# Patient Record
Sex: FEMALE | Race: WHITE | NOT HISPANIC OR LATINO | Employment: FULL TIME | URBAN - METROPOLITAN AREA
[De-identification: names, ages, dates, MRNs, and addresses within clinical notes are randomized per-mention and may not be internally consistent; named-entity substitution may affect disease eponyms.]

---

## 2017-01-11 ENCOUNTER — PATIENT MESSAGE (OUTPATIENT)
Dept: FAMILY MEDICINE | Facility: CLINIC | Age: 57
End: 2017-01-11

## 2017-01-12 RX ORDER — CLINDAMYCIN AND BENZOYL PEROXIDE 10; 50 MG/G; MG/G
GEL TOPICAL 2 TIMES DAILY
Qty: 50 G | Refills: 5 | Status: SHIPPED | OUTPATIENT
Start: 2017-01-12 | End: 2017-03-27 | Stop reason: ALTCHOICE

## 2017-01-18 ENCOUNTER — TELEPHONE (OUTPATIENT)
Dept: ORTHOPEDICS | Facility: CLINIC | Age: 57
End: 2017-01-18

## 2017-01-18 NOTE — TELEPHONE ENCOUNTER
Called patient regarding appointment needed with Dr. Beltran.  Appointment made for 2-27-17 per patient's request and slip mailed.

## 2017-02-03 DIAGNOSIS — Z12.31 OTHER SCREENING MAMMOGRAM: ICD-10-CM

## 2017-02-27 ENCOUNTER — OFFICE VISIT (OUTPATIENT)
Dept: ORTHOPEDICS | Facility: CLINIC | Age: 57
End: 2017-02-27
Payer: COMMERCIAL

## 2017-02-27 ENCOUNTER — HOSPITAL ENCOUNTER (OUTPATIENT)
Dept: RADIOLOGY | Facility: HOSPITAL | Age: 57
Discharge: HOME | End: 2017-02-27
Attending: ORTHOPAEDIC SURGERY
Payer: COMMERCIAL

## 2017-02-27 VITALS
WEIGHT: 136.25 LBS | DIASTOLIC BLOOD PRESSURE: 80 MMHG | BODY MASS INDEX: 25.72 KG/M2 | HEIGHT: 61 IN | SYSTOLIC BLOOD PRESSURE: 118 MMHG | HEART RATE: 103 BPM

## 2017-02-27 DIAGNOSIS — R52 PAIN: ICD-10-CM

## 2017-02-27 DIAGNOSIS — R52 PAIN: Primary | ICD-10-CM

## 2017-02-27 DIAGNOSIS — M20.42 HAMMER TOE OF LEFT FOOT: Primary | ICD-10-CM

## 2017-02-27 PROCEDURE — 73630 X-RAY EXAM OF FOOT: CPT | Mod: 26,LT,, | Performed by: RADIOLOGY

## 2017-02-27 PROCEDURE — 99999 PR PBB SHADOW E&M-EST. PATIENT-LVL III: CPT | Mod: PBBFAC,,, | Performed by: ORTHOPAEDIC SURGERY

## 2017-02-27 PROCEDURE — 99203 OFFICE O/P NEW LOW 30 MIN: CPT | Mod: S$GLB,,, | Performed by: ORTHOPAEDIC SURGERY

## 2017-02-27 PROCEDURE — 73630 X-RAY EXAM OF FOOT: CPT | Mod: TC,LT

## 2017-02-27 PROCEDURE — 1160F RVW MEDS BY RX/DR IN RCRD: CPT | Mod: S$GLB,,, | Performed by: ORTHOPAEDIC SURGERY

## 2017-02-27 RX ORDER — HYDROCODONE BITARTRATE AND HOMATROPINE METHYLBROMIDE ORAL SOLUTION 5; 1.5 MG/5ML; MG/5ML
LIQUID ORAL
COMMUNITY
Start: 2017-01-26 | End: 2017-03-24

## 2017-02-27 RX ORDER — AMOXICILLIN 875 MG/1
TABLET, FILM COATED ORAL
COMMUNITY
Start: 2017-01-26 | End: 2017-03-24 | Stop reason: ALTCHOICE

## 2017-02-27 NOTE — PROGRESS NOTES
DATE: 2017  PATIENT: Terese Barney    CHIEF COMPLAINT: left 2nd toe pain    HISTORY:  Terese Barney is a 56 y.o. female here for initial evaluation of her left second toe pain.  She has had it for several years and recently saw Dr Goff for this issue and was diagnosed with hammer toe and operative repair was offered.  Her pain initially began with shoe wear but has progressed now to the point that she has pain even without shoes.  She has also developed callous formation on dorsal PIP of left 2nd toe.      She has h/o BL plantar fasciitis and is s/p release in left foot.      PAST MEDICAL/SURGICAL HISTORY:  History reviewed. No pertinent past medical history.  Past Surgical History:   Procedure Laterality Date     SECTION      two    foot surgery      left plantar fascial release    laparascopy      ovarian cysts    left knee surgery      TONSILLECTOMY      TOTAL ABDOMINAL HYSTERECTOMY W/ BILATERAL SALPINGOOPHORECTOMY         Current Medications:   Current Outpatient Prescriptions:     buPROPion (WELLBUTRIN XL) 150 MG TB24 tablet, Take 1 tablet (150 mg total) by mouth once daily., Disp: 30 tablet, Rfl: 11    cevimeline (EVOXAC) 30 mg capsule, 2 (two) times daily. , Disp: , Rfl:     duloxetine (CYMBALTA) 60 MG capsule, Take 1 capsule (60 mg total) by mouth once daily., Disp: 90 capsule, Rfl: 3    ergocalciferol (ERGOCALCIFEROL) 50,000 unit Cap, Take 1 capsule (50,000 Units total) by mouth every 7 days., Disp: 15 capsule, Rfl: 3    fluticasone (FLONASE) 50 mcg/actuation nasal spray, , Disp: , Rfl:     lorazepam (ATIVAN) 1 MG tablet, 1/2-1 tablet daily as needed for severe anxiety, Disp: 30 tablet, Rfl: 5    meloxicam (MOBIC) 15 MG tablet, Take 1 tablet (15 mg total) by mouth once daily., Disp: 30 tablet, Rfl: 3    zolpidem (AMBIEN) 10 mg Tab, Take 1 tablet (10 mg total) by mouth every evening., Disp: 30 tablet, Rfl: 5    amoxicillin (AMOXIL) 875 MG tablet, , Disp: , Rfl:      "clindamycin-benzoyl peroxide (BENZACLIN) gel, Apply topically 2 (two) times daily., Disp: 50 g, Rfl: 5    hydrocodone-homatropine 5-1.5 mg/5 ml (HYCODAN) 5-1.5 mg/5 mL Syrp, , Disp: , Rfl:     Social History:   Social History     Social History    Marital status:      Spouse name: N/A    Number of children: N/A    Years of education: N/A     Occupational History          middle school     Social History Main Topics    Smoking status: Never Smoker    Smokeless tobacco: Not on file    Alcohol use Yes      Comment: rare    Drug use: No    Sexual activity: Not on file     Other Topics Concern    Not on file     Social History Narrative       REVIEW OF SYSTEMS:  Constitution: Negative. Negative for chills, fever and night sweats.   Cardiovascular: Negative for chest pain and syncope.   Respiratory: Negative for cough and shortness of breath.   Gastrointestinal: See HPI. Negative for nausea/vomiting. Negative for abdominal pain.  Genitourinary: See HPI. Negative for discoloration or dysuria.  Skin: Negative for dry skin, itching and rash.   Hematologic/Lymphatic: Negative for bleeding problem. Does not bruise/bleed easily.   Musculoskeletal: Negative for falls and muscle weakness.   Neurological: See HPI. No seizures.   Endocrine: Negative for polydipsia, polyphagia and polyuria.   Allergic/Immunologic: Negative for hives and persistent infections.    PHYSICAL EXAMINATION:    /80 (BP Location: Right arm, Patient Position: Sitting, BP Method: Automatic)  Pulse 103  Ht 5' 1" (1.549 m)  Wt 61.8 kg (136 lb 3.9 oz)  BMI 25.74 kg/m2    General: The patient is a 56 y.o. female in no apparent distress, the patient is orientatied to person, place and time.   Psych: Normal mood and affect  HEENT:  NCAT, sclera nonicteric  Lungs:  Respirations are equal and unlabored.  CV:  2+ bilateral upper and lower extremity pulses.  Skin:  Intact throughout.  Musculoskeletal: No pain with the range of " motion of the bilateral hips. No trochanteric tenderness to palpation. No pain with range of motion about the bilateral knees.    Left Foot:  - 2nd hammer toe: flexible with callus on dorsal PIP joint  - ttp to PIP 2nd toe  - full ROM without pain  - NVI        IMAGING:     Radiographs of the left foot were ordered and personally reviewed with the patient today.   - 2nd hammer toe    ASSESSMENT/PLAN:    Terese was seen today for foot pain.    Diagnoses and all orders for this visit:    Hammer toe of left foot, 2nd      No Follow-up on file.    Discussed operative intervention vs conservative management.  Given that she has had symptoms for an extended period of time and she cannot wear shoes without pain, she would like to proceed with surgery.  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      I have personally taken the history and examined this patient and agree with the residents note as stated above.

## 2017-03-01 ENCOUNTER — DOCUMENTATION ONLY (OUTPATIENT)
Dept: ORTHOPEDICS | Facility: CLINIC | Age: 57
End: 2017-03-01

## 2017-03-01 NOTE — PROGRESS NOTES
Left message for patient to return call.  Regarding change provider 03/24/17 @ 1415 & 04/10/17 @ 1000/Mailed, (RT).

## 2017-03-09 DIAGNOSIS — M20.42 HAMMER TOE OF LEFT FOOT: Primary | ICD-10-CM

## 2017-03-21 ENCOUNTER — PATIENT MESSAGE (OUTPATIENT)
Dept: ORTHOPEDICS | Facility: CLINIC | Age: 57
End: 2017-03-21

## 2017-03-24 ENCOUNTER — OFFICE VISIT (OUTPATIENT)
Dept: ORTHOPEDICS | Facility: CLINIC | Age: 57
End: 2017-03-24
Payer: COMMERCIAL

## 2017-03-24 VITALS
HEIGHT: 61 IN | DIASTOLIC BLOOD PRESSURE: 79 MMHG | SYSTOLIC BLOOD PRESSURE: 119 MMHG | WEIGHT: 137 LBS | RESPIRATION RATE: 18 BRPM | BODY MASS INDEX: 25.86 KG/M2 | HEART RATE: 98 BPM

## 2017-03-24 DIAGNOSIS — M20.42 HAMMER TOE OF LEFT FOOT: Primary | ICD-10-CM

## 2017-03-24 PROCEDURE — 99999 PR PBB SHADOW E&M-EST. PATIENT-LVL III: CPT | Mod: PBBFAC,,, | Performed by: PHYSICIAN ASSISTANT

## 2017-03-24 PROCEDURE — 99499 UNLISTED E&M SERVICE: CPT | Mod: S$GLB,,, | Performed by: PHYSICIAN ASSISTANT

## 2017-03-24 RX ORDER — HYDROCODONE BITARTRATE AND ACETAMINOPHEN 5; 325 MG/1; MG/1
1 TABLET ORAL
Qty: 60 TABLET | Refills: 0 | Status: SHIPPED | OUTPATIENT
Start: 2017-03-24 | End: 2017-03-24

## 2017-03-24 RX ORDER — PROMETHAZINE HYDROCHLORIDE 12.5 MG/1
12.5 TABLET ORAL EVERY 6 HOURS PRN
Qty: 20 TABLET | Refills: 0 | Status: SHIPPED | OUTPATIENT
Start: 2017-03-24 | End: 2018-06-29

## 2017-03-24 NOTE — H&P
Subjective:      Patient ID: Terese Barney is a 56 y.o. female.    Chief Complaint: No chief complaint on file.    Terese is a 56 y.o. female here today for a pre-operative visit in preparation for a   REPAIR-HAMMER TOE 2nd toe Left     to be performed by   on 2017.  she was last seen and treated in the clinic on 2017.  she has since seen their primary care for optimization of the chronic health concerns.  There has been no significant change in their past medical or orthopedic status since the previous visit.  No fever, chills, malaise or unexplained weight change.     No past medical history on file.  Past Surgical History:   Procedure Laterality Date     SECTION      two    foot surgery      left plantar fascial release    laparascopy      ovarian cysts    left knee surgery      TONSILLECTOMY      TOTAL ABDOMINAL HYSTERECTOMY W/ BILATERAL SALPINGOOPHORECTOMY       Social History     Occupational History          middle school     Social History Main Topics    Smoking status: Never Smoker    Smokeless tobacco: Not on file    Alcohol use Yes      Comment: rare    Drug use: No    Sexual activity: Not on file      ROS  Current Outpatient Prescriptions on File Prior to Visit   Medication Sig Dispense Refill    amoxicillin (AMOXIL) 875 MG tablet       buPROPion (WELLBUTRIN XL) 150 MG TB24 tablet Take 1 tablet (150 mg total) by mouth once daily. 30 tablet 11    cevimeline (EVOXAC) 30 mg capsule 2 (two) times daily.       clindamycin-benzoyl peroxide (BENZACLIN) gel Apply topically 2 (two) times daily. 50 g 5    duloxetine (CYMBALTA) 60 MG capsule Take 1 capsule (60 mg total) by mouth once daily. 90 capsule 3    ergocalciferol (ERGOCALCIFEROL) 50,000 unit Cap Take 1 capsule (50,000 Units total) by mouth every 7 days. 15 capsule 3    fluticasone (FLONASE) 50 mcg/actuation nasal spray       hydrocodone-homatropine 5-1.5 mg/5 ml (HYCODAN) 5-1.5 mg/5 mL  Syrp       lorazepam (ATIVAN) 1 MG tablet 1/2-1 tablet daily as needed for severe anxiety 30 tablet 5    meloxicam (MOBIC) 15 MG tablet Take 1 tablet (15 mg total) by mouth once daily. 30 tablet 3    zolpidem (AMBIEN) 10 mg Tab Take 1 tablet (10 mg total) by mouth every evening. 30 tablet 5     No current facility-administered medications on file prior to visit.      Review of patient's allergies indicates:  No Known Allergies      Objective:      There were no vitals filed for this visit.  Ortho Exam     Gen: WD, WN in NAD   HEENT: NC/AT   Heart: RR   Resp: unlabored breathing   Skin: intact, no lesions pertinent to the surgery site    Assessment:       1. Hammer toe of left foot, 2nd          Plan:       Surgical intervention per .

## 2017-03-24 NOTE — PROGRESS NOTES
Terese is a 56 y.o. female here today for a pre-operative visit in preparation for a   REPAIR-HAMMER TOE 2nd toe Left     to be performed by   on 03/28/2017.  she was last seen and treated in the clinic on 02/27/2017.  she has since seen their primary care for optimization of the chronic health concerns.  There has been no significant change in their past medical or orthopedic status since the previous visit.  No fever, chills, malaise or unexplained weight change.     Allergies, medications, past medical history and past surgical history were reviewed with the patient.     Physical examination was performed.     Consents were signed.     Pre, cesar, and post operative procedure and expectations were discussed.  Questions were answered. The patient has been educated and is ready to proceed with surgery.  Approximately 30 minutes was spent discussing surgical outcomes, plans, procedures, pre, cesar, and post operative expectations and care.  The patient will contact us if the have any questions, concerns, and changes in their medical condition prior to surgery.

## 2017-03-27 ENCOUNTER — TELEPHONE (OUTPATIENT)
Dept: ORTHOPEDICS | Facility: CLINIC | Age: 57
End: 2017-03-27

## 2017-03-27 ENCOUNTER — PATIENT MESSAGE (OUTPATIENT)
Dept: FAMILY MEDICINE | Facility: CLINIC | Age: 57
End: 2017-03-27

## 2017-03-27 RX ORDER — CLINDAMYCIN PHOSPHATE AND BENZOYL PEROXIDE 10; 50 MG/G; MG/G
GEL TOPICAL NIGHTLY
Qty: 45 G | Refills: 11 | Status: SHIPPED | OUTPATIENT
Start: 2017-03-27 | End: 2017-08-25 | Stop reason: SDUPTHER

## 2017-03-27 NOTE — PRE-PROCEDURE INSTRUCTIONS
Spoke with Patient.  NPO, medication, and pre-op instructions reviewed.  Has PONV with every Anesthesia.  Has motion sickness.  Verbalized understanding of instructions.

## 2017-03-27 NOTE — TELEPHONE ENCOUNTER
----- Message from Narinder Stanley sent at 3/27/2017  4:26 PM CDT -----  Contact: self/home  Pt would like to reschedule her post op appt.

## 2017-03-28 ENCOUNTER — HOSPITAL ENCOUNTER (OUTPATIENT)
Facility: HOSPITAL | Age: 57
Discharge: HOME | End: 2017-03-28
Attending: ORTHOPAEDIC SURGERY | Admitting: ORTHOPAEDIC SURGERY
Payer: COMMERCIAL

## 2017-03-28 ENCOUNTER — SURGERY (OUTPATIENT)
Age: 57
End: 2017-03-28

## 2017-03-28 ENCOUNTER — ANESTHESIA (OUTPATIENT)
Dept: SURGERY | Facility: HOSPITAL | Age: 57
End: 2017-03-28
Payer: COMMERCIAL

## 2017-03-28 ENCOUNTER — ANESTHESIA EVENT (OUTPATIENT)
Dept: SURGERY | Facility: HOSPITAL | Age: 57
End: 2017-03-28
Payer: COMMERCIAL

## 2017-03-28 DIAGNOSIS — M20.42 HAMMER TOE OF LEFT FOOT: ICD-10-CM

## 2017-03-28 PROCEDURE — 25000003 PHARM REV CODE 250: Performed by: ORTHOPAEDIC SURGERY

## 2017-03-28 PROCEDURE — D9220A PRA ANESTHESIA: Mod: ANES,,, | Performed by: ANESTHESIOLOGY

## 2017-03-28 PROCEDURE — 63600175 PHARM REV CODE 636 W HCPCS: Performed by: NURSE ANESTHETIST, CERTIFIED REGISTERED

## 2017-03-28 PROCEDURE — 37000009 HC ANESTHESIA EA ADD 15 MINS: Performed by: ORTHOPAEDIC SURGERY

## 2017-03-28 PROCEDURE — D9220A PRA ANESTHESIA: Mod: CRNA,,, | Performed by: NURSE ANESTHETIST, CERTIFIED REGISTERED

## 2017-03-28 PROCEDURE — 27201423 OPTIME MED/SURG SUP & DEVICES STERILE SUPPLY: Performed by: ORTHOPAEDIC SURGERY

## 2017-03-28 PROCEDURE — 28285 REPAIR OF HAMMERTOE: CPT | Mod: T1,,, | Performed by: ORTHOPAEDIC SURGERY

## 2017-03-28 PROCEDURE — 71000033 HC RECOVERY, INTIAL HOUR: Performed by: ORTHOPAEDIC SURGERY

## 2017-03-28 PROCEDURE — 36000707: Performed by: ORTHOPAEDIC SURGERY

## 2017-03-28 PROCEDURE — 71000015 HC POSTOP RECOV 1ST HR: Performed by: ORTHOPAEDIC SURGERY

## 2017-03-28 PROCEDURE — 25000003 PHARM REV CODE 250: Performed by: NURSE ANESTHETIST, CERTIFIED REGISTERED

## 2017-03-28 PROCEDURE — 36000706: Performed by: ORTHOPAEDIC SURGERY

## 2017-03-28 PROCEDURE — 71000039 HC RECOVERY, EACH ADD'L HOUR: Performed by: ORTHOPAEDIC SURGERY

## 2017-03-28 PROCEDURE — 27200671 HC STIMUCATH NEEDLE/ CATHETER: Performed by: ANESTHESIOLOGY

## 2017-03-28 PROCEDURE — 37000008 HC ANESTHESIA 1ST 15 MINUTES: Performed by: ORTHOPAEDIC SURGERY

## 2017-03-28 RX ORDER — ONDANSETRON 2 MG/ML
INJECTION INTRAMUSCULAR; INTRAVENOUS
Status: DISCONTINUED | OUTPATIENT
Start: 2017-03-28 | End: 2017-03-28

## 2017-03-28 RX ORDER — SODIUM CHLORIDE 9 MG/ML
INJECTION, SOLUTION INTRAVENOUS CONTINUOUS
Status: DISCONTINUED | OUTPATIENT
Start: 2017-03-28 | End: 2017-03-30 | Stop reason: HOSPADM

## 2017-03-28 RX ORDER — OXYCODONE AND ACETAMINOPHEN 10; 325 MG/1; MG/1
1 TABLET ORAL EVERY 4 HOURS PRN
Qty: 30 TABLET | Refills: 0 | Status: SHIPPED | OUTPATIENT
Start: 2017-03-28 | End: 2017-03-29

## 2017-03-28 RX ORDER — FENTANYL CITRATE 50 UG/ML
INJECTION, SOLUTION INTRAMUSCULAR; INTRAVENOUS
Status: DISCONTINUED | OUTPATIENT
Start: 2017-03-28 | End: 2017-03-28

## 2017-03-28 RX ORDER — ONDANSETRON 8 MG/1
8 TABLET, ORALLY DISINTEGRATING ORAL EVERY 6 HOURS PRN
Qty: 30 TABLET | Refills: 0 | Status: SHIPPED | OUTPATIENT
Start: 2017-03-28 | End: 2018-06-29

## 2017-03-28 RX ORDER — DEXAMETHASONE SODIUM PHOSPHATE 4 MG/ML
INJECTION, SOLUTION INTRA-ARTICULAR; INTRALESIONAL; INTRAMUSCULAR; INTRAVENOUS; SOFT TISSUE
Status: DISCONTINUED | OUTPATIENT
Start: 2017-03-28 | End: 2017-03-28

## 2017-03-28 RX ORDER — PROPOFOL 10 MG/ML
VIAL (ML) INTRAVENOUS CONTINUOUS PRN
Status: DISCONTINUED | OUTPATIENT
Start: 2017-03-28 | End: 2017-03-28

## 2017-03-28 RX ORDER — PROPOFOL 10 MG/ML
VIAL (ML) INTRAVENOUS
Status: DISCONTINUED | OUTPATIENT
Start: 2017-03-28 | End: 2017-03-28

## 2017-03-28 RX ORDER — ONDANSETRON 8 MG/1
8 TABLET, ORALLY DISINTEGRATING ORAL EVERY 8 HOURS PRN
Status: DISCONTINUED | OUTPATIENT
Start: 2017-03-28 | End: 2017-03-30 | Stop reason: HOSPADM

## 2017-03-28 RX ORDER — OXYCODONE HYDROCHLORIDE 5 MG/1
10 TABLET ORAL EVERY 4 HOURS PRN
Status: DISCONTINUED | OUTPATIENT
Start: 2017-03-28 | End: 2017-03-30 | Stop reason: HOSPADM

## 2017-03-28 RX ORDER — LIDOCAINE HCL/PF 100 MG/5ML
SYRINGE (ML) INTRAVENOUS
Status: DISCONTINUED | OUTPATIENT
Start: 2017-03-28 | End: 2017-03-28

## 2017-03-28 RX ORDER — MIDAZOLAM HYDROCHLORIDE 1 MG/ML
INJECTION, SOLUTION INTRAMUSCULAR; INTRAVENOUS
Status: DISCONTINUED | OUTPATIENT
Start: 2017-03-28 | End: 2017-03-28

## 2017-03-28 RX ORDER — OXYCODONE HYDROCHLORIDE 5 MG/1
5 TABLET ORAL EVERY 4 HOURS PRN
Status: DISCONTINUED | OUTPATIENT
Start: 2017-03-28 | End: 2017-03-30 | Stop reason: HOSPADM

## 2017-03-28 RX ORDER — LIDOCAINE HYDROCHLORIDE 10 MG/ML
1 INJECTION INFILTRATION; PERINEURAL ONCE
Status: COMPLETED | OUTPATIENT
Start: 2017-03-28 | End: 2017-03-28

## 2017-03-28 RX ADMIN — Medication 1 G: at 12:03

## 2017-03-28 RX ADMIN — LIDOCAINE HYDROCHLORIDE 1 ML: 10 INJECTION, SOLUTION EPIDURAL; INFILTRATION; INTRACAUDAL; PERINEURAL at 10:03

## 2017-03-28 RX ADMIN — PROPOFOL 150 MCG/KG/MIN: 10 INJECTION, EMULSION INTRAVENOUS at 12:03

## 2017-03-28 RX ADMIN — PROPOFOL 40 MG: 10 INJECTION, EMULSION INTRAVENOUS at 12:03

## 2017-03-28 RX ADMIN — SODIUM CHLORIDE: 0.9 INJECTION, SOLUTION INTRAVENOUS at 10:03

## 2017-03-28 RX ADMIN — PROPOFOL 50 MG: 10 INJECTION, EMULSION INTRAVENOUS at 12:03

## 2017-03-28 RX ADMIN — FENTANYL CITRATE 25 MCG: 50 INJECTION, SOLUTION INTRAMUSCULAR; INTRAVENOUS at 12:03

## 2017-03-28 RX ADMIN — LIDOCAINE HYDROCHLORIDE 50 MG: 20 INJECTION, SOLUTION INTRAVENOUS at 12:03

## 2017-03-28 RX ADMIN — MIDAZOLAM HYDROCHLORIDE 2 MG: 1 INJECTION, SOLUTION INTRAMUSCULAR; INTRAVENOUS at 12:03

## 2017-03-28 RX ADMIN — DEXAMETHASONE SODIUM PHOSPHATE 8 MG: 4 INJECTION, SOLUTION INTRAMUSCULAR; INTRAVENOUS at 12:03

## 2017-03-28 RX ADMIN — ONDANSETRON 4 MG: 2 INJECTION INTRAMUSCULAR; INTRAVENOUS at 12:03

## 2017-03-28 NOTE — ANESTHESIA PREPROCEDURE EVALUATION
03/28/2017  Terese Barney is a 56 y.o., female.    [facility] Anesthesia Evaluation    I have reviewed the Patient Summary Reports.    I have reviewed the Nursing Notes.   I have reviewed the Medications.     Review of Systems  Anesthesia Hx:  Hx of Anesthetic complications PONV History of prior surgery of interest to airway management or planning:  Denies Personal Hx of Anesthesia complications.   Social:  Non-Smoker, Alcohol Use    Hematology/Oncology:     Oncology Normal     EENT/Dental:EENT/Dental Normal   Cardiovascular:    Denies Angina.    Pulmonary:   Denies Shortness of breath.    Renal/:  Renal/ Normal     Hepatic/GI:  Hepatic/GI Normal    Psych:   Psychiatric History anxiety depression          Physical Exam  General:  Well nourished    Airway/Jaw/Neck:  Airway Findings: Mouth Opening: Normal Tongue: Normal  Mallampati: II  TM Distance: Normal, at least 6 cm  Jaw/Neck Findings:  Neck ROM: Normal ROM      Dental:  Dental Findings: In tact   Chest/Lungs:  Chest/Lungs Findings: Normal Respiratory Rate     Heart/Vascular:  Heart Findings: Rate: Normal        Mental Status:  Mental Status Findings:  Cooperative, Alert and Oriented         Anesthesia Plan  Type of Anesthesia, risks & benefits discussed:  Anesthesia Type:  regional, MAC  Patient's Preference:   Intra-op Monitoring Plan: standard ASA monitors  Intra-op Monitoring Plan Comments:   Post Op Pain Control Plan:   Post Op Pain Control Plan Comments:   Induction:   IV  Beta Blocker:  Patient is not currently on a Beta-Blocker (No further documentation required).       Informed Consent: Patient understands risks and agrees with Anesthesia plan.  Questions answered. Anesthesia consent signed with patient.  ASA Score: 1     Day of Surgery Review of History & Physical:    H&P update referred to the surgeon.         Ready For Surgery From Anesthesia  Perspective.

## 2017-03-28 NOTE — ANESTHESIA POSTPROCEDURE EVALUATION
"Anesthesia Post Evaluation    Patient: Terese Barney    Procedure(s) Performed: Procedure(s) (LRB):  REPAIR-HAMMER TOE 2nd toe (Left)    Final Anesthesia Type: regional  Patient location during evaluation: PACU  Patient participation: Yes- Able to Participate  Level of consciousness: awake and alert and oriented  Post-procedure vital signs: reviewed and stable  Pain management: adequate  Airway patency: patent  PONV status at discharge: No PONV  Anesthetic complications: no      Cardiovascular status: blood pressure returned to baseline and hemodynamically stable  Respiratory status: unassisted, spontaneous ventilation and room air  Hydration status: euvolemic  Follow-up not needed.        Visit Vitals    /70    Pulse 64    Temp 36.7 °C (98.1 °F) (Temporal)    Resp 16    Ht 5' 1" (1.549 m)    Wt 62.1 kg (137 lb)    SpO2 100%    Breastfeeding No    BMI 25.89 kg/m2       Pain/Veronique Score: Pain Assessment Performed: Yes (3/28/2017  2:00 PM)  Presence of Pain: denies (3/28/2017  2:30 PM)  Veronique Score: 10 (3/28/2017  2:30 PM)      "

## 2017-03-28 NOTE — TRANSFER OF CARE
"Anesthesia Transfer of Care Note    Patient: Terese Barney    Procedure(s) Performed: Procedure(s) (LRB):  REPAIR-HAMMER TOE 2nd toe (Left)    Patient location: PACU    Anesthesia Type: regional    Transport from OR: Transported from OR on 6-10 L/min O2 by face mask with adequate spontaneous ventilation    Post pain: adequate analgesia    Post assessment: no apparent anesthetic complications and tolerated procedure well    Post vital signs: stable    Level of consciousness: sedated    Nausea/Vomiting: no nausea/vomiting    Complications: none          Last vitals:   Visit Vitals    BP 98/60 (BP Location: Left arm, Patient Position: Lying, BP Method: Automatic)    Pulse 77    Temp 36.5 °C (97.7 °F) (Tympanic)    Resp 18    Ht 5' 1" (1.549 m)    Wt 62.1 kg (137 lb)    SpO2 99%    Breastfeeding No    BMI 25.89 kg/m2     "

## 2017-03-28 NOTE — BRIEF OP NOTE
[facility]  Brief Operative Note     SUMMARY     Surgery Date: 3/28/2017     Surgeon(s) and Role:     * Oscar Beltran MD - Primary     * Michael Joseph Casale, MD - Resident - Assisting        Pre-op Diagnosis:  Hammer toe of left foot [M20.42]    Post-op Diagnosis:  Post-Op Diagnosis Codes:     * Hammer toe of left foot [M20.42]    Procedure(s) (LRB):  REPAIR-HAMMER TOE 2nd toe (Left)    Anesthesia: Choice    Description of the findings of the procedure: See Op Note    Estimated Blood Loss: Minimal         Specimens:   Specimen     None          Discharge Note    SUMMARY     Admit Date: 3/28/2017    Discharge Date and Time:  03/28/2017 1:02 PM    Hospital Course (synopsis of major diagnoses, care, treatment, and services provided during the course of the hospital stay): The patient was admitted for an outpatient procedure and tolerated the procedure well with no complications.     Final Diagnosis: Post-Op Diagnosis Codes:     * Hammer toe of left foot [M20.42]    Disposition: Home or Self Care    Follow Up/Patient Instructions:     Medications:  Reconciled Home Medications:   Current Discharge Medication List      START taking these medications    Details   ondansetron (ZOFRAN-ODT) 8 MG TbDL Take 1 tablet (8 mg total) by mouth every 6 (six) hours as needed.  Qty: 30 tablet, Refills: 0      oxycodone-acetaminophen (PERCOCET)  mg per tablet Take 1 tablet by mouth every 4 (four) hours as needed for Pain.  Qty: 30 tablet, Refills: 0         CONTINUE these medications which have NOT CHANGED    Details   buPROPion (WELLBUTRIN XL) 150 MG TB24 tablet Take 1 tablet (150 mg total) by mouth once daily.  Qty: 30 tablet, Refills: 11    Associated Diagnoses: Anxiety and depression      cevimeline (EVOXAC) 30 mg capsule Take 30 mg by mouth 2 (two) times daily.       clindamycin-benzoyl peroxide gel Apply topically every evening.  Qty: 45 g, Refills: 11      duloxetine (CYMBALTA) 60 MG capsule Take  1 capsule (60 mg total) by mouth once daily.  Qty: 90 capsule, Refills: 3    Associated Diagnoses: Anxiety and depression      lorazepam (ATIVAN) 1 MG tablet 1/2-1 tablet daily as needed for severe anxiety  Qty: 30 tablet, Refills: 5    Associated Diagnoses: Anxiety and depression      meloxicam (MOBIC) 15 MG tablet Take 1 tablet (15 mg total) by mouth once daily.  Qty: 30 tablet, Refills: 3    Associated Diagnoses: Chronic back pain, unspecified back location, unspecified back pain laterality      zolpidem (AMBIEN) 10 mg Tab Take 1 tablet (10 mg total) by mouth every evening.  Qty: 30 tablet, Refills: 5    Associated Diagnoses: Insomnia, unspecified type      ergocalciferol (ERGOCALCIFEROL) 50,000 unit Cap Take 1 capsule (50,000 Units total) by mouth every 7 days.  Qty: 15 capsule, Refills: 3    Associated Diagnoses: Vitamin D deficiency      fluticasone (FLONASE) 50 mcg/actuation nasal spray 1 spray by Nasal route daily as needed.       promethazine (PHENERGAN) 12.5 MG Tab Take 1 tablet (12.5 mg total) by mouth every 6 (six) hours as needed.  Qty: 20 tablet, Refills: 0             Discharge Procedure Orders  Diet general     Keep surgical extremity elevated     No driving, operating heavy equipment or signing legal documents while taking pain medication     Call MD for:  temperature >100.4     Call MD for:  persistent nausea and vomiting     Call MD for:  severe uncontrolled pain     Call MD for:  difficulty breathing, headache or visual disturbances     Call MD for:  redness, tenderness, or signs of infection (pain, swelling, redness, odor or green/yellow discharge around incision site)     Call MD for:  hives     Call MD for:  persistent dizziness or light-headedness     Call MD for:  extreme fatigue     Weight bearing as tolerated     Change dressing (specify)   Order Comments: Keep dressing clean, dry, and intact for 3-5 days. Then, may remove and re-dress with gauze and an ACE wrap.       Follow-up  Information     Follow up with Tanisha Beasley PA-C. Go on 4/10/2017.    Specialty:  Orthopedic Surgery    Why:  For wound re-check    Contact information:    [address] QU[address] PRIYA  [facility] [id number] [phone]

## 2017-03-28 NOTE — IP AVS SNAPSHOT
[facility]  [address]  [facility] [address]  Phone: [phone]           Patient Discharge Instructions     Our goal is to set you up for success. This packet includes information on your condition, medications, and your home care. It will help you to care for yourself so you don't get sicker and need to go back to the hospital.     Please ask your nurse if you have any questions.        There are many details to remember when preparing to leave the hospital. Here is what you will need to do:    1. Take your medicine. If you are prescribed medications, review your Medication List in the following pages. You may have new medications to  at the pharmacy and others that you'll need to stop taking. Review the instructions for how and when to take your medications. Talk with your doctor or nurses if you are unsure of what to do.     2. Go to your follow-up appointments. Specific follow-up information is listed in the following pages. Your may be contacted by a transition nurse or clinical provider about future appointments. Be sure we have all of the phone numbers to reach you, if needed. Please contact your provider's office if you are unable to make an appointment.     3. Watch for warning signs. Your doctor or nurse will give you detailed warning signs to watch for and when to call for assistance. These instructions may also include educational information about your condition. If you experience any of warning signs to your health, call your doctor.               [facility]  Unless otherwise directed by your provider, please contact [facility], our nurse care line that is available for 24/7 assistance.     [phone] (toll-free)    Registered nurses in the [facility] provide clinical advisement, health education, appointment booking, and other advisory services.                    ** Verify the list of medication(s) below is accurate and up  to date. Carry this with you in case of emergency. If your medications have changed, please notify your healthcare provider.             Medication List      START taking these medications        Additional Info                      ondansetron 8 MG Tbdl   Commonly known as:  ZOFRAN-ODT   Quantity:  30 tablet   Refills:  0   Dose:  8 mg    Instructions:  Take 1 tablet (8 mg total) by mouth every 6 (six) hours as needed.     Begin Date    AM    Noon    PM    Bedtime       oxycodone-acetaminophen  mg per tablet   Commonly known as:  PERCOCET   Quantity:  30 tablet   Refills:  0   Dose:  1 tablet    Instructions:  Take 1 tablet by mouth every 4 (four) hours as needed for Pain.     Begin Date    AM    Noon    PM    Bedtime         CHANGE how you take these medications        Additional Info                      buPROPion 150 MG TB24 tablet   Commonly known as:  WELLBUTRIN XL   Quantity:  30 tablet   Refills:  11   Dose:  150 mg   What changed:  when to take this    Instructions:  Take 1 tablet (150 mg total) by mouth once daily.     Begin Date    AM    Noon    PM    Bedtime       duloxetine 60 MG capsule   Commonly known as:  CYMBALTA   Quantity:  90 capsule   Refills:  3   Dose:  60 mg   What changed:  when to take this    Instructions:  Take 1 capsule (60 mg total) by mouth once daily.     Begin Date    AM    Noon    PM    Bedtime       ergocalciferol 50,000 unit Cap   Commonly known as:  ERGOCALCIFEROL   Quantity:  15 capsule   Refills:  3   Dose:  29932 Units   What changed:  additional instructions    Instructions:  Take 1 capsule (50,000 Units total) by mouth every 7 days.     Begin Date    AM    Noon    PM    Bedtime       meloxicam 15 MG tablet   Commonly known as:  MOBIC   Quantity:  30 tablet   Refills:  3   Dose:  15 mg   What changed:    - when to take this  - reasons to take this    Instructions:  Take 1 tablet (15 mg total) by mouth once daily.     Begin Date    AM    Noon    PM    Bedtime        zolpidem 10 mg Tab   Commonly known as:  AMBIEN   Quantity:  30 tablet   Refills:  5   Dose:  10 mg   What changed:  when to take this    Instructions:  Take 1 tablet (10 mg total) by mouth every evening.     Begin Date    AM    Noon    PM    Bedtime         CONTINUE taking these medications        Additional Info                      cevimeline 30 mg capsule   Commonly known as:  EVOXAC   Refills:  0   Dose:  30 mg    Instructions:  Take 30 mg by mouth 2 (two) times daily.     Begin Date    AM    Noon    PM    Bedtime       clindamycin-benzoyl peroxide gel   Quantity:  45 g   Refills:  11    Instructions:  Apply topically every evening.     Begin Date    AM    Noon    PM    Bedtime       fluticasone 50 mcg/actuation nasal spray   Commonly known as:  FLONASE   Refills:  0   Dose:  1 spray    Instructions:  1 spray by Nasal route daily as needed.     Begin Date    AM    Noon    PM    Bedtime       lorazepam 1 MG tablet   Commonly known as:  ATIVAN   Quantity:  30 tablet   Refills:  5    Instructions:  1/2-1 tablet daily as needed for severe anxiety     Begin Date    AM    Noon    PM    Bedtime       promethazine 12.5 MG Tab   Commonly known as:  PHENERGAN   Quantity:  20 tablet   Refills:  0   Dose:  12.5 mg    Instructions:  Take 1 tablet (12.5 mg total) by mouth every 6 (six) hours as needed.     Begin Date    AM    Noon    PM    Bedtime            Where to Get Your Medications      You can get these medications from any pharmacy     Bring a paper prescription for each of these medications     ondansetron 8 MG Tbdl    oxycodone-acetaminophen  mg per tablet                  Please bring to all follow up appointments:    1. A copy of your discharge instructions.  2. All medicines you are currently taking in their original bottles.  3. Identification and insurance card.    Please arrive 15 minutes ahead of scheduled appointment time.    Please call 24 hours in advance if you must reschedule your appointment  and/or time.        Your Scheduled Appointments     Apr 10, 2017 10:00 AM CDT   Post OP with Oscar Beltran MD   [facility]    [address]  [facility] [id number] [phone]              Follow-up Information     Follow up with Tanisha Beasley PA-C. Go on 4/10/2017.    Specialty:  Orthopedic Surgery    Why:  For wound re-check    Contact information:    [address]  [facility] [id number] [phone]          Discharge Instructions     Future Orders    Call MD for:  difficulty breathing, headache or visual disturbances     Call MD for:  extreme fatigue     Call MD for:  hives     Call MD for:  persistent dizziness or light-headedness     Call MD for:  persistent nausea and vomiting     Call MD for:  redness, tenderness, or signs of infection (pain, swelling, redness, odor or green/yellow discharge around incision site)     Call MD for:  severe uncontrolled pain     Call MD for:  temperature >100.4     Change dressing (specify)     Comments:    Keep dressing clean, dry, and intact for 3-5 days. Then, may remove and re-dress with gauze and an ACE wrap.    Diet general     Questions:    Total calories:      Fat restriction, if any:      Protein restriction, if any:      Na restriction, if any:      Fluid restriction:      Additional restrictions:      Keep surgical extremity elevated     No driving, operating heavy equipment or signing legal documents while taking pain medication     Weight bearing as tolerated         Primary Diagnosis     Your primary diagnosis was:  Hammer Toe Of Left Foot      Admission Information     Date & Time Provider Department [facility]    3/28/2017  9:10 AM Oscar Beltran MD [facility] [id number]       Admisson Diagnosis: Hammer toe of left foot, Hammer toe of left foot      Care Providers     Provider Role Specialty Primary office phone    Oscar Beltran MD Attending Provider Orthopedic Surgery [phone]     "Oscar Beltran MD Surgeon  Orthopedic Surgery [phone]      Your Vitals Were     BP Pulse Temp Resp Height Weight    98/60 (BP Location: Left arm, Patient Position: Lying, BP Method: Automatic) 77 97.7 °F (36.5 °C) (Tympanic) 18 5' 1" (1.549 m) 62.1 kg (137 lb)    SpO2 BMI             99% 25.89 kg/m2         Recent Lab Values        11/8/2016                           8:50 AM           A1C 5.5           Comment for A1C at  8:50 AM on 11/8/2016:  According to ADA guidelines, hemoglobin A1C <7.0% represents  optimal control in non-pregnant diabetic patients.  Different  metrics may apply to specific populations.   Standards of Medical Care in Diabetes - 2016.  For the purpose of screening for the presence of diabetes:  <5.7%     Consistent with the absence of diabetes  5.7-6.4%  Consistent with increasing risk for diabetes   (prediabetes)  >or=6.5%  Consistent with diabetes  Currently no consensus exists for use of hemoglobin A1C  for diagnosis of diabetes for children.        Allergies as of 3/28/2017        Reactions    Codeine Nausea And Vomiting      Advance Directives     An advance directive is a document which, in the event you are no longer able to make decisions for yourself, tells your healthcare team what kind of treatment you do or do not want to receive, or who you would like to make those decisions for you.  If you do not currently have an advance directive, [facility] encourages you to create one.  For more information call:  [phone]), [phone] ([phone]), or log on to [email or web address].        Translation Services Information     ATTENTION: Language assistance services are available, free of charge. Please call [phone].    ATENCIÓN: Si habla español, tiene a gilbert disposición servicios gratuitos de asistencia lingüística. Llame al [phone].     CHÚ Ý: N?u b?n nói Ti?ng Vi?t, có các d?ch v? h? tr? ngôn ng? mi?n phí dành cho b?n. G?i s? [phone].   "       [facility] complies with applicable Federal civil rights laws and does not discriminate on the basis of race, color, national origin, age, disability, or sex.

## 2017-03-28 NOTE — DISCHARGE INSTRUCTIONS
Foot Surgery: Curled Fifth Toe  Hammertoes can make walking painful. With hammertoes, one or more toes curl or bend abnormally. This can be caused by an inherited muscle problem, an abnormal bone length, or poor foot mechanics. The affected joints can rub inside shoes, causing corns (buildups of dead skin).    Curled fifth toe  A curled fifth toe is most often inherited. When the fifth toe curls inward, it moves under the next toe. Then the nail of the curled toe starts to face outward. As a result, you may bear weight on the side of your toe instead of the bottom. This can cause corns and painful nails.  There are many nonsurgical treatments available. But if these are not effective, surgery is a choice.    Derotation arthroplasty  A wedge of skin and a section of bone are removed to help straighten (derotate) the toe. You can bear weight on your foot right after surgery. In some cases, you may need to wear a bandage, splint, and surgical shoe for a few weeks. When healed, the bones become connected with scar tissue.  Date Last Reviewed: 10/15/2015  © 7631-2595 [employer]. [address] [place] [address], [place] [address]. All rights reserved. This information is not intended as a substitute for professional medical care. Always follow your healthcare professional's instructions.

## 2017-03-28 NOTE — PLAN OF CARE
Pt to pod _21__ asleep and easily aroused by voiced. Pt oriented to immediate surroundings and situation and verbalized understanding, acceptance and satisfaction with clinical encounter.Patient arrived to unit via stretcher from___or____. Denies pain. Denies shortness of breath. Monitoring equipment placed on pt with noted VSS HRR, tele strip obtained.IVF patent per gravity and without any s/s ofnfiltration noted. Oxygen in use @ 3L VM___ Bed in lowest position. Brakes locked Call light within reach. Side rails up x2. xr @ bs with xr of left lower ext. Dressing cdi. Stabilization boot in use with noted pin protruding from 2nd toe

## 2017-03-28 NOTE — ANESTHESIA PROCEDURE NOTES
Left ankle block    Patient location during procedure: OR    Reason for block: primary anesthetic   Diagnosis: left toe pain   Start time: 3/28/2017 12:11 PM  Timeout: 3/28/2017 12:08 PM   End time: 3/28/2017 12:16 PM  Staffing  Anesthesiologist: CLAY BAUMAN  Resident/CRNA: MARIAN MACLOLM  Performed by: resident/CRNA   Peripheral Block  Patient position: supine  Prep: ChloraPrep  Patient monitoring: heart rate, cardiac monitor, continuous pulse ox, continuous capnometry and frequent blood pressure checks  Block type: ankle - saphenous, ankle - superficial peroneal and ankle - tibial  Laterality: left  Injection technique: single shot  Needle  Needle gauge: 22 G  Needle length: 1.5 in  Needle localization: anatomical landmarks and ultrasound guidance (U/S for PT block)     Assessment  Injection assessment: negative aspiration and negative parasthesia  Heart rate change: no  Slow fractionated injection: yes  Medications:  Bolus administered: 30 mL of 0.75/1.5 mepivacaine/bupivacaine

## 2017-03-28 NOTE — INTERVAL H&P NOTE
The patient has been examined and the H&P has been reviewed:    I concur with the findings and no changes have occurred since H&P was written.    Anesthesia/Surgery risks, benefits and alternative options discussed and understood by patient/family.          Active Hospital Problems    Diagnosis  POA    Hammer toe of left foot [M20.42]  Yes      Resolved Hospital Problems    Diagnosis Date Resolved POA   No resolved problems to display.

## 2017-03-29 VITALS
DIASTOLIC BLOOD PRESSURE: 70 MMHG | HEIGHT: 61 IN | HEART RATE: 64 BPM | OXYGEN SATURATION: 100 % | TEMPERATURE: 98 F | SYSTOLIC BLOOD PRESSURE: 124 MMHG | RESPIRATION RATE: 16 BRPM | WEIGHT: 137 LBS | BODY MASS INDEX: 25.86 KG/M2

## 2017-03-29 DIAGNOSIS — M20.42 HAMMER TOE OF LEFT FOOT: Primary | ICD-10-CM

## 2017-03-29 RX ORDER — TRAMADOL HYDROCHLORIDE 50 MG/1
50 TABLET ORAL
Qty: 60 TABLET | Refills: 0 | Status: SHIPPED | OUTPATIENT
Start: 2017-03-29 | End: 2017-04-08

## 2017-03-29 NOTE — OP NOTE
DATE OF PROCEDURE:  03/28/2017    PREOPERATIVE DIAGNOSIS:  Left second hammertoe.    POSTOPERATIVE DIAGNOSIS:  Left second hammertoe.    PROCEDURES PERFORMED:  Repair of left second hammertoe, PIP resection   arthroplasty.    ANESTHESIA:  Ankle block.    SURGEON:  Oscar Beltran M.D.    ASSISTANT:  Dr. Casale.    COMPLICATIONS:  There were no operative or anesthetic complications.    PROCEDURE IN DETAIL:  After ankle block was administered, the patient's left leg   was prepped and draped in sterile fashion.  The left foot was exsanguinated   with an Esmarch bandage and this was left around the ankle as a tourniquet.  An   elliptical incision was made around the dorsal callous formation over the PIP   joint of the left second toe.  Full thickness wedge of skin was excised.  The   extensor tendon was then split longitudinally and a T-shaped capsulotomy was   performed, exposing the distal portion of the proximal phalanx.  The distal   portion of proximal phalanx was removed with a microsagittal saw, allowing for   correction of flexion contracture.  A 0.045 K-wire was used to fix the PIP   resection arthroplasty.  Once this was completed, the wounds were well   irrigated.  The Esmarch was taken off of the ankle.  The toe sat in good   position with the ankle in a neutral dorsiflexion.  The skin incision was closed   with 3-0 nylon.  A sterile compressive dressing was placed.  The patient   returned to the Recovery Room in stable condition.      LUISITO/YOLETTE  dd: 03/28/2017 13:03:07 (CDT)  td: 03/28/2017 20:49:50 (ROBIN)  Doc ID   #[id number]  Job ID #[id number]    CC:

## 2017-03-29 NOTE — OP NOTE
DATE OF PROCEDURE:  03/28/2017    PREOPERATIVE DIAGNOSIS:  Left second hammertoe.    POSTOPERATIVE DIAGNOSIS:  Left second hammertoe.    PROCEDURES PERFORMED:  Repair of left second hammertoe, PIP resection   arthroplasty.    ANESTHESIA:  Ankle block.    SURGEON:  Oscar Beltran M.D.    ASSISTANT:  Dr. Casale.    COMPLICATIONS:  There were no operative or anesthetic complications.    PROCEDURE IN DETAIL:  After anesthesia was administered, the patient's left leg   was prepped and draped in a sterile fashion.  The left foot was exsanguinated   with an Esmarch bandage and this was left around the ankle as a tourniquet.  An   elliptical skin incision was made around the dorsal callous formation over the   PIP joint of the second toe.  A full thickness wedge of skin was excised.  The   extensor tendon was split longitudinally and a T-shaped capsulotomy was   performed, exposing the distal portion of the proximal phalanx.  The distal   portion of proximal phalanx was removed with a microsagittal saw allowing for   correction of the PIP flexion contracture.  The resection arthroplasty site was   fixed with a 0.045 K-wire.  The Esmarch was removed from the ankle and the toe   sat in a good position in a fully dorsiflexed position.      LUISITO/YOLETTE  dd: 03/28/2017 13:01:01 (ROBIN)  td: 03/28/2017 20:36:15 (ROBIN)  Doc ID   #[id number]  Job ID #[id number]    CC:

## 2017-03-30 ENCOUNTER — TELEPHONE (OUTPATIENT)
Dept: ORTHOPEDICS | Facility: CLINIC | Age: 57
End: 2017-03-30

## 2017-04-10 ENCOUNTER — OFFICE VISIT (OUTPATIENT)
Dept: ORTHOPEDICS | Facility: CLINIC | Age: 57
End: 2017-04-10
Payer: COMMERCIAL

## 2017-04-10 VITALS
TEMPERATURE: 98 F | HEIGHT: 61 IN | BODY MASS INDEX: 26.06 KG/M2 | WEIGHT: 138 LBS | SYSTOLIC BLOOD PRESSURE: 131 MMHG | DIASTOLIC BLOOD PRESSURE: 80 MMHG

## 2017-04-10 DIAGNOSIS — M20.42 HAMMER TOE OF LEFT FOOT: ICD-10-CM

## 2017-04-10 DIAGNOSIS — Z09 FOLLOW-UP EXAMINATION AFTER ORTHOPEDIC SURGERY: Primary | ICD-10-CM

## 2017-04-10 PROCEDURE — 99024 POSTOP FOLLOW-UP VISIT: CPT | Mod: S$GLB,,, | Performed by: ORTHOPAEDIC SURGERY

## 2017-04-10 PROCEDURE — 99999 PR PBB SHADOW E&M-EST. PATIENT-LVL III: CPT | Mod: PBBFAC,,, | Performed by: ORTHOPAEDIC SURGERY

## 2017-04-10 RX ORDER — HYDROCODONE BITARTRATE AND ACETAMINOPHEN 5; 325 MG/1; MG/1
TABLET ORAL
COMMUNITY
Start: 2017-03-27 | End: 2017-05-08

## 2017-04-11 NOTE — PROGRESS NOTES
Ms. Barney returns today.  It has been about two weeks since her left second   hammertoe repair.  Her wounds are well healed.  I removed her sutures from the   toe today.  I am going to leave the pin in for one more week.  She will return   for pin removal next week.      LUISITO/YOLETTE  dd: 04/10/2017 10:28:58 (CDT)  td: 04/11/2017 07:38:16 (CDT)  Doc ID   #[id number]  Job ID #[id number]    CC:

## 2017-04-12 ENCOUNTER — NURSE TRIAGE (OUTPATIENT)
Dept: ADMINISTRATIVE | Facility: CLINIC | Age: 57
End: 2017-04-12

## 2017-04-12 ENCOUNTER — TELEPHONE (OUTPATIENT)
Dept: ORTHOPEDICS | Facility: CLINIC | Age: 57
End: 2017-04-12

## 2017-04-12 NOTE — TELEPHONE ENCOUNTER
----- Message from Ming Todd sent at 4/12/2017  2:02 PM CDT -----  Contact: self  Patient states have tripped over something in pain,pin sticking out,need to be seen today.

## 2017-04-12 NOTE — TELEPHONE ENCOUNTER
"  Reason for Disposition   [1] SEVERE post-op pain (e.g., excruciating, pain scale 8-10) AND [2] not controlled with pain medications    Answer Assessment - Initial Assessment Questions  1. SYMPTOM: "What's the main symptom you're concerned about?" (e.g., pain, fever, vomiting)      Pain and pin is sticking out further, patient hit foot that was operated on  2. ONSET: "When did ________  start?"      today  3. SURGERY: "What surgery was performed?"      Hammer toe repair  4. DATE of SURGERY: "When was surgery performed?"       3/28/17  5. ANESTHESIA: " What type of anesthesia did you have?" (e.g., general, spinal, epidural, local)      -  6. PAIN: "Is there any pain?" If so, ask: "How bad is it?"  (Scale 1-10; or mild, moderate, severe)      severe  7. FEVER: "Do you have a fever?" If so, ask: "What is your temperature, how was it measured, and when did it start?"      -  8. VOMITING: "Is there any vomiting?" If yes, ask: "How many times?"      -  9. BLEEDING: "Is there any bleeding?" If so, ask: "How much?" and "Where?"      -  10. OTHER SYMPTOMS: "Do you have any other symptoms?" (e.g., drainage from wound, painful urination, constipation)        -    Protocols used: ST POST-OP SYMPTOMS AND [id number]  Patient is requesting to be seen today. She states she is in severe pain. Ms. Barney transferred to [facility] with Dr. Beltran's office.  "

## 2017-04-13 ENCOUNTER — PATIENT MESSAGE (OUTPATIENT)
Dept: FAMILY MEDICINE | Facility: CLINIC | Age: 57
End: 2017-04-13

## 2017-04-13 ENCOUNTER — OFFICE VISIT (OUTPATIENT)
Dept: ORTHOPEDICS | Facility: CLINIC | Age: 57
End: 2017-04-13
Payer: COMMERCIAL

## 2017-04-13 DIAGNOSIS — F32.A ANXIETY AND DEPRESSION: ICD-10-CM

## 2017-04-13 DIAGNOSIS — Z09 S/P ORTHOPEDIC SURGERY, FOLLOW-UP EXAM: ICD-10-CM

## 2017-04-13 DIAGNOSIS — F41.9 ANXIETY AND DEPRESSION: ICD-10-CM

## 2017-04-13 DIAGNOSIS — G47.00 INSOMNIA, UNSPECIFIED TYPE: ICD-10-CM

## 2017-04-13 DIAGNOSIS — M20.42 HAMMER TOE OF LEFT FOOT: Primary | ICD-10-CM

## 2017-04-13 DIAGNOSIS — E55.9 VITAMIN D DEFICIENCY: ICD-10-CM

## 2017-04-13 PROCEDURE — 99999 PR PBB SHADOW E&M-EST. PATIENT-LVL II: CPT | Mod: PBBFAC,,, | Performed by: PHYSICIAN ASSISTANT

## 2017-04-13 PROCEDURE — 99024 POSTOP FOLLOW-UP VISIT: CPT | Mod: S$GLB,,, | Performed by: PHYSICIAN ASSISTANT

## 2017-04-13 RX ORDER — BUPROPION HYDROCHLORIDE 150 MG/1
150 TABLET ORAL EVERY MORNING
Qty: 30 TABLET | Refills: 11 | Status: SHIPPED | OUTPATIENT
Start: 2017-04-13 | End: 2017-04-23 | Stop reason: SDUPTHER

## 2017-04-13 RX ORDER — LORAZEPAM 1 MG/1
TABLET ORAL
Qty: 30 TABLET | Refills: 0 | Status: SHIPPED | OUTPATIENT
Start: 2017-04-13 | End: 2017-04-23 | Stop reason: SDUPTHER

## 2017-04-13 RX ORDER — CEVIMELINE HYDROCHLORIDE 30 MG/1
30 CAPSULE ORAL 2 TIMES DAILY
Qty: 90 CAPSULE | Refills: 0 | Status: SHIPPED | OUTPATIENT
Start: 2017-04-13 | End: 2017-04-23 | Stop reason: SDUPTHER

## 2017-04-13 RX ORDER — ERGOCALCIFEROL 1.25 MG/1
50000 CAPSULE ORAL
Qty: 12 CAPSULE | Refills: 0 | Status: SHIPPED | OUTPATIENT
Start: 2017-04-13 | End: 2017-04-23 | Stop reason: SDUPTHER

## 2017-04-13 RX ORDER — ZOLPIDEM TARTRATE 10 MG/1
10 TABLET ORAL NIGHTLY
Qty: 30 TABLET | Refills: 0 | Status: SHIPPED | OUTPATIENT
Start: 2017-04-13 | End: 2017-04-23 | Stop reason: SDUPTHER

## 2017-04-13 NOTE — PROGRESS NOTES
Ms. Barney is here today for increased pain in her toe as well as pin pulled out. Patient stated that yesterday she tripped while at work and the pin in her toe pulled out some.  Patient is s/p  Repair of left second hammertoe, PIP resection arthroplasty by  on 03/28/2107.    Physical exam:  Dressing taken down Incision is clean, dry and intact. Pin sticking out of toe 1/2 inch mild swelling no signs of infection.      Assessment:  Post-op visit     Plan:  - Pin removed in clinic by .   - wound dressed and taped down.   - weight bearing as tolerated   - return to clinic in 2 weeks with .

## 2017-04-17 ENCOUNTER — HOSPITAL ENCOUNTER (OUTPATIENT)
Dept: RADIOLOGY | Facility: HOSPITAL | Age: 57
Discharge: HOME | End: 2017-04-17
Attending: FAMILY MEDICINE
Payer: COMMERCIAL

## 2017-04-17 DIAGNOSIS — Z12.31 OTHER SCREENING MAMMOGRAM: ICD-10-CM

## 2017-04-17 PROCEDURE — 77067 SCR MAMMO BI INCL CAD: CPT | Mod: TC

## 2017-04-17 PROCEDURE — 77067 SCR MAMMO BI INCL CAD: CPT | Mod: 26,,, | Performed by: RADIOLOGY

## 2017-04-17 PROCEDURE — 77063 BREAST TOMOSYNTHESIS BI: CPT | Mod: 26,,, | Performed by: RADIOLOGY

## 2017-04-18 ENCOUNTER — TELEPHONE (OUTPATIENT)
Dept: ORTHOPEDICS | Facility: CLINIC | Age: 57
End: 2017-04-18

## 2017-04-18 NOTE — TELEPHONE ENCOUNTER
----- Message from Ray Buenrostro sent at 4/18/2017  3:06 PM CDT -----  Contact: self  Pt request a call to reschedule her PO.

## 2017-04-19 ENCOUNTER — PATIENT MESSAGE (OUTPATIENT)
Dept: ORTHOPEDICS | Facility: CLINIC | Age: 57
End: 2017-04-19

## 2017-04-20 ENCOUNTER — TELEPHONE (OUTPATIENT)
Dept: ORTHOPEDICS | Facility: CLINIC | Age: 57
End: 2017-04-20

## 2017-04-20 ENCOUNTER — PATIENT MESSAGE (OUTPATIENT)
Dept: ORTHOPEDICS | Facility: CLINIC | Age: 57
End: 2017-04-20

## 2017-04-20 ENCOUNTER — OFFICE VISIT (OUTPATIENT)
Dept: ORTHOPEDICS | Facility: CLINIC | Age: 57
End: 2017-04-20
Payer: COMMERCIAL

## 2017-04-20 VITALS
BODY MASS INDEX: 25.84 KG/M2 | DIASTOLIC BLOOD PRESSURE: 86 MMHG | SYSTOLIC BLOOD PRESSURE: 125 MMHG | HEIGHT: 61 IN | WEIGHT: 136.88 LBS | HEART RATE: 108 BPM

## 2017-04-20 DIAGNOSIS — Z09 FOLLOW-UP EXAMINATION AFTER ORTHOPEDIC SURGERY: Primary | ICD-10-CM

## 2017-04-20 DIAGNOSIS — M20.42 HAMMER TOE OF LEFT FOOT: ICD-10-CM

## 2017-04-20 PROCEDURE — 99024 POSTOP FOLLOW-UP VISIT: CPT | Mod: S$GLB,,, | Performed by: ORTHOPAEDIC SURGERY

## 2017-04-20 PROCEDURE — 99999 PR PBB SHADOW E&M-EST. PATIENT-LVL III: CPT | Mod: PBBFAC,,, | Performed by: ORTHOPAEDIC SURGERY

## 2017-04-20 NOTE — TELEPHONE ENCOUNTER
Returned call. Scheduled an appointment for today. We will talk about the note for work when she comes in.

## 2017-04-20 NOTE — TELEPHONE ENCOUNTER
----- Message from Alicia Sher sent at 4/19/2017  5:07 PM CDT -----  Contact: self  Terese  Is scheduled for 04/27/17 to come in and see Dr. Beltran. Pt was scheduled on the 04/24/17, but canceled appt.  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 Beltran has released pt.  Pt needs a new release date to give for her job.  Pt is also stating that her left foot, second toe after big toe is swollen and in pain.  Pt's pain level is a 6 and toe is also red and look like it may possibly have puss in toe    Please contact Terese as soon as possible at [phone]    Thank you

## 2017-04-21 NOTE — PROGRESS NOTES
Ms. Barney returns today.  She was not scheduled for followup and was just seen   this one week ago.  She had had an injury to her foot while working, and when   she came back last week a pin was coming out, so in addition to removing the   sutures, I had to remove her pin as well.  We had hoped to leave the pin in for   at least three weeks.  She called in today because she was concerned that she   might have an infection.  So, I had her come in.  On exam, her wounds are well   healed.  She has a normal amount of swelling with some mild erythema, but it   does not appear to be infected.  The toe is maintaining a decent correction   despite early pin removal.  So at this point, I am going to have her continue   out of work as this is where her injury occurred.  I am going to have her return   to see me in about three weeks for followup.  If she is doing well, we will let   her return to work at that point.      LUISITO/YOLETTE  dd: 04/20/2017 10:31:54 (ROBIN)  td: 04/21/2017 00:31:25 (ROBIN)  Doc ID   #[id number]  Job ID #[id number]    CC:

## 2017-04-22 ENCOUNTER — PATIENT MESSAGE (OUTPATIENT)
Dept: FAMILY MEDICINE | Facility: CLINIC | Age: 57
End: 2017-04-22

## 2017-04-22 DIAGNOSIS — F32.A ANXIETY AND DEPRESSION: ICD-10-CM

## 2017-04-22 DIAGNOSIS — F41.9 ANXIETY AND DEPRESSION: ICD-10-CM

## 2017-04-22 DIAGNOSIS — E55.9 VITAMIN D DEFICIENCY: ICD-10-CM

## 2017-04-22 DIAGNOSIS — G47.00 INSOMNIA, UNSPECIFIED TYPE: ICD-10-CM

## 2017-04-24 RX ORDER — ZOLPIDEM TARTRATE 10 MG/1
10 TABLET ORAL NIGHTLY
Qty: 90 TABLET | Refills: 5 | Status: SHIPPED | OUTPATIENT
Start: 2017-04-24 | End: 2017-06-30 | Stop reason: SDUPTHER

## 2017-04-24 RX ORDER — DULOXETIN HYDROCHLORIDE 60 MG/1
60 CAPSULE, DELAYED RELEASE ORAL EVERY MORNING
Qty: 90 CAPSULE | Refills: 4 | Status: SHIPPED | OUTPATIENT
Start: 2017-04-24 | End: 2017-05-02 | Stop reason: SDUPTHER

## 2017-04-24 RX ORDER — BUPROPION HYDROCHLORIDE 150 MG/1
150 TABLET ORAL EVERY MORNING
Qty: 90 TABLET | Refills: 4 | Status: SHIPPED | OUTPATIENT
Start: 2017-04-24 | End: 2017-08-25 | Stop reason: SDUPTHER

## 2017-04-24 RX ORDER — LORAZEPAM 1 MG/1
TABLET ORAL
Qty: 45 TABLET | Refills: 5 | Status: SHIPPED | OUTPATIENT
Start: 2017-04-24 | End: 2017-06-29 | Stop reason: SDUPTHER

## 2017-04-24 RX ORDER — ERGOCALCIFEROL 1.25 MG/1
50000 CAPSULE ORAL
Qty: 15 CAPSULE | Refills: 4 | Status: SHIPPED | OUTPATIENT
Start: 2017-04-24 | End: 2017-11-17 | Stop reason: ALTCHOICE

## 2017-04-24 RX ORDER — CEVIMELINE HYDROCHLORIDE 30 MG/1
30 CAPSULE ORAL 2 TIMES DAILY
Qty: 180 CAPSULE | Refills: 4 | Status: SHIPPED | OUTPATIENT
Start: 2017-04-24 | End: 2018-07-30 | Stop reason: SDUPTHER

## 2017-04-28 ENCOUNTER — PATIENT MESSAGE (OUTPATIENT)
Dept: ORTHOPEDICS | Facility: CLINIC | Age: 57
End: 2017-04-28

## 2017-05-01 ENCOUNTER — HOSPITAL ENCOUNTER (OUTPATIENT)
Dept: RADIOLOGY | Facility: HOSPITAL | Age: 57
Discharge: HOME | End: 2017-05-01
Attending: ORTHOPAEDIC SURGERY
Payer: COMMERCIAL

## 2017-05-01 ENCOUNTER — OFFICE VISIT (OUTPATIENT)
Dept: ORTHOPEDICS | Facility: CLINIC | Age: 57
End: 2017-05-01
Payer: COMMERCIAL

## 2017-05-01 VITALS — HEIGHT: 61 IN | BODY MASS INDEX: 25.68 KG/M2 | WEIGHT: 136 LBS

## 2017-05-01 DIAGNOSIS — Z09 FOLLOW-UP EXAMINATION AFTER ORTHOPEDIC SURGERY: ICD-10-CM

## 2017-05-01 DIAGNOSIS — M20.42 HAMMER TOE OF LEFT FOOT: Primary | ICD-10-CM

## 2017-05-01 DIAGNOSIS — M20.42 HAMMER TOE OF LEFT FOOT: ICD-10-CM

## 2017-05-01 PROCEDURE — 73630 X-RAY EXAM OF FOOT: CPT | Mod: TC,LT

## 2017-05-01 PROCEDURE — 73630 X-RAY EXAM OF FOOT: CPT | Mod: 26,LT,, | Performed by: RADIOLOGY

## 2017-05-01 PROCEDURE — 99999 PR PBB SHADOW E&M-EST. PATIENT-LVL II: CPT | Mod: PBBFAC,,, | Performed by: ORTHOPAEDIC SURGERY

## 2017-05-01 PROCEDURE — 99024 POSTOP FOLLOW-UP VISIT: CPT | Mod: S$GLB,,, | Performed by: ORTHOPAEDIC SURGERY

## 2017-05-02 ENCOUNTER — PATIENT MESSAGE (OUTPATIENT)
Dept: ORTHOPEDICS | Facility: CLINIC | Age: 57
End: 2017-05-02

## 2017-05-02 ENCOUNTER — OFFICE VISIT (OUTPATIENT)
Dept: PSYCHIATRY | Facility: CLINIC | Age: 57
End: 2017-05-02
Payer: COMMERCIAL

## 2017-05-02 VITALS
SYSTOLIC BLOOD PRESSURE: 133 MMHG | BODY MASS INDEX: 25.94 KG/M2 | HEIGHT: 61 IN | DIASTOLIC BLOOD PRESSURE: 79 MMHG | HEART RATE: 102 BPM | WEIGHT: 137.38 LBS

## 2017-05-02 DIAGNOSIS — F32.A ANXIETY AND DEPRESSION: ICD-10-CM

## 2017-05-02 DIAGNOSIS — F33.1 MDD (MAJOR DEPRESSIVE DISORDER), RECURRENT EPISODE, MODERATE: Primary | ICD-10-CM

## 2017-05-02 DIAGNOSIS — F41.9 ANXIETY AND DEPRESSION: ICD-10-CM

## 2017-05-02 DIAGNOSIS — F41.1 ANXIETY STATE, UNSPECIFIED: ICD-10-CM

## 2017-05-02 DIAGNOSIS — G89.4 CHRONIC PAIN SYNDROME: ICD-10-CM

## 2017-05-02 DIAGNOSIS — G47.00 INSOMNIA, UNSPECIFIED TYPE: ICD-10-CM

## 2017-05-02 PROCEDURE — 1160F RVW MEDS BY RX/DR IN RCRD: CPT | Mod: S$GLB,,, | Performed by: PSYCHIATRY & NEUROLOGY

## 2017-05-02 PROCEDURE — 99204 OFFICE O/P NEW MOD 45 MIN: CPT | Mod: S$GLB,,, | Performed by: PSYCHIATRY & NEUROLOGY

## 2017-05-02 PROCEDURE — 99999 PR PBB SHADOW E&M-EST. PATIENT-LVL III: CPT | Mod: PBBFAC,,, | Performed by: PSYCHIATRY & NEUROLOGY

## 2017-05-02 RX ORDER — DULOXETIN HYDROCHLORIDE 60 MG/1
60 CAPSULE, DELAYED RELEASE ORAL 2 TIMES DAILY
Qty: 180 CAPSULE | Refills: 1 | Status: SHIPPED | OUTPATIENT
Start: 2017-05-02 | End: 2018-07-30 | Stop reason: SDUPTHER
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
## 2017-05-02 NOTE — PATIENT INSTRUCTIONS
Increase Cymbalta to 120 mg daily.    Continue all other medications with refills as noted.    Return to clinic in  for follow up.

## 2017-05-02 NOTE — MR AVS SNAPSHOT
[facility]  [address]  [facility] [address]  Phone: [phone]  Fax: [phone]                  Terese Barney   2017 1:00 PM   Office Visit    Description:  Female : 1960   Provider:  Anant Pulido MD   Department:  [facility]           Diagnoses this Visit        Comments    Anxiety and depression         Anxiety and depression     trial of Cymbalta 30 mg daily and Wellbutrin 150 mg XL           To Do List           Future Appointments        Provider Department Dept Phone    2017 9:00 AM MD [facility] [phone]      Goals (5 Years of Data)     None       These Medications        Disp Refills Start End    duloxetine (CYMBALTA) 60 MG capsule 180 capsule 1 2017     Take 1 capsule (60 mg total) by mouth 2 (two) times daily. - Oral    Pharmacy: [facility] - [address] #: [phone]       Notes to Pharmacy: Please note: increase in dosage      [facility]     [facility] On Call Nurse Care Line -  Assistance  Unless otherwise directed by your provider, please contact [facility] On-Call, our nurse care line that is available for  assistance.     Registered nurses in the [facility] On Call Center provide: appointment scheduling, clinical advisement, health education, and other advisory services.  Call: [phone] (toll free)               Medications           Message regarding Medications     Verify the changes and/or additions to your medication regime listed below are the same as discussed with your clinician today.  If any of these changes or additions are incorrect, please notify your healthcare provider.        CHANGE how you are taking these medications     Start Taking Instead of    duloxetine (CYMBALTA) 60 MG capsule duloxetine (CYMBALTA) 60 MG capsule    Dosage:  Take 1 capsule (60 mg total) by mouth 2 (two) times daily. Dosage:  Take 1 capsule  "(60 mg total) by mouth every morning.    Reason for Change:  Reorder            Verify that the below list of medications is an accurate representation of the medications you are currently taking.  If none reported, the list may be blank. If incorrect, please contact your healthcare provider. Carry this list with you in case of emergency.           Current Medications     buPROPion (WELLBUTRIN XL) 150 MG TB24 tablet Take 1 tablet (150 mg total) by mouth every morning.    cevimeline (EVOXAC) 30 mg capsule Take 1 capsule (30 mg total) by mouth 2 (two) times daily.    clindamycin-benzoyl peroxide gel Apply topically every evening.    duloxetine (CYMBALTA) 60 MG capsule Take 1 capsule (60 mg total) by mouth 2 (two) times daily.    ergocalciferol (ERGOCALCIFEROL) 50,000 unit Cap Take 1 capsule (50,000 Units total) by mouth every 7 days. Takes on Sundays    fluticasone (FLONASE) 50 mcg/actuation nasal spray 1 spray by Nasal route daily as needed.     hydrocodone-acetaminophen 5-325mg (NORCO) 5-325 mg per tablet     lorazepam (ATIVAN) 1 MG tablet 1/2-1 tablet daily as needed for severe anxiety    meloxicam (MOBIC) 15 MG tablet Take 1 tablet (15 mg total) by mouth once daily.    ondansetron (ZOFRAN-ODT) 8 MG TbDL Take 1 tablet (8 mg total) by mouth every 6 (six) hours as needed.    promethazine (PHENERGAN) 12.5 MG Tab Take 1 tablet (12.5 mg total) by mouth every 6 (six) hours as needed.    zolpidem (AMBIEN) 10 mg Tab Take 1 tablet (10 mg total) by mouth nightly.           Clinical Reference Information           Your Vitals Were     BP Pulse Height Weight BMI    133/79 102 5' 1" (1.549 m) 62.3 kg (137 lb 6.4 oz) 25.96 kg/m2      Blood Pressure          Most Recent Value    BP  133/79      Allergies as of 5/2/2017     Codeine      Immunizations Administered on Date of Encounter - 5/2/2017     None      Instructions    Increase Cymbalta to 120 mg daily.    Continue all other medications with refills as noted.    Return to " clinic in  for follow up.         Language Assistance Services     ATTENTION: Language assistance services are available, free of charge. Please call [phone].      ATENCIÓN: Si habla cynthia, tiene a gilbert disposición servicios gratuitos de asistencia lingüística. Llame al [phone].     CHÚ Ý: N?u b?n nói Ti?ng Vi?t, có các d?ch v? h? tr? ngôn ng? mi?n phí dành cho b?n. G?i s? [phone].         [facility] complies with applicable Federal civil rights laws and does not discriminate on the basis of race, color, national origin, age, disability, or sex.

## 2017-05-02 NOTE — PROGRESS NOTES
Ms. Barney returns today for followup.  She came in because of concern that   there may be some metal still left in her toe.  She states she felt some sharp   kind of pain at the tip of the toe and thought she saw something where the pin   had been previously.  On exam, she has a little bit of hyperkeratotic area where   the pin had been.  I used a #15 blade to shave the area down and there was   noted to be no loose bodies.  I ordered an x-ray as well, which did not show any   metal in her toe.  After she had the callus down, she stated that it felt   better.  I am going to have her return to see me as previously scheduled.      LUISITO/YOLETTE  dd: 05/01/2017 10:26:41 (CDT)  td: 05/02/2017 01:45:11 (CDT)  Doc ID   #[id number]  Job ID #[id number]    CC:

## 2017-05-03 ENCOUNTER — PATIENT MESSAGE (OUTPATIENT)
Dept: ORTHOPEDICS | Facility: CLINIC | Age: 57
End: 2017-05-03

## 2017-05-05 PROBLEM — F41.1 ANXIETY STATE, UNSPECIFIED: Status: ACTIVE | Noted: 2017-05-05

## 2017-05-05 PROBLEM — G89.4 CHRONIC PAIN SYNDROME: Status: ACTIVE | Noted: 2017-05-05

## 2017-05-06 ENCOUNTER — PATIENT MESSAGE (OUTPATIENT)
Dept: ORTHOPEDICS | Facility: CLINIC | Age: 57
End: 2017-05-06

## 2017-05-08 ENCOUNTER — OFFICE VISIT (OUTPATIENT)
Dept: ORTHOPEDICS | Facility: CLINIC | Age: 57
End: 2017-05-08
Payer: COMMERCIAL

## 2017-05-08 VITALS — WEIGHT: 137.38 LBS | BODY MASS INDEX: 25.94 KG/M2 | HEIGHT: 61 IN

## 2017-05-08 DIAGNOSIS — M20.42 HAMMER TOE OF LEFT FOOT: ICD-10-CM

## 2017-05-08 DIAGNOSIS — Z09 FOLLOW-UP EXAMINATION AFTER ORTHOPEDIC SURGERY: Primary | ICD-10-CM

## 2017-05-08 PROCEDURE — 99024 POSTOP FOLLOW-UP VISIT: CPT | Mod: S$GLB,,, | Performed by: ORTHOPAEDIC SURGERY

## 2017-05-08 PROCEDURE — 99999 PR PBB SHADOW E&M-EST. PATIENT-LVL II: CPT | Mod: PBBFAC,,, | Performed by: ORTHOPAEDIC SURGERY

## 2017-05-09 NOTE — PROGRESS NOTES
Ms. Barney returns today.  It has been six weeks since her left second   hammertoe repair.  She had an incident when she returned to work after surgery   where her pin got dislodged and we had removed the pin early.  She returns today   for followup.  She has continued swelling and redness, which would be expected   at this point.  She has mild discomfort.  She has not really tried to get an   enclosed shoe on.  She would like to return to work tomorrow, so I would agree   to this as long as she can limit her standing and walking as needed.  She may   also need to wear open toed shoe or a spacious athletic shoe.  I am going to   have her return to see me in six weeks if necessary.      LUISITO/YOLETTE  dd: 05/08/2017 09:27:04 (CDT)  td: 05/09/2017 04:06:45 (ROBIN)  Doc ID   #[id number]  Job ID #[id number]    CC:

## 2017-05-10 ENCOUNTER — PATIENT MESSAGE (OUTPATIENT)
Dept: ORTHOPEDICS | Facility: CLINIC | Age: 57
End: 2017-05-10

## 2017-05-15 ENCOUNTER — TELEPHONE (OUTPATIENT)
Dept: ORTHOPEDICS | Facility: CLINIC | Age: 57
End: 2017-05-15

## 2017-05-15 NOTE — TELEPHONE ENCOUNTER
Spoke with pt.   Advised that letter has been written for return to work with restrictions.   Emailed letter as requested

## 2017-05-15 NOTE — TELEPHONE ENCOUNTER
----- Message from Ming Todd sent at 5/15/2017  9:47 AM CDT -----  Contact: self   Patient states missed a call from Kristan and ask for a call back

## 2017-05-24 ENCOUNTER — OFFICE VISIT (OUTPATIENT)
Dept: PSYCHIATRY | Facility: CLINIC | Age: 57
End: 2017-05-24
Payer: COMMERCIAL

## 2017-05-24 VITALS
HEIGHT: 62 IN | HEART RATE: 99 BPM | WEIGHT: 138.38 LBS | BODY MASS INDEX: 25.47 KG/M2 | DIASTOLIC BLOOD PRESSURE: 67 MMHG | SYSTOLIC BLOOD PRESSURE: 146 MMHG

## 2017-05-24 DIAGNOSIS — G89.4 CHRONIC PAIN SYNDROME: ICD-10-CM

## 2017-05-24 DIAGNOSIS — F41.1 ANXIETY STATE, UNSPECIFIED: ICD-10-CM

## 2017-05-24 DIAGNOSIS — G47.00 INSOMNIA, UNSPECIFIED TYPE: ICD-10-CM

## 2017-05-24 DIAGNOSIS — F33.1 MDD (MAJOR DEPRESSIVE DISORDER), RECURRENT EPISODE, MODERATE: Primary | ICD-10-CM

## 2017-05-24 PROCEDURE — 99999 PR PBB SHADOW E&M-EST. PATIENT-LVL II: CPT | Mod: PBBFAC,,, | Performed by: PSYCHIATRY & NEUROLOGY

## 2017-05-24 PROCEDURE — 90836 PSYTX W PT W E/M 45 MIN: CPT | Mod: S$GLB,,, | Performed by: PSYCHIATRY & NEUROLOGY

## 2017-05-24 PROCEDURE — 99213 OFFICE O/P EST LOW 20 MIN: CPT | Mod: S$GLB,,, | Performed by: PSYCHIATRY & NEUROLOGY

## 2017-05-24 NOTE — PROGRESS NOTES
"Outpatient Psychiatry Follow-Up Visit (MD/NP)    5/24/2017    Clinical Status of Patient:  Outpatient (Ambulatory)    Session Length:  60 minutes (E&M plus psychotherapy)     Chief Complaint:  Terese Barney is a 56 y.o. female who presents today for follow-up of anxiety, depression, relationship stress and bereavement.  Met with patient.      Interval History and Content of Current Session:  Interim Events/Subjective Report/Content of Current Session: First appt since initial evaluation on 5/2/2017 (see my note in computer medical record).    Med plan at last appt:  "Increase Cymbalta to 120 mg daily.  Risks/benefits of SNRI's noted, including potential for nausea, constipation, diarrhea, headache, sexual adverse effects, hypertension and, rarely, hyponatremia.  I also noted SNRI's are NOT habit forming and can benefit uncontrolled anxiety and depression.    Continue all other medications with refills as noted."      She has tried to increase the Cymbalta to 120 mg daily; however, it has caused worsening of her chronic dry mouth.  Therefore, she has not been taking 2 of the Cymbalta 60 mg capsules per day, but has been taking basically 2 caps every other day with 1 cap on alternating days.   However, she has had a lot of problems with dry mouth even before the increase in Cymbalta.  She has a long Hx of xerostomia -- she has been using Evoxac twice daily that was Rx by another provider (not sure if it helps).      She is still reeling from the loss of the relationship (see my initial eval note) -- self disclosure noted.  Pt describes him as very narcissistic, very selfish; "he used me".  She notes he was similar compared to her first  who was also very selfish and used her (though circumstances were different).       She takes 1/2 of a 10 mg Ambien at bedtime.  She has some restless nights at times.    She hates taking ANY medication in general.      She denies any current physical complaints (besides dry " "mouth).      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  She is considering other options, such as coding school,  school, etc -- self disclosure noted.      Current SIGECAPS:    Sleep -- somewhat better with Ambien  Interests -- decreased   Guilt -- denies excessive   Energy -- decreased   Concentration -- decreased  Appetite -- fluctuates   Psychomotor -- decreased  Suicidal ideation -- + passive at times; denies active     From my initial eval on 5/2/2017:  Since her move back to the [facility], she has been living with her parents in [place].  Her dad is 82 yo and her mom is 79 yo.  She must stay with them because she cannot afford to live on her own and try to pay off the debt that she has accrued.   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  She calls this "the job from Hell".  She states there is essentially nothing that is good about it -- the kids are highly disrespectful, obviously don't want to learn, have stolen things from teachers and classrooms, resources are woefully lacking; she also has problems with parents and administration, as well as all of the bureaucracy and paperwork.  She is currently on medical leave s/p surgery to her L foot to correct hammer toe with subsequent accidental injury to the toe while at work.  She states she will not be returning to this job next fall and is already trying to find other work, but NOT as a teacher.    She states she did recently get an opportunity to train as a .  However, she is not sure about this because she would like to be close to her parents as they get older.        PSYCHOTHERAPY ADD-ON +[id number]   45 (38 - 52*) minutes  · Target symptoms: depression, anxiety , adjustment, grief  · Why chosen therapy is appropriate versus another modality: " relevant to diagnosis, patient responds to this modality  · Outcome monitoring methods: self-report, lab data, observation  · Therapeutic intervention type: insight oriented psychotherapy, supportive psychotherapy  · Topics discussed/themes: relationships difficulties, difficulty managing affect in interpersonal relationships   · The patient's response to the intervention is motivated.   · The patient's progress toward treatment goals is limited.   · Duration of intervention: 45 minutes.    Review of Systems   GENERAL:  No recent weight gain or loss  SKIN:  No rashes or lacerations  HEAD:  No headaches  EYES:  No exophthalmos, jaundice or blindness  EARS:  No dizziness, tinnitus or hearing loss  NOSE:  No changes in smell  MOUTH & THROAT:  No dyskinetic movements or obvious goiter  CHEST:  No shortness of breath, hyperventilation or cough  CARDIOVASCULAR:  No tachycardia or chest pain  ABDOMEN:  No nausea, vomiting, pain, constipation or diarrhea  URINARY:  No frequency, dysuria or sexual dysfunction  ENDOCRINE:  No polydipsia, polyuria  MUSCULOSKELETAL:  Chronic lower back pain  NEUROLOGIC:  No weakness, sensory changes, seizures, confusion, memory loss, tremor or other abnormal movements     Past Psychiatric History:  She had some counseling in the past; did not find this helpful.    Prior to seeing me in the initial evaluation, pt was taking Wellbutrin XL, Cymbalta daily, as well as PRN lorazepam and Ambien as noted (Rx most recently by PCP).  Cymbalta is for BOTH depression and chronic pain.      Past Medical, Family and Social History: The patient's past medical, family and social history have been reviewed and updated as appropriate within the electronic medical record -- see encounter notes, including my initial eval on 5/2/2017.     Current Medications:    Current Outpatient Prescriptions on File Prior to Visit   Medication Sig Dispense Refill    buPROPion (WELLBUTRIN XL) 150 MG TB24 tablet Take 1 tablet  "(150 mg total) by mouth every morning. 90 tablet 4    cevimeline (EVOXAC) 30 mg capsule Take 1 capsule (30 mg total) by mouth 2 (two) times daily. 180 capsule 4    clindamycin-benzoyl peroxide gel Apply topically every evening. 45 g 11    duloxetine (CYMBALTA) 60 MG capsule Take 1 capsule (60 mg total) by mouth 2 (two) times daily. 180 capsule 1    ergocalciferol (ERGOCALCIFEROL) 50,000 unit Cap Take 1 capsule (50,000 Units total) by mouth every 7 days. Takes on Sundays 15 capsule 4    fluticasone (FLONASE) 50 mcg/actuation nasal spray 1 spray by Nasal route daily as needed.       lorazepam (ATIVAN) 1 MG tablet 1/2-1 tablet daily as needed for severe anxiety 45 tablet 5    meloxicam (MOBIC) 15 MG tablet Take 1 tablet (15 mg total) by mouth once daily. (Patient taking differently: Take 15 mg by mouth daily as needed. ) 30 tablet 3    ondansetron (ZOFRAN-ODT) 8 MG TbDL Take 1 tablet (8 mg total) by mouth every 6 (six) hours as needed. 30 tablet 0    promethazine (PHENERGAN) 12.5 MG Tab Take 1 tablet (12.5 mg total) by mouth every 6 (six) hours as needed. 20 tablet 0    zolpidem (AMBIEN) 10 mg Tab Take 1 tablet (10 mg total) by mouth nightly. 90 tablet 5     No current facility-administered medications on file prior to visit.        Compliance: see above     Side effects: xerostomia    Risk Parameters:  Patient reports no suicidal ideation  Patient reports no homicidal ideation  Patient reports no self-injurious behavior  Patient reports no violent behavior    Exam (detailed: at least 9 elements; comprehensive: all 15 elements)   Constitutional  Vitals:  Most recent vital signs, dated less than 90 days prior to this appointment, were reviewed.   Vitals:    05/24/17 1541   BP: (!) 146/67   Pulse: 99   Weight: 62.8 kg (138 lb 6.4 oz)   Height: 5' 2" (1.575 m)     Vitals - 1 value per visit 5/1/2017 5/2/2017 5/8/2017   SYSTOLIC  133    DIASTOLIC  79    PULSE  102    TEMPERATURE      RESPIRATIONS      SPO2    " "  Weight (lb) 136 137.4 137.35   Weight (kg) 61.689 62.324 62.3   HEIGHT 5' 1" 5' 1" 5' 1"   BODY MASS INDEX 25.7 25.96 25.95   VISIT REPORT      Pain Score  4  3          General:  unremarkable, age appropriate, well nourished, casually dressed, neatly groomed, cooperative, good eye contact, engages well with interviewer     Musculoskeletal  Muscle Strength/Tone:  not examined   Gait & Station:  non-ataxic     Psychiatric  Speech:  spontaneous, mildly pressured, but interruptible; good articulation   Mood & Affect:  anxious, depressed  congruent and appropriate   Thought Process:  logical, circumstantial   Associations:  intact, circumstantial   Thought Content:  normal, no suicidality, no homicidality, delusions, or paranoia   Insight:  intact, has awareness of illness   Judgement: behavior is adequate to circumstances   Orientation:  grossly intact   Memory: intact for content of interview   Language: grossly intact   Attention Span & Concentration:  able to focus   Fund of Knowledge:  intact and appropriate to age and level of education       Assessment and Diagnosis   Status/Progress: Based on the examination today, the patient's problem(s) is/are inadequately controlled.  New problems have been presented today.   Co-morbidities are complicating management of the primary condition.  There are no active rule-out diagnoses for this patient at this time.     General Impression:    Major Depressive Disorder, Recurrent, Moderate   Anxiety Disorder, Unspecified   Chronic Insomnia  Chronic Pain Syndrome (lower back)     Intervention/Counseling/Treatment Plan   Medication Management: Continue all current medications as noted above; pt does NOT want to make any medication changes at this time.  I have encouraged her to try to take Cymbalta 120 mg daily (can take 60 mg BID instead of entire daily dose at one time).      Return to Clinic: 2 months; pt to call sooner prn.      "

## 2017-05-30 DIAGNOSIS — G47.00 INSOMNIA, UNSPECIFIED TYPE: ICD-10-CM

## 2017-05-30 DIAGNOSIS — F32.A ANXIETY AND DEPRESSION: ICD-10-CM

## 2017-05-30 DIAGNOSIS — F41.9 ANXIETY AND DEPRESSION: ICD-10-CM

## 2017-05-30 RX ORDER — LORAZEPAM 1 MG/1
TABLET ORAL
Qty: 45 TABLET | Refills: 5 | OUTPATIENT
Start: 2017-05-30

## 2017-05-30 RX ORDER — ONDANSETRON 4 MG/1
TABLET, ORALLY DISINTEGRATING ORAL
COMMUNITY
Start: 2017-05-20 | End: 2017-08-28 | Stop reason: SDUPTHER

## 2017-05-30 RX ORDER — ZOLPIDEM TARTRATE 10 MG/1
10 TABLET ORAL NIGHTLY
Qty: 90 TABLET | Refills: 5 | OUTPATIENT
Start: 2017-05-30

## 2017-05-30 NOTE — TELEPHONE ENCOUNTER
Covering for Dr. Potts: She was provided lorazepam 45 tablets with 5 additional refills just last month.  She was also provided with multiple refills of Ambien last month as well

## 2017-06-19 ENCOUNTER — HOSPITAL ENCOUNTER (OUTPATIENT)
Dept: RADIOLOGY | Facility: HOSPITAL | Age: 57
Discharge: HOME | End: 2017-06-19
Attending: ORTHOPAEDIC SURGERY
Payer: COMMERCIAL

## 2017-06-19 ENCOUNTER — OFFICE VISIT (OUTPATIENT)
Dept: ORTHOPEDICS | Facility: CLINIC | Age: 57
End: 2017-06-19

## 2017-06-19 VITALS — WEIGHT: 138 LBS | BODY MASS INDEX: 25.4 KG/M2 | HEIGHT: 62 IN

## 2017-06-19 DIAGNOSIS — M20.42 HAMMER TOE OF LEFT FOOT: ICD-10-CM

## 2017-06-19 DIAGNOSIS — Z09 FOLLOW-UP EXAMINATION AFTER ORTHOPEDIC SURGERY: ICD-10-CM

## 2017-06-19 DIAGNOSIS — Z09 FOLLOW-UP EXAMINATION AFTER ORTHOPEDIC SURGERY: Primary | ICD-10-CM

## 2017-06-19 PROCEDURE — 99024 POSTOP FOLLOW-UP VISIT: CPT | Mod: S$GLB,,, | Performed by: ORTHOPAEDIC SURGERY

## 2017-06-19 PROCEDURE — 73630 X-RAY EXAM OF FOOT: CPT | Mod: 26,LT,, | Performed by: RADIOLOGY

## 2017-06-19 PROCEDURE — 99999 PR PBB SHADOW E&M-EST. PATIENT-LVL II: CPT | Mod: PBBFAC,,, | Performed by: ORTHOPAEDIC SURGERY

## 2017-06-19 PROCEDURE — 73630 X-RAY EXAM OF FOOT: CPT | Mod: TC,LT

## 2017-06-20 NOTE — PROGRESS NOTES
Ms. Barney returns today.  She is now three months' postop from her left second   hammertoe repair.  She seems to be a bit displeased with her outcome to this   point.  She states she is still having similar pains as to what she was having   before surgery.  Examination today reveals continued swelling, but overall good   alignment.  There is still a bit of a curl at the end of the toe.  Her pin did   come out a bit early from an incident postoperatively.  I ordered an x-ray today   and the overall alignment is improved from preop.  There is still significant   soft tissue swelling.  I reassured her there is still continued room for   improvement.  The swelling will continue to go down, but I told her it can take   up to a year.  I did not recommend any further intervention at this time.  I am   going to have her make a return appointment in three months for followup.      LUISITO/YOLETTE  dd: 06/19/2017 11:15:21 (CDT)  td: 06/20/2017 05:59:03 (ROBIN)  Doc ID   #[id number]  Job ID #[id number]    CC:

## 2017-06-29 DIAGNOSIS — F41.9 ANXIETY AND DEPRESSION: ICD-10-CM

## 2017-06-29 DIAGNOSIS — F32.A ANXIETY AND DEPRESSION: ICD-10-CM

## 2017-06-30 ENCOUNTER — PATIENT MESSAGE (OUTPATIENT)
Dept: FAMILY MEDICINE | Facility: CLINIC | Age: 57
End: 2017-06-30

## 2017-06-30 DIAGNOSIS — E55.9 VITAMIN D DEFICIENCY: ICD-10-CM

## 2017-06-30 DIAGNOSIS — G89.29 CHRONIC BACK PAIN, UNSPECIFIED BACK LOCATION, UNSPECIFIED BACK PAIN LATERALITY: ICD-10-CM

## 2017-06-30 DIAGNOSIS — M54.9 CHRONIC BACK PAIN, UNSPECIFIED BACK LOCATION, UNSPECIFIED BACK PAIN LATERALITY: ICD-10-CM

## 2017-06-30 DIAGNOSIS — F41.9 ANXIETY AND DEPRESSION: ICD-10-CM

## 2017-06-30 DIAGNOSIS — F32.A ANXIETY AND DEPRESSION: ICD-10-CM

## 2017-06-30 DIAGNOSIS — G47.00 INSOMNIA, UNSPECIFIED TYPE: ICD-10-CM

## 2017-06-30 RX ORDER — ERGOCALCIFEROL 1.25 MG/1
CAPSULE ORAL
Qty: 15 CAPSULE | Refills: 3 | Status: SHIPPED | OUTPATIENT
Start: 2017-06-30 | End: 2019-01-14 | Stop reason: SDUPTHER

## 2017-06-30 RX ORDER — MELOXICAM 15 MG/1
15 TABLET ORAL DAILY PRN
Qty: 90 TABLET | Refills: 3 | Status: SHIPPED | OUTPATIENT
Start: 2017-06-30 | End: 2018-10-04 | Stop reason: SDUPTHER

## 2017-06-30 RX ORDER — LORAZEPAM 1 MG/1
TABLET ORAL
Qty: 30 TABLET | Refills: 5 | Status: SHIPPED | OUTPATIENT
Start: 2017-06-30 | End: 2017-06-30 | Stop reason: SDUPTHER

## 2017-06-30 RX ORDER — LORAZEPAM 1 MG/1
TABLET ORAL
Qty: 30 TABLET | Refills: 5 | Status: SHIPPED | OUTPATIENT
Start: 2017-06-30 | End: 2018-01-15 | Stop reason: SDUPTHER

## 2017-06-30 RX ORDER — ZOLPIDEM TARTRATE 10 MG/1
10 TABLET ORAL NIGHTLY
Qty: 90 TABLET | Refills: 5 | Status: SHIPPED | OUTPATIENT
Start: 2017-06-30 | End: 2018-01-24 | Stop reason: SDUPTHER

## 2017-06-30 NOTE — TELEPHONE ENCOUNTER
Could you please prescribe to [facility] a 90 day refill for the following medications:   Zolpidem - 10mg   Meloxicam 15 mg     Also, can you please send in the following prescription to my [facility] pharmacy as I am completely out.   Lorazepam - 1 mg     Could you please let me know if these have been prescribed, mainly for the prescription at [facility].

## 2017-08-22 DIAGNOSIS — Z12.11 COLON CANCER SCREENING: ICD-10-CM

## 2017-08-25 ENCOUNTER — TELEPHONE (OUTPATIENT)
Dept: FAMILY MEDICINE | Facility: CLINIC | Age: 57
End: 2017-08-25

## 2017-08-25 DIAGNOSIS — F32.A ANXIETY AND DEPRESSION: ICD-10-CM

## 2017-08-25 DIAGNOSIS — F41.9 ANXIETY AND DEPRESSION: ICD-10-CM

## 2017-08-25 RX ORDER — BUPROPION HYDROCHLORIDE 150 MG/1
150 TABLET ORAL EVERY MORNING
Qty: 90 TABLET | Refills: 4 | Status: SHIPPED | OUTPATIENT
Start: 2017-08-25 | End: 2018-05-08 | Stop reason: SDUPTHER

## 2017-08-25 RX ORDER — CLINDAMYCIN PHOSPHATE AND BENZOYL PEROXIDE 10; 50 MG/G; MG/G
GEL TOPICAL NIGHTLY
Qty: 45 G | Refills: 11 | Status: SHIPPED | OUTPATIENT
Start: 2017-08-25 | End: 2018-06-29 | Stop reason: SDUPTHER

## 2017-08-25 NOTE — TELEPHONE ENCOUNTER
----- Message from Senia Reid sent at 8/25/2017  4:10 PM CDT -----  Contact: self/[phone]  Patient would like to be seen for a sooner appointment.  Please advise

## 2017-08-25 NOTE — TELEPHONE ENCOUNTER
----- Message from Senia Reid sent at 8/25/2017  4:10 PM CDT -----  Contact: self/[phone]  Patient would like to be seen for a sooner appointment.  Please advise

## 2017-08-25 NOTE — TELEPHONE ENCOUNTER
Terese Barney would like a refill of the following medications:   clindamycin-benzoyl peroxide gel [Shanique Potts MD]   buPROPion (WELLBUTRIN XL) 150 MG TB24 tablet [Shanique Potts MD]     Preferred pharmacy: [facility] - [address]     Comment:   Dr. Potts:     Can you please send both prescriptions to [facility].     If you can, please send quantity of 90 for the Bupropion HclXl Tabs rather than 24 and the generic (Duac) for the Clindamycin-Benzoyl perixide gel.       Thank you -   Terese Barney

## 2017-08-25 NOTE — TELEPHONE ENCOUNTER
----- Message from Senia Reid sent at 8/25/2017  4:21 PM CDT -----  Contact: self/[phone]  Patient is returning your call.  Please advise

## 2017-08-28 ENCOUNTER — TELEPHONE (OUTPATIENT)
Dept: FAMILY MEDICINE | Facility: CLINIC | Age: 57
End: 2017-08-28

## 2017-08-28 ENCOUNTER — OFFICE VISIT (OUTPATIENT)
Dept: FAMILY MEDICINE | Facility: CLINIC | Age: 57
End: 2017-08-28
Payer: COMMERCIAL

## 2017-08-28 VITALS
WEIGHT: 140 LBS | BODY MASS INDEX: 25.76 KG/M2 | HEIGHT: 62 IN | DIASTOLIC BLOOD PRESSURE: 72 MMHG | SYSTOLIC BLOOD PRESSURE: 123 MMHG | OXYGEN SATURATION: 95 % | HEART RATE: 90 BPM

## 2017-08-28 DIAGNOSIS — K21.9 GASTROESOPHAGEAL REFLUX DISEASE, ESOPHAGITIS PRESENCE NOT SPECIFIED: ICD-10-CM

## 2017-08-28 DIAGNOSIS — M54.9 CHRONIC BACK PAIN, UNSPECIFIED BACK LOCATION, UNSPECIFIED BACK PAIN LATERALITY: Primary | ICD-10-CM

## 2017-08-28 DIAGNOSIS — G47.00 INSOMNIA, UNSPECIFIED TYPE: ICD-10-CM

## 2017-08-28 DIAGNOSIS — G89.29 CHRONIC BACK PAIN, UNSPECIFIED BACK LOCATION, UNSPECIFIED BACK PAIN LATERALITY: Primary | ICD-10-CM

## 2017-08-28 DIAGNOSIS — E55.9 VITAMIN D DEFICIENCY: ICD-10-CM

## 2017-08-28 DIAGNOSIS — F41.9 ANXIETY AND DEPRESSION: ICD-10-CM

## 2017-08-28 DIAGNOSIS — F32.A ANXIETY AND DEPRESSION: ICD-10-CM

## 2017-08-28 DIAGNOSIS — R07.89 COSTOCHONDRAL CHEST PAIN: ICD-10-CM

## 2017-08-28 DIAGNOSIS — E66.3 OVERWEIGHT (BMI 25.0-29.9): ICD-10-CM

## 2017-08-28 PROCEDURE — 99214 OFFICE O/P EST MOD 30 MIN: CPT | Mod: S$GLB,,, | Performed by: FAMILY MEDICINE

## 2017-08-28 PROCEDURE — 99999 PR PBB SHADOW E&M-EST. PATIENT-LVL III: CPT | Mod: PBBFAC,,, | Performed by: FAMILY MEDICINE

## 2017-08-28 PROCEDURE — 3008F BODY MASS INDEX DOCD: CPT | Mod: S$GLB,,, | Performed by: FAMILY MEDICINE

## 2017-08-28 RX ORDER — TIZANIDINE HYDROCHLORIDE 2 MG/1
1 CAPSULE, GELATIN COATED ORAL DAILY PRN
Qty: 30 CAPSULE | Refills: 2 | Status: SHIPPED | OUTPATIENT
Start: 2017-08-28 | End: 2017-09-07

## 2017-08-28 RX ORDER — OMEPRAZOLE 40 MG/1
40 CAPSULE, DELAYED RELEASE ORAL EVERY MORNING
Qty: 90 CAPSULE | Refills: 3 | Status: SHIPPED | OUTPATIENT
Start: 2017-08-28 | End: 2017-12-18

## 2017-08-28 NOTE — PROGRESS NOTES
Office Visit    Patient Name: Terese Barney    : 1960  MRN: [id number]    Subjective:  Terese is a 56 y.o. female who presents today for:    Stress    56 yr old patient of mine with Major Depressive Disorder, Recurrent, Moderate Anxiety Disorder, Unspecified Chronic Insomnia, Chronic Pain Syndrome (lower back) who also sees Psychiatry Dr Pulido (last seen 2017) here today secondary to flare of anxiety and depression secondary to stress over where her life is right now-- trying to find a job and living with her parents after breaking up with her ex-fiance.  Fighting with unemployment issues. Now seeing a grief counselor- Alexis Belcher-- and this is helping.     She is tearful and cries often over her situation and now dealing with chronic chest pain possibly related to build up of stress. Feels right sided chest pain and now left sided near her breast bone, with some associated reflux.  Can exercise regularly at gym with jonathan or spin class without increased chest pain.  Starting with a  and exercise tolerance is excellent.  The pain is described as an intermittent squeezing pain next to her breast bone, and she definitely notices an association with stress.  He also notices some point tenderness in that certain movements tend to exacerbate her pain.  This morning she had some neck stiffness and took some Aleve, and the Aleve definitely helped her chest pain.             Past Medical History  Past Medical History:   Diagnosis Date    Anxiety and depression 2016    Chronic back pain     Hammer toe of left foot     Insomnia     Motion sickness     Overweight (BMI 25.0-29.9) 2016    PONV (postoperative nausea and vomiting)     Vitamin D deficiency 2016       Past Surgical History  Past Surgical History:   Procedure Laterality Date     SECTION      two    foot surgery      left plantar fascial release    FOOT SURGERY Left 2017    2nd TOE, Hammer toe repair     "laparascopy      ovarian cysts    left knee surgery      TONSILLECTOMY      TOTAL ABDOMINAL HYSTERECTOMY W/ BILATERAL SALPINGOOPHORECTOMY  2004       Family History  Family History   Problem Relation Age of Onset    Hypertension Mother     Breast cancer Mother     Macular degeneration Father     Diabetes type II Father     Coronary artery disease Father     Hypertension Sister     Diabetes type II Sister     Diabetes type II Brother     Hypertension Brother        Social History  Social History     Social History    Marital status:      Spouse name: N/A    Number of children: N/A    Years of education: N/A     Occupational History          middle school     Social History Main Topics    Smoking status: Never Smoker    Smokeless tobacco: Never Used    Alcohol use Yes      Comment: rare    Drug use: No    Sexual activity: Not on file     Other Topics Concern    Not on file     Social History Narrative    No narrative on file       Current Medications  Medications reviewed and updated.     Allergies   Review of patient's allergies indicates:   Allergen Reactions    Codeine Nausea And Vomiting       Review of Systems (Pertinent positives)  Review of Systems   Respiratory: Negative for shortness of breath.    Cardiovascular: Negative for palpitations. Chest pain: muscular.   Gastrointestinal:        Occasional reflux     Neurological: Negative for dizziness and numbness.   Psychiatric/Behavioral: Positive for dysphoric mood. The patient is nervous/anxious.        /72   Pulse 90   Ht 5' 2" (1.575 m)   Wt 63.5 kg (139 lb 15.9 oz)   SpO2 95%   BMI 25.60 kg/m²     Physical Exam   Constitutional: She is oriented to person, place, and time. She appears well-developed and well-nourished. No distress.   HENT:   Head: Normocephalic.   Eyes: Conjunctivae are normal.   Cardiovascular: Normal rate, regular rhythm and normal heart sounds.    Pulmonary/Chest: Effort normal and " breath sounds normal.       Abdominal: Soft.   Musculoskeletal: She exhibits no edema.   Neurological: She is alert and oriented to person, place, and time.   Skin: Skin is warm and dry.   Psychiatric: She has a normal mood and affect.   Vitals reviewed.        Assessment/Plan:  Terese Barney is a 56 y.o. female who presents today for :    Terese was seen today for stress.    Diagnoses and all orders for this visit:    Chronic back pain, unspecified back location, unspecified back pain laterality  -     tizanidine 2 mg Cap; Take 1 capsule (2 mg total) by mouth daily as needed (muscle spasm).    Costochondral chest pain  Comments:  trial of Aleve or other NSAID as needed, watch exercise movements, muscle relaxer or lorazepam as needed for stress  Orders:  -     tizanidine 2 mg Cap; Take 1 capsule (2 mg total) by mouth daily as needed (muscle spasm).    Anxiety and depression    Vitamin D deficiency  Comments:  Resume weekly vitamin D and we'll recheck level with annual physical in November 2017    Insomnia, unspecified type    Overweight (BMI 25.0-29.9)    Gastroesophageal reflux disease, esophagitis presence not specified    Other orders  -     omeprazole (PRILOSEC) 40 MG capsule; Take 1 capsule (40 mg total) by mouth every morning.            ICD-10-CM ICD-9-CM    1. Chronic back pain, unspecified back location, unspecified back pain laterality M54.9 724.5 tizanidine 2 mg Cap    G89.29 338.29    2. Costochondral chest pain R07.1 786.52 tizanidine 2 mg Cap    trial of Aleve or other NSAID as needed, watch exercise movements, muscle relaxer or lorazepam as needed for stress   3. Anxiety and depression F41.9 300.00     F32.9 311    4. Vitamin D deficiency E55.9 268.9     Resume weekly vitamin D and we'll recheck level with annual physical in November 2017   5. Insomnia, unspecified type G47.00 780.52    6. Overweight (BMI 25.0-29.9) E66.3 278.02    7. Gastroesophageal reflux disease, esophagitis presence not  specified K21.9 530.81        Return in about 3 months (around 11/28/2017) for annual physical November 2017, sooner if concerns.

## 2017-08-28 NOTE — PATIENT INSTRUCTIONS
Chest pain is most consistent with costochondritis-- inflammation of the junction of the ribs to breast bone. Try NSAIDs- aleve,advil, motriin, etc as needed.  Stress reduction with ativan and muscle relaxer as needed.  Watch movement that stress this area (keep elbow close to body).  Follow up if worsening.

## 2017-08-28 NOTE — TELEPHONE ENCOUNTER
----- Message from Senia Reid sent at 8/25/2017  4:21 PM CDT -----  Contact: self/[phone]  Patient is returning your call.  Please advise

## 2017-11-14 ENCOUNTER — PATIENT MESSAGE (OUTPATIENT)
Dept: FAMILY MEDICINE | Facility: CLINIC | Age: 57
End: 2017-11-14

## 2017-11-14 DIAGNOSIS — Z00.00 LABORATORY EXAMINATION ORDERED AS PART OF A ROUTINE GENERAL MEDICAL EXAMINATION: ICD-10-CM

## 2017-11-14 DIAGNOSIS — E55.9 VITAMIN D DEFICIENCY: Primary | ICD-10-CM

## 2017-11-16 ENCOUNTER — LAB VISIT (OUTPATIENT)
Dept: LAB | Facility: HOSPITAL | Age: 57
End: 2017-11-16
Attending: FAMILY MEDICINE
Payer: COMMERCIAL

## 2017-11-16 DIAGNOSIS — E55.9 VITAMIN D DEFICIENCY: ICD-10-CM

## 2017-11-16 DIAGNOSIS — Z00.00 LABORATORY EXAMINATION ORDERED AS PART OF A ROUTINE GENERAL MEDICAL EXAMINATION: ICD-10-CM

## 2017-11-16 LAB
25(OH)D3+25(OH)D2 SERPL-MCNC: 38 NG/ML
ALBUMIN SERPL BCP-MCNC: 4.2 G/DL
ALP SERPL-CCNC: 60 U/L
ALT SERPL W/O P-5'-P-CCNC: 19 U/L
ANION GAP SERPL CALC-SCNC: 10 MMOL/L
AST SERPL-CCNC: 34 U/L
BASOPHILS # BLD AUTO: 0.03 K/UL
BASOPHILS NFR BLD: 0.7 %
BILIRUB SERPL-MCNC: 0.8 MG/DL
BUN SERPL-MCNC: 18 MG/DL
CALCIUM SERPL-MCNC: 9.4 MG/DL
CHLORIDE SERPL-SCNC: 104 MMOL/L
CHOLEST SERPL-MCNC: 219 MG/DL
CHOLEST/HDLC SERPL: 3.4 {RATIO}
CO2 SERPL-SCNC: 29 MMOL/L
CREAT SERPL-MCNC: 0.85 MG/DL
DIFFERENTIAL METHOD: ABNORMAL
EOSINOPHIL # BLD AUTO: 0.2 K/UL
EOSINOPHIL NFR BLD: 3.9 %
ERYTHROCYTE [DISTWIDTH] IN BLOOD BY AUTOMATED COUNT: 12.6 %
EST. GFR  (AFRICAN AMERICAN): >60 ML/MIN/1.73 M^2
EST. GFR  (NON AFRICAN AMERICAN): >60 ML/MIN/1.73 M^2
ESTIMATED AVG GLUCOSE: 105 MG/DL
GLUCOSE SERPL-MCNC: 83 MG/DL
HBA1C MFR BLD HPLC: 5.3 %
HCT VFR BLD AUTO: 39.2 %
HDLC SERPL-MCNC: 65 MG/DL
HDLC SERPL: 29.7 %
HGB BLD-MCNC: 12.5 G/DL
LDLC SERPL CALC-MCNC: 131.6 MG/DL
LYMPHOCYTES # BLD AUTO: 1.1 K/UL
LYMPHOCYTES NFR BLD: 27.9 %
MCH RBC QN AUTO: 31.4 PG
MCHC RBC AUTO-ENTMCNC: 31.9 G/DL
MCV RBC AUTO: 99 FL
MONOCYTES # BLD AUTO: 0.5 K/UL
MONOCYTES NFR BLD: 11.5 %
NEUTROPHILS # BLD AUTO: 2.3 K/UL
NEUTROPHILS NFR BLD: 55.5 %
NONHDLC SERPL-MCNC: 154 MG/DL
PLATELET # BLD AUTO: 305 K/UL
PMV BLD AUTO: 10.2 FL
POTASSIUM SERPL-SCNC: 4.8 MMOL/L
PROT SERPL-MCNC: 7.3 G/DL
RBC # BLD AUTO: 3.98 M/UL
SODIUM SERPL-SCNC: 143 MMOL/L
TRIGL SERPL-MCNC: 112 MG/DL
TSH SERPL DL<=0.005 MIU/L-ACNC: 1.67 UIU/ML
WBC # BLD AUTO: 4.09 K/UL

## 2017-11-16 PROCEDURE — 80061 LIPID PANEL: CPT

## 2017-11-16 PROCEDURE — 84443 ASSAY THYROID STIM HORMONE: CPT | Mod: PO

## 2017-11-16 PROCEDURE — 85025 COMPLETE CBC W/AUTO DIFF WBC: CPT | Mod: PO

## 2017-11-16 PROCEDURE — 80053 COMPREHEN METABOLIC PANEL: CPT | Mod: PO

## 2017-11-16 PROCEDURE — 83036 HEMOGLOBIN GLYCOSYLATED A1C: CPT | Mod: PO

## 2017-11-16 PROCEDURE — 82306 VITAMIN D 25 HYDROXY: CPT | Mod: PO

## 2017-11-16 PROCEDURE — 36415 COLL VENOUS BLD VENIPUNCTURE: CPT | Mod: PO

## 2017-11-17 ENCOUNTER — OFFICE VISIT (OUTPATIENT)
Dept: FAMILY MEDICINE | Facility: CLINIC | Age: 57
End: 2017-11-17
Payer: COMMERCIAL

## 2017-11-17 VITALS
WEIGHT: 144.19 LBS | BODY MASS INDEX: 26.53 KG/M2 | SYSTOLIC BLOOD PRESSURE: 112 MMHG | DIASTOLIC BLOOD PRESSURE: 74 MMHG | HEIGHT: 62 IN | HEART RATE: 88 BPM

## 2017-11-17 DIAGNOSIS — M54.9 CHRONIC BACK PAIN, UNSPECIFIED BACK LOCATION, UNSPECIFIED BACK PAIN LATERALITY: ICD-10-CM

## 2017-11-17 DIAGNOSIS — H52.10 MYOPIA, UNSPECIFIED LATERALITY: ICD-10-CM

## 2017-11-17 DIAGNOSIS — E55.9 VITAMIN D DEFICIENCY: ICD-10-CM

## 2017-11-17 DIAGNOSIS — R13.12 OROPHARYNGEAL DYSPHAGIA: ICD-10-CM

## 2017-11-17 DIAGNOSIS — E66.3 OVERWEIGHT (BMI 25.0-29.9): ICD-10-CM

## 2017-11-17 DIAGNOSIS — Z78.0 POSTMENOPAUSAL: ICD-10-CM

## 2017-11-17 DIAGNOSIS — Z79.899 MEDICATION MANAGEMENT: ICD-10-CM

## 2017-11-17 DIAGNOSIS — Z23 NEEDS FLU SHOT: ICD-10-CM

## 2017-11-17 DIAGNOSIS — K21.9 GASTROESOPHAGEAL REFLUX DISEASE, ESOPHAGITIS PRESENCE NOT SPECIFIED: ICD-10-CM

## 2017-11-17 DIAGNOSIS — F32.A ANXIETY AND DEPRESSION: ICD-10-CM

## 2017-11-17 DIAGNOSIS — Z12.31 ENCOUNTER FOR SCREENING MAMMOGRAM FOR BREAST CANCER: ICD-10-CM

## 2017-11-17 DIAGNOSIS — G47.00 INSOMNIA, UNSPECIFIED TYPE: ICD-10-CM

## 2017-11-17 DIAGNOSIS — F41.9 ANXIETY AND DEPRESSION: ICD-10-CM

## 2017-11-17 DIAGNOSIS — G89.29 CHRONIC BACK PAIN, UNSPECIFIED BACK LOCATION, UNSPECIFIED BACK PAIN LATERALITY: ICD-10-CM

## 2017-11-17 DIAGNOSIS — Z00.00 ROUTINE GENERAL MEDICAL EXAMINATION AT A HEALTH CARE FACILITY: Primary | ICD-10-CM

## 2017-11-17 PROCEDURE — 99999 PR PBB SHADOW E&M-EST. PATIENT-LVL IV: CPT | Mod: PBBFAC,,, | Performed by: FAMILY MEDICINE

## 2017-11-17 PROCEDURE — 99396 PREV VISIT EST AGE 40-64: CPT | Mod: 25,S$GLB,, | Performed by: FAMILY MEDICINE

## 2017-11-17 PROCEDURE — 90686 IIV4 VACC NO PRSV 0.5 ML IM: CPT | Mod: S$GLB,,, | Performed by: FAMILY MEDICINE

## 2017-11-17 PROCEDURE — 90471 IMMUNIZATION ADMIN: CPT | Mod: S$GLB,,, | Performed by: FAMILY MEDICINE

## 2017-11-17 RX ORDER — SODIUM FLUORIDE 6.1 MG/ML
GEL, DENTIFRICE DENTAL
COMMUNITY
Start: 2017-10-06 | End: 2022-07-27

## 2017-11-17 NOTE — PROGRESS NOTES
Office Visit    Patient Name: Terese Barney    : 1960  MRN: [id number]    Subjective:  Terese is a 57 y.o. female who presents today for:    Annual Exam (flu shot)    Terese Barney presents today for annual wellness exam and answering of chronic conditions: Anxiety and depression, vitamin D deficiency, insomnia, overweight, back pain.  She is postmenopausal since BRUNO/BSO in  and no longer needs paps. Previously was on HRT but has not been on HRT in several years. Had previous side effects from HRT.      They have been feeling physically overall well/ stable- has some chronic musculoskeletal issues including costochondritis that are overall managed.  She is also doing much better from an anxiety and depression standpoint-feels stable on her current medications and does see Dr. Pulido, psychiatry regularly.  New complaint today includes dysphasia.  She does have long-standing reflux and recently has started to feel like certain foods that have a dry or texture will get stop in the back of her throat and upper neck.  She has never had an EGD to evaluate this.  She is currently on omeprazole.     General lifestyle habits are as follows:  Diet is described as healthy-- home cooked meals and now avoiding fast food, exercise is described as good-- now working out with a  and doing well, sleep is described as poor-- depends on ambien and does well with it.  Weight is up 5 lbs in the last year.       Immunizations: TDaP 2015, influenza today 2017     Screening Tests: colonoscopy --> normal and repeat 10 years, mammogram--> 19 repeat 1 year, DEXA with next mammogram due to early menopause, no longer needs paps     Eye/Dental:  Eye exam up to date-- needs new eye doctor, dental up to date          Past Medical History  Past Medical History:   Diagnosis Date    Anxiety and depression 2016    Chronic back pain     Hammer toe of left foot     Insomnia     Motion sickness      Overweight (BMI 25.0-29.9) 2016    PONV (postoperative nausea and vomiting)     Vitamin D deficiency 2016       Past Surgical History  Past Surgical History:   Procedure Laterality Date     SECTION      two    foot surgery      left plantar fascial release    FOOT SURGERY Left 2017    2nd TOE, Hammer toe repair    laparascopy      ovarian cysts    left knee surgery      TONSILLECTOMY      TOTAL ABDOMINAL HYSTERECTOMY W/ BILATERAL SALPINGOOPHORECTOMY  2004       Family History  Family History   Problem Relation Age of Onset    Hypertension Mother     Breast cancer Mother     Macular degeneration Father     Diabetes type II Father     Coronary artery disease Father     Hypertension Sister     Diabetes type II Sister     Diabetes type II Brother     Hypertension Brother        Social History  Social History     Social History    Marital status:      Spouse name: N/A    Number of children: N/A    Years of education: N/A     Occupational History          middle school     Social History Main Topics    Smoking status: Never Smoker    Smokeless tobacco: Never Used    Alcohol use Yes      Comment: rare    Drug use: No    Sexual activity: Not on file     Other Topics Concern    Not on file     Social History Narrative    No narrative on file       Current Medications  Medications reviewed and updated.     Allergies   Review of patient's allergies indicates:   Allergen Reactions    Codeine Nausea And Vomiting       Review of Systems (Pertinent positives)  Review of Systems   Constitutional: Positive for activity change. Negative for unexpected weight change.   HENT: Positive for hearing loss and trouble swallowing. Negative for rhinorrhea.    Eyes: Negative for discharge and visual disturbance.   Respiratory: Negative for chest tightness and wheezing.    Cardiovascular: Positive for chest pain (STABLE COSTOCHONDRITIS). Negative for palpitations.  "  Gastrointestinal: Positive for constipation. Negative for blood in stool, diarrhea and vomiting.   Endocrine: Positive for polyuria. Negative for polydipsia.   Genitourinary: Negative for difficulty urinating, dysuria, hematuria and menstrual problem.   Musculoskeletal: Positive for arthralgias and back pain. Negative for joint swelling and neck pain.   Neurological: Negative for weakness and headaches.   Psychiatric/Behavioral: Positive for dysphoric mood (STABLE/ IMPROVED). Negative for confusion.       /74   Pulse 88   Ht 5' 2" (1.575 m)   Wt 65.4 kg (144 lb 2.9 oz)   BMI 26.37 kg/m²     Physical Exam   Constitutional: She is oriented to person, place, and time. She appears well-developed and well-nourished. No distress.   HENT:   Head: Normocephalic and atraumatic.   Right Ear: Ear canal normal. Tympanic membrane is not erythematous and not bulging.   Left Ear: Ear canal normal. Tympanic membrane is not erythematous and not bulging.   Mouth/Throat: No oropharyngeal exudate.   Eyes: Conjunctivae are normal.   Neck: Carotid bruit is not present. No thyroid mass and no thyromegaly present.   Cardiovascular: Normal rate, regular rhythm and normal heart sounds.    No murmur heard.  Pulses:       Dorsalis pedis pulses are 2+ on the right side, and 2+ on the left side.   Pulmonary/Chest: Effort normal and breath sounds normal. No respiratory distress.   Abdominal: Soft. Bowel sounds are normal. She exhibits no distension and no mass. There is no hepatosplenomegaly. There is no tenderness.   Musculoskeletal: Normal range of motion.   Lymphadenopathy:     She has no cervical adenopathy.   Neurological: She is alert and oriented to person, place, and time.   Skin: Skin is warm and dry. No rash noted.   Psychiatric: She has a normal mood and affect.   Vitals reviewed.        Assessment/Plan:  Terese Barney is a 57 y.o. female who presents today for :    Terese was seen today for annual exam.    Diagnoses and " all orders for this visit:    Routine general medical examination at a health care facility    Overweight (BMI 25.0-29.9)    Anxiety and depression    Chronic back pain, unspecified back location, unspecified back pain laterality    Gastroesophageal reflux disease, esophagitis presence not specified  -     Ambulatory referral to Gastroenterology    Insomnia, unspecified type    Vitamin D deficiency    Medication management    Encounter for screening mammogram for breast cancer  -     Mammo Digital Screening Bilat with CAD; Future    Postmenopausal  -     DXA Bone Density Spine And Hip; Future    Needs flu shot  -     Influenza - Quadrivalent (3 years & older) (PF)    Myopia, unspecified laterality  -     Ambulatory referral to Optometry    Oropharyngeal dysphagia  -     Ambulatory referral to Gastroenterology            ICD-10-CM ICD-9-CM    1. Routine general medical examination at a health care facility Z00.00 V70.0    2. Overweight (BMI 25.0-29.9) E66.3 278.02    3. Anxiety and depression F41.8 300.00      311    4. Chronic back pain, unspecified back location, unspecified back pain laterality M54.9 724.5     G89.29 338.29    5. Gastroesophageal reflux disease, esophagitis presence not specified K21.9 530.81 Ambulatory referral to Gastroenterology   6. Insomnia, unspecified type G47.00 780.52    7. Vitamin D deficiency E55.9 268.9    8. Medication management Z79.899 V58.69    9. Encounter for screening mammogram for breast cancer Z12.31 V76.12 Mammo Digital Screening Bilat with CAD   10. Postmenopausal Z78.0 V49.81 DXA Bone Density Spine And Hip   11. Needs flu shot Z23 V04.81 Influenza - Quadrivalent (3 years & older) (PF)   12. Myopia, unspecified laterality H52.10 367.1 Ambulatory referral to Optometry   13. Oropharyngeal dysphagia R13.12 787.22 Ambulatory referral to Gastroenterology       Patient Instructions   TRIAL OF PERICOLACE 2-3 PILLS NIGHTLY TO MAINTAIN NORMAL BOWEL REGIMEN.  CONSIDER METAMUCIL MIXED  WITH WATER EVERY MORNING.      CHANGE TO OVER THE COUNTER VITAMIN D 5,000 IU DAILY.         Return in about 1 year (around 11/17/2018) for return as needed for new concerns.

## 2017-11-17 NOTE — PATIENT INSTRUCTIONS
TRIAL OF PERICOLACE 2-3 PILLS NIGHTLY TO MAINTAIN NORMAL BOWEL REGIMEN.  CONSIDER METAMUCIL MIXED WITH WATER EVERY MORNING.      CHANGE TO OVER THE COUNTER VITAMIN D 5,000 IU DAILY.

## 2017-11-27 ENCOUNTER — PATIENT MESSAGE (OUTPATIENT)
Dept: FAMILY MEDICINE | Facility: CLINIC | Age: 57
End: 2017-11-27

## 2017-11-27 DIAGNOSIS — K59.00 CONSTIPATION, UNSPECIFIED CONSTIPATION TYPE: Primary | ICD-10-CM

## 2017-11-27 RX ORDER — LACTULOSE 10 G/15ML
20 SOLUTION ORAL 2 TIMES DAILY PRN
Qty: 900 ML | Refills: 2 | Status: SHIPPED | OUTPATIENT
Start: 2017-11-27 | End: 2017-12-07

## 2017-12-13 ENCOUNTER — OFFICE VISIT (OUTPATIENT)
Dept: OPTOMETRY | Facility: CLINIC | Age: 57
End: 2017-12-13
Payer: COMMERCIAL

## 2017-12-13 DIAGNOSIS — H52.4 MYOPIA OF BOTH EYES WITH ASTIGMATISM AND PRESBYOPIA: ICD-10-CM

## 2017-12-13 DIAGNOSIS — H04.123 BILATERAL DRY EYES: Primary | ICD-10-CM

## 2017-12-13 DIAGNOSIS — Z46.0 FITTING AND ADJUSTMENT OF SPECTACLES AND CONTACT LENSES: Primary | ICD-10-CM

## 2017-12-13 DIAGNOSIS — H52.203 MYOPIA OF BOTH EYES WITH ASTIGMATISM AND PRESBYOPIA: ICD-10-CM

## 2017-12-13 DIAGNOSIS — H52.13 MYOPIA OF BOTH EYES WITH ASTIGMATISM AND PRESBYOPIA: ICD-10-CM

## 2017-12-13 PROCEDURE — 99999 PR PBB SHADOW E&M-EST. PATIENT-LVL II: CPT | Mod: PBBFAC,,, | Performed by: OPTOMETRIST

## 2017-12-13 PROCEDURE — 92015 DETERMINE REFRACTIVE STATE: CPT | Mod: S$GLB,,, | Performed by: OPTOMETRIST

## 2017-12-13 PROCEDURE — 92310 CONTACT LENS FITTING OU: CPT | Mod: ,,, | Performed by: OPTOMETRIST

## 2017-12-13 PROCEDURE — 92004 COMPRE OPH EXAM NEW PT 1/>: CPT | Mod: S$GLB,,, | Performed by: OPTOMETRIST

## 2017-12-13 RX ORDER — CYCLOSPORINE 0.5 MG/ML
1 EMULSION OPHTHALMIC 2 TIMES DAILY
Qty: 60 VIAL | Refills: 12 | Status: SHIPPED | OUTPATIENT
Start: 2017-12-13 | End: 2021-04-26 | Stop reason: ALTCHOICE

## 2017-12-13 NOTE — PROGRESS NOTES
HPI     Watery eyes  Prev cauterized tear ducts in [facility]  Uses saline solution  Has restasis, does not use regularly  Wears dist only specs, takes off to read  Wears one daily contact, doesn't sleep in them, not sure which eye for   distance    Last edited by Wesley Bennett, OD on 12/13/2017 10:40 AM. (History)              Assessment /Plan     For exam results, see Encounter Report.    Bilateral dry eyes  -     cycloSPORINE (RESTASIS) 0.05 % ophthalmic emulsion; Place 0.4 mLs (1 drop total) into both eyes 2 (two) times daily.  Dispense: 60 vial; Refill: 12    Myopia of both eyes with astigmatism and presbyopia      1. Continue restasis , stressed use of PF art tears with and without contact lenses. Prev punctal cautery.   2. Spec Rx given and Contact lens Rx released to pt. Daily wear only advised, with education to risks of extended wear.  Discussed lens care, compliance and solutions. RTC yearly contact lens follow up.   . Different lens options discussed with patient. RTC 1 year full exam.

## 2017-12-13 NOTE — LETTER
December 13, 2017      Shanique Potts MD  [address]  [address]  [place] [address]           [place] [facility]  [address]  [place] [address]  Phone: [phone]  Fax: [phone]          Patient: Terese Barney   MR Number: [id number]   YOB: 1960   Date of Visit: 12/13/2017       Dear Dr. Shanique Potts:    Thank you for referring Terese Barney to me for evaluation. Attached you will find relevant portions of my assessment and plan of care.    If you have questions, please do not hesitate to call me. I look forward to following Terese Barney along with you.    Sincerely,    Wesley Bennett, OD    Enclosure  CC:  No Recipients    If you would like to receive this communication electronically, please contact [email or web address] or [phone] to request more information on [employer] Link access.    For providers and/or their staff who would like to refer a patient to [facility], please contact us through our one-stop-shop provider referral line, [facility] [place], at [phone].    If you feel you have received this communication in error or would no longer like to receive these types of communications, please e-mail [email or web address]

## 2017-12-18 ENCOUNTER — INITIAL CONSULT (OUTPATIENT)
Dept: GASTROENTEROLOGY | Facility: CLINIC | Age: 57
End: 2017-12-18
Payer: COMMERCIAL

## 2017-12-18 VITALS
WEIGHT: 144.19 LBS | DIASTOLIC BLOOD PRESSURE: 59 MMHG | SYSTOLIC BLOOD PRESSURE: 117 MMHG | BODY MASS INDEX: 26.37 KG/M2 | HEART RATE: 93 BPM

## 2017-12-18 DIAGNOSIS — K21.9 GASTROESOPHAGEAL REFLUX DISEASE, ESOPHAGITIS PRESENCE NOT SPECIFIED: Primary | ICD-10-CM

## 2017-12-18 DIAGNOSIS — R13.10 DYSPHAGIA, UNSPECIFIED TYPE: ICD-10-CM

## 2017-12-18 PROCEDURE — 99203 OFFICE O/P NEW LOW 30 MIN: CPT | Mod: S$GLB,,, | Performed by: INTERNAL MEDICINE

## 2017-12-18 PROCEDURE — 99999 PR PBB SHADOW E&M-EST. PATIENT-LVL II: CPT | Mod: PBBFAC,,, | Performed by: INTERNAL MEDICINE

## 2017-12-18 RX ORDER — LACTULOSE 10 G/15ML
SOLUTION ORAL
COMMUNITY
Start: 2017-11-29 | End: 2018-06-29

## 2017-12-18 NOTE — PROGRESS NOTES
Subjective:       Patient ID: Terese Barney is a 57 y.o. female.    Chief Complaint: Gastroesophageal Reflux    This is a 56yo female with a hx of anxiety, depression here for evaluation of reflux and dysphagia.  She's noted these symptoms of reflux for many years but the dysphagia is more recent.  It occurs primarily to meet noting the sensation of it sticking in the suprasternal region.  It is improved then by drinking water.  She notes significant dry mouth which is relatively constant.  No other exacerbating or relieving factors.  It occurs episodically lasting minutes at a time.  No other exacerbating or relieving factors or other associated symptoms. She has been prescribed prilosec 40mg daily and mobic as needed.     The following portions of the patient's history were reviewed and updated as appropriate: allergies, current medications, past family history, past medical history, past social history, past surgical history and problem list.    (Portions of this note were dictated using voice recognition software and may contain dictation related errors in spelling/grammar/syntax not found on text review)    HPI  Review of Systems   Constitutional: Negative for appetite change, chills and fever.   HENT: Positive for trouble swallowing. Negative for postnasal drip.    Eyes: Negative for pain and redness.   Respiratory: Negative for cough, choking, chest tightness and shortness of breath.    Cardiovascular: Negative for chest pain and leg swelling.   Gastrointestinal: Negative for abdominal distention, abdominal pain, anal bleeding, blood in stool, constipation, diarrhea, nausea, rectal pain and vomiting.   Endocrine: Negative for cold intolerance and heat intolerance.   Genitourinary: Negative for difficulty urinating and hematuria.   Musculoskeletal: Negative for arthralgias and back pain.   Skin: Negative for color change and pallor.   Allergic/Immunologic: Negative for environmental allergies and food  allergies.   Neurological: Negative for dizziness and light-headedness.   Hematological: Negative for adenopathy. Does not bruise/bleed easily.   Psychiatric/Behavioral: Negative for agitation and behavioral problems.       Objective:      Physical Exam   Constitutional: She is oriented to person, place, and time. She appears well-developed and well-nourished. No distress.   HENT:   Head: Normocephalic and atraumatic.   Eyes: Conjunctivae are normal. No scleral icterus.   Neck: Normal range of motion. Neck supple. No tracheal deviation present. No thyromegaly present.   Cardiovascular: Normal rate and regular rhythm.  Exam reveals no gallop and no friction rub.    No murmur heard.  Pulmonary/Chest: Effort normal and breath sounds normal. No respiratory distress. She has no wheezes.   Abdominal: Soft. Bowel sounds are normal. She exhibits no distension. There is no tenderness.   Musculoskeletal:        Right wrist: She exhibits normal range of motion and no tenderness.        Left wrist: She exhibits normal range of motion and no tenderness.   Lymphadenopathy:        Head (right side): No submental and no submandibular adenopathy present.        Head (left side): No submental and no submandibular adenopathy present.   Neurological: She is alert and oriented to person, place, and time.   Skin: Skin is warm and dry. No rash noted. She is not diaphoretic. No erythema.   Psychiatric: She has a normal mood and affect. Her behavior is normal.   Nursing note and vitals reviewed.      Labs reviewed  Assessment:       1. Gastroesophageal reflux disease, esophagitis presence not specified    2. Dysphagia, unspecified type        Plan:   1. Agree with trial of PPI  2. RTC in 4-5 weeks

## 2017-12-18 NOTE — LETTER
December 18, 2017      Shanique Potts MD  [address]  [address]  [place] [address]           [facility]  [address]  [place] [address]  Phone: [phone]          Patient: Terese Barney   MR Number: [id number]   YOB: 1960   Date of Visit: 12/18/2017       Dear Dr. Shanique Potts:    Thank you for referring Terese Barney to me for evaluation. Attached you will find relevant portions of my assessment and plan of care.    If you have questions, please do not hesitate to call me. I look forward to following Terese Barney along with you.    Sincerely,    Cristhian Mancilla MD    Enclosure  CC:  No Recipients    If you would like to receive this communication electronically, please contact [email or web address] or [phone] to request more information on [employer] Link access.    For providers and/or their staff who would like to refer a patient to [facility], please contact us through our one-stop-shop provider referral line, [facility], at [phone].    If you feel you have received this communication in error or would no longer like to receive these types of communications, please e-mail [email or web address]

## 2018-01-15 DIAGNOSIS — F32.A ANXIETY AND DEPRESSION: ICD-10-CM

## 2018-01-15 DIAGNOSIS — F41.9 ANXIETY AND DEPRESSION: ICD-10-CM

## 2018-01-15 RX ORDER — LORAZEPAM 1 MG/1
TABLET ORAL
Qty: 30 TABLET | Refills: 5 | Status: SHIPPED | OUTPATIENT
Start: 2018-01-15 | End: 2018-03-06 | Stop reason: SDUPTHER

## 2018-01-15 NOTE — TELEPHONE ENCOUNTER
Terese Barney would like a refill of the following medications:         lorazepam (ATIVAN) 1 MG tablet [Shanique Potts MD]     Preferred pharmacy: [facility] - [address]

## 2018-01-24 DIAGNOSIS — G47.00 INSOMNIA, UNSPECIFIED TYPE: ICD-10-CM

## 2018-01-24 RX ORDER — ZOLPIDEM TARTRATE 10 MG/1
10 TABLET ORAL NIGHTLY
Qty: 90 TABLET | Refills: 5 | Status: SHIPPED | OUTPATIENT
Start: 2018-01-24 | End: 2018-07-30

## 2018-01-24 NOTE — TELEPHONE ENCOUNTER
Terese Barney would like a refill of the following medications:         zolpidem (AMBIEN) 10 mg Tab [Shanique Potts MD]     Preferred pharmacy: [facility] - [address]

## 2018-03-05 ENCOUNTER — PATIENT MESSAGE (OUTPATIENT)
Dept: FAMILY MEDICINE | Facility: CLINIC | Age: 58
End: 2018-03-05

## 2018-03-05 DIAGNOSIS — F41.9 ANXIETY AND DEPRESSION: ICD-10-CM

## 2018-03-05 DIAGNOSIS — F41.9 ANXIETY AND DEPRESSION: Primary | ICD-10-CM

## 2018-03-05 DIAGNOSIS — F32.A ANXIETY AND DEPRESSION: ICD-10-CM

## 2018-03-05 DIAGNOSIS — F32.A ANXIETY AND DEPRESSION: Primary | ICD-10-CM

## 2018-03-06 ENCOUNTER — PATIENT MESSAGE (OUTPATIENT)
Dept: FAMILY MEDICINE | Facility: CLINIC | Age: 58
End: 2018-03-06

## 2018-03-06 RX ORDER — LORAZEPAM 1 MG/1
TABLET ORAL
Qty: 30 TABLET | Refills: 5 | Status: SHIPPED | OUTPATIENT
Start: 2018-03-06 | End: 2018-09-25 | Stop reason: SDUPTHER

## 2018-03-26 ENCOUNTER — PATIENT MESSAGE (OUTPATIENT)
Dept: ADMINISTRATIVE | Facility: OTHER | Age: 58
End: 2018-03-26

## 2018-04-27 ENCOUNTER — HOSPITAL ENCOUNTER (OUTPATIENT)
Dept: RADIOLOGY | Facility: HOSPITAL | Age: 58
Discharge: HOME | End: 2018-04-27
Attending: FAMILY MEDICINE
Payer: COMMERCIAL

## 2018-04-27 DIAGNOSIS — Z12.31 ENCOUNTER FOR SCREENING MAMMOGRAM FOR BREAST CANCER: ICD-10-CM

## 2018-04-27 DIAGNOSIS — Z78.0 POSTMENOPAUSAL: ICD-10-CM

## 2018-04-27 PROCEDURE — 77063 BREAST TOMOSYNTHESIS BI: CPT | Mod: 26,,, | Performed by: RADIOLOGY

## 2018-04-27 PROCEDURE — 77080 DXA BONE DENSITY AXIAL: CPT | Mod: 26,,, | Performed by: RADIOLOGY

## 2018-04-27 PROCEDURE — 77080 DXA BONE DENSITY AXIAL: CPT | Mod: TC

## 2018-04-27 PROCEDURE — 77067 SCR MAMMO BI INCL CAD: CPT | Mod: TC

## 2018-04-27 PROCEDURE — 77067 SCR MAMMO BI INCL CAD: CPT | Mod: 26,,, | Performed by: RADIOLOGY

## 2018-04-28 PROBLEM — M85.80 OSTEOPENIA: Status: ACTIVE | Noted: 2018-04-28

## 2018-04-30 ENCOUNTER — PATIENT MESSAGE (OUTPATIENT)
Dept: FAMILY MEDICINE | Facility: CLINIC | Age: 58
End: 2018-04-30

## 2018-05-08 ENCOUNTER — PATIENT MESSAGE (OUTPATIENT)
Dept: FAMILY MEDICINE | Facility: CLINIC | Age: 58
End: 2018-05-08

## 2018-05-08 DIAGNOSIS — F41.9 ANXIETY AND DEPRESSION: ICD-10-CM

## 2018-05-08 DIAGNOSIS — F32.A ANXIETY AND DEPRESSION: ICD-10-CM

## 2018-05-08 RX ORDER — BUPROPION HYDROCHLORIDE 150 MG/1
150 TABLET ORAL EVERY MORNING
Qty: 90 TABLET | Refills: 4 | Status: SHIPPED | OUTPATIENT
Start: 2018-05-08 | End: 2018-06-29 | Stop reason: ALTCHOICE

## 2018-05-30 ENCOUNTER — PATIENT MESSAGE (OUTPATIENT)
Dept: FAMILY MEDICINE | Facility: CLINIC | Age: 58
End: 2018-05-30

## 2018-06-06 NOTE — TELEPHONE ENCOUNTER
Can you please schedule patient for a visit to discuss adipex? I need to do some more documentation prior to starting it.  Also, she has bring in her insurance for at that time for us to fill out and order repeat biometric labs if needed.  It looks like there are several options on June 18th. Thanks

## 2018-06-29 ENCOUNTER — OFFICE VISIT (OUTPATIENT)
Dept: FAMILY MEDICINE | Facility: CLINIC | Age: 58
End: 2018-06-29
Payer: COMMERCIAL

## 2018-06-29 VITALS
SYSTOLIC BLOOD PRESSURE: 116 MMHG | WEIGHT: 143.31 LBS | DIASTOLIC BLOOD PRESSURE: 79 MMHG | HEIGHT: 62 IN | TEMPERATURE: 99 F | BODY MASS INDEX: 26.37 KG/M2 | HEART RATE: 83 BPM

## 2018-06-29 DIAGNOSIS — E66.3 OVERWEIGHT (BMI 25.0-29.9): ICD-10-CM

## 2018-06-29 DIAGNOSIS — E78.00 PURE HYPERCHOLESTEROLEMIA: Primary | ICD-10-CM

## 2018-06-29 DIAGNOSIS — F41.9 ANXIETY AND DEPRESSION: ICD-10-CM

## 2018-06-29 DIAGNOSIS — M54.9 CHRONIC BACK PAIN, UNSPECIFIED BACK LOCATION, UNSPECIFIED BACK PAIN LATERALITY: ICD-10-CM

## 2018-06-29 DIAGNOSIS — K21.9 GASTROESOPHAGEAL REFLUX DISEASE, ESOPHAGITIS PRESENCE NOT SPECIFIED: ICD-10-CM

## 2018-06-29 DIAGNOSIS — F32.A ANXIETY AND DEPRESSION: ICD-10-CM

## 2018-06-29 DIAGNOSIS — Z79.899 MEDICATION MANAGEMENT: ICD-10-CM

## 2018-06-29 DIAGNOSIS — G89.29 CHRONIC BACK PAIN, UNSPECIFIED BACK LOCATION, UNSPECIFIED BACK PAIN LATERALITY: ICD-10-CM

## 2018-06-29 DIAGNOSIS — G47.00 INSOMNIA, UNSPECIFIED TYPE: ICD-10-CM

## 2018-06-29 PROCEDURE — 3008F BODY MASS INDEX DOCD: CPT | Mod: CPTII,S$GLB,, | Performed by: FAMILY MEDICINE

## 2018-06-29 PROCEDURE — 99214 OFFICE O/P EST MOD 30 MIN: CPT | Mod: S$GLB,,, | Performed by: FAMILY MEDICINE

## 2018-06-29 PROCEDURE — 99999 PR PBB SHADOW E&M-EST. PATIENT-LVL III: CPT | Mod: PBBFAC,,, | Performed by: FAMILY MEDICINE

## 2018-06-29 RX ORDER — CLINDAMYCIN PHOSPHATE AND BENZOYL PEROXIDE 10; 50 MG/G; MG/G
GEL TOPICAL NIGHTLY
Qty: 45 G | Refills: 11 | Status: SHIPPED | OUTPATIENT
Start: 2018-06-29 | End: 2019-08-02 | Stop reason: SDUPTHER

## 2018-06-29 RX ORDER — PHENTERMINE HYDROCHLORIDE 37.5 MG/1
37.5 TABLET ORAL
Qty: 90 TABLET | Refills: 0 | Status: SHIPPED | OUTPATIENT
Start: 2018-06-29 | End: 2018-07-30

## 2018-06-29 NOTE — PROGRESS NOTES
Office Visit    Patient Name: Terese Barney    : 1960  MRN: [id number]    Subjective:  Terese is a 57 y.o. female who presents today for:    Follow-up    58 yo female with overweight BMI, GERD, osteopenia w/ vit d deficiency, anxiety/depression, chronic back pain here for biometric screening for insurance and to discuss recent weight gain/ possibly starting adipex.    Her weight is up about 8 lbs in the last year and this has put her into the overweight category for BMI. She started working for [employer] 6 months ago and her schedule has been crazy since then with very odd work shifts-- ie 630 PM to 230 AM.     She is eating on a fairly regular schedule and trying to make healthier choices with avoiding sugars.  Drinking plenty of water.   She is currently walking and is trying to add in some strength training.     Anxiety and depression are stable on cymbalta and lorazepam as needed-- will use lorazepam as needed for sleep INSTEAD of ambien. Has been able to off and on stop Wellbutrin without any negative side effects.       Last CME 2017    Past Medical History  Past Medical History:   Diagnosis Date    Anxiety and depression 2016    Chronic back pain     Hammer toe of left foot     Insomnia     Motion sickness     Overweight (BMI 25.0-29.9) 2016    PONV (postoperative nausea and vomiting)     Vitamin D deficiency 2016       Past Surgical History  Past Surgical History:   Procedure Laterality Date    BREAST SURGERY Bilateral      SECTION      two    foot surgery      left plantar fascial release    FOOT SURGERY Left 2017    2nd TOE, Hammer toe repair    HYSTERECTOMY      laparascopy      ovarian cysts    left knee surgery      TONSILLECTOMY      TOTAL ABDOMINAL HYSTERECTOMY W/ BILATERAL SALPINGOOPHORECTOMY  2004       Family History  Family History   Problem Relation Age of Onset    Hypertension Mother     Breast cancer Mother     Macular degeneration  "Father     Diabetes type II Father     Coronary artery disease Father     Hypertension Sister     Diabetes type II Sister     Diabetes type II Brother     Hypertension Brother     Glaucoma Paternal Grandmother        Social History  Social History     Social History    Marital status:      Spouse name: N/A    Number of children: N/A    Years of education: N/A     Occupational History          middle school     Social History Main Topics    Smoking status: Never Smoker    Smokeless tobacco: Never Used    Alcohol use Yes      Comment: rare    Drug use: No    Sexual activity: Not on file     Other Topics Concern    Not on file     Social History Narrative    No narrative on file       Current Medications  Medications reviewed and updated.     Allergies   Review of patient's allergies indicates:   Allergen Reactions    Codeine Nausea And Vomiting       Review of Systems (Pertinent positives)  Review of Systems   Constitutional: Positive for unexpected weight change (weight gain).   Respiratory: Negative for shortness of breath.    Cardiovascular: Negative for chest pain, palpitations and leg swelling.   Musculoskeletal: Positive for back pain.   Neurological: Negative for dizziness and light-headedness.   Psychiatric/Behavioral: Negative for dysphoric mood. The patient is not nervous/anxious (controlled).        /79   Pulse 83   Temp 98.6 °F (37 °C)   Ht 5' 2" (1.575 m)   Wt 65 kg (143 lb 4.8 oz)   LMP  (LMP Unknown)   BMI 26.21 kg/m²     Physical Exam   Constitutional: She is oriented to person, place, and time. She appears well-developed and well-nourished. No distress.   HENT:   Head: Normocephalic and atraumatic.   Eyes: Conjunctivae are normal.   Cardiovascular: Normal rate and regular rhythm.    Pulmonary/Chest: Effort normal and breath sounds normal.   Musculoskeletal: She exhibits no edema.   Neurological: She is alert and oriented to person, place, and time. "   Skin: Skin is warm and dry.   Psychiatric: She has a normal mood and affect.   Vitals reviewed.        Assessment/Plan:  Terese Barney is a 57 y.o. female who presents today for :    Terese was seen today for follow-up.    Diagnoses and all orders for this visit:    Pure hypercholesterolemia  Comments:  Will recheck CMP and lipid panel for updated biometric screening for insurance purposes  Orders:  -     Lipid panel; Future  -     Comprehensive metabolic panel; Future    Overweight (BMI 25.0-29.9)  Comments:  After counseling, have decided on 90 days Adipex trial. Counseled on risks/benefits along with need to stick with a regular diet and exercise routine. F/U 2 wks  Orders:  -     phentermine (ADIPEX-P) 37.5 mg tablet; Take 1 tablet (37.5 mg total) by mouth before breakfast.    Gastroesophageal reflux disease, esophagitis presence not specified    Anxiety and depression  Comments:  Has been doing well on Cymbalta 60 mg twice daily.  Advised to stop Wellbutrin.  Continue lorazepam as needed.    Chronic back pain, unspecified back location, unspecified back pain laterality    Insomnia, unspecified type  Comments:  Continue attention to sleep hygiene, but in light of crazy work schedule, okay to take occasional lorazepam to help with getting to sleep instead of the Ambien.    Medication management    Other orders  -     clindamycin-benzoyl peroxide gel; Apply topically every evening.            ICD-10-CM ICD-9-CM    1. Pure hypercholesterolemia E78.00 272.0 Lipid panel      Comprehensive metabolic panel    Will recheck CMP and lipid panel for updated biometric screening for insurance purposes   2. Overweight (BMI 25.0-29.9) E66.3 278.02 phentermine (ADIPEX-P) 37.5 mg tablet    After counseling, have decided on 90 days Adipex trial. Counseled on risks/benefits along with need to stick with a regular diet and exercise routine. F/U 2 wks   3. Gastroesophageal reflux disease, esophagitis presence not specified  K21.9 530.81    4. Anxiety and depression F41.9 300.00     F32.9 311     Has been doing well on Cymbalta 60 mg twice daily.  Advised to stop Wellbutrin.  Continue lorazepam as needed.   5. Chronic back pain, unspecified back location, unspecified back pain laterality M54.9 724.5     G89.29 338.29    6. Insomnia, unspecified type G47.00 780.52     Continue attention to sleep hygiene, but in light of crazy work schedule, okay to take occasional lorazepam to help with getting to sleep instead of the Ambien.   7. Medication management Z79.899 V58.69        Greater than 50% of 25 min office visit spent counseling.     There are no Patient Instructions on file for this visit.      Follow-up for nurse visit in 2 weeks for adipex follow up and then annual exam in 11/2018.

## 2018-07-02 ENCOUNTER — PATIENT MESSAGE (OUTPATIENT)
Dept: FAMILY MEDICINE | Facility: CLINIC | Age: 58
End: 2018-07-02

## 2018-07-06 DIAGNOSIS — Z11.59 NEED FOR HEPATITIS C SCREENING TEST: ICD-10-CM

## 2018-07-18 ENCOUNTER — CLINICAL SUPPORT (OUTPATIENT)
Dept: FAMILY MEDICINE | Facility: CLINIC | Age: 58
End: 2018-07-18
Payer: COMMERCIAL

## 2018-07-18 VITALS
SYSTOLIC BLOOD PRESSURE: 116 MMHG | TEMPERATURE: 99 F | OXYGEN SATURATION: 98 % | HEIGHT: 62 IN | BODY MASS INDEX: 25.48 KG/M2 | HEART RATE: 84 BPM | DIASTOLIC BLOOD PRESSURE: 72 MMHG | WEIGHT: 138.44 LBS

## 2018-07-18 PROCEDURE — 99999 PR PBB SHADOW E&M-EST. PATIENT-LVL II: CPT | Mod: PBBFAC,,,

## 2018-07-18 NOTE — PROGRESS NOTES
Pt came in today for weight and vital check.  All vitals WNL and weight as of today is 62.8kg.  Advised pt to keep us updated.

## 2018-07-30 ENCOUNTER — OFFICE VISIT (OUTPATIENT)
Dept: FAMILY MEDICINE | Facility: CLINIC | Age: 58
End: 2018-07-30
Payer: COMMERCIAL

## 2018-07-30 VITALS
HEART RATE: 105 BPM | HEIGHT: 62 IN | OXYGEN SATURATION: 98 % | BODY MASS INDEX: 24.95 KG/M2 | DIASTOLIC BLOOD PRESSURE: 81 MMHG | WEIGHT: 135.56 LBS | TEMPERATURE: 99 F | SYSTOLIC BLOOD PRESSURE: 117 MMHG

## 2018-07-30 DIAGNOSIS — E55.9 VITAMIN D DEFICIENCY: ICD-10-CM

## 2018-07-30 DIAGNOSIS — Z11.59 NEED FOR HEPATITIS C SCREENING TEST: ICD-10-CM

## 2018-07-30 DIAGNOSIS — G89.29 CHRONIC BACK PAIN, UNSPECIFIED BACK LOCATION, UNSPECIFIED BACK PAIN LATERALITY: ICD-10-CM

## 2018-07-30 DIAGNOSIS — K21.9 GASTROESOPHAGEAL REFLUX DISEASE, ESOPHAGITIS PRESENCE NOT SPECIFIED: Primary | ICD-10-CM

## 2018-07-30 DIAGNOSIS — F41.9 ANXIETY AND DEPRESSION: ICD-10-CM

## 2018-07-30 DIAGNOSIS — E78.1 HYPERTRIGLYCERIDEMIA: ICD-10-CM

## 2018-07-30 DIAGNOSIS — M54.9 CHRONIC BACK PAIN, UNSPECIFIED BACK LOCATION, UNSPECIFIED BACK PAIN LATERALITY: ICD-10-CM

## 2018-07-30 DIAGNOSIS — E66.3 OVERWEIGHT (BMI 25.0-29.9): ICD-10-CM

## 2018-07-30 DIAGNOSIS — R47.02 DYSPHASIA: ICD-10-CM

## 2018-07-30 DIAGNOSIS — Z79.899 MEDICATION MANAGEMENT: ICD-10-CM

## 2018-07-30 DIAGNOSIS — H90.5 SENSORINEURAL HEARING LOSS (SNHL) OF RIGHT EAR, UNSPECIFIED HEARING STATUS ON CONTRALATERAL SIDE: ICD-10-CM

## 2018-07-30 DIAGNOSIS — F32.A ANXIETY AND DEPRESSION: ICD-10-CM

## 2018-07-30 PROCEDURE — 99999 PR PBB SHADOW E&M-EST. PATIENT-LVL III: CPT | Mod: PBBFAC,,, | Performed by: FAMILY MEDICINE

## 2018-07-30 PROCEDURE — 99214 OFFICE O/P EST MOD 30 MIN: CPT | Mod: S$GLB,,, | Performed by: FAMILY MEDICINE

## 2018-07-30 PROCEDURE — 3008F BODY MASS INDEX DOCD: CPT | Mod: CPTII,S$GLB,, | Performed by: FAMILY MEDICINE

## 2018-07-30 RX ORDER — DULOXETIN HYDROCHLORIDE 60 MG/1
60 CAPSULE, DELAYED RELEASE ORAL 2 TIMES DAILY
Qty: 180 CAPSULE | Refills: 4 | Status: SHIPPED | OUTPATIENT
Start: 2018-07-30 | End: 2020-02-20

## 2018-07-30 RX ORDER — CEVIMELINE HYDROCHLORIDE 30 MG/1
30 CAPSULE ORAL 2 TIMES DAILY
Qty: 180 CAPSULE | Refills: 4 | Status: SHIPPED | OUTPATIENT
Start: 2018-07-30 | End: 2019-08-11 | Stop reason: SDUPTHER

## 2018-07-30 NOTE — PROGRESS NOTES
" Office Visit    Patient Name: Terese Barney    : 1960  MRN: [id number]    Subjective:  Terese is a 57 y.o. female who presents today for:    Follow-up (refill cymbalta, )    56 yo female last seen 2018 here for follow up on Adipex trial for help with weight loss.    From previous visit: "She is eating on a fairly regular schedule and trying to make healthier choices with avoiding sugars.  Drinking plenty of water.   She is currently walking and is trying to add in some strength training."    She is on a very irregular work schedule so not going to the gym as often as she would like.     CMP and LIPID 2018 unremarkable except for mildly elevated triglycerides     Reports feeling well on the ADIPEX with NO SIDE EFFECTS. WEIGHT: down 8 lbs in the last month.  She has stopped wellbutrin and is continuing Cymbalta only for treatment of anxiety/depression-- this is working well.       Past Medical History  Past Medical History:   Diagnosis Date    Anxiety and depression 2016    Chronic back pain     Hammer toe of left foot     Insomnia     Motion sickness     Overweight (BMI 25.0-29.9) 2016    PONV (postoperative nausea and vomiting)     Vitamin D deficiency 2016       Past Surgical History  Past Surgical History:   Procedure Laterality Date    BREAST SURGERY Bilateral      SECTION      two    foot surgery      left plantar fascial release    FOOT SURGERY Left 2017    2nd TOE, Hammer toe repair    HYSTERECTOMY      laparascopy      ovarian cysts    left knee surgery      TONSILLECTOMY      TOTAL ABDOMINAL HYSTERECTOMY W/ BILATERAL SALPINGOOPHORECTOMY         Family History  Family History   Problem Relation Age of Onset    Hypertension Mother     Breast cancer Mother     Macular degeneration Father     Diabetes type II Father     Coronary artery disease Father     Hypertension Sister     Diabetes type II Sister     Diabetes type II Brother     " "Hypertension Brother     Glaucoma Paternal Grandmother        Social History  Social History     Social History    Marital status:      Spouse name: N/A    Number of children: N/A    Years of education: N/A     Occupational History          middle school     Social History Main Topics    Smoking status: Never Smoker    Smokeless tobacco: Never Used    Alcohol use Yes      Comment: rare    Drug use: No    Sexual activity: Not on file     Other Topics Concern    Not on file     Social History Narrative    No narrative on file       Current Medications  Medications reviewed and updated.     Allergies   Review of patient's allergies indicates:   Allergen Reactions    Codeine Nausea And Vomiting       Review of Systems (Pertinent positives)  Review of Systems   Constitutional: Negative for activity change and unexpected weight change.   HENT: Positive for hearing loss and trouble swallowing. Negative for rhinorrhea.    Eyes: Negative for discharge and visual disturbance.   Respiratory: Negative for chest tightness and wheezing.    Cardiovascular: Negative for chest pain and palpitations.   Gastrointestinal: Negative for blood in stool, constipation, diarrhea and vomiting.   Endocrine: Negative for polydipsia and polyuria.   Genitourinary: Negative for difficulty urinating, dysuria, hematuria and menstrual problem.   Musculoskeletal: Positive for arthralgias. Negative for joint swelling and neck pain.   Neurological: Negative for weakness and headaches.   Psychiatric/Behavioral: Negative for confusion and dysphoric mood.       /81   Pulse 105   Temp 98.8 °F (37.1 °C)   Ht 5' 2" (1.575 m)   Wt 61.5 kg (135 lb 9.3 oz)   LMP  (LMP Unknown)   SpO2 98%   BMI 24.80 kg/m²     Physical Exam   Constitutional: She is oriented to person, place, and time. She appears well-developed and well-nourished. No distress.   HENT:   Head: Normocephalic and atraumatic.   Eyes: Conjunctivae are " normal.   Cardiovascular: Normal rate and regular rhythm.    Pulmonary/Chest: Effort normal and breath sounds normal.   Musculoskeletal: She exhibits no edema.   Neurological: She is alert and oriented to person, place, and time.   Skin: Skin is warm and dry.   Psychiatric: She has a normal mood and affect.   Vitals reviewed.        Assessment/Plan:  Terese Barney is a 57 y.o. female who presents today for :    Terese was seen today for follow-up.    Diagnoses and all orders for this visit:    Gastroesophageal reflux disease, esophagitis presence not specified  Comments:  resume omeprazole and referral to discuss EGD  Orders:  -     Ambulatory referral to Gastroenterology    Dysphasia  -     Ambulatory referral to Gastroenterology    Overweight (BMI 25.0-29.9)  -     Hemoglobin A1c; Future  -     Comprehensive metabolic panel; Future  -     Lipid panel; Future  -     TSH; Future    Anxiety and depression  -     TSH; Future  -     DULoxetine (CYMBALTA) 60 MG capsule; Take 1 capsule (60 mg total) by mouth 2 (two) times daily.    Medication management  -     Hemoglobin A1c; Future  -     Comprehensive metabolic panel; Future  -     Lipid panel; Future  -     CBC auto differential; Future  -     TSH; Future  -     Vitamin D; Future  -     Hepatitis C antibody; Future    Hypertriglyceridemia  -     Hemoglobin A1c; Future  -     Comprehensive metabolic panel; Future  -     Lipid panel; Future  -     CBC auto differential; Future  -     TSH; Future    Vitamin D deficiency  -     Vitamin D; Future    Need for hepatitis C screening test  -     Hepatitis C antibody; Future    Sensorineural hearing loss (SNHL) of right ear, unspecified hearing status on contralateral side  Comments:  seing outside ENT    Chronic back pain, unspecified back location, unspecified back pain laterality    Other orders  -     cevimeline (EVOXAC) 30 mg capsule; Take 1 capsule (30 mg total) by mouth 2 (two) times daily.            ICD-10-CM  ICD-9-CM    1. Gastroesophageal reflux disease, esophagitis presence not specified K21.9 530.81 Ambulatory referral to Gastroenterology    resume omeprazole and referral to discuss EGD   2. Dysphasia R47.02 784.59 Ambulatory referral to Gastroenterology   3. Overweight (BMI 25.0-29.9) E66.3 278.02 Hemoglobin A1c      Comprehensive metabolic panel      Lipid panel      TSH   4. Anxiety and depression F41.9 300.00 TSH    F32.9 311 DULoxetine (CYMBALTA) 60 MG capsule   5. Medication management Z79.899 V58.69 Hemoglobin A1c      Comprehensive metabolic panel      Lipid panel      CBC auto differential      TSH      Vitamin D      Hepatitis C antibody   6. Hypertriglyceridemia E78.1 272.1 Hemoglobin A1c      Comprehensive metabolic panel      Lipid panel      CBC auto differential      TSH   7. Vitamin D deficiency E55.9 268.9 Vitamin D   8. Need for hepatitis C screening test Z11.59 V73.89 Hepatitis C antibody   9. Sensorineural hearing loss (SNHL) of right ear, unspecified hearing status on contralateral side H90.5 389.15     seing outside ENT   10. Chronic back pain, unspecified back location, unspecified back pain laterality M54.9 724.5     G89.29 338.29        There are no Patient Instructions on file for this visit.      Follow-up for follow up for wellness visit in January 2019 with labs prior, sooner if concerns .

## 2018-09-25 DIAGNOSIS — F32.A ANXIETY AND DEPRESSION: ICD-10-CM

## 2018-09-25 DIAGNOSIS — F41.9 ANXIETY AND DEPRESSION: ICD-10-CM

## 2018-09-25 RX ORDER — LORAZEPAM 1 MG/1
TABLET ORAL
Qty: 30 TABLET | Refills: 5 | Status: SHIPPED | OUTPATIENT
Start: 2018-09-25 | End: 2018-10-04 | Stop reason: SDUPTHER

## 2018-10-04 ENCOUNTER — PATIENT MESSAGE (OUTPATIENT)
Dept: FAMILY MEDICINE | Facility: CLINIC | Age: 58
End: 2018-10-04

## 2018-10-04 DIAGNOSIS — G89.29 CHRONIC BACK PAIN, UNSPECIFIED BACK LOCATION, UNSPECIFIED BACK PAIN LATERALITY: ICD-10-CM

## 2018-10-04 DIAGNOSIS — F32.A ANXIETY AND DEPRESSION: ICD-10-CM

## 2018-10-04 DIAGNOSIS — F41.9 ANXIETY AND DEPRESSION: ICD-10-CM

## 2018-10-04 DIAGNOSIS — M54.9 CHRONIC BACK PAIN, UNSPECIFIED BACK LOCATION, UNSPECIFIED BACK PAIN LATERALITY: ICD-10-CM

## 2018-10-04 RX ORDER — MELOXICAM 15 MG/1
15 TABLET ORAL DAILY PRN
Qty: 90 TABLET | Refills: 3 | Status: CANCELLED | OUTPATIENT
Start: 2018-10-04

## 2018-10-04 RX ORDER — MELOXICAM 15 MG/1
15 TABLET ORAL DAILY PRN
Qty: 90 TABLET | Refills: 3 | Status: ON HOLD | OUTPATIENT
Start: 2018-10-04 | End: 2020-01-31 | Stop reason: HOSPADM

## 2018-10-04 RX ORDER — LORAZEPAM 1 MG/1
TABLET ORAL
Qty: 30 TABLET | Refills: 5 | Status: SHIPPED | OUTPATIENT
Start: 2018-10-04 | End: 2018-10-05 | Stop reason: SDUPTHER

## 2018-10-05 ENCOUNTER — PATIENT MESSAGE (OUTPATIENT)
Dept: FAMILY MEDICINE | Facility: CLINIC | Age: 58
End: 2018-10-05

## 2018-10-05 RX ORDER — LORAZEPAM 1 MG/1
TABLET ORAL
Qty: 30 TABLET | Refills: 5 | Status: SHIPPED | OUTPATIENT
Start: 2018-10-05 | End: 2019-04-09 | Stop reason: SDUPTHER

## 2018-12-28 ENCOUNTER — PATIENT MESSAGE (OUTPATIENT)
Dept: FAMILY MEDICINE | Facility: CLINIC | Age: 58
End: 2018-12-28

## 2018-12-28 DIAGNOSIS — M25.569 ACUTE KNEE PAIN, UNSPECIFIED LATERALITY: Primary | ICD-10-CM

## 2018-12-28 NOTE — TELEPHONE ENCOUNTER
It sounds like patient is seeing an outside orthopedist but continuing to have very severe knee problems.  She is having trouble walking due to knee pain and swelling, can you please help schedule her with Dr. John [facility] ASAP for another opinion?  Thank you

## 2018-12-28 NOTE — TELEPHONE ENCOUNTER
Patient scheduled her appointment on January 8, 2019 with Dr. John [facility] through patient portal.

## 2018-12-31 ENCOUNTER — TELEPHONE (OUTPATIENT)
Dept: ORTHOPEDICS | Facility: CLINIC | Age: 58
End: 2018-12-31

## 2018-12-31 NOTE — TELEPHONE ENCOUNTER
----- Message from Miguel Johnson sent at 12/31/2018  8:26 AM CST -----  Contact: Self/ [phone]  Same Day Appointment Request    Reason for FST appt.: Patient can not walk on her leg and would like to come in today.   Communication Preference: Cell phone [phone].         We spoke  Told Dr [facility] is in surgery  She said she had an appt for 1/8 ,  I offered to get her someone else   I   asked if workers comp. =no  Asked if litigation =she said a grocery store may have to pay   I said we don't take litigation either   She said she would pay

## 2019-01-07 ENCOUNTER — LAB VISIT (OUTPATIENT)
Dept: LAB | Facility: HOSPITAL | Age: 59
End: 2019-01-07
Attending: FAMILY MEDICINE
Payer: COMMERCIAL

## 2019-01-07 DIAGNOSIS — F32.A ANXIETY AND DEPRESSION: ICD-10-CM

## 2019-01-07 DIAGNOSIS — Z79.899 MEDICATION MANAGEMENT: ICD-10-CM

## 2019-01-07 DIAGNOSIS — E66.3 OVERWEIGHT (BMI 25.0-29.9): ICD-10-CM

## 2019-01-07 DIAGNOSIS — E78.1 HYPERTRIGLYCERIDEMIA: ICD-10-CM

## 2019-01-07 DIAGNOSIS — Z11.59 NEED FOR HEPATITIS C SCREENING TEST: ICD-10-CM

## 2019-01-07 DIAGNOSIS — F41.9 ANXIETY AND DEPRESSION: ICD-10-CM

## 2019-01-07 DIAGNOSIS — E55.9 VITAMIN D DEFICIENCY: ICD-10-CM

## 2019-01-07 LAB
25(OH)D3+25(OH)D2 SERPL-MCNC: 17 NG/ML
ALBUMIN SERPL BCP-MCNC: 3.4 G/DL
ALP SERPL-CCNC: 65 U/L
ALT SERPL W/O P-5'-P-CCNC: 14 U/L
ANION GAP SERPL CALC-SCNC: 7 MMOL/L
AST SERPL-CCNC: 20 U/L
BASOPHILS # BLD AUTO: 0.02 K/UL
BASOPHILS NFR BLD: 0.4 %
BILIRUB SERPL-MCNC: 0.9 MG/DL
BUN SERPL-MCNC: 17 MG/DL
CALCIUM SERPL-MCNC: 9.4 MG/DL
CHLORIDE SERPL-SCNC: 106 MMOL/L
CHOLEST SERPL-MCNC: 189 MG/DL
CHOLEST/HDLC SERPL: 3.4 {RATIO}
CO2 SERPL-SCNC: 28 MMOL/L
CREAT SERPL-MCNC: 0.8 MG/DL
DIFFERENTIAL METHOD: ABNORMAL
EOSINOPHIL # BLD AUTO: 0.2 K/UL
EOSINOPHIL NFR BLD: 4.3 %
ERYTHROCYTE [DISTWIDTH] IN BLOOD BY AUTOMATED COUNT: 12.6 %
EST. GFR  (AFRICAN AMERICAN): >60 ML/MIN/1.73 M^2
EST. GFR  (NON AFRICAN AMERICAN): >60 ML/MIN/1.73 M^2
ESTIMATED AVG GLUCOSE: 114 MG/DL
GLUCOSE SERPL-MCNC: 95 MG/DL
HBA1C MFR BLD HPLC: 5.6 %
HCT VFR BLD AUTO: 39.4 %
HDLC SERPL-MCNC: 55 MG/DL
HDLC SERPL: 29.1 %
HGB BLD-MCNC: 12.5 G/DL
LDLC SERPL CALC-MCNC: 103.4 MG/DL
LYMPHOCYTES # BLD AUTO: 1.8 K/UL
LYMPHOCYTES NFR BLD: 37.9 %
MCH RBC QN AUTO: 30.9 PG
MCHC RBC AUTO-ENTMCNC: 31.7 G/DL
MCV RBC AUTO: 97 FL
MONOCYTES # BLD AUTO: 0.4 K/UL
MONOCYTES NFR BLD: 9.3 %
NEUTROPHILS # BLD AUTO: 2.2 K/UL
NEUTROPHILS NFR BLD: 48.1 %
NONHDLC SERPL-MCNC: 134 MG/DL
PLATELET # BLD AUTO: 320 K/UL
PMV BLD AUTO: 9.3 FL
POTASSIUM SERPL-SCNC: 3.8 MMOL/L
PROT SERPL-MCNC: 6.7 G/DL
RBC # BLD AUTO: 4.05 M/UL
SODIUM SERPL-SCNC: 141 MMOL/L
TRIGL SERPL-MCNC: 153 MG/DL
TSH SERPL DL<=0.005 MIU/L-ACNC: 2.62 UIU/ML
WBC # BLD AUTO: 4.62 K/UL

## 2019-01-07 PROCEDURE — 36415 COLL VENOUS BLD VENIPUNCTURE: CPT

## 2019-01-07 PROCEDURE — 84443 ASSAY THYROID STIM HORMONE: CPT

## 2019-01-07 PROCEDURE — 85025 COMPLETE CBC W/AUTO DIFF WBC: CPT

## 2019-01-07 PROCEDURE — 80061 LIPID PANEL: CPT

## 2019-01-07 PROCEDURE — 83036 HEMOGLOBIN GLYCOSYLATED A1C: CPT

## 2019-01-07 PROCEDURE — 80053 COMPREHEN METABOLIC PANEL: CPT

## 2019-01-07 PROCEDURE — 86803 HEPATITIS C AB TEST: CPT

## 2019-01-07 PROCEDURE — 82306 VITAMIN D 25 HYDROXY: CPT

## 2019-01-08 LAB — HCV AB SERPL QL IA: NEGATIVE

## 2019-01-14 ENCOUNTER — OFFICE VISIT (OUTPATIENT)
Dept: FAMILY MEDICINE | Facility: CLINIC | Age: 59
End: 2019-01-14
Payer: COMMERCIAL

## 2019-01-14 VITALS
DIASTOLIC BLOOD PRESSURE: 84 MMHG | OXYGEN SATURATION: 98 % | SYSTOLIC BLOOD PRESSURE: 128 MMHG | WEIGHT: 147.69 LBS | BODY MASS INDEX: 27.18 KG/M2 | HEIGHT: 62 IN | TEMPERATURE: 98 F | HEART RATE: 97 BPM

## 2019-01-14 DIAGNOSIS — E66.3 OVERWEIGHT (BMI 25.0-29.9): ICD-10-CM

## 2019-01-14 DIAGNOSIS — Z00.00 ROUTINE GENERAL MEDICAL EXAMINATION AT A HEALTH CARE FACILITY: Primary | ICD-10-CM

## 2019-01-14 DIAGNOSIS — F41.9 ANXIETY AND DEPRESSION: ICD-10-CM

## 2019-01-14 DIAGNOSIS — M25.569 PAIN AND SWELLING OF KNEE, UNSPECIFIED LATERALITY: ICD-10-CM

## 2019-01-14 DIAGNOSIS — Z12.31 ENCOUNTER FOR SCREENING MAMMOGRAM FOR BREAST CANCER: ICD-10-CM

## 2019-01-14 DIAGNOSIS — F32.A ANXIETY AND DEPRESSION: ICD-10-CM

## 2019-01-14 DIAGNOSIS — Z79.899 ENCOUNTER FOR MONITORING LONG-TERM PROTON PUMP INHIBITOR THERAPY: ICD-10-CM

## 2019-01-14 DIAGNOSIS — E55.9 VITAMIN D DEFICIENCY: ICD-10-CM

## 2019-01-14 DIAGNOSIS — Z23 NEEDS FLU SHOT: ICD-10-CM

## 2019-01-14 DIAGNOSIS — Z51.81 ENCOUNTER FOR MONITORING LONG-TERM PROTON PUMP INHIBITOR THERAPY: ICD-10-CM

## 2019-01-14 DIAGNOSIS — G89.29 CHRONIC BACK PAIN, UNSPECIFIED BACK LOCATION, UNSPECIFIED BACK PAIN LATERALITY: ICD-10-CM

## 2019-01-14 DIAGNOSIS — M54.9 CHRONIC BACK PAIN, UNSPECIFIED BACK LOCATION, UNSPECIFIED BACK PAIN LATERALITY: ICD-10-CM

## 2019-01-14 DIAGNOSIS — K21.9 GASTROESOPHAGEAL REFLUX DISEASE, ESOPHAGITIS PRESENCE NOT SPECIFIED: ICD-10-CM

## 2019-01-14 DIAGNOSIS — S83.242D ACUTE MEDIAL MENISCUS TEAR OF LEFT KNEE, SUBSEQUENT ENCOUNTER: ICD-10-CM

## 2019-01-14 DIAGNOSIS — M25.469 PAIN AND SWELLING OF KNEE, UNSPECIFIED LATERALITY: ICD-10-CM

## 2019-01-14 DIAGNOSIS — M85.80 OSTEOPENIA, UNSPECIFIED LOCATION: ICD-10-CM

## 2019-01-14 DIAGNOSIS — G47.00 INSOMNIA, UNSPECIFIED TYPE: ICD-10-CM

## 2019-01-14 PROBLEM — S83.242A ACUTE MEDIAL MENISCUS TEAR OF LEFT KNEE: Status: ACTIVE | Noted: 2019-01-14

## 2019-01-14 PROCEDURE — 90471 IMMUNIZATION ADMIN: CPT | Mod: S$GLB,,, | Performed by: FAMILY MEDICINE

## 2019-01-14 PROCEDURE — 99999 PR PBB SHADOW E&M-EST. PATIENT-LVL IV: ICD-10-PCS | Mod: PBBFAC,,, | Performed by: FAMILY MEDICINE

## 2019-01-14 PROCEDURE — 90686 IIV4 VACC NO PRSV 0.5 ML IM: CPT | Mod: S$GLB,,, | Performed by: FAMILY MEDICINE

## 2019-01-14 PROCEDURE — 99999 PR PBB SHADOW E&M-EST. PATIENT-LVL IV: CPT | Mod: PBBFAC,,, | Performed by: FAMILY MEDICINE

## 2019-01-14 PROCEDURE — 90471 FLU VACCINE (QUAD) GREATER THAN OR EQUAL TO 3YO PRESERVATIVE FREE IM: ICD-10-PCS | Mod: S$GLB,,, | Performed by: FAMILY MEDICINE

## 2019-01-14 PROCEDURE — 99396 PR PREVENTIVE VISIT,EST,40-64: ICD-10-PCS | Mod: 25,S$GLB,, | Performed by: FAMILY MEDICINE

## 2019-01-14 PROCEDURE — 90686 FLU VACCINE (QUAD) GREATER THAN OR EQUAL TO 3YO PRESERVATIVE FREE IM: ICD-10-PCS | Mod: S$GLB,,, | Performed by: FAMILY MEDICINE

## 2019-01-14 PROCEDURE — 99396 PREV VISIT EST AGE 40-64: CPT | Mod: 25,S$GLB,, | Performed by: FAMILY MEDICINE

## 2019-01-14 RX ORDER — OMEPRAZOLE 40 MG/1
40 CAPSULE, DELAYED RELEASE ORAL DAILY
Qty: 30 CAPSULE | Refills: 11 | Status: ON HOLD | OUTPATIENT
Start: 2019-01-14 | End: 2020-01-31 | Stop reason: HOSPADM

## 2019-01-14 RX ORDER — ERGOCALCIFEROL 1.25 MG/1
CAPSULE ORAL
Qty: 15 CAPSULE | Refills: 3 | Status: SHIPPED | OUTPATIENT
Start: 2019-01-14 | End: 2020-02-20

## 2019-01-14 RX ORDER — PHENTERMINE HYDROCHLORIDE 37.5 MG/1
37.5 TABLET ORAL
Qty: 30 TABLET | Refills: 0 | Status: SHIPPED | OUTPATIENT
Start: 2019-01-14 | End: 2019-02-13

## 2019-01-14 NOTE — PROGRESS NOTES
Office Visit    Patient Name: Terese Barney    : 1960  MRN: [id number]    Subjective:  Terese is a 58 y.o. female who presents today for:    Annual Exam (tdap, flu shot, colonoscopy)    Terese Barney presents today for annual wellness exam and monitoring of chronic conditions: Anxiety and depression, vitamin D deficiency, insomnia, overweight, back pain, GERD (resumed omeprazole off and on). Recently she has been struggling with ongoing knee pain since slipping and falling at a grocery store a few months ago. She has been following with orthopedics-- Dr Barriga- has a left knee meniscus tear that she needs surgery which is scheduled for later this month.   Labs 2019 unremarkable except for low Vit D of 17- has been off her weekly supplement but is resuming it.     She is postmenopausal since BRUNO/BSO in  and no longer needs paps. Previously was on HRT but has not been on HRT in several years. Had previous side effects from HRT.      They have been feeling physically overall well other than her recent knee issues.  She is also doing much better from an anxiety and depression standpoint-  Stable on cymbalta 60 mg daily with lorazepam nightly for sleep.     General lifestyle habits are as follows:  Diet is described as healthy-- home cooked meals and avoiding fast food but still struggling with irregular eating schedule [place], exercise is described as recently poor- she was previously doing very well working out with a , etc, but now cannot do anything due to her knee pain and swelling, sleep is described as good -sleeps well nightly with use of Ativan.  This is working better than Ambien.   Weight is back up to her previous baseline-- had lost 10 lbs but then with recent knee injury has been unable to exercise an put her weight back on.  She is s/p 90 day of ADIPEX about 60 months ago, which did help at that time.      Immunizations: TDaP 2015, yearly influenza today   19     Screening Tests: colonoscopy --> normal and repeat 10 years, mammogram--> 20 repeat 1 year, DEXA with next mammogram due to early menopause, no longer needs paps, hep C (-) 2019     Eye/Dental:  Eye exam up to date-[facility] exam, dental up to date       Past Medical History  Past Medical History:   Diagnosis Date    Anxiety and depression 2016    Chronic back pain     Hammer toe of left foot     Insomnia     Motion sickness     Overweight (BMI 25.0-29.9) 2016    PONV (postoperative nausea and vomiting)     Vitamin D deficiency 2016       Past Surgical History  Past Surgical History:   Procedure Laterality Date    BREAST SURGERY Bilateral      SECTION      two    foot surgery      left plantar fascial release    FOOT SURGERY Left 2017    2nd TOE, Hammer toe repair    HYSTERECTOMY      laparascopy      ovarian cysts    left knee surgery      REPAIR-HAMMER TOE 2nd toe Left 3/28/2017    Performed by [facility]an MD at [facility] OR [address] [place] [address]    TONSILLECTOMY      TOTAL ABDOMINAL HYSTERECTOMY W/ BILATERAL SALPINGOOPHORECTOMY  2004       Family History  Family History   Problem Relation Age of Onset    Hypertension Mother     Breast cancer Mother     Macular degeneration Father     Diabetes type II Father     Coronary artery disease Father     Hypertension Sister     Diabetes type II Sister     Diabetes type II Brother     Hypertension Brother     Glaucoma Paternal Grandmother        Social History  Social History     Socioeconomic History    Marital status:      Spouse name: Not on file    Number of children: Not on file    Years of education: Not on file    Highest education level: Not on file   Social Needs    Financial resource strain: Not on file    Food insecurity - worry: Not on file    Food insecurity - inability: Not on file    Transportation needs - medical: Not on file    Transportation needs - non-medical:  "Not on file   Occupational History    Occupation:      Comment: middle school   Tobacco Use    Smoking status: Never Smoker    Smokeless tobacco: Never Used   Substance and Sexual Activity    Alcohol use: Yes     Comment: rare    Drug use: No    Sexual activity: Not on file   Other Topics Concern    Not on file   Social History Narrative    Not on file       Current Medications  Medications reviewed and updated.     Allergies   Review of patient's allergies indicates:   Allergen Reactions    Codeine Nausea And Vomiting       Review of Systems (Pertinent positives)  Review of Systems   Constitutional: Negative for chills, fever and unexpected weight change.   HENT: Positive for postnasal drip. Negative for dental problem.    Eyes: Negative for visual disturbance.   Respiratory: Negative for shortness of breath.    Cardiovascular: Negative for chest pain.   Gastrointestinal: Negative for blood in stool, constipation and diarrhea.        Reflux   Genitourinary: Negative for dysuria and hematuria.   Musculoskeletal: Positive for arthralgias (knees shoulder).   Allergic/Immunologic: Positive for environmental allergies.   Neurological: Negative for dizziness and light-headedness.   Psychiatric/Behavioral: Negative for dysphoric mood and sleep disturbance (stable on lorazepam ). The patient is not nervous/anxious (managed on cymbalta ).        /84   Pulse 97   Temp 98.1 °F (36.7 °C)   Ht 5' 2" (1.575 m)   Wt 67 kg (147 lb 11.3 oz)   LMP  (LMP Unknown)   SpO2 98%   BMI 27.02 kg/m²     Physical Exam   Constitutional: She is oriented to person, place, and time. She appears well-developed and well-nourished. No distress.   HENT:   Head: Normocephalic and atraumatic.   Right Ear: Ear canal normal. Tympanic membrane is not erythematous and not bulging.   Left Ear: Ear canal normal. Tympanic membrane is not erythematous and not bulging.   Mouth/Throat: No oropharyngeal exudate.   Eyes: " Conjunctivae are normal.   Neck: Carotid bruit is not present. No thyroid mass and no thyromegaly present.   Cardiovascular: Normal rate, regular rhythm and normal heart sounds.   No murmur heard.  Pulses:       Dorsalis pedis pulses are 2+ on the right side, and 2+ on the left side.   Pulmonary/Chest: Effort normal and breath sounds normal. No respiratory distress.   Abdominal: Soft. Bowel sounds are normal. She exhibits no distension and no mass. There is no hepatosplenomegaly. There is no tenderness.   Musculoskeletal: Normal range of motion.        Left knee: Tenderness found. Medial joint line tenderness noted.   Lymphadenopathy:     She has no cervical adenopathy.   Neurological: She is alert and oriented to person, place, and time.   Skin: Skin is warm and dry. No rash noted.   Psychiatric: She has a normal mood and affect.   Vitals reviewed.        Assessment/Plan:  Terese Barney is a 58 y.o. female who presents today for :    Terese was seen today for annual exam.    Diagnoses and all orders for this visit:    Routine general medical examination at a health care facility  Comments:  health maintenance reviewed: flu shot today & otw UTD, advised on diet/exercise, eye/dental    Overweight (BMI 25.0-29.9)  -     phentermine (ADIPEX-P) 37.5 mg tablet; Take 1 tablet (37.5 mg total) by mouth before breakfast.    Gastroesophageal reflux disease, esophagitis presence not specified  Comments:  resuming daily omeprazole and will see GI if this is not helping reflux symptoms  Orders:  -     omeprazole (PRILOSEC) 40 MG capsule; Take 1 capsule (40 mg total) by mouth once daily.    Encounter for monitoring long-term proton pump inhibitor therapy    Vitamin D deficiency  -     ergocalciferol (VITAMIN D2) 50,000 unit Cap; TAKE 1 CAPSULE EVERY 7 DAYS ON SUNDAYS    Anxiety and depression  Comments:  stable on cymbalta 60 mg daily    Insomnia, unspecified type  Comments:  stable with lorazepam nightly, not needing any  lorazepam during the day    Chronic back pain, unspecified back location, unspecified back pain laterality    Osteopenia, unspecified location    Pain and swelling of knee, unspecified laterality    Acute medial meniscus tear of left knee, subsequent encounter  Comments:  has arthroscopic repair planned with Dr Linus poole January 24, 2019.     Needs flu shot  -     Influenza - Quadrivalent (3 years & older) (PF)    Encounter for screening mammogram for breast cancer  -     Mammo Digital Screening Bilat; Future            ICD-10-CM ICD-9-CM    1. Routine general medical examination at a health care facility Z00.00 V70.0     health maintenance reviewed: flu shot today & otw UTD, advised on diet/exercise, eye/dental   2. Overweight (BMI 25.0-29.9) E66.3 278.02 phentermine (ADIPEX-P) 37.5 mg tablet   3. Gastroesophageal reflux disease, esophagitis presence not specified K21.9 530.81 omeprazole (PRILOSEC) 40 MG capsule    resuming daily omeprazole and will see GI if this is not helping reflux symptoms   4. Encounter for monitoring long-term proton pump inhibitor therapy Z51.81 V58.83     Z79.899 V58.69    5. Vitamin D deficiency E55.9 268.9 ergocalciferol (VITAMIN D2) 50,000 unit Cap   6. Anxiety and depression F41.9 300.00     F32.9 311     stable on cymbalta 60 mg daily   7. Insomnia, unspecified type G47.00 780.52     stable with lorazepam nightly, not needing any lorazepam during the day   8. Chronic back pain, unspecified back location, unspecified back pain laterality M54.9 724.5     G89.29 338.29    9. Osteopenia, unspecified location M85.80 733.90    10. Pain and swelling of knee, unspecified laterality M25.569 719.46     M25.469 719.06    11. Acute medial meniscus tear of left knee, subsequent encounter S83.242D V58.89      836.0     has arthroscopic repair planned with Dr Linus poole January 24, 2019.    12. Needs flu shot Z23 V04.81 Influenza - Quadrivalent (3 years & older) (PF)   13. Encounter for  screening mammogram for breast cancer Z12.31 V76.12 Mammo Digital Screening Bilat       There are no Patient Instructions on file for this visit.      Follow-up in about 1 year (around 1/14/2020) for return as needed for new concerns.

## 2019-04-09 DIAGNOSIS — F41.9 ANXIETY AND DEPRESSION: ICD-10-CM

## 2019-04-09 DIAGNOSIS — F32.A ANXIETY AND DEPRESSION: ICD-10-CM

## 2019-04-09 RX ORDER — LORAZEPAM 1 MG/1
TABLET ORAL
Qty: 30 TABLET | Refills: 5 | Status: SHIPPED | OUTPATIENT
Start: 2019-04-09 | End: 2019-10-16 | Stop reason: SDUPTHER

## 2019-04-29 ENCOUNTER — HOSPITAL ENCOUNTER (OUTPATIENT)
Dept: RADIOLOGY | Facility: HOSPITAL | Age: 59
Discharge: HOME | End: 2019-04-29
Attending: FAMILY MEDICINE
Payer: COMMERCIAL

## 2019-04-29 DIAGNOSIS — Z12.31 ENCOUNTER FOR SCREENING MAMMOGRAM FOR BREAST CANCER: ICD-10-CM

## 2019-04-29 PROCEDURE — 77067 SCR MAMMO BI INCL CAD: CPT | Mod: 26,,, | Performed by: RADIOLOGY

## 2019-04-29 PROCEDURE — 77063 BREAST TOMOSYNTHESIS BI: CPT | Mod: 26,,, | Performed by: RADIOLOGY

## 2019-04-29 PROCEDURE — 77067 MAMMO DIGITAL SCREENING BILAT WITH TOMOSYNTHESIS_CAD: ICD-10-PCS | Mod: 26,,, | Performed by: RADIOLOGY

## 2019-04-29 PROCEDURE — 77063 MAMMO DIGITAL SCREENING BILAT WITH TOMOSYNTHESIS_CAD: ICD-10-PCS | Mod: 26,,, | Performed by: RADIOLOGY

## 2019-04-29 PROCEDURE — 77067 SCR MAMMO BI INCL CAD: CPT | Mod: TC

## 2019-08-02 RX ORDER — CLINDAMYCIN PHOSPHATE AND BENZOYL PEROXIDE 10; 50 MG/G; MG/G
GEL TOPICAL NIGHTLY
Qty: 45 G | Refills: 11 | Status: SHIPPED | OUTPATIENT
Start: 2019-08-02 | End: 2021-10-26 | Stop reason: SDUPTHER

## 2019-08-05 ENCOUNTER — PATIENT MESSAGE (OUTPATIENT)
Dept: FAMILY MEDICINE | Facility: CLINIC | Age: 59
End: 2019-08-05

## 2019-08-08 NOTE — TELEPHONE ENCOUNTER
Jahaira, can you please schedule Terese in the 1120 spot this coming Tuesday? Thanks    PS you can hold it with her MRN and then put her in that morning if need be, thanks

## 2019-08-11 RX ORDER — CEVIMELINE HYDROCHLORIDE 30 MG/1
CAPSULE ORAL
Qty: 180 CAPSULE | Refills: 4 | Status: SHIPPED | OUTPATIENT
Start: 2019-08-11 | End: 2020-09-17

## 2019-08-12 ENCOUNTER — HOSPITAL ENCOUNTER (OUTPATIENT)
Dept: RADIOLOGY | Facility: HOSPITAL | Age: 59
Discharge: HOME | End: 2019-08-12
Attending: FAMILY MEDICINE
Payer: COMMERCIAL

## 2019-08-12 ENCOUNTER — OFFICE VISIT (OUTPATIENT)
Dept: FAMILY MEDICINE | Facility: CLINIC | Age: 59
End: 2019-08-12
Payer: COMMERCIAL

## 2019-08-12 VITALS
BODY MASS INDEX: 28.39 KG/M2 | HEIGHT: 62 IN | HEART RATE: 99 BPM | SYSTOLIC BLOOD PRESSURE: 120 MMHG | WEIGHT: 154.31 LBS | DIASTOLIC BLOOD PRESSURE: 82 MMHG | OXYGEN SATURATION: 99 %

## 2019-08-12 DIAGNOSIS — M25.562 CHRONIC PAIN OF LEFT KNEE: ICD-10-CM

## 2019-08-12 DIAGNOSIS — G89.29 CHRONIC PAIN OF LEFT KNEE: Primary | ICD-10-CM

## 2019-08-12 DIAGNOSIS — G89.29 CHRONIC PAIN OF LEFT KNEE: ICD-10-CM

## 2019-08-12 DIAGNOSIS — M25.462 SWELLING OF LEFT KNEE JOINT: ICD-10-CM

## 2019-08-12 DIAGNOSIS — M25.562 CHRONIC PAIN OF LEFT KNEE: Primary | ICD-10-CM

## 2019-08-12 PROCEDURE — 99214 OFFICE O/P EST MOD 30 MIN: CPT | Mod: S$GLB,,, | Performed by: FAMILY MEDICINE

## 2019-08-12 PROCEDURE — 3008F BODY MASS INDEX DOCD: CPT | Mod: CPTII,S$GLB,, | Performed by: FAMILY MEDICINE

## 2019-08-12 PROCEDURE — 73564 XR KNEE COMP 4 OR MORE VIEWS LEFT: ICD-10-PCS | Mod: 26,LT,, | Performed by: RADIOLOGY

## 2019-08-12 PROCEDURE — 99999 PR PBB SHADOW E&M-EST. PATIENT-LVL IV: ICD-10-PCS | Mod: PBBFAC,,, | Performed by: FAMILY MEDICINE

## 2019-08-12 PROCEDURE — 73564 X-RAY EXAM KNEE 4 OR MORE: CPT | Mod: 26,LT,, | Performed by: RADIOLOGY

## 2019-08-12 PROCEDURE — 73565 X-RAY EXAM OF KNEES: CPT | Mod: TC,FY

## 2019-08-12 PROCEDURE — 99999 PR PBB SHADOW E&M-EST. PATIENT-LVL IV: CPT | Mod: PBBFAC,,, | Performed by: FAMILY MEDICINE

## 2019-08-12 PROCEDURE — 3008F PR BODY MASS INDEX (BMI) DOCUMENTED: ICD-10-PCS | Mod: CPTII,S$GLB,, | Performed by: FAMILY MEDICINE

## 2019-08-12 PROCEDURE — 99214 PR OFFICE/OUTPT VISIT, EST, LEVL IV, 30-39 MIN: ICD-10-PCS | Mod: S$GLB,,, | Performed by: FAMILY MEDICINE

## 2019-08-12 PROCEDURE — 73564 X-RAY EXAM KNEE 4 OR MORE: CPT | Mod: TC,FY,LT

## 2019-08-12 RX ORDER — CEFUROXIME AXETIL 250 MG/1
250 TABLET ORAL 2 TIMES DAILY
Refills: 1 | COMMUNITY
Start: 2019-05-21 | End: 2019-08-12

## 2019-08-12 RX ORDER — GABAPENTIN 300 MG/1
300 CAPSULE ORAL 3 TIMES DAILY PRN
Qty: 90 CAPSULE | Refills: 11 | Status: SHIPPED | OUTPATIENT
Start: 2019-08-12 | End: 2020-02-07

## 2019-08-12 NOTE — PROGRESS NOTES
" Office Visit    Patient Name: Terese Barney    : 1960  MRN: [id number]    Subjective:  Terese is a 58 y.o. female who presents today for:    joint pains    57 yo patient of mine with PMH of anxiety and depression, vitamin D deficiency, insomnia, overweight BMI, back pain, GERD (resumed omeprazole off and on) here today to discuss  ongoing knee pain since slipping and falling at a grocery store 2018. As part of that fall she sprained her R knee, broke her R second toe and started to experience Left knee pain with looking/giving out. At a certain point her knee stared having such severe pain and severe "giving out."    She has been following with outside ortho- Dr Barriga- she received cortisone injections and then MRI that showed meniscus tear. She had an arthroscopic knee surgery for repair of the meniscus 2019. She did post operative PT after the surgery for about 20 sessions and continued home exercise program. She was not really improving much so he then advised Synvisc injections about 2 months ago with zero relief.     Current pain is severe especially after being on her feet all day and progressive swelling. Still with episodes of locking/catching/giving out. Prior to her fall she was working out but now has difficulty going from sitting to standing and just with basic walking. Takes OTC analgesics and RX NSAID  meloxicam with only mild relief. A knee brace is also only minimally helpful.     She was previously pursuing legal action against the grocery store(and obtaining medical care via her insurance claim)  as there appears to have been a substance on the floor where she fell causing it to be slippery, however, she is fine now going through me and paying through her normal insurance pathway as she was not making good progress using the avenues advised through her legal action.    Past Medical History  Past Medical History:   Diagnosis Date    Anxiety and depression " 2016    Chronic back pain     Hammer toe of left foot     Insomnia     Motion sickness     Overweight (BMI 25.0-29.9) 2016    PONV (postoperative nausea and vomiting)     Vitamin D deficiency 2016       Past Surgical History  Past Surgical History:   Procedure Laterality Date    AUGMENTATION OF BREAST Bilateral     BREAST SURGERY Bilateral      SECTION      two    foot surgery      left plantar fascial release    FOOT SURGERY Left 2017    2nd TOE, Hammer toe repair    HYSTERECTOMY      laparascopy      ovarian cysts    left knee surgery      REPAIR-HAMMER TOE 2nd toe Left 3/28/2017    Performed by [facility] at [facility] OR [address] [place] [address]    TONSILLECTOMY      TOTAL ABDOMINAL HYSTERECTOMY W/ BILATERAL SALPINGOOPHORECTOMY  2004       Family History  Family History   Problem Relation Age of Onset    Hypertension Mother     Breast cancer Mother     Macular degeneration Father     Diabetes type II Father     Coronary artery disease Father     Hypertension Sister     Diabetes type II Sister     Diabetes type II Brother     Hypertension Brother     Glaucoma Paternal Grandmother        Social History  Social History     Socioeconomic History    Marital status:      Spouse name: Not on file    Number of children: Not on file    Years of education: Not on file    Highest education level: Not on file   Occupational History    Occupation:      Comment: middle school   Social Needs    Financial resource strain: Not on file    Food insecurity:     Worry: Not on file     Inability: Not on file    Transportation needs:     Medical: Not on file     Non-medical: Not on file   Tobacco Use    Smoking status: Never Smoker    Smokeless tobacco: Never Used   Substance and Sexual Activity    Alcohol use: Yes     Comment: rare    Drug use: No    Sexual activity: Not on file   Lifestyle    Physical activity:     Days per week: Not on file      "Minutes per session: Not on file    Stress: Not on file   Relationships    Social connections:     Talks on phone: Not on file     Gets together: Not on file     Attends Worship service: Not on file     Active member of club or organization: Not on file     Attends meetings of clubs or organizations: Not on file     Relationship status: Not on file   Other Topics Concern    Not on file   Social History Narrative    Not on file       Current Medications  Medications reviewed and updated.     Allergies   Review of patient's allergies indicates:   Allergen Reactions    Codeine Nausea And Vomiting       Review of Systems (Pertinent positives)  Review of Systems   Constitutional: Positive for unexpected weight change (weight gain-- can't exercise due to knee issues).   Musculoskeletal: Positive for arthralgias and back pain.   Skin: Negative for color change, rash and wound.       /82   Pulse 99   Ht 5' 2" (1.575 m)   Wt 70 kg (154 lb 5.2 oz)   LMP  (LMP Unknown)   SpO2 99%   BMI 28.23 kg/m²     Physical Exam   Constitutional: She is oriented to person, place, and time. She appears well-developed and well-nourished. No distress.   HENT:   Head: Normocephalic and atraumatic.   Eyes: Conjunctivae are normal.   Pulmonary/Chest: Effort normal.   Musculoskeletal: She exhibits no edema.        Left knee: She exhibits decreased range of motion and swelling. She exhibits no effusion and no erythema. Abnormal patellar mobility: pain with patellar grind. Tenderness found. Medial joint line tenderness noted. No patellar tendon tenderness noted.        Legs:  Neurological: She is alert and oriented to person, place, and time.   Skin: Skin is warm and dry.   Psychiatric: She has a normal mood and affect.   Vitals reviewed.        Assessment/Plan:  Terese Barney is a 58 y.o. female who presents today for :    Terese was seen today for joint pains.    Diagnoses and all orders for this visit:    Chronic pain of " left knee  Comments:  Due to ongoing pain/swelling & instability symptoms following her meniscus repair, will obtain updated MRI as it has been now about 8 months since prior surgery  Orders:  -     X-Ray Knee AP Standing Bilateral; Future  -     X-Ray Knee Complete 4 or More Views Left; Future  -     MRI Knee Without Contrast Left; Future  -     gabapentin (NEURONTIN) 300 MG capsule; Take 1 capsule (300 mg total) by mouth 3 (three) times daily as needed.    Swelling of left knee joint  -     X-Ray Knee AP Standing Bilateral; Future  -     X-Ray Knee Complete 4 or More Views Left; Future  -     MRI Knee Without Contrast Left; Future            ICD-10-CM ICD-9-CM    1. Chronic pain of left knee M25.562 719.46 X-Ray Knee AP Standing Bilateral    G89.29 338.29 X-Ray Knee Complete 4 or More Views Left      MRI Knee Without Contrast Left      gabapentin (NEURONTIN) 300 MG capsule    Due to ongoing pain/swelling & instability symptoms following her meniscus repair, will obtain updated MRI as it has been now about 8 months since prior surgery   2. Swelling of left knee joint M25.462 719.06 X-Ray Knee AP Standing Bilateral      X-Ray Knee Complete 4 or More Views Left      MRI Knee Without Contrast Left       There are no Patient Instructions on file for this visit.    Based on x-ray and MRI results, will advised on next step IE repeat trial of physical therapy/injections either steroid or viscosupplementation through ortho and/ or referral to Orthopedics for 2nd opinion.    Trial of adding gabapentin to her meloxicam anti-inflammatory as some of her knee pain is described as burning/pins and needles/numbness.    Follow up in about 3 months (around 11/12/2019) for to review results of diagnostic procedure.

## 2019-08-13 PROBLEM — M17.12 OSTEOARTHRITIS OF LEFT KNEE: Status: ACTIVE | Noted: 2019-08-13

## 2019-08-19 ENCOUNTER — HOSPITAL ENCOUNTER (OUTPATIENT)
Dept: RADIOLOGY | Facility: HOSPITAL | Age: 59
Discharge: HOME | End: 2019-08-19
Attending: FAMILY MEDICINE
Payer: COMMERCIAL

## 2019-08-19 DIAGNOSIS — M25.562 CHRONIC PAIN OF LEFT KNEE: ICD-10-CM

## 2019-08-19 DIAGNOSIS — M25.462 SWELLING OF LEFT KNEE JOINT: ICD-10-CM

## 2019-08-19 DIAGNOSIS — G89.29 CHRONIC PAIN OF LEFT KNEE: ICD-10-CM

## 2019-08-19 PROCEDURE — 73721 MRI JNT OF LWR EXTRE W/O DYE: CPT | Mod: TC,LT

## 2019-08-19 PROCEDURE — 73721 MRI JNT OF LWR EXTRE W/O DYE: CPT | Mod: 26,LT,, | Performed by: RADIOLOGY

## 2019-08-19 PROCEDURE — 73721 MRI KNEE WITHOUT CONTRAST LEFT: ICD-10-PCS | Mod: 26,LT,, | Performed by: RADIOLOGY

## 2019-08-25 ENCOUNTER — TELEPHONE (OUTPATIENT)
Dept: FAMILY MEDICINE | Facility: CLINIC | Age: 59
End: 2019-08-25

## 2019-08-25 DIAGNOSIS — M17.12 OSTEOARTHRITIS OF LEFT KNEE, UNSPECIFIED OSTEOARTHRITIS TYPE: Primary | ICD-10-CM

## 2019-08-25 DIAGNOSIS — M25.562 LEFT KNEE PAIN, UNSPECIFIED CHRONICITY: ICD-10-CM

## 2019-08-25 DIAGNOSIS — S83.242D ACUTE MEDIAL MENISCUS TEAR OF LEFT KNEE, SUBSEQUENT ENCOUNTER: ICD-10-CM

## 2019-09-06 ENCOUNTER — TELEPHONE (OUTPATIENT)
Dept: SPORTS MEDICINE | Facility: CLINIC | Age: 59
End: 2019-09-06

## 2019-09-06 ENCOUNTER — PATIENT MESSAGE (OUTPATIENT)
Dept: SPORTS MEDICINE | Facility: CLINIC | Age: 59
End: 2019-09-06

## 2019-09-09 ENCOUNTER — HOSPITAL ENCOUNTER (EMERGENCY)
Facility: HOSPITAL | Age: 59
Discharge: HOME | End: 2019-09-09
Attending: FAMILY MEDICINE
Payer: COMMERCIAL

## 2019-09-09 VITALS
OXYGEN SATURATION: 100 % | WEIGHT: 156.88 LBS | DIASTOLIC BLOOD PRESSURE: 73 MMHG | SYSTOLIC BLOOD PRESSURE: 130 MMHG | RESPIRATION RATE: 22 BRPM | HEART RATE: 75 BPM | TEMPERATURE: 98 F | BODY MASS INDEX: 28.87 KG/M2 | HEIGHT: 62 IN

## 2019-09-09 DIAGNOSIS — S62.639B OPEN FRACTURE OF TUFT OF DISTAL PHALANX OF FINGER: Primary | ICD-10-CM

## 2019-09-09 DIAGNOSIS — S60.132A CONTUSION OF LEFT MIDDLE FINGER WITH DAMAGE TO NAIL, INITIAL ENCOUNTER: ICD-10-CM

## 2019-09-09 PROCEDURE — 90715 TDAP VACCINE 7 YRS/> IM: CPT | Mod: ER | Performed by: FAMILY MEDICINE

## 2019-09-09 PROCEDURE — 63600175 PHARM REV CODE 636 W HCPCS: Mod: ER | Performed by: FAMILY MEDICINE

## 2019-09-09 PROCEDURE — 96365 THER/PROPH/DIAG IV INF INIT: CPT | Mod: ER

## 2019-09-09 PROCEDURE — 90471 IMMUNIZATION ADMIN: CPT | Mod: ER | Performed by: FAMILY MEDICINE

## 2019-09-09 PROCEDURE — 99284 EMERGENCY DEPT VISIT MOD MDM: CPT | Mod: 25,ER

## 2019-09-09 PROCEDURE — S0020 INJECTION, BUPIVICAINE HYDRO: HCPCS | Mod: ER | Performed by: FAMILY MEDICINE

## 2019-09-09 PROCEDURE — 12001 RPR S/N/AX/GEN/TRNK 2.5CM/<: CPT | Mod: ER

## 2019-09-09 PROCEDURE — 25000003 PHARM REV CODE 250: Mod: ER | Performed by: FAMILY MEDICINE

## 2019-09-09 PROCEDURE — 96375 TX/PRO/DX INJ NEW DRUG ADDON: CPT | Mod: ER

## 2019-09-09 PROCEDURE — 29130 APPL FINGER SPLINT STATIC: CPT | Mod: F3,ER

## 2019-09-09 RX ORDER — CEFAZOLIN SODIUM 1 G/50ML
1 SOLUTION INTRAVENOUS
Status: COMPLETED | OUTPATIENT
Start: 2019-09-09 | End: 2019-09-09

## 2019-09-09 RX ORDER — ONDANSETRON 2 MG/ML
4 INJECTION INTRAMUSCULAR; INTRAVENOUS
Status: COMPLETED | OUTPATIENT
Start: 2019-09-09 | End: 2019-09-09

## 2019-09-09 RX ORDER — CEPHALEXIN 500 MG/1
500 CAPSULE ORAL EVERY 12 HOURS
Qty: 14 CAPSULE | Refills: 0 | Status: SHIPPED | OUTPATIENT
Start: 2019-09-09 | End: 2019-09-16

## 2019-09-09 RX ORDER — MORPHINE SULFATE 4 MG/ML
4 INJECTION, SOLUTION INTRAMUSCULAR; INTRAVENOUS
Status: COMPLETED | OUTPATIENT
Start: 2019-09-09 | End: 2019-09-09

## 2019-09-09 RX ORDER — KETOROLAC TROMETHAMINE 30 MG/ML
30 INJECTION, SOLUTION INTRAMUSCULAR; INTRAVENOUS
Status: COMPLETED | OUTPATIENT
Start: 2019-09-09 | End: 2019-09-09

## 2019-09-09 RX ORDER — HYDROCODONE BITARTRATE AND ACETAMINOPHEN 5; 325 MG/1; MG/1
1 TABLET ORAL EVERY 4 HOURS PRN
Qty: 18 TABLET | Refills: 0 | Status: SHIPPED | OUTPATIENT
Start: 2019-09-09 | End: 2020-02-07

## 2019-09-09 RX ORDER — BUPIVACAINE HYDROCHLORIDE 5 MG/ML
10 INJECTION, SOLUTION EPIDURAL; INTRACAUDAL
Status: COMPLETED | OUTPATIENT
Start: 2019-09-09 | End: 2019-09-09

## 2019-09-09 RX ADMIN — MORPHINE SULFATE 4 MG: 4 INJECTION INTRAVENOUS at 07:09

## 2019-09-09 RX ADMIN — BACITRACIN ZINC, NEOMYCIN SULFATE, POLYMYXIN B SULFATE 1 EACH: 3.5; 5000; 4 OINTMENT TOPICAL at 09:09

## 2019-09-09 RX ADMIN — CEFAZOLIN SODIUM 1 G: 1 SOLUTION INTRAVENOUS at 09:09

## 2019-09-09 RX ADMIN — KETOROLAC TROMETHAMINE 30 MG: 30 INJECTION, SOLUTION INTRAMUSCULAR; INTRAVENOUS at 07:09

## 2019-09-09 RX ADMIN — BUPIVACAINE HYDROCHLORIDE 150 MG: 5 INJECTION, SOLUTION EPIDURAL; INTRACAUDAL; PERINEURAL at 07:09

## 2019-09-09 RX ADMIN — CLOSTRIDIUM TETANI TOXOID ANTIGEN (FORMALDEHYDE INACTIVATED), CORYNEBACTERIUM DIPHTHERIAE TOXOID ANTIGEN (FORMALDEHYDE INACTIVATED), BORDETELLA PERTUSSIS TOXOID ANTIGEN (GLUTARALDEHYDE INACTIVATED), BORDETELLA PERTUSSIS FILAMENTOUS HEMAGGLUTININ ANTIGEN (FORMALDEHYDE INACTIVATED), BORDETELLA PERTUSSIS PERTACTIN ANTIGEN, AND BORDETELLA PERTUSSIS FIMBRIAE 2/3 ANTIGEN 0.5 ML: 5; 2; 2.5; 5; 3; 5 INJECTION, SUSPENSION INTRAMUSCULAR at 07:09

## 2019-09-09 RX ADMIN — ONDANSETRON 4 MG: 2 INJECTION INTRAMUSCULAR; INTRAVENOUS at 07:09

## 2019-09-10 NOTE — ED NOTES
Cleaned 4th finger again.   Applied neosporin to suture site.   Simple dressing applied.  Applied finger splint to digit.  Pt tolerated procedure well.  Pt educated on RICE and wound care. Pt instructed to follow up with orthopedic this week.   Pt verbalized understanding.

## 2019-09-10 NOTE — ED NOTES
Family member returned with acetone product.   Poured some in container. Placed middle finger into acetone to allow to soak off artifical nail covering.  Instruct to call with any problems, needs, concerns.  Will continue to monitor.

## 2019-09-10 NOTE — ED NOTES
Dr Fields went speak with pt.  Alternative options provided to relieve pressure behind nail bed.   Family will go to purchase some acetone to remove the artifical nail.

## 2019-09-10 NOTE — ED NOTES
Dr Fields at the bedside to reassess before I wrap dressing.   He wants to apply stitch to wound prior to closing dressing.  Set up for suture as instructed.

## 2019-09-10 NOTE — ED NOTES
Attempted to remove nail polish and artificial overlay from nail on middle finger of left hand.   Nail polish not working.  Notified MD.

## 2019-09-10 NOTE — ED PROVIDER NOTES
Encounter Date: 2019       History     Chief Complaint   Patient presents with    Hand Pain     60 y/o f to ER after having fingers caught under a lawnmower about 20 mins PTA. Bleeding controlled. Pain and discoloration to 3rd and 4th digit. Pt reports pain going up arm     59-year-old female presents with chief complaint of left hand pain.  Patient reports was cutting grass a which time some grass got stuck in the exit of the more while the more still running stuck her hand in it to clear the grass out.  Patient reports the blade hit her finger injuring her left hand.  Patient reports is right-hand dominant.  Currently complaining in severe pain.        Review of patient's allergies indicates:   Allergen Reactions    Codeine Nausea And Vomiting     Past Medical History:   Diagnosis Date    Anxiety and depression 2016    Chronic back pain     Hammer toe of left foot     Insomnia     Motion sickness     Overweight (BMI 25.0-29.9) 2016    PONV (postoperative nausea and vomiting)     Vitamin D deficiency 2016     Past Surgical History:   Procedure Laterality Date    AUGMENTATION OF BREAST Bilateral     BREAST SURGERY Bilateral      SECTION      two    foot surgery      left plantar fascial release    FOOT SURGERY Left 2017    2nd TOE, Hammer toe repair    HYSTERECTOMY      laparascopy      ovarian cysts    left knee surgery      REPAIR-HAMMER TOE 2nd toe Left 3/28/2017    Performed by Oscar [facility]an MD at [facility] OR [address] [place] [address]    TONSILLECTOMY      TOTAL ABDOMINAL HYSTERECTOMY W/ BILATERAL SALPINGOOPHORECTOMY       Family History   Problem Relation Age of Onset    Hypertension Mother     Breast cancer Mother     Macular degeneration Father     Diabetes type II Father     Coronary artery disease Father     Hypertension Sister     Diabetes type II Sister     Diabetes type II Brother     Hypertension Brother     Glaucoma Paternal Grandmother      Social  History     Tobacco Use    Smoking status: Never Smoker    Smokeless tobacco: Never Used   Substance Use Topics    Alcohol use: Yes     Comment: rare    Drug use: No     Review of Systems   Constitutional: Negative for chills and fever.   Skin: Positive for wound.   All other systems reviewed and are negative.      Physical Exam     Initial Vitals [09/09/19 1913]   BP Pulse Resp Temp SpO2   (!) 145/84 109 (!) 22 98.2 °F (36.8 °C) 97 %      MAP       --         Physical Exam    Nursing note and vitals reviewed.  Constitutional: She appears well-developed and well-nourished.   HENT:   Head: Normocephalic and atraumatic.   Eyes: EOM are normal. Pupils are equal, round, and reactive to light.   Neck: Normal range of motion. Neck supple.   Cardiovascular: Normal rate, regular rhythm and normal heart sounds.   Pulmonary/Chest: Breath sounds normal.   Abdominal: Soft.   Musculoskeletal: Normal range of motion.   Neurological: She is alert and oriented to person, place, and time. She has normal strength.   Skin: Skin is warm. Capillary refill takes less than 2 seconds. Laceration noted.        Distal laceration of left distal tip.  Patient has gel nail Polish required acetone soak to removed the nail Polish from the 3rd fingernail to assess fully.   Psychiatric: Her behavior is normal. Her mood appears anxious.         ED Course   Orthopedic Injury  Date/Time: 9/9/2019 9:22 PM  Performed by: South Fields MD  Authorized by: South Fields MD     Pre-operative diagnosis:  Distal tuft fracture 4th digit  Post-operative diagnosis:  Distal tuft fracture 4th digit  Consent Done?:  Not Needed  Injury:     Injury location:  Finger    Location details:  Left ring finger    Injury type:  Fracture    Fracture type: distal phalanx      MCP joint involved?: No      Any IP joint involved?: No        Pre-procedure assessment:     Neurovascular status: Neurovascularly intact      Distal perfusion: normal      Neurological  function: normal      Range of motion: reduced      Local anesthesia used?: Yes      Anesthesia:  Digital block    Local anesthetic:  Bupivacaine 0.5% without epinephrine    Anesthetic total (ml):  10    Patient sedated?: No        Selections made in this section will also lock the Injury type section above.:     Manipulation performed?: No      Immobilization:  Splint    Splint type:  Static finger    Supplies used:  Aluminum splint    Complications: No      Specimens: No      Implants: No    Post-procedure assessment:     Distal perfusion: normal      Neurological function: normal      Range of motion: splinted      Patient tolerance:  Patient tolerated the procedure well with no immediate complications    Lac Repair  Date/Time: 9/9/2019 9:35 PM  Performed by: South Fields MD  Authorized by: South Fields MD   Consent Done: Not Needed  Body area: upper extremity  Location details: left index finger  Laceration length: 1.5 cm  Foreign bodies: no foreign bodies  Tendon involvement: none  Nerve involvement: none  Vascular damage: no  Anesthesia: digital block    Anesthesia:  Local Anesthetic: bupivacaine 0.5% without epinephrine  Anesthetic total: 10 mL  Patient sedated: no  Preparation: Patient was prepped and draped in the usual sterile fashion.  Debridement: none  Degree of undermining: none  Skin closure: 4-0 nylon  Number of sutures: 1  Technique: simple  Approximation: loose  Approximation difficulty: simple  Dressing: antibiotic ointment  Patient tolerance: Patient tolerated the procedure well with no immediate complications        Labs Reviewed - No data to display       Imaging Results          X-Ray Hand 3 view Left (Final result)  Result time 09/09/19 19:32:02    Final result by Oscar Watkins MD (09/09/19 19:32:02)                 Impression:        Comminuted nondisplaced fracture of the shaft and tuft of the distal phalanx left ring finger.      Electronically signed by: Oscar  Chadchelsey  Date:    09/09/2019  Time:    19:32             Narrative:    EXAMINATION:  XR HAND COMPLETE 3 VIEW LEFT    CLINICAL HISTORY:  Hand pain;. Left hand pain.    COMPARISON:  None    FINDINGS:  A comminuted nondisplaced fracture of the shaft and tuft of the distal phalanx left ring finger.  No other fractures are identified.  Joint spaces are well preserved.  Soft tissues are our normal.                                                      Clinical Impression:       ICD-10-CM ICD-9-CM   1. Open fracture of tuft of distal phalanx of finger S62.639B 816.12   2. Contusion of left middle finger with damage to nail, initial encounter S60.132A 923.3                                South Fields MD  09/09/19 2148

## 2019-09-10 NOTE — ED NOTES
Updated the family. Pt's niece went obtain the acetone.   Instructed pt and family to inform when she returns.  Verbalized understanding.  Will continue to monitor.

## 2019-09-13 ENCOUNTER — PATIENT MESSAGE (OUTPATIENT)
Dept: SPORTS MEDICINE | Facility: CLINIC | Age: 59
End: 2019-09-13

## 2019-09-13 ENCOUNTER — TELEPHONE (OUTPATIENT)
Dept: SPORTS MEDICINE | Facility: CLINIC | Age: 59
End: 2019-09-13

## 2019-10-03 ENCOUNTER — OFFICE VISIT (OUTPATIENT)
Dept: SPORTS MEDICINE | Facility: CLINIC | Age: 59
End: 2019-10-03
Payer: COMMERCIAL

## 2019-10-03 VITALS
HEIGHT: 62 IN | DIASTOLIC BLOOD PRESSURE: 81 MMHG | WEIGHT: 150 LBS | SYSTOLIC BLOOD PRESSURE: 136 MMHG | HEART RATE: 106 BPM | BODY MASS INDEX: 27.6 KG/M2

## 2019-10-03 DIAGNOSIS — G89.29 CHRONIC PAIN OF LEFT KNEE: ICD-10-CM

## 2019-10-03 DIAGNOSIS — M25.562 CHRONIC PAIN OF LEFT KNEE: ICD-10-CM

## 2019-10-03 DIAGNOSIS — M17.12 ARTHRITIS OF KNEE, LEFT: Primary | ICD-10-CM

## 2019-10-03 PROCEDURE — 20610 LARGE JOINT ASPIRATION/INJECTION: L KNEE: ICD-10-PCS | Mod: LT,S$GLB,, | Performed by: ORTHOPAEDIC SURGERY

## 2019-10-03 PROCEDURE — 99999 PR PBB SHADOW E&M-EST. PATIENT-LVL III: CPT | Mod: PBBFAC,,, | Performed by: ORTHOPAEDIC SURGERY

## 2019-10-03 PROCEDURE — 99204 OFFICE O/P NEW MOD 45 MIN: CPT | Mod: 25,S$GLB,, | Performed by: ORTHOPAEDIC SURGERY

## 2019-10-03 PROCEDURE — 3008F BODY MASS INDEX DOCD: CPT | Mod: CPTII,S$GLB,, | Performed by: ORTHOPAEDIC SURGERY

## 2019-10-03 PROCEDURE — 3008F PR BODY MASS INDEX (BMI) DOCUMENTED: ICD-10-PCS | Mod: CPTII,S$GLB,, | Performed by: ORTHOPAEDIC SURGERY

## 2019-10-03 PROCEDURE — 99204 PR OFFICE/OUTPT VISIT, NEW, LEVL IV, 45-59 MIN: ICD-10-PCS | Mod: 25,S$GLB,, | Performed by: ORTHOPAEDIC SURGERY

## 2019-10-03 PROCEDURE — 99999 PR PBB SHADOW E&M-EST. PATIENT-LVL III: ICD-10-PCS | Mod: PBBFAC,,, | Performed by: ORTHOPAEDIC SURGERY

## 2019-10-03 PROCEDURE — 20610 DRAIN/INJ JOINT/BURSA W/O US: CPT | Mod: LT,S$GLB,, | Performed by: ORTHOPAEDIC SURGERY

## 2019-10-03 RX ADMIN — TRIAMCINOLONE ACETONIDE 40 MG: 40 INJECTION, SUSPENSION INTRA-ARTICULAR; INTRAMUSCULAR at 11:10

## 2019-10-03 NOTE — PROGRESS NOTES
CC: LEFT knee pain    59 y.o. Female who presents as a new patient to me. She works as a  for [employer]. Complaint is left knee pain of about a year in duration. Patient slipped while in the store and fell on knee and had immediated pain. She continued to have pain and was seen by Dr. Barriga who performed a partial meniscectomy. She subsequently had 3 gel injections. However her pain never got better and saw her pcp who obtained an MRI and referred her for a 2nd opinion. Her pain is 2/10 localizes anterior knee, with occasional swelling, and stiffness. No prominent mechanical symptoms. Denies instability.  Worse with rising from a seater position, prolonged sitting, prolonged standing, as well as going up/down stairs. Better with rest, and sometimes aleve.Treatment thus far has included rest, activity modifications, oral medications.  Here today to discuss diagnosis and treatment options.      PMHx notable for as listed below.   Negative for tobacco.   Negative for diabetes.     Pain Score:   2    REVIEW OF SYSTEMS:   Constitution: Negative. Negative for chills, fever and night sweats.    Hematologic/Lymphatic: Negative for bleeding problem. Does not bruise/bleed easily.   Skin: Negative for dry skin, itching and rash.   Musculoskeletal: Negative for falls. Positive for left knee pain and  muscle weakness.     All other review of symptoms were reviewed and found to be noncontributory.     PAST MEDICAL HISTORY:   Past Medical History:   Diagnosis Date    Anxiety and depression 2016    Chronic back pain     Hammer toe of left foot     Insomnia     Motion sickness     Overweight (BMI 25.0-29.9) 2016    PONV (postoperative nausea and vomiting)     Vitamin D deficiency 2016       PAST SURGICAL HISTORY:   Past Surgical History:   Procedure Laterality Date    AUGMENTATION OF BREAST Bilateral     BREAST SURGERY Bilateral      SECTION      two     foot surgery      left plantar fascial release    FOOT SURGERY Left 03/28/2017    2nd TOE, Hammer toe repair    HYSTERECTOMY      laparascopy      ovarian cysts    left knee surgery      TONSILLECTOMY      TOTAL ABDOMINAL HYSTERECTOMY W/ BILATERAL SALPINGOOPHORECTOMY  2004       FAMILY HISTORY:   Family History   Problem Relation Age of Onset    Hypertension Mother     Breast cancer Mother     Macular degeneration Father     Diabetes type II Father     Coronary artery disease Father     Hypertension Sister     Diabetes type II Sister     Diabetes type II Brother     Hypertension Brother     Glaucoma Paternal Grandmother        SOCIAL HISTORY:   Social History     Socioeconomic History    Marital status:      Spouse name: Not on file    Number of children: Not on file    Years of education: Not on file    Highest education level: Not on file   Occupational History    Occupation:      Comment: middle school   Social Needs    Financial resource strain: Not on file    Food insecurity:     Worry: Not on file     Inability: Not on file    Transportation needs:     Medical: Not on file     Non-medical: Not on file   Tobacco Use    Smoking status: Never Smoker    Smokeless tobacco: Never Used   Substance and Sexual Activity    Alcohol use: Yes     Comment: rare    Drug use: No    Sexual activity: Not on file   Lifestyle    Physical activity:     Days per week: Not on file     Minutes per session: Not on file    Stress: Not on file   Relationships    Social connections:     Talks on phone: Not on file     Gets together: Not on file     Attends Anabaptist service: Not on file     Active member of club or organization: Not on file     Attends meetings of clubs or organizations: Not on file     Relationship status: Not on file   Other Topics Concern    Not on file   Social History Narrative    Not on file       MEDICATIONS:     Current Outpatient Medications:      "cevimeline (EVOXAC) 30 mg capsule, TAKE 1 CAPSULE BY MOUTH TWICE DAILY, Disp: 180 capsule, Rfl: 4    clindamycin-benzoyl peroxide gel, APPLY TOPICALLY EVERY EVENING, Disp: 45 g, Rfl: 11    DULoxetine (CYMBALTA) 60 MG capsule, Take 1 capsule (60 mg total) by mouth 2 (two) times daily., Disp: 180 capsule, Rfl: 4    ergocalciferol (VITAMIN D2) 50,000 unit Cap, TAKE 1 CAPSULE EVERY 7 DAYS ON SUNDAYS, Disp: 15 capsule, Rfl: 3    gabapentin (NEURONTIN) 300 MG capsule, Take 1 capsule (300 mg total) by mouth 3 (three) times daily as needed., Disp: 90 capsule, Rfl: 11    HYDROcodone-acetaminophen (NORCO) 5-325 mg per tablet, Take 1 tablet by mouth every 4 (four) hours as needed for Pain., Disp: 18 tablet, Rfl: 0    LORazepam (ATIVAN) 1 MG tablet, TAKE 1/2 TO 1 (ONE-HALF TO ONE) TABLET BY MOUTH ONCE DAILY AS NEEDED FOR SEVERE ANXIETY, Disp: 30 tablet, Rfl: 5    meloxicam (MOBIC) 15 MG tablet, Take 1 tablet (15 mg total) by mouth daily as needed., Disp: 90 tablet, Rfl: 3    omeprazole (PRILOSEC) 40 MG capsule, Take 1 capsule (40 mg total) by mouth once daily., Disp: 30 capsule, Rfl: 11    PREVIDENT 5000 DRY MOUTH 1.1 % Gel, , Disp: , Rfl:     cycloSPORINE (RESTASIS) 0.05 % ophthalmic emulsion, Place 0.4 mLs (1 drop total) into both eyes 2 (two) times daily., Disp: 60 vial, Rfl: 12    ALLERGIES:   Review of patient's allergies indicates:   Allergen Reactions    Codeine Nausea And Vomiting        PHYSICAL EXAMINATION:  /81   Pulse 106   Ht 5' 2" (1.575 m)   Wt 68 kg (150 lb)   LMP  (LMP Unknown)   BMI 27.44 kg/m²   General: Well-developed well-nourished 59 y.o. femalein no acute distress   Cardiovascular: Regular rhythm by palpation of distal pulse, normal color and temperature, no concerning varicosities on symptomatic side   Lungs: No labored breathing or wheezing appreciated   Neuro: Alert and oriented ×3   Psychiatric: well oriented to person, place and time, demonstrates normal mood and affect   Skin: " No rashes, lesions or ulcers, normal temperature, turgor, and texture on involved extremity    Ortho/SPM Exam  Examination of the left knee demonstrates no effusion or swelling. Negative patellar grind test.  Negative patellar apprehension. Positive medial joint line tenderness. Positive Mayra's with pain over medial compartment.  Range of motion is 0-140 with no pain at terminal flexion or extension. Mild patellofemoral joint crepitus with range of motion. Negative Lachman's.  Negative posterior drawer.  Knee stable with varus and valgus stress test. Patient is neurovascularly intact.    IMAGING:  X-rays including standing, weight bearing AP and flexion bilateral knees, LEFT knee lateral and sunrise views ordered and images reviewed by me show:    There is medial greater than lateral femorotibial joint space narrowing.  Mild tricompartmental degenerative osteophytes.  No acute fracture or dislocation.  Possible small suprapatellar effusion.    MRI reviewed by me and discussed with patient. Study shows:   1. Tricompartmental osteoarthritis most pronounced within the patellofemoral and medial tibiofemoral compartments.  2. Degenerative changes of the medial meniscus with horizontal and radial tears as above.  3. Degenerative fraying lateral meniscus without definite focal tears.  4. Small joint effusion.    ASSESSMENT:      ICD-10-CM ICD-9-CM   1. Arthritis of knee, left M17.12 716.96   2. Chronic pain of left knee M25.562 719.46    G89.29 338.29     PLAN:     Findings were discussed with the patient. She has osteoarthritis.  Conservative care was recommended at this time. She elected for intra-articular steroid injection.  Also discussed weight loss, physical therapy, activity modifications, and oral medication as needed. She may need a knee replacement at some point in the future if she fails these conservative treatment measures.  All questions answered.    Large Joint Aspiration/Injection: L knee  Date/Time:  10/3/2019 11:00 AM  Performed by: NANDO Coffey MD  Authorized by: NANDO Coffey MD     Consent Done?:  Yes (Verbal)  Indications:  Pain  Procedure site marked: Yes    Timeout: Prior to procedure the correct patient, procedure, and site was verified    Anesthesia  Local anesthesia used  Anesthetic: co-phenylcaine spray (0.2% Naropin)  Anesthetic total: 4mL    Location:  Knee  Site:  L knee  Prep: Patient was prepped and draped in usual sterile fashion    Needle size:  22 G  Ultrasonic Guidance for needle placement: No  Approach:  Lateral  Medications:  40 mg triamcinolone acetonide 40 mg/mL  Patient tolerance:  Patient tolerated the procedure well with no immediate complications

## 2019-10-14 RX ORDER — TRIAMCINOLONE ACETONIDE 40 MG/ML
40 INJECTION, SUSPENSION INTRA-ARTICULAR; INTRAMUSCULAR
Status: DISCONTINUED | OUTPATIENT
Start: 2019-10-03 | End: 2019-10-14 | Stop reason: HOSPADM

## 2019-10-16 DIAGNOSIS — F41.9 ANXIETY AND DEPRESSION: ICD-10-CM

## 2019-10-16 DIAGNOSIS — F32.A ANXIETY AND DEPRESSION: ICD-10-CM

## 2019-10-16 RX ORDER — LORAZEPAM 1 MG/1
TABLET ORAL
Qty: 30 TABLET | Refills: 5 | Status: SHIPPED | OUTPATIENT
Start: 2019-10-16 | End: 2020-03-17 | Stop reason: SDUPTHER

## 2020-01-19 ENCOUNTER — OFFICE VISIT (OUTPATIENT)
Dept: URGENT CARE | Facility: CLINIC | Age: 60
End: 2020-01-19
Payer: COMMERCIAL

## 2020-01-19 VITALS
SYSTOLIC BLOOD PRESSURE: 134 MMHG | OXYGEN SATURATION: 100 % | DIASTOLIC BLOOD PRESSURE: 67 MMHG | HEIGHT: 62 IN | WEIGHT: 150 LBS | TEMPERATURE: 99 F | BODY MASS INDEX: 27.6 KG/M2 | HEART RATE: 102 BPM | RESPIRATION RATE: 19 BRPM

## 2020-01-19 DIAGNOSIS — J40 BRONCHITIS: Primary | ICD-10-CM

## 2020-01-19 DIAGNOSIS — J06.9 URI, ACUTE: ICD-10-CM

## 2020-01-19 PROCEDURE — 99214 OFFICE O/P EST MOD 30 MIN: CPT | Mod: S$GLB,,, | Performed by: FAMILY MEDICINE

## 2020-01-19 PROCEDURE — 99214 PR OFFICE/OUTPT VISIT, EST, LEVL IV, 30-39 MIN: ICD-10-PCS | Mod: S$GLB,,, | Performed by: FAMILY MEDICINE

## 2020-01-19 PROCEDURE — 71046 XR CHEST PA AND LATERAL: ICD-10-PCS | Mod: FY,S$GLB,, | Performed by: RADIOLOGY

## 2020-01-19 PROCEDURE — 71046 X-RAY EXAM CHEST 2 VIEWS: CPT | Mod: FY,S$GLB,, | Performed by: RADIOLOGY

## 2020-01-19 RX ORDER — PREDNISONE 10 MG/1
TABLET ORAL
Qty: 30 TABLET | Refills: 0 | Status: SHIPPED | OUTPATIENT
Start: 2020-01-19 | End: 2021-04-26 | Stop reason: ALTCHOICE

## 2020-01-19 RX ORDER — ALBUTEROL SULFATE 90 UG/1
2 AEROSOL, METERED RESPIRATORY (INHALATION) EVERY 6 HOURS PRN
Qty: 1 INHALER | Refills: 0 | Status: SHIPPED | OUTPATIENT
Start: 2020-01-19

## 2020-01-19 RX ORDER — ALBUTEROL SULFATE 0.83 MG/ML
2.5 SOLUTION RESPIRATORY (INHALATION) EVERY 6 HOURS PRN
Qty: 2 BOX | Refills: 3 | Status: SHIPPED | OUTPATIENT
Start: 2020-01-19 | End: 2022-01-20 | Stop reason: SDUPTHER

## 2020-01-19 RX ORDER — PROMETHAZINE HYDROCHLORIDE AND DEXTROMETHORPHAN HYDROBROMIDE 6.25; 15 MG/5ML; MG/5ML
SYRUP ORAL
Qty: 180 ML | Refills: 0 | Status: SHIPPED | OUTPATIENT
Start: 2020-01-19 | End: 2020-02-23

## 2020-01-19 RX ORDER — PANTOPRAZOLE SODIUM 40 MG/1
40 TABLET, DELAYED RELEASE ORAL DAILY
Qty: 30 TABLET | Refills: 1 | Status: ON HOLD | OUTPATIENT
Start: 2020-01-19 | End: 2020-01-31 | Stop reason: SDUPTHER

## 2020-01-19 NOTE — PROGRESS NOTES
"Subjective:       Patient ID: Terese Barney is a 59 y.o. female.    Vitals:  height is 5' 2" (1.575 m) and weight is 68 kg (150 lb). Her oral temperature is 98.8 °F (37.1 °C). Her blood pressure is 134/67 and her pulse is 102. Her respiration is 19 and oxygen saturation is 100%.     Chief Complaint: URI    60 y/o female with c/o persistent cough and chest congestion, x 3 weeks, no fever but cough is persistent, denies fever or chills  Has hx of asthma,     URI    This is a new problem. The current episode started 1 to 4 weeks ago (3 weeks). The problem has been unchanged. There has been no fever. Associated symptoms include congestion, coughing and headaches. Pertinent negatives include no abdominal pain, ear pain, nausea, rash, sinus pain, sore throat, vomiting or wheezing. She has tried decongestant for the symptoms. The treatment provided no relief.       Constitution: Negative for chills, sweating, fatigue and fever.   HENT: Positive for congestion. Negative for ear pain, sinus pain, sinus pressure, sore throat and voice change.    Neck: Negative for painful lymph nodes.   Eyes: Negative for eye redness.   Respiratory: Positive for cough. Negative for chest tightness, sputum production, bloody sputum, COPD, shortness of breath, stridor, wheezing and asthma.    Gastrointestinal: Negative for abdominal pain, nausea and vomiting.   Musculoskeletal: Negative for muscle ache.   Skin: Negative for rash.   Allergic/Immunologic: Negative for seasonal allergies and asthma.   Neurological: Positive for headaches.   Hematologic/Lymphatic: Negative for swollen lymph nodes.       Objective:      Physical Exam   Constitutional: She is oriented to person, place, and time. She appears well-developed and well-nourished. She is cooperative.  Non-toxic appearance. She does not appear ill. No distress.   HENT:   Head: Normocephalic and atraumatic.   Right Ear: Hearing, tympanic membrane, external ear and ear canal normal.   Left " Ear: Hearing, tympanic membrane, external ear and ear canal normal.   Nose: Nose normal. No mucosal edema, rhinorrhea or nasal deformity. No epistaxis. Right sinus exhibits no maxillary sinus tenderness and no frontal sinus tenderness. Left sinus exhibits no maxillary sinus tenderness and no frontal sinus tenderness.   Mouth/Throat: Uvula is midline, oropharynx is clear and moist and mucous membranes are normal. No trismus in the jaw. Normal dentition. No uvula swelling. No posterior oropharyngeal erythema.   Throat clear, tms normal     Eyes: Conjunctivae and lids are normal. Right eye exhibits no discharge. Left eye exhibits no discharge. No scleral icterus.   Neck: Trachea normal, normal range of motion, full passive range of motion without pain and phonation normal. Neck supple.   Cardiovascular: Normal rate, regular rhythm, normal heart sounds, intact distal pulses and normal pulses.   Pulmonary/Chest: Effort normal. No respiratory distress. She has wheezes. She has no rales. She exhibits no tenderness.   Abdominal: Soft. Normal appearance and bowel sounds are normal. She exhibits no distension, no pulsatile midline mass and no mass. There is no tenderness.   Musculoskeletal: Normal range of motion. She exhibits no edema or deformity.   Neurological: She is alert and oriented to person, place, and time. She exhibits normal muscle tone. Coordination normal.   Skin: Skin is warm, dry, intact, not diaphoretic and not pale.   Psychiatric: She has a normal mood and affect. Her speech is normal and behavior is normal. Judgment and thought content normal. Cognition and memory are normal.   Nursing note and vitals reviewed.        Assessment:       1. Bronchitis    2. URI, acute        Plan:         Bronchitis  -     X-Ray Chest PA And Lateral; Future; Expected date: 01/19/2020    URI, acute  -     X-Ray Chest PA And Lateral; Future; Expected date: 01/19/2020    Other orders  -     albuterol (PROVENTIL/VENTOLIN HFA) 90  mcg/actuation inhaler; Inhale 2 puffs into the lungs every 6 (six) hours as needed for Wheezing or Shortness of Breath. Rescue  Dispense: 1 Inhaler; Refill: 0  -     promethazine-dextromethorphan (PROMETHAZINE-DM) 6.25-15 mg/5 mL Syrp; Take one tsp po q 6 hrs prn cough  Dispense: 180 mL; Refill: 0  -     predniSONE (DELTASONE) 10 MG tablet; Take 2 po bid x 3 days, one bid x 3 days, then one daily till finish  Dispense: 30 tablet; Refill: 0  -     albuterol (PROVENTIL) 2.5 mg /3 mL (0.083 %) nebulizer solution; Take 3 mLs (2.5 mg total) by nebulization every 6 (six) hours as needed for Wheezing. Rescue  Dispense: 2 Box; Refill: 3

## 2020-01-19 NOTE — PATIENT INSTRUCTIONS
What Is Acute Bronchitis?  Acute bronchitis is when the airways in your lungs (bronchial tubes) become red and swollen (inflamed). It is usually caused by a viral infection. But it can also occur because of a bacteria or allergen. Symptoms include a cough that produces yellow or greenish mucus and can last for days or sometimes weeks.  Inside healthy lungs    Air travels in and out of the lungs through the airways. The linings of these airways produce sticky mucus. This mucus traps particles that enter the lungs. Tiny structures called cilia then sweep the particles out of the airways.     Healthy airway: Airways are normally open. Air moves in and out easily.      Healthy cilia: Tiny, hairlike cilia sweep mucus and particles up and out of the airways.   Lungs with bronchitis  Bronchitis often occurs with a cold or the flu virus. The airways become inflamed (red and swollen). There is a deep hacking cough from the extra mucus. Other symptoms may include:  · Wheezing  · Chest discomfort  · Shortness of breath  · Mild fever  A second infection, this time due to bacteria, may then occur. And airways irritated by allergens or smoke are more likely to get infected.        Inflamed airway: Inflammation and extra mucus narrow the airway, causing shortness of breath.      Impaired cilia: Extra mucus impairs cilia, causing congestion and wheezing. Smoking makes the problem worse.   Making a diagnosis  A physical exam, health history, and certain tests help your healthcare provider make the diagnosis.  Health history  Your healthcare provider will ask you about your symptoms.  The exam  Your provider listens to your chest for signs of congestion. He or she may also check your ears, nose, and throat.  Possible tests  · A sputum test for bacteria. This requires a sample of mucus from your lungs.  · A nasal or throat swab. This tests to see if you have a bacterial infection.  · A chest X-ray. This is done if your healthcare  provider thinks you have pneumonia.  · Tests to check for an underlying condition. Other tests may be done to check for things such as allergies, asthma, or COPD (chronic obstructive pulmonary disease). You may need to see a specialist for more lung function testing.  Treating a cough  The main treatment for bronchitis is easing symptoms. Avoiding smoke, allergens, and other things that trigger coughing can often help. If the infection is bacterial, you may be given antibiotics. During the illness, it's important to get plenty of sleep. To ease symptoms:  · Dont smoke. Also avoid secondhand smoke.  · Use a humidifier. Or try breathing in steam from a hot shower. This may help loosen mucus.  · Drink a lot of water and juice. They can soothe the throat and may help thin mucus.  · Sit up or use extra pillows when in bed. This helps to lessen coughing and congestion.  · Ask your provider about using medicine. Ask about using cough medicine, pain and fever medicine, or a decongestant.  Antibiotics  Most cases of bronchitis are caused by cold or flu viruses. They dont need antibiotics to treat them, even if your mucus is thick and green or yellow. Antibiotics dont treat viral illness and antibiotics have not been shown to have any benefit in cases of acute bronchitis. Taking antibiotics when they are not needed increases your risk of getting an infection later that is antibiotic-resistant. Antibiotics can also cause severe cases of diarrhea that require other antibiotics to treat.  It is important that you accept your healthcare provider's opinion to not use antibiotics. Your provider will prescribe antibiotics if the infection is caused by bacteria. If they are prescribed:  · Take all of the medicine. Take the medicine until it is used up, even if symptoms have improved. If you dont, the bronchitis may come back.  · Take the medicines as directed. For instance, some medicines should be taken with food.  · Ask about  side effects. Ask your provider or pharmacist what side effects are common, and what to do about them.  Follow-up care  You should see your provider again in 2 to 3 weeks. By this time, symptoms should have improved. An infection that lasts longer may mean you have a more serious problem.  Prevention  · Avoid tobacco smoke. If you smoke, quit. Stay away from smoky places. Ask friends and family not to smoke around you, or in your home or car.  · Get checked for allergies.  · Ask your provider about getting a yearly flu shot. Also ask about pneumococcal or pneumonia shots.  · Wash your hands often. This helps reduce the chance of picking up viruses that cause colds and flu.  Call your healthcare provider if:  · Symptoms worsen, or you have new symptoms  · Breathing problems worsen or  become severe  · Symptoms dont get better within a week, or within 3 days of taking antibiotics   Date Last Reviewed: 2/1/2017  © 8376-1995 [employer]. [address] [place] [address], [place] [address]. All rights reserved. This information is not intended as a substitute for professional medical care. Always follow your healthcare professional's instructions.

## 2020-01-28 ENCOUNTER — PATIENT OUTREACH (OUTPATIENT)
Dept: ADMINISTRATIVE | Facility: OTHER | Age: 60
End: 2020-01-28

## 2020-01-29 ENCOUNTER — TELEPHONE (OUTPATIENT)
Dept: FAMILY MEDICINE | Facility: CLINIC | Age: 60
End: 2020-01-29

## 2020-01-29 NOTE — TELEPHONE ENCOUNTER
----- Message from Ruth Sher sent at 1/29/2020 10:15 AM CST -----  Contact: Pt   Pt is requesting to be seen today because she has been feeling weak and tired for 2 weeks - 3 weeks , has been to urgent care twice.     Please contact patient at [phone]    Thanks

## 2020-01-30 ENCOUNTER — TELEPHONE (OUTPATIENT)
Dept: ALLERGY | Facility: CLINIC | Age: 60
End: 2020-01-30

## 2020-01-30 ENCOUNTER — HOSPITAL ENCOUNTER (OUTPATIENT)
Facility: HOSPITAL | Age: 60
Discharge: HOME | End: 2020-01-31
Attending: EMERGENCY MEDICINE | Admitting: FAMILY MEDICINE
Payer: COMMERCIAL

## 2020-01-30 ENCOUNTER — LAB VISIT (OUTPATIENT)
Dept: LAB | Facility: HOSPITAL | Age: 60
End: 2020-01-30
Attending: ALLERGY & IMMUNOLOGY
Payer: COMMERCIAL

## 2020-01-30 ENCOUNTER — TELEPHONE (OUTPATIENT)
Dept: FAMILY MEDICINE | Facility: CLINIC | Age: 60
End: 2020-01-30

## 2020-01-30 ENCOUNTER — OFFICE VISIT (OUTPATIENT)
Dept: ALLERGY | Facility: CLINIC | Age: 60
End: 2020-01-30
Payer: COMMERCIAL

## 2020-01-30 VITALS — WEIGHT: 156.31 LBS | HEART RATE: 112 BPM | OXYGEN SATURATION: 98 % | BODY MASS INDEX: 28.76 KG/M2 | HEIGHT: 62 IN

## 2020-01-30 DIAGNOSIS — J32.1 CHRONIC FRONTAL SINUSITIS: ICD-10-CM

## 2020-01-30 DIAGNOSIS — R05.3 CHRONIC COUGH: ICD-10-CM

## 2020-01-30 DIAGNOSIS — K92.2 GASTROINTESTINAL HEMORRHAGE, UNSPECIFIED GASTROINTESTINAL HEMORRHAGE TYPE: ICD-10-CM

## 2020-01-30 DIAGNOSIS — D64.9 SEVERE ANEMIA: Primary | ICD-10-CM

## 2020-01-30 DIAGNOSIS — D50.0 IRON DEFICIENCY ANEMIA DUE TO CHRONIC BLOOD LOSS: ICD-10-CM

## 2020-01-30 DIAGNOSIS — R00.2 PALPITATIONS: ICD-10-CM

## 2020-01-30 DIAGNOSIS — J31.0 CHRONIC RHINITIS: ICD-10-CM

## 2020-01-30 DIAGNOSIS — D64.9 SYMPTOMATIC ANEMIA: Primary | ICD-10-CM

## 2020-01-30 DIAGNOSIS — J32.1 CHRONIC FRONTAL SINUSITIS: Primary | ICD-10-CM

## 2020-01-30 DIAGNOSIS — R07.9 CHEST PAIN: ICD-10-CM

## 2020-01-30 LAB
ABO + RH BLD: NORMAL
ALBUMIN SERPL BCP-MCNC: 3.4 G/DL (ref 3.5–5.2)
ALP SERPL-CCNC: 62 U/L (ref 55–135)
ALT SERPL W/O P-5'-P-CCNC: 9 U/L (ref 10–44)
ANION GAP SERPL CALC-SCNC: 5 MMOL/L (ref 8–16)
ANISOCYTOSIS BLD QL SMEAR: SLIGHT
AST SERPL-CCNC: 14 U/L (ref 10–40)
BASOPHILS # BLD AUTO: 0.01 K/UL (ref 0–0.2)
BASOPHILS # BLD AUTO: 0.01 K/UL (ref 0–0.2)
BASOPHILS NFR BLD: 0.1 % (ref 0–1.9)
BASOPHILS NFR BLD: 0.1 % (ref 0–1.9)
BILIRUB SERPL-MCNC: 0.2 MG/DL (ref 0.1–1)
BLD GP AB SCN CELLS X3 SERPL QL: NORMAL
BLD PROD TYP BPU: NORMAL
BLD PROD TYP BPU: NORMAL
BLOOD UNIT EXPIRATION DATE: NORMAL
BLOOD UNIT EXPIRATION DATE: NORMAL
BLOOD UNIT TYPE CODE: 6200
BLOOD UNIT TYPE CODE: 6200
BLOOD UNIT TYPE: NORMAL
BLOOD UNIT TYPE: NORMAL
BUN SERPL-MCNC: 19 MG/DL (ref 6–20)
CALCIUM SERPL-MCNC: 8.2 MG/DL (ref 8.7–10.5)
CHLORIDE SERPL-SCNC: 105 MMOL/L (ref 95–110)
CO2 SERPL-SCNC: 29 MMOL/L (ref 23–29)
CODING SYSTEM: NORMAL
CODING SYSTEM: NORMAL
CREAT SERPL-MCNC: 1 MG/DL (ref 0.5–1.4)
DACRYOCYTES BLD QL SMEAR: ABNORMAL
DIFFERENTIAL METHOD: ABNORMAL
DIFFERENTIAL METHOD: ABNORMAL
DISPENSE STATUS: NORMAL
DISPENSE STATUS: NORMAL
EOSINOPHIL # BLD AUTO: 0.1 K/UL (ref 0–0.5)
EOSINOPHIL # BLD AUTO: 0.1 K/UL (ref 0–0.5)
EOSINOPHIL NFR BLD: 0.8 % (ref 0–8)
EOSINOPHIL NFR BLD: 0.9 % (ref 0–8)
ERYTHROCYTE [DISTWIDTH] IN BLOOD BY AUTOMATED COUNT: 19.8 % (ref 11.5–14.5)
ERYTHROCYTE [DISTWIDTH] IN BLOOD BY AUTOMATED COUNT: 19.9 % (ref 11.5–14.5)
EST. GFR  (AFRICAN AMERICAN): >60 ML/MIN/1.73 M^2
EST. GFR  (NON AFRICAN AMERICAN): >60 ML/MIN/1.73 M^2
GLUCOSE SERPL-MCNC: 103 MG/DL (ref 70–110)
HCT VFR BLD AUTO: 18.9 % (ref 37–48.5)
HCT VFR BLD AUTO: 22.4 % (ref 37–48.5)
HGB BLD-MCNC: 5.4 G/DL (ref 12–16)
HGB BLD-MCNC: 6 G/DL (ref 12–16)
HYPOCHROMIA BLD QL SMEAR: ABNORMAL
IGA SERPL-MCNC: 247 MG/DL (ref 40–350)
IGE SERPL-ACNC: 45 IU/ML (ref 0–100)
IGG SERPL-MCNC: 829 MG/DL (ref 650–1600)
IGM SERPL-MCNC: 55 MG/DL (ref 50–300)
IMM GRANULOCYTES # BLD AUTO: 0.12 K/UL (ref 0–0.04)
IMM GRANULOCYTES NFR BLD AUTO: 1.1 % (ref 0–0.5)
INR PPP: 0.9 (ref 0.8–1.2)
LYMPHOCYTES # BLD AUTO: 2.5 K/UL (ref 1–4.8)
LYMPHOCYTES # BLD AUTO: 2.6 K/UL (ref 1–4.8)
LYMPHOCYTES NFR BLD: 24.4 % (ref 18–48)
LYMPHOCYTES NFR BLD: 24.5 % (ref 18–48)
MCH RBC QN AUTO: 20.4 PG (ref 27–31)
MCH RBC QN AUTO: 21.1 PG (ref 27–31)
MCHC RBC AUTO-ENTMCNC: 26.8 G/DL (ref 32–36)
MCHC RBC AUTO-ENTMCNC: 28.6 G/DL (ref 32–36)
MCV RBC AUTO: 74 FL (ref 82–98)
MCV RBC AUTO: 76 FL (ref 82–98)
MONOCYTES # BLD AUTO: 0.8 K/UL (ref 0.3–1)
MONOCYTES # BLD AUTO: 1 K/UL (ref 0.3–1)
MONOCYTES NFR BLD: 8 % (ref 4–15)
MONOCYTES NFR BLD: 9.9 % (ref 4–15)
NEUTROPHILS # BLD AUTO: 6.5 K/UL (ref 1.8–7.7)
NEUTROPHILS # BLD AUTO: 6.7 K/UL (ref 1.8–7.7)
NEUTROPHILS NFR BLD: 63.7 % (ref 38–73)
NEUTROPHILS NFR BLD: 66.5 % (ref 38–73)
NRBC BLD-RTO: 1 /100 WBC
OB PNL STL: NEGATIVE
OVALOCYTES BLD QL SMEAR: ABNORMAL
PLATELET # BLD AUTO: 405 K/UL (ref 150–350)
PLATELET # BLD AUTO: 408 K/UL (ref 150–350)
PLATELET BLD QL SMEAR: ABNORMAL
PMV BLD AUTO: 8.5 FL (ref 9.2–12.9)
PMV BLD AUTO: 9.5 FL (ref 9.2–12.9)
POIKILOCYTOSIS BLD QL SMEAR: SLIGHT
POLYCHROMASIA BLD QL SMEAR: ABNORMAL
POTASSIUM SERPL-SCNC: 3.4 MMOL/L (ref 3.5–5.1)
PROT SERPL-MCNC: 6.7 G/DL (ref 6–8.4)
PROTHROMBIN TIME: 9.8 SEC (ref 9–12.5)
RBC # BLD AUTO: 2.56 M/UL (ref 4–5.4)
RBC # BLD AUTO: 2.94 M/UL (ref 4–5.4)
SODIUM SERPL-SCNC: 139 MMOL/L (ref 136–145)
TRANS ERYTHROCYTES VOL PATIENT: NORMAL ML
TRANS ERYTHROCYTES VOL PATIENT: NORMAL ML
TROPONIN I SERPL DL<=0.01 NG/ML-MCNC: 0.01 NG/ML (ref 0–0.03)
WBC # BLD AUTO: 10.53 K/UL (ref 3.9–12.7)
WBC # BLD AUTO: 9.98 K/UL (ref 3.9–12.7)

## 2020-01-30 PROCEDURE — 99999 PR PBB SHADOW E&M-EST. PATIENT-LVL III: ICD-10-PCS | Mod: PBBFAC,,, | Performed by: ALLERGY & IMMUNOLOGY

## 2020-01-30 PROCEDURE — 3008F PR BODY MASS INDEX (BMI) DOCUMENTED: ICD-10-PCS | Mod: CPTII,S$GLB,, | Performed by: ALLERGY & IMMUNOLOGY

## 2020-01-30 PROCEDURE — 85025 COMPLETE CBC W/AUTO DIFF WBC: CPT | Mod: 91

## 2020-01-30 PROCEDURE — 86774 TETANUS ANTIBODY: CPT

## 2020-01-30 PROCEDURE — G0378 HOSPITAL OBSERVATION PER HR: HCPCS

## 2020-01-30 PROCEDURE — 99204 OFFICE O/P NEW MOD 45 MIN: CPT | Mod: S$GLB,,, | Performed by: ALLERGY & IMMUNOLOGY

## 2020-01-30 PROCEDURE — 82272 OCCULT BLD FECES 1-3 TESTS: CPT

## 2020-01-30 PROCEDURE — 86003 ALLG SPEC IGE CRUDE XTRC EA: CPT | Mod: 59

## 2020-01-30 PROCEDURE — 80053 COMPREHEN METABOLIC PANEL: CPT

## 2020-01-30 PROCEDURE — 86901 BLOOD TYPING SEROLOGIC RH(D): CPT

## 2020-01-30 PROCEDURE — 84484 ASSAY OF TROPONIN QUANT: CPT

## 2020-01-30 PROCEDURE — 86920 COMPATIBILITY TEST SPIN: CPT

## 2020-01-30 PROCEDURE — 82784 ASSAY IGA/IGD/IGG/IGM EACH: CPT | Mod: 59

## 2020-01-30 PROCEDURE — 85610 PROTHROMBIN TIME: CPT

## 2020-01-30 PROCEDURE — 93005 ELECTROCARDIOGRAM TRACING: CPT

## 2020-01-30 PROCEDURE — 99204 PR OFFICE/OUTPT VISIT, NEW, LEVL IV, 45-59 MIN: ICD-10-PCS | Mod: S$GLB,,, | Performed by: ALLERGY & IMMUNOLOGY

## 2020-01-30 PROCEDURE — 86003 ALLG SPEC IGE CRUDE XTRC EA: CPT

## 2020-01-30 PROCEDURE — 85025 COMPLETE CBC W/AUTO DIFF WBC: CPT

## 2020-01-30 PROCEDURE — 82784 ASSAY IGA/IGD/IGG/IGM EACH: CPT

## 2020-01-30 PROCEDURE — 82785 ASSAY OF IGE: CPT

## 2020-01-30 PROCEDURE — P9021 RED BLOOD CELLS UNIT: HCPCS

## 2020-01-30 PROCEDURE — 36430 TRANSFUSION BLD/BLD COMPNT: CPT

## 2020-01-30 PROCEDURE — 25000003 PHARM REV CODE 250: Performed by: FAMILY MEDICINE

## 2020-01-30 PROCEDURE — 3008F BODY MASS INDEX DOCD: CPT | Mod: CPTII,S$GLB,, | Performed by: ALLERGY & IMMUNOLOGY

## 2020-01-30 PROCEDURE — 99291 CRITICAL CARE FIRST HOUR: CPT | Mod: 25

## 2020-01-30 PROCEDURE — 99999 PR PBB SHADOW E&M-EST. PATIENT-LVL III: CPT | Mod: PBBFAC,,, | Performed by: ALLERGY & IMMUNOLOGY

## 2020-01-30 PROCEDURE — 82787 IGG 1 2 3 OR 4 EACH: CPT

## 2020-01-30 PROCEDURE — 36415 COLL VENOUS BLD VENIPUNCTURE: CPT | Mod: PO

## 2020-01-30 RX ORDER — IBUPROFEN 200 MG
24 TABLET ORAL
Status: DISCONTINUED | OUTPATIENT
Start: 2020-01-30 | End: 2020-01-31 | Stop reason: HOSPADM

## 2020-01-30 RX ORDER — SODIUM CHLORIDE 0.9 % (FLUSH) 0.9 %
10 SYRINGE (ML) INJECTION
Status: DISCONTINUED | OUTPATIENT
Start: 2020-01-30 | End: 2020-01-31 | Stop reason: HOSPADM

## 2020-01-30 RX ORDER — GABAPENTIN 300 MG/1
300 CAPSULE ORAL 3 TIMES DAILY PRN
Status: DISCONTINUED | OUTPATIENT
Start: 2020-01-30 | End: 2020-01-31 | Stop reason: HOSPADM

## 2020-01-30 RX ORDER — ALBUTEROL SULFATE 2.5 MG/.5ML
2.5 SOLUTION RESPIRATORY (INHALATION) EVERY 4 HOURS
Status: DISCONTINUED | OUTPATIENT
Start: 2020-01-31 | End: 2020-01-31 | Stop reason: HOSPADM

## 2020-01-30 RX ORDER — AMOXICILLIN 500 MG/1
500 CAPSULE ORAL
Status: ON HOLD | COMMUNITY
End: 2020-01-31 | Stop reason: HOSPADM

## 2020-01-30 RX ORDER — GLUCAGON 1 MG
1 KIT INJECTION
Status: DISCONTINUED | OUTPATIENT
Start: 2020-01-30 | End: 2020-01-31 | Stop reason: HOSPADM

## 2020-01-30 RX ORDER — IBUPROFEN 200 MG
16 TABLET ORAL
Status: DISCONTINUED | OUTPATIENT
Start: 2020-01-30 | End: 2020-01-31 | Stop reason: HOSPADM

## 2020-01-30 RX ORDER — TALC
6 POWDER (GRAM) TOPICAL NIGHTLY PRN
Status: DISCONTINUED | OUTPATIENT
Start: 2020-01-30 | End: 2020-01-31 | Stop reason: HOSPADM

## 2020-01-30 RX ORDER — ACETAMINOPHEN 325 MG/1
650 TABLET ORAL EVERY 4 HOURS PRN
Status: DISCONTINUED | OUTPATIENT
Start: 2020-01-30 | End: 2020-01-31 | Stop reason: HOSPADM

## 2020-01-30 RX ORDER — ALBUTEROL SULFATE 2.5 MG/.5ML
2.5 SOLUTION RESPIRATORY (INHALATION) EVERY 4 HOURS PRN
Status: DISCONTINUED | OUTPATIENT
Start: 2020-01-30 | End: 2020-01-31 | Stop reason: HOSPADM

## 2020-01-30 RX ORDER — DULOXETIN HYDROCHLORIDE 30 MG/1
60 CAPSULE, DELAYED RELEASE ORAL 2 TIMES DAILY
Status: DISCONTINUED | OUTPATIENT
Start: 2020-01-30 | End: 2020-01-31 | Stop reason: HOSPADM

## 2020-01-30 RX ORDER — POTASSIUM CHLORIDE 20 MEQ/15ML
40 SOLUTION ORAL
Status: DISCONTINUED | OUTPATIENT
Start: 2020-01-30 | End: 2020-01-31 | Stop reason: HOSPADM

## 2020-01-30 RX ORDER — ONDANSETRON 8 MG/1
8 TABLET, ORALLY DISINTEGRATING ORAL EVERY 8 HOURS PRN
Status: DISCONTINUED | OUTPATIENT
Start: 2020-01-30 | End: 2020-01-31 | Stop reason: HOSPADM

## 2020-01-30 RX ORDER — CEFDINIR 300 MG/1
300 CAPSULE ORAL 2 TIMES DAILY
Qty: 28 CAPSULE | Refills: 0 | Status: SHIPPED | OUTPATIENT
Start: 2020-01-30 | End: 2020-02-13

## 2020-01-30 RX ADMIN — DULOXETINE HYDROCHLORIDE 60 MG: 30 CAPSULE, DELAYED RELEASE ORAL at 11:01

## 2020-01-30 NOTE — ED NOTES
"Pt reports increasing sob with activity over the last few weeks and she becomes lightheaded with palpations. She has been having black stools for a few days but she thought it was from the antibiotics she has been taking for congestion. Pt denies chest pain, sob at this time. Also c/o abdominal discomfort that "feels like something wants to pop".   "

## 2020-01-30 NOTE — TELEPHONE ENCOUNTER
Called patient and advised that labs from allergist did show severe anemia.  Asked patient if she was having any lightheadedness or dizziness.  Patient confirmed that she was experiencing these symptoms.  Patient denies any bleeding that she is aware of.  Advised that due to the symptoms that she is experiencing she should go to the ER to be evaluated and possibly stabilized.  Advised someone will be contacting her to schedule her hemoc referral.  Patient verbalized understanding.

## 2020-01-30 NOTE — PROGRESS NOTES
Subjective:       Patient ID: Terese Barney is a 59 y.o. female.    Chief Complaint:  Sinus Problem (cough, congestion, sinus issues ongoing - has been to urgent care 2 times)      60 yo woman presents for new patient evaluation of sinus infection. She states for over 4 weeks she has fatigue, chest congestion with bad cough but cant get anything out, SOB and ZHANG with minimal exertion, has congestion I head, PND and sore throat and now mucus is green again. She has been hoarse. No sneeze or itch. She has been to UC twice and given steroid shot and doxy first time, no better then given amoxil and prednisone and no better. She has albuterol neb and inhaler which helps little, has promethazine DM for cough and Mucinex. She used to get frequent infections then lived in [place] dn was fine there. Moved back here in 2016 and had about once per year but never this bad. She has asthma, only really flares will infections. She saw an allergist many years ago and thinks was told allergic to ragweed but not sure. She did have CXR on first UC and lungs clear but noted to have hiatal hernia so on Protonix since then. No H/o eczema, no known food, insect or latex allergy. Had T&A but no sinus surgery.       Environmental History: see history section for home environment  Review of Systems   Constitutional: Positive for fatigue and fever. Negative for appetite change and chills.   HENT: Positive for congestion, ear pain, postnasal drip, rhinorrhea, sinus pressure, sinus pain, sore throat and voice change. Negative for ear discharge, facial swelling, nosebleeds, sneezing and trouble swallowing.    Eyes: Positive for discharge. Negative for redness, itching and visual disturbance.   Respiratory: Positive for cough, chest tightness, shortness of breath and wheezing. Negative for choking.    Cardiovascular: Negative for chest pain, palpitations and leg swelling.   Gastrointestinal: Negative for abdominal distention, abdominal pain,  constipation, diarrhea, nausea and vomiting.   Genitourinary: Negative for difficulty urinating.   Musculoskeletal: Negative for arthralgias, gait problem, joint swelling and myalgias.   Skin: Negative for color change and rash.   Neurological: Positive for weakness. Negative for dizziness, syncope, light-headedness and headaches.   Hematological: Negative for adenopathy. Does not bruise/bleed easily.   Psychiatric/Behavioral: Negative for agitation, behavioral problems, confusion and sleep disturbance. The patient is not nervous/anxious.         Objective:      Physical Exam   Constitutional: She is oriented to person, place, and time. She appears well-developed and well-nourished. No distress.   HENT:   Head: Normocephalic and atraumatic.   Right Ear: Hearing, tympanic membrane, external ear and ear canal normal.   Left Ear: Hearing, tympanic membrane, external ear and ear canal normal.   Nose: Mucosal edema (3+ erythematous turbinates) present. No rhinorrhea, sinus tenderness or septal deviation. No epistaxis. Right sinus exhibits no maxillary sinus tenderness and no frontal sinus tenderness. Left sinus exhibits no maxillary sinus tenderness and no frontal sinus tenderness.   Mouth/Throat: Uvula is midline, oropharynx is clear and moist and mucous membranes are normal. No uvula swelling.   Eyes: Conjunctivae are normal. Right eye exhibits no discharge. Left eye exhibits no discharge.   Neck: Normal range of motion. No thyromegaly present.   Cardiovascular: Normal rate, regular rhythm and normal heart sounds.   No murmur heard.  Pulmonary/Chest: Effort normal and breath sounds normal. No respiratory distress. She has no wheezes.   Abdominal: Soft. She exhibits no distension. There is no tenderness.   Musculoskeletal: Normal range of motion. She exhibits no edema or tenderness.   Lymphadenopathy:     She has no cervical adenopathy.   Neurological: She is alert and oriented to person, place, and time.   Skin: Skin  is warm and dry. No rash noted. No erythema.   Psychiatric: She has a normal mood and affect. Her behavior is normal. Judgment and thought content normal.   Nursing note and vitals reviewed.      Laboratory:   none performed   Assessment:       1. Chronic frontal sinusitis    2. Chronic cough    3. Chronic rhinitis         Plan:       1. Advised pt likely has bacterial sinus infection and bronchitis given length of course and symptoms, will change amoxil to omnicef 300 BID for 14 days and finish prednisone  2. Can continue albuterol and cough med as needed  3. Immunocaps and immune system labs to determine if any underlying cause for th is flare  4. If not better will need CT scan  5. Once better needs baseline PFT  6. Phone review

## 2020-01-30 NOTE — TELEPHONE ENCOUNTER
Allergist may have notified patient already but on labs drawn by her allergist, there is a new severe anemia. If she is having ANY symptoms of anemia-- lightheadedness/weakness/ chills or any signs of bleeding then she should go to [facility] ASAP. If not then she will need a small additional blood drawn with the orders I just entered to help look for the cause of her anemia as well as to schedule the urgent heme/onc referral I placed to dr Guerrero, thanks.

## 2020-01-30 NOTE — TELEPHONE ENCOUNTER
Please let her know she is anemic, her hemoglobin is low at 6 which is significant change. If she is feeling fatigue, lightheaded, heart racing means she is symptomatic from this and should go to ER. Her PCP Dr Potts is going to reach out to her as well and has labs ordered to follow this up. This is not from her sinus condition so needs to be evaluated as to why she is anemic

## 2020-01-30 NOTE — ED PROVIDER NOTES
Encounter Date: 2020       History     Chief Complaint   Patient presents with    Shortness of Breath     Reports SOB and palpitations over the past 4 wks. States SOB worse with exertion. Pt also reports was called today and told she is anemic. Pt reports has been having black stools.      59-year-old female presents to the ED for evaluation of worsening shortness of breath and palpitations.  She states symptoms better approximately 4 weeks ago.  She states that symptoms are exacerbated with exertion.  She states that she has some generalized fatigue and weakness. She thought that symptoms related to viral illness that she did have some hoarse throat and dry cough.  She states that she felt like the cold had caused her to have heart issues and hence made appoint with Cardiology next week.  She did see her PCP with labs drawn today and was informed that she had new anemia with H&H quite low.  Upon further discussion she states that she has been having some black stools for some time.  She denies any monika blood.  She denies any abdominal pain or chest pain presently.    The history is provided by the patient.     Review of patient's allergies indicates:   Allergen Reactions    Codeine Nausea And Vomiting     Past Medical History:   Diagnosis Date    Anxiety and depression 2016    Chronic back pain     Hammer toe of left foot     Insomnia     Motion sickness     Overweight (BMI 25.0-29.9) 2016    PONV (postoperative nausea and vomiting)     Vitamin D deficiency 2016     Past Surgical History:   Procedure Laterality Date    AUGMENTATION OF BREAST Bilateral     BREAST SURGERY Bilateral      SECTION      two    foot surgery      left plantar fascial release    FOOT SURGERY Left 2017    2nd TOE, Hammer toe repair    HYSTERECTOMY      laparascopy      ovarian cysts    left knee surgery      TONSILLECTOMY      TOTAL ABDOMINAL HYSTERECTOMY W/ BILATERAL  SALPINGOOPHORECTOMY  2004     Family History   Problem Relation Age of Onset    Hypertension Mother     Breast cancer Mother     Macular degeneration Father     Diabetes type II Father     Coronary artery disease Father     Hypertension Sister     Diabetes type II Sister     Diabetes type II Brother     Hypertension Brother     Glaucoma Paternal Grandmother      Social History     Tobacco Use    Smoking status: Never Smoker    Smokeless tobacco: Never Used   Substance Use Topics    Alcohol use: Yes     Comment: rare    Drug use: No     Review of Systems   Constitutional: Positive for fatigue. Negative for chills and fever.   HENT: Positive for congestion and voice change. Negative for sore throat.    Respiratory: Positive for cough and shortness of breath.    Cardiovascular: Positive for palpitations. Negative for chest pain.   Gastrointestinal: Positive for blood in stool. Negative for abdominal pain, nausea and vomiting.   Genitourinary: Negative for dysuria and hematuria.   Musculoskeletal: Negative for arthralgias, back pain and myalgias.   Skin: Positive for pallor. Negative for rash.   Allergic/Immunologic: Negative for immunocompromised state.   Neurological: Positive for weakness.   Hematological: Negative for adenopathy. Does not bruise/bleed easily.   Psychiatric/Behavioral: Negative for confusion.       Physical Exam     Initial Vitals [01/30/20 1617]   BP Pulse Resp Temp SpO2   (!) 146/68 110 20 98.6 °F (37 °C) 98 %      MAP       --         Physical Exam    Nursing note and vitals reviewed.  Constitutional: Vital signs are normal. She appears well-developed and well-nourished. She is cooperative.  Non-toxic appearance. She does not appear ill. She appears distressed.   HENT:   Head: Normocephalic and atraumatic.   Mouth/Throat: Mucous membranes are pale and not dry.   Eyes: Conjunctivae and lids are normal.   Neck: Neck supple. No neck rigidity.   Cardiovascular: Regular rhythm.  Tachycardia present.    Pulmonary/Chest: Breath sounds normal. Tachypnea noted. No respiratory distress. She has no wheezes. She has no rhonchi.   Abdominal: Soft. Normal appearance. There is no tenderness. There is no rigidity and no guarding.   Genitourinary: Rectal exam shows no external hemorrhoid, no internal hemorrhoid and guaiac negative stool. Guaiac negative stool.   Genitourinary Comments: No obvious blood on stool however limited stool appreciated in the vault   Musculoskeletal: Normal range of motion.   Neurological: She is alert and oriented to person, place, and time. GCS eye subscore is 4. GCS verbal subscore is 5. GCS motor subscore is 6.   Skin: Skin is warm, dry and intact. No rash noted. There is pallor.   Psychiatric: She has a normal mood and affect. Her speech is normal and behavior is normal. Thought content normal.         ED Course   Procedures  Labs Reviewed   CBC W/ AUTO DIFFERENTIAL - Abnormal; Notable for the following components:       Result Value    RBC 2.56 (*)     Hemoglobin 5.4 (*)     Hematocrit 18.9 (*)     Mean Corpuscular Volume 74 (*)     Mean Corpuscular Hemoglobin 21.1 (*)     Mean Corpuscular Hemoglobin Conc 28.6 (*)     RDW 19.8 (*)     Platelets 408 (*)     MPV 8.5 (*)     Platelet Estimate Increased (*)     All other components within normal limits    Narrative:       Hemoglobin/Hematocrit critical result(s) called and verbal readback   obtained from [facility].RN. at 18:10 on 01/30/2020 by IMER   01/30/2020 18:10   COMPREHENSIVE METABOLIC PANEL - Abnormal; Notable for the following components:    Potassium 3.4 (*)     Calcium 8.2 (*)     Albumin 3.4 (*)     ALT 9 (*)     Anion Gap 5 (*)     All other components within normal limits   PROTIME-INR   OCCULT BLOOD X 1, STOOL   TYPE & SCREEN   PREPARE RBC SOFT          Imaging Results    None          Medical Decision Making:   History:   Old Records Summarized: records from previous admission(s).  Initial Assessment:    59-year-old female presents to the ED for evaluation of symptomatic anemia.  She appears in some distress with tachycardia and tachypnea.  Patient has noted pallor.  Lungs CTA; heart with increased rate and normal rhythm.  Abdomen is soft and nontender. Fair range of motion of all extremities.  Skin free of rash or diaphoresis.  Differential Diagnosis:   Differential Diagnosis includes, but is not limited to:  PE, MI/ACS, pneumothorax, pericardial effusion/tamonade, pneumonia, lung abscess, pericarditis/myocarditis, pleural effusion, lung mass, CHF exacerbation, asthma exacerbation, COPD exacerbation, aspirated/ingested foreign body, airway obstruction, CO poisoning, anemia, metabolic derangement, allergy/atopy, influenza, viral URI, viral syndrome.    Clinical Tests:   Lab Tests: Ordered and Reviewed  ED Management:  Review of lab work drawn earlier today does reveal low H&H.  Given that she is symptomatic at this is acute change with reported black stools labs including a call were obtained.  Type and screen and 2 units of blood ordered in the ED.  Did discuss with GI with recommendations to keep her NPO and placed a GI consult for probable scope during hospital stay.  Discussed results with patient and she is agreeable to plan.  She will be admitted to [facility] hospitalist for further evaluation of the symptomatic anemia with probable GI source              Attending Attestation:     Physician Attestation Statement for NP/PA:   I have conducted a face to face encounter with this patient in addition to the NP/PA, due to Medical Complexity    Other NP/PA Attestation Additions:    History of Present Illness: 59-year-old presenting with worsening shortness of breath and fatigue found to be anemic plan for admission.       Attending Critical Care:   Critical Care Times:   Direct Patient Care (initial evaluation, reassessments, and time considering the  case)................................................................15 minutes.   Additional History from reviewing old medical records or taking additional history from the family, EMS, PCP, etc.......................5 minutes.   Ordering, Reviewing, and Interpreting Diagnostic Studies...............................................................................................................8 minutes.   Documentation..................................................................................................................................................................................5 minutes.   ==============================================================  · Total Critical Care Time - exclusive of procedural time: 33 minutes.  ==============================================================                            Clinical Impression:       ICD-10-CM ICD-9-CM   1. Symptomatic anemia D64.9 285.9   2. Palpitations R00.2 785.1   3. Gastrointestinal hemorrhage, unspecified gastrointestinal hemorrhage type K92.2 578.9                             CHECO Ojeda  01/30/20 1843       Jessi Lewis Jr., MD  02/01/20 1545       Jessi Lewis Jr., MD  02/01/20 1549

## 2020-01-30 NOTE — EKG INTERPRETATIONS - EMERGENCY DEPT.
Time of study: 01/30/2020 16:19    Independent interpretation by ED physician.    Sinus tachycardia.  Ventricular rate 103 beats per minute. Normal axis.  Normal QRS and QT intervals.  No ST segment elevation or depression.  Normal T-wave morphology.  Poor anterior R-wave progression.

## 2020-01-30 NOTE — LETTER
01/31/2020    {enter recipient's name}                [address]  [place] [address]  Phone: [phone]  Fax: [phone]   01/31/2020    Patient: Terese Barney   YOB: 1960   Date of Visit: 1/30/2020       To Whom it May Concern:    Terese Barney was seen in my clinic on 1/30/2020-01/31/2020. She may return to work on 02/08/2020  If you have any questions or concerns, please don't hesitate to call.    Sincerely,         Dr. Mcneill  [phone]

## 2020-01-30 NOTE — TELEPHONE ENCOUNTER
----- Message from Hannah Cam MD sent at 1/30/2020  2:00 PM CST -----  Dr Potts.  I saw your patient Ms Barney this morning for sinus complaints. I order allergy and immune labs which include CBC. This came back with severe anemia, Hgb 6, I have not called her yet, am about to but wanted to let you know. I am going to ask her to see you for this for further workup  Thanks  Hannah Cam, LILIANA/I

## 2020-01-31 ENCOUNTER — TELEPHONE (OUTPATIENT)
Dept: HEMATOLOGY/ONCOLOGY | Facility: CLINIC | Age: 60
End: 2020-01-31

## 2020-01-31 ENCOUNTER — ANESTHESIA EVENT (OUTPATIENT)
Dept: ENDOSCOPY | Facility: HOSPITAL | Age: 60
End: 2020-01-31
Payer: COMMERCIAL

## 2020-01-31 ENCOUNTER — TELEPHONE (OUTPATIENT)
Dept: GASTROENTEROLOGY | Facility: CLINIC | Age: 60
End: 2020-01-31

## 2020-01-31 ENCOUNTER — ANESTHESIA (OUTPATIENT)
Dept: ENDOSCOPY | Facility: HOSPITAL | Age: 60
End: 2020-01-31
Payer: COMMERCIAL

## 2020-01-31 VITALS
WEIGHT: 152.13 LBS | TEMPERATURE: 99 F | HEIGHT: 62 IN | DIASTOLIC BLOOD PRESSURE: 64 MMHG | OXYGEN SATURATION: 93 % | SYSTOLIC BLOOD PRESSURE: 122 MMHG | BODY MASS INDEX: 27.99 KG/M2 | HEART RATE: 102 BPM | RESPIRATION RATE: 20 BRPM

## 2020-01-31 DIAGNOSIS — D50.0 IRON DEFICIENCY ANEMIA DUE TO CHRONIC BLOOD LOSS: Primary | ICD-10-CM

## 2020-01-31 LAB
BASOPHILS # BLD AUTO: 0.01 K/UL (ref 0–0.2)
BASOPHILS NFR BLD: 0.1 % (ref 0–1.9)
DIFFERENTIAL METHOD: ABNORMAL
EOSINOPHIL # BLD AUTO: 0.1 K/UL (ref 0–0.5)
EOSINOPHIL NFR BLD: 0.9 % (ref 0–8)
ERYTHROCYTE [DISTWIDTH] IN BLOOD BY AUTOMATED COUNT: 19.7 % (ref 11.5–14.5)
FERRITIN SERPL-MCNC: 3 NG/ML (ref 20–300)
HCT VFR BLD AUTO: 29.4 % (ref 37–48.5)
HGB BLD-MCNC: 8.6 G/DL (ref 12–16)
INR PPP: 0.9 (ref 0.8–1.2)
IRON SERPL-MCNC: 25 UG/DL (ref 30–160)
LYMPHOCYTES # BLD AUTO: 1.1 K/UL (ref 1–4.8)
LYMPHOCYTES NFR BLD: 10.1 % (ref 18–48)
MCH RBC QN AUTO: 22.8 PG (ref 27–31)
MCHC RBC AUTO-ENTMCNC: 29.3 G/DL (ref 32–36)
MCV RBC AUTO: 78 FL (ref 82–98)
MONOCYTES # BLD AUTO: 0.8 K/UL (ref 0.3–1)
MONOCYTES NFR BLD: 7.3 % (ref 4–15)
NEUTROPHILS # BLD AUTO: 8.5 K/UL (ref 1.8–7.7)
NEUTROPHILS NFR BLD: 81.6 % (ref 38–73)
PLATELET # BLD AUTO: 296 K/UL (ref 150–350)
PMV BLD AUTO: 8.7 FL (ref 9.2–12.9)
PROTHROMBIN TIME: 9.9 SEC (ref 9–12.5)
RBC # BLD AUTO: 3.77 M/UL (ref 4–5.4)
SATURATED IRON: 5 % (ref 20–50)
TOTAL IRON BINDING CAPACITY: 517 UG/DL (ref 250–450)
TRANSFERRIN SERPL-MCNC: 349 MG/DL (ref 200–375)
WBC # BLD AUTO: 10.47 K/UL (ref 3.9–12.7)

## 2020-01-31 PROCEDURE — 94761 N-INVAS EAR/PLS OXIMETRY MLT: CPT

## 2020-01-31 PROCEDURE — 63600175 PHARM REV CODE 636 W HCPCS: Performed by: NURSE ANESTHETIST, CERTIFIED REGISTERED

## 2020-01-31 PROCEDURE — 99215 OFFICE O/P EST HI 40 MIN: CPT | Mod: 25,,, | Performed by: INTERNAL MEDICINE

## 2020-01-31 PROCEDURE — 83540 ASSAY OF IRON: CPT

## 2020-01-31 PROCEDURE — 88305 TISSUE EXAM BY PATHOLOGIST: CPT | Performed by: PATHOLOGY

## 2020-01-31 PROCEDURE — 37000008 HC ANESTHESIA 1ST 15 MINUTES: Performed by: INTERNAL MEDICINE

## 2020-01-31 PROCEDURE — 25000003 PHARM REV CODE 250: Performed by: INTERNAL MEDICINE

## 2020-01-31 PROCEDURE — 27201012 HC FORCEPS, HOT/COLD, DISP: Performed by: INTERNAL MEDICINE

## 2020-01-31 PROCEDURE — 99205 OFFICE O/P NEW HI 60 MIN: CPT | Mod: ,,, | Performed by: INTERNAL MEDICINE

## 2020-01-31 PROCEDURE — 63600175 PHARM REV CODE 636 W HCPCS: Performed by: INTERNAL MEDICINE

## 2020-01-31 PROCEDURE — 82728 ASSAY OF FERRITIN: CPT

## 2020-01-31 PROCEDURE — G0378 HOSPITAL OBSERVATION PER HR: HCPCS

## 2020-01-31 PROCEDURE — 88305 TISSUE EXAM BY PATHOLOGIST: CPT | Mod: 26,,, | Performed by: PATHOLOGY

## 2020-01-31 PROCEDURE — 43239 EGD BIOPSY SINGLE/MULTIPLE: CPT | Performed by: INTERNAL MEDICINE

## 2020-01-31 PROCEDURE — 99205 PR OFFICE/OUTPT VISIT, NEW, LEVL V, 60-74 MIN: ICD-10-PCS | Mod: ,,, | Performed by: INTERNAL MEDICINE

## 2020-01-31 PROCEDURE — 99215 PR OFFICE/OUTPT VISIT, EST, LEVL V, 40-54 MIN: ICD-10-PCS | Mod: 25,,, | Performed by: INTERNAL MEDICINE

## 2020-01-31 PROCEDURE — 85610 PROTHROMBIN TIME: CPT

## 2020-01-31 PROCEDURE — 36415 COLL VENOUS BLD VENIPUNCTURE: CPT

## 2020-01-31 PROCEDURE — 96374 THER/PROPH/DIAG INJ IV PUSH: CPT

## 2020-01-31 PROCEDURE — 94640 AIRWAY INHALATION TREATMENT: CPT

## 2020-01-31 PROCEDURE — 90471 IMMUNIZATION ADMIN: CPT | Performed by: FAMILY MEDICINE

## 2020-01-31 PROCEDURE — 85025 COMPLETE CBC W/AUTO DIFF WBC: CPT

## 2020-01-31 PROCEDURE — 90686 IIV4 VACC NO PRSV 0.5 ML IM: CPT | Performed by: FAMILY MEDICINE

## 2020-01-31 PROCEDURE — 25000242 PHARM REV CODE 250 ALT 637 W/ HCPCS: Performed by: FAMILY MEDICINE

## 2020-01-31 PROCEDURE — 88305 TISSUE EXAM BY PATHOLOGIST: ICD-10-PCS | Mod: 26,,, | Performed by: PATHOLOGY

## 2020-01-31 PROCEDURE — 43239 EGD BIOPSY SINGLE/MULTIPLE: CPT | Mod: ,,, | Performed by: INTERNAL MEDICINE

## 2020-01-31 PROCEDURE — 63600175 PHARM REV CODE 636 W HCPCS: Performed by: FAMILY MEDICINE

## 2020-01-31 PROCEDURE — 25000003 PHARM REV CODE 250: Performed by: FAMILY MEDICINE

## 2020-01-31 PROCEDURE — 43239 PR EGD, FLEX, W/BIOPSY, SGL/MULTI: ICD-10-PCS | Mod: ,,, | Performed by: INTERNAL MEDICINE

## 2020-01-31 PROCEDURE — 36430 TRANSFUSION BLD/BLD COMPNT: CPT

## 2020-01-31 RX ORDER — LIDOCAINE HCL/PF 100 MG/5ML
SYRINGE (ML) INTRAVENOUS
Status: DISCONTINUED | OUTPATIENT
Start: 2020-01-31 | End: 2020-01-31

## 2020-01-31 RX ORDER — PROPOFOL 10 MG/ML
VIAL (ML) INTRAVENOUS
Status: DISCONTINUED | OUTPATIENT
Start: 2020-01-31 | End: 2020-01-31

## 2020-01-31 RX ORDER — FLUCONAZOLE 100 MG/1
100 TABLET ORAL DAILY
Qty: 14 TABLET | Refills: 0 | Status: SHIPPED | OUTPATIENT
Start: 2020-02-01 | End: 2020-02-15

## 2020-01-31 RX ORDER — PANTOPRAZOLE SODIUM 40 MG/1
40 TABLET, DELAYED RELEASE ORAL 2 TIMES DAILY
Qty: 60 TABLET | Refills: 1 | Status: SHIPPED | OUTPATIENT
Start: 2020-01-31 | End: 2020-04-03 | Stop reason: SDUPTHER

## 2020-01-31 RX ORDER — SODIUM CHLORIDE 9 MG/ML
INJECTION, SOLUTION INTRAVENOUS CONTINUOUS PRN
Status: DISCONTINUED | OUTPATIENT
Start: 2020-01-31 | End: 2020-01-31

## 2020-01-31 RX ORDER — PANTOPRAZOLE SODIUM 40 MG/1
40 TABLET, DELAYED RELEASE ORAL DAILY
Qty: 30 TABLET | Refills: 11 | Status: SHIPPED | OUTPATIENT
Start: 2020-01-31 | End: 2020-01-31 | Stop reason: SDUPTHER

## 2020-01-31 RX ORDER — FERROUS SULFATE 300 MG/5ML
300 LIQUID (ML) ORAL DAILY
Status: DISCONTINUED | OUTPATIENT
Start: 2020-01-31 | End: 2020-01-31 | Stop reason: HOSPADM

## 2020-01-31 RX ORDER — PROPOFOL 10 MG/ML
VIAL (ML) INTRAVENOUS CONTINUOUS PRN
Status: DISCONTINUED | OUTPATIENT
Start: 2020-01-31 | End: 2020-01-31

## 2020-01-31 RX ORDER — FLUCONAZOLE 200 MG/1
200 TABLET ORAL ONCE
Status: COMPLETED | OUTPATIENT
Start: 2020-01-31 | End: 2020-01-31

## 2020-01-31 RX ADMIN — ALBUTEROL SULFATE 2.5 MG: 2.5 SOLUTION RESPIRATORY (INHALATION) at 04:01

## 2020-01-31 RX ADMIN — ALBUTEROL SULFATE 2.5 MG: 2.5 SOLUTION RESPIRATORY (INHALATION) at 12:01

## 2020-01-31 RX ADMIN — ALBUTEROL SULFATE 2.5 MG: 2.5 SOLUTION RESPIRATORY (INHALATION) at 07:01

## 2020-01-31 RX ADMIN — PROPOFOL 150 MCG/KG/MIN: 10 INJECTION, EMULSION INTRAVENOUS at 03:01

## 2020-01-31 RX ADMIN — FLUCONAZOLE 200 MG: 200 TABLET ORAL at 05:01

## 2020-01-31 RX ADMIN — Medication 100 MG: at 03:01

## 2020-01-31 RX ADMIN — PROPOFOL 100 MG: 10 INJECTION, EMULSION INTRAVENOUS at 03:01

## 2020-01-31 RX ADMIN — IRON SUCROSE 200 MG: 20 INJECTION, SOLUTION INTRAVENOUS at 11:01

## 2020-01-31 RX ADMIN — Medication 6 MG: at 12:01

## 2020-01-31 RX ADMIN — INFLUENZA VIRUS VACCINE 0.5 ML: 15; 15; 15; 15 SUSPENSION INTRAMUSCULAR at 06:01

## 2020-01-31 RX ADMIN — DULOXETINE HYDROCHLORIDE 60 MG: 30 CAPSULE, DELAYED RELEASE ORAL at 08:01

## 2020-01-31 RX ADMIN — ALBUTEROL SULFATE 2.5 MG: 2.5 SOLUTION RESPIRATORY (INHALATION) at 11:01

## 2020-01-31 RX ADMIN — ALBUTEROL SULFATE 2.5 MG: 2.5 SOLUTION RESPIRATORY (INHALATION) at 03:01

## 2020-01-31 RX ADMIN — MINERAL SUPPLEMENT IRON 300 MG / 5 ML STRENGTH LIQUID 100 PER BOX UNFLAVORED 300 MG: at 11:01

## 2020-01-31 RX ADMIN — SODIUM CHLORIDE: 0.9 INJECTION, SOLUTION INTRAVENOUS at 03:01

## 2020-01-31 NOTE — HOSPITAL COURSE
Admitted with symptom of anemia, status post 2 U of PRBC, H/H stable, awaits GI recommendation- EGD reported candidal esophagitis, treat for 3 weeks and PPI And outpatient FU for colonoscopy

## 2020-01-31 NOTE — TELEPHONE ENCOUNTER
SUPREP Instructions    You are scheduled for a colonoscopy with Dr. Cristhian Mancilla on 02/04/2020 at [facility] located at [address] [place] [address].  Check in at the admit desk, first floor of the hospital (which is the building on the left).     You will receive a call 2-3 days before your colonoscopy to tell you the time to arrive.  If you have not received a call by the day before your procedure, call the Endoscopy Lab at [phone].  To ensure that your test is accurate and complete, you MUST follow these instructions listed below.  If you have any questions, please call our office at [phone].  Plan on being at the hospital for your procedure for 3-4 hours.    1.  Follow a CLEAR LIQUID DIET for the entire day before your scheduled colonoscopy.  This means no solid food the entire day starting when you wake.  You may have as much of the clear liquids as you want throughout the day.   CLEAR LIQUID DIET:   - Avoid Red, Orange, Purple, and/or Blue food coloring   - NO DAIRY   - You can have:  Coffee with sugar (no creamer), tea, water, soda, apple or white grape juice, chicken or beef broth/bouillon (no meat, noodles, or veggies), green/yellow popsicles, green/yellow Jell-O, lemonade.    2.  AT 5 pm the evening before your colonoscopy, POUR ONE (1) BOTTLE OF SUPREP INTO THE MIXING CONTAINER, PROVIDED INSIDE THE BOX.  ADD WATER TO THE LINE ON THE CONTAINER AND MIX IT WELL.  DRINK THE ENTIRE CONTAINER AND THEN DRINK TWO (2) MORE CONTAINERS OF WATER OVER THE NEXT 1 HOUR.  This is sometimes easier to drink if this solution is cold, so you can mix the solution 20 minutes ahead of time and place in the refrigerator prior to drinking.  You have to drink the solution within 30-45 minutes of mixing it.  Do NOT put this solution over ice.  It IS ok to drink with a straw.    3.  The endoscopy department will call you 1 day before your colonoscopy to tell you the exact time to arrive, AND to tell you the exact  time to drink the 2nd portion of your prep (which will be FIVE HOURS BEFORE YOUR ARRIVAL TIME).  At this time given to you, POUR ONE (1) BOTTLE OF SUPREP INTO THE MIXING CONTAINER, PROVIDED INSIDE THE BOX.  ADD WATER TO THE LINE ON THE CONTAINER AND MIX IT WELL.  DRINK THE ENTIRE CONTAINER AND THEN DRINK TWO (2) MORE CONTAINERS OF WATER OVER THE NEXT 1 HOUR.  This is sometimes easier to drink if this solution is cold, so you can mix the solution 20 minutes ahead of time and place in the refrigerator prior to drinking.  You have to drink the solution within 30-45 minutes of mixing it.  Do NOT put this solution over ice.  It IS ok to drink with a straw.  Once this is complete, you may not have ANYTHING else by mouth!    4.  You must have someone with you to DRIVE YOU HOME since you will be receiving IV sedation for the colonoscopy.    5.  It is ok to take your heart, blood pressure, and seizure medications in the morning of your test with a SIP of water.  Hold other medications until after your procedure.  Do NOT have anything else to eat or drink the morning of your colonoscopy.  It is ok to brush your teeth.    6.  If you are on blood thinners THAT YOU HAVE BEEN INSTRUCTED TO HOLD BY YOUR DOCTOR FOR THIS PROCEDURE, then do NOT take this the morning of your colonoscopy.  Do NOT stop these medications on your own, they must be approved to be held by your doctor.  Your colonoscopy can NOT be done if you are on these medications.  Examples of blood thinners include: Coumadin, Aggrenox, Plavix, Pradaxa, Reapro, Pletal, Xarelto, Ticagrelor, Brilinta, Eliquis, and high dose aspirin (325 mg).  You do not have to stop baby aspirin 81 mg.    7.  IF YOU ARE DIABETIC:  NO INSULIN OR ORAL MEDICATIONS THE MORNING OF THE COLONOSCOPY.  TAKE ONLY HALF THE DOSE OF YOUR INSULIN THE DAY BEFORE THE COLONOSCOPY.  DO NOT TAKE ANY ORAL DIABETIC MEDICATIONS THE DAY BEFORE THE COLONOSCOPY.  IF YOU ARE AN INSULIN DEPENDENT DIABETIC WITH  UNSTABLE BLOOD SUGARS, NOTIFY YOUR PRIMARY CARE PHYSICIAN FOR INSTRUCTIONS.

## 2020-01-31 NOTE — HPI
Terese Barney is a 58 y/o F with PMH of anxiety, depression, chronic back pain, insomnia, vitamin D deficiency, sent to [facility] ED by PCP for anemia. Patient noted with 3 weeks hx of progressive shortness of breath worse with activity. There is associated palpitation, generalized weakness, fatigue and passage of black stool. and She denies fever, diaphoresis, chills, nausea or vomiting. She  Denies NSAID us lately. She also had some URI symptoms with treatment but no improvement  She was seen by her PCP today and had some lab drawn, and informed her H/H is low.   In the ED H/H was 5.4/18.9

## 2020-01-31 NOTE — SUBJECTIVE & OBJECTIVE
Past Medical History:   Diagnosis Date    Anxiety and depression 2016    Chronic back pain     Hammer toe of left foot     Insomnia     Motion sickness     Overweight (BMI 25.0-29.9) 2016    PONV (postoperative nausea and vomiting)     Vitamin D deficiency 2016       Past Surgical History:   Procedure Laterality Date    AUGMENTATION OF BREAST Bilateral     BREAST SURGERY Bilateral      SECTION      two    foot surgery      left plantar fascial release    FOOT SURGERY Left 2017    2nd TOE, Hammer toe repair    HYSTERECTOMY      laparascopy      ovarian cysts    left knee surgery      TONSILLECTOMY      TOTAL ABDOMINAL HYSTERECTOMY W/ BILATERAL SALPINGOOPHORECTOMY         Review of patient's allergies indicates:   Allergen Reactions    Codeine Nausea And Vomiting       No current facility-administered medications on file prior to encounter.      Current Outpatient Medications on File Prior to Encounter   Medication Sig    albuterol (PROVENTIL) 2.5 mg /3 mL (0.083 %) nebulizer solution Take 3 mLs (2.5 mg total) by nebulization every 6 (six) hours as needed for Wheezing. Rescue    albuterol (PROVENTIL/VENTOLIN HFA) 90 mcg/actuation inhaler Inhale 2 puffs into the lungs every 6 (six) hours as needed for Wheezing or Shortness of Breath. Rescue    amoxicillin (AMOXIL) 500 MG capsule Take 500 mg by mouth every 8 (eight) hours.    cefdinir (OMNICEF) 300 MG capsule Take 1 capsule (300 mg total) by mouth 2 (two) times daily. for 14 days    cevimeline (EVOXAC) 30 mg capsule TAKE 1 CAPSULE BY MOUTH TWICE DAILY    clindamycin-benzoyl peroxide gel APPLY TOPICALLY EVERY EVENING    cycloSPORINE (RESTASIS) 0.05 % ophthalmic emulsion Place 0.4 mLs (1 drop total) into both eyes 2 (two) times daily.    DULoxetine (CYMBALTA) 60 MG capsule Take 1 capsule (60 mg total) by mouth 2 (two) times daily.    ergocalciferol (VITAMIN D2) 50,000 unit Cap TAKE 1 CAPSULE EVERY 7 DAYS ON  SUNDAYS    gabapentin (NEURONTIN) 300 MG capsule Take 1 capsule (300 mg total) by mouth 3 (three) times daily as needed.    HYDROcodone-acetaminophen (NORCO) 5-325 mg per tablet Take 1 tablet by mouth every 4 (four) hours as needed for Pain.    LORazepam (ATIVAN) 1 MG tablet TAKE 1/2 TO 1 (ONE-HALF TO ONE) TABLET BY MOUTH ONCE DAILY AS NEEDED FOR SEVERE ANXIETY    meloxicam (MOBIC) 15 MG tablet Take 1 tablet (15 mg total) by mouth daily as needed.    omeprazole (PRILOSEC) 40 MG capsule Take 1 capsule (40 mg total) by mouth once daily.    pantoprazole (PROTONIX) 40 MG tablet Take 1 tablet (40 mg total) by mouth once daily.    predniSONE (DELTASONE) 10 MG tablet Take 2 po bid x 3 days, one bid x 3 days, then one daily till finish    PREVIDENT 5000 DRY MOUTH 1.1 % Gel     promethazine-dextromethorphan (PROMETHAZINE-DM) 6.25-15 mg/5 mL Syrp Take one tsp po q 6 hrs prn cough     Family History     Problem Relation (Age of Onset)    Breast cancer Mother    Coronary artery disease Father    Diabetes type II Father, Sister, Brother    Glaucoma Paternal Grandmother    Hypertension Mother, Sister, Brother    Macular degeneration Father        Tobacco Use    Smoking status: Never Smoker    Smokeless tobacco: Never Used   Substance and Sexual Activity    Alcohol use: Yes     Comment: rare    Drug use: No    Sexual activity: Not on file     Review of Systems   Constitutional: Positive for fatigue. Negative for chills and fever.   HENT: Negative for congestion and tinnitus.    Eyes: Negative for pain.   Respiratory: Positive for shortness of breath. Negative for apnea and wheezing.    Cardiovascular: Positive for palpitations. Negative for chest pain.   Gastrointestinal: Negative for abdominal pain, nausea and vomiting.        Passage of dark stool   Musculoskeletal: Negative for joint swelling.   Neurological: Positive for weakness. Negative for dizziness, syncope, light-headedness, numbness and headaches.    Psychiatric/Behavioral: Negative for confusion and sleep disturbance.     Objective:     Vital Signs (Most Recent):  Temp: 98.6 °F (37 °C) (01/30/20 1617)  Pulse: 110 (01/30/20 1617)  Resp: 20 (01/30/20 1617)  BP: (!) 146/68 (01/30/20 1617)  SpO2: 98 % (01/30/20 1617) Vital Signs (24h Range):  Temp:  [98.6 °F (37 °C)] 98.6 °F (37 °C)  Pulse:  [110-112] 110  Resp:  [20] 20  SpO2:  [98 %] 98 %  BP: (146)/(68) 146/68     Weight: 68 kg (150 lb)  Body mass index is 27.44 kg/m².    Physical Exam   Constitutional: She is oriented to person, place, and time. She appears well-developed and well-nourished.   HENT:   Head: Normocephalic.   Eyes: Pupils are equal, round, and reactive to light.   Neck: Neck supple.   Cardiovascular: Normal rate, regular rhythm and normal heart sounds. Exam reveals no gallop and no friction rub.   No murmur heard.  Pulmonary/Chest: Breath sounds normal.   Abdominal: There is no tenderness.   Musculoskeletal: She exhibits tenderness.   Lymphadenopathy:     She has no cervical adenopathy.   Neurological: She is alert and oriented to person, place, and time.   Skin: No rash noted.         CRANIAL NERVES     CN III, IV, VI   Pupils are equal, round, and reactive to light.       Significant Labs:   CBC:   Recent Labs   Lab 01/30/20  0907 01/30/20  1710   WBC 10.53 9.98   HGB 6.0* 5.4*   HCT 22.4* 18.9*   * 408*     CMP:   Recent Labs   Lab 01/30/20  1710      K 3.4*      CO2 29      BUN 19   CREATININE 1.0   CALCIUM 8.2*   PROT 6.7   ALBUMIN 3.4*   BILITOT 0.2   ALKPHOS 62   AST 14   ALT 9*   ANIONGAP 5*   EGFRNONAA >60     Coagulation:   Recent Labs   Lab 01/30/20  1710   INR 0.9     Lactic Acid: No results for input(s): LACTATE in the last 48 hours.  Magnesium: No results for input(s): MG in the last 48 hours.  Troponin: No results for input(s): TROPONINI in the last 48 hours.  TSH: No results for input(s): TSH in the last 4320 hours.        Imaging Results    None          Significant Imaging: I have reviewed all pertinent imaging results/findings within the past 24 hours.

## 2020-01-31 NOTE — CONSULTS
[facility]  Gastroenterology  Consult Note    Patient Name: Terese Barney  MRN: [id number]  Admission Date: 2020  Hospital Length of Stay: 0 days  Code Status: Full Code   Attending Provider: Lucinda George*   Consulting Provider: Cristhian Mancilla MD  Primary Care Physician: Shanique Potts MD  Principal Problem:Symptomatic anemia    Gastroenterology  Consult performed by: Cristhian Mancilla MD  Consult ordered by: Lucinda George MD  Reason for consult: Anemia        Subjective:     HPI:  This is a 59-year-old female here for evaluation of weakness and lethargy noted to have severe anemia.  She presented with palpitations, lethargy and weakness noted to have a severe anemia with a hemoglobin of less than 7.  It is microcytic, severe without associated weight loss.  She did note some dark stools recently.  She has never had an upper endoscopy.  Some associated esophageal odynophagia as well. No other exacerbating or relieving factors or other associated symptoms.  She believes she has had a colonoscopy remotely.  No unintentional weight loss or change in stool caliber.  She denies any NSAID usage.  No hematochezia. +PICA    The following portions of the patient's history were reviewed and updated as appropriate: allergies, current medications, past family history, past medical history, past social history, past surgical history and problem list.      Past Medical History:   Diagnosis Date    Anxiety and depression 2016    Chronic back pain     Hammer toe of left foot     Insomnia     Motion sickness     Overweight (BMI 25.0-29.9) 2016    PONV (postoperative nausea and vomiting)     Vitamin D deficiency 2016       Past Surgical History:   Procedure Laterality Date    AUGMENTATION OF BREAST Bilateral     BREAST SURGERY Bilateral      SECTION      two    foot surgery      left plantar fascial release    FOOT SURGERY Left 2017    2nd  TOE, Hammer toe repair    HYSTERECTOMY      laparascopy      ovarian cysts    left knee surgery      TONSILLECTOMY      TOTAL ABDOMINAL HYSTERECTOMY W/ BILATERAL SALPINGOOPHORECTOMY  2004       Review of patient's allergies indicates:   Allergen Reactions    Codeine Nausea And Vomiting     Family History     Problem Relation (Age of Onset)    Breast cancer Mother    Coronary artery disease Father    Diabetes type II Father, Sister, Brother    Glaucoma Paternal Grandmother    Hypertension Mother, Sister, Brother    Macular degeneration Father        Tobacco Use    Smoking status: Never Smoker    Smokeless tobacco: Never Used   Substance and Sexual Activity    Alcohol use: Yes     Comment: rare    Drug use: No    Sexual activity: Not on file     Review of Systems   Constitutional: Positive for fatigue. Negative for appetite change, chills and fever.   HENT: Negative for postnasal drip and trouble swallowing.    Eyes: Negative for pain and redness.   Respiratory: Negative for cough, choking, chest tightness and shortness of breath.    Cardiovascular: Positive for palpitations. Negative for chest pain and leg swelling.   Gastrointestinal: Positive for blood in stool. Negative for abdominal distention, abdominal pain, anal bleeding, constipation, diarrhea, nausea, rectal pain and vomiting.   Endocrine: Negative for cold intolerance and heat intolerance.   Genitourinary: Negative for difficulty urinating and hematuria.   Musculoskeletal: Negative for arthralgias and back pain.   Skin: Negative for color change and pallor.   Allergic/Immunologic: Negative for environmental allergies and food allergies.   Neurological: Positive for weakness. Negative for dizziness and light-headedness.   Hematological: Negative for adenopathy. Does not bruise/bleed easily.   Psychiatric/Behavioral: Negative for agitation and behavioral problems.     Objective:     Vital Signs (Most Recent):  Temp: 98.3 °F (36.8 °C) (01/31/20  1520)  Pulse: 97 (01/31/20 1604)  Resp: 16 (01/31/20 1544)  BP: (!) 120/51 (01/31/20 1544)  SpO2: (!) 94 % (01/31/20 1544) Vital Signs (24h Range):  Temp:  [97.2 °F (36.2 °C)-99.7 °F (37.6 °C)] 98.3 °F (36.8 °C)  Pulse:  [] 97  Resp:  [15-39] 16  SpO2:  [91 %-100 %] 94 %  BP: ()/(42-77) 120/51     Weight: 69 kg (152 lb 1.9 oz) (01/31/20 0600)  Body mass index is 27.82 kg/m².      Intake/Output Summary (Last 24 hours) at 1/31/2020 1606  Last data filed at 1/31/2020 1515  Gross per 24 hour   Intake 1075 ml   Output 500 ml   Net 575 ml       Lines/Drains/Airways     Airway                 Airway - Non-Surgical 01/31/20 1511 Nasal Cannula less than 1 day          Epidural Line                 Perineural Analgesia/Anesthesia Assessment (using dermatomes) Epidural 03/28/17 1211 1039 days          Peripheral Intravenous Line                 Peripheral IV - Single Lumen 01/30/20 1655 20 G Left Forearm less than 1 day         Peripheral IV - Single Lumen 01/30/20 1705 20 G Left Upper Arm less than 1 day                Physical Exam   Constitutional: She is oriented to person, place, and time. She appears well-developed and well-nourished. No distress.   HENT:   Head: Normocephalic and atraumatic.   Eyes: Conjunctivae are normal. No scleral icterus.   Neck: Normal range of motion. Neck supple. No tracheal deviation present. No thyromegaly present.   Cardiovascular: Normal rate and regular rhythm. Exam reveals no gallop and no friction rub.   Pulmonary/Chest: Effort normal and breath sounds normal. No respiratory distress. She has no wheezes.   Abdominal: Soft. Bowel sounds are normal. She exhibits no distension. There is no tenderness.   Musculoskeletal:        Right wrist: She exhibits normal range of motion and no tenderness.        Left wrist: She exhibits normal range of motion and no tenderness.   Lymphadenopathy:        Head (right side): No submental and no submandibular adenopathy present.        Head  (left side): No submental and no submandibular adenopathy present.   Neurological: She is alert and oriented to person, place, and time. Cranial nerve deficit:      Skin: Skin is warm and dry. No rash noted. She is not diaphoretic. No erythema.   Psychiatric: She has a normal mood and affect. Her behavior is normal.   Nursing note and vitals reviewed.      Significant Labs:  CBC:   Recent Labs   Lab 01/30/20  0907 01/30/20  1710 01/31/20  0704   WBC 10.53 9.98 10.47   HGB 6.0* 5.4* 8.6*   HCT 22.4* 18.9* 29.4*   * 408* 296       Significant Imaging:  Imaging results within the past 24 hours have been reviewed.    Assessment/Plan:     * Symptomatic anemia  - agree with fe  - npo  - egd  - ppi  - monitor hct        Thank you for your consult. I will follow-up with patient. Please contact us if you have any additional questions.    Cristhian Mancilla MD  Gastroenterology  [facility]

## 2020-01-31 NOTE — ED NOTES
Pt to restroom via w/c. Able to stand and sit, states some feeling of fatigue after the process, no chest pain or dizziness. Pt reconnected to monitor with call bell in reach and family at bedside.

## 2020-01-31 NOTE — ASSESSMENT & PLAN NOTE
Gastroesophageal reflux disease  Admit H/H 5.4 /18.9  Transfuse with 2 U pRBC  NPO overnight  Protonix IV bid  Telemetry  Stool occult blood   Supplemental oxygen as needed  Consult GI

## 2020-01-31 NOTE — HPI
59 year-old female was admitted with symptomatic anemia secondary to a gastrointestinal bleed. Consult is for iron deficiency anemia. She was scheduled to see me in clinic this afternoon, so I saw her in the hospital since she is hospitalized.

## 2020-01-31 NOTE — SUBJECTIVE & OBJECTIVE
- she endorses fatigue, dyspnea upon exertion, poor exercise tolerance.  - She denies chest pain, nausea, vomiting, diarrhea, constipation.    Oncology Treatment Plan:   [No treatment plan]    Medications:  Continuous Infusions:  Scheduled Meds:   albuterol sulfate  2.5 mg Nebulization Q4H    DULoxetine  60 mg Oral BID     PRN Meds:acetaminophen, albuterol sulfate, Dextrose 10% Bolus, Dextrose 10% Bolus, gabapentin, glucagon (human recombinant), glucose, glucose, influenza, melatonin, ondansetron, potassium chloride 10%, sodium chloride 0.9%     Review of patient's allergies indicates:   Allergen Reactions    Codeine Nausea And Vomiting        Past Medical History:   Diagnosis Date    Anxiety and depression 2016    Chronic back pain     Hammer toe of left foot     Insomnia     Motion sickness     Overweight (BMI 25.0-29.9) 2016    PONV (postoperative nausea and vomiting)     Vitamin D deficiency 2016     Past Surgical History:   Procedure Laterality Date    AUGMENTATION OF BREAST Bilateral     BREAST SURGERY Bilateral      SECTION      two    foot surgery      left plantar fascial release    FOOT SURGERY Left 2017    2nd TOE, Hammer toe repair    HYSTERECTOMY      laparascopy      ovarian cysts    left knee surgery      TONSILLECTOMY      TOTAL ABDOMINAL HYSTERECTOMY W/ BILATERAL SALPINGOOPHORECTOMY       Family History     Problem Relation (Age of Onset)    Breast cancer Mother    Coronary artery disease Father    Diabetes type II Father, Sister, Brother    Glaucoma Paternal Grandmother    Hypertension Mother, Sister, Brother    Macular degeneration Father        Tobacco Use    Smoking status: Never Smoker    Smokeless tobacco: Never Used   Substance and Sexual Activity    Alcohol use: Yes     Comment: rare    Drug use: No    Sexual activity: Not on file       Review of Systems   Constitutional: Positive for fatigue.   HENT: Negative for sore throat.     Eyes: Negative for visual disturbance.   Respiratory: Positive for shortness of breath. Negative for cough.    Cardiovascular: Negative for chest pain.   Gastrointestinal: Negative for abdominal pain, constipation, diarrhea, nausea and vomiting.   Genitourinary: Negative for dysuria.   Musculoskeletal: Negative for back pain.   Skin: Negative for rash.   Neurological: Negative for headaches.   Hematological: Negative for adenopathy.   Psychiatric/Behavioral: The patient is not nervous/anxious.      Objective:     Vital Signs (Most Recent):  Temp: 99.2 °F (37.3 °C) (01/31/20 0736)  Pulse: 90 (01/31/20 0737)  Resp: 20 (01/31/20 0737)  BP: (!) 143/70 (01/31/20 0736)  SpO2: 96 % (01/31/20 0737) Vital Signs (24h Range):  Temp:  [98.4 °F (36.9 °C)-99.7 °F (37.6 °C)] 99.2 °F (37.3 °C)  Pulse:  [] 90  Resp:  [15-39] 20  SpO2:  [91 %-100 %] 96 %  BP: (113-157)/(56-77) 143/70     Weight: 69 kg (152 lb 1.9 oz)  Body mass index is 27.82 kg/m².  Body surface area is 1.74 meters squared.      Intake/Output Summary (Last 24 hours) at 1/31/2020 1019  Last data filed at 1/31/2020 0600  Gross per 24 hour   Intake 775 ml   Output 500 ml   Net 275 ml       Physical Exam   Constitutional: She is oriented to person, place, and time. She appears well-developed and well-nourished.   fatigued   HENT:   Head: Normocephalic and atraumatic.   Eyes: Pupils are equal, round, and reactive to light. EOM are normal.   Neck: Normal range of motion. Neck supple.   Cardiovascular: Normal rate and regular rhythm.   Pulmonary/Chest: Effort normal and breath sounds normal.   Abdominal: Soft. Bowel sounds are normal.   Musculoskeletal: Normal range of motion. She exhibits no edema.   Neurological: She is alert and oriented to person, place, and time.   Skin: Skin is warm and dry.   Psychiatric: She has a normal mood and affect. Her behavior is normal. Judgment and thought content normal.   Nursing note and vitals reviewed.      Significant Labs:    Labs have been reviewed.    Lab Results   Component Value Date    WBC 10.47 01/31/2020    HGB 8.6 (L) 01/31/2020    HCT 29.4 (L) 01/31/2020    MCV 78 (L) 01/31/2020     01/31/2020

## 2020-01-31 NOTE — PROGRESS NOTES
[facility] Medicine  Progress Note    Patient Name: Terese Barney  MRN: [id number]  Patient Class: OP- Observation   Admission Date: 2020  Length of Stay: 0 days  Attending Physician: Lucinda George*  Primary Care Provider: Shanique Potts MD        Subjective:     Principal Problem:Symptomatic anemia        HPI:  Terese Barney is a 58 y/o F with PMH of anxiety, depression, chronic back pain, insomnia, vitamin D deficiency, sent to [facility] ED by PCP for anemia. Patient noted with 3 weeks hx of progressive shortness of breath worse with activity. There is associated palpitation, generalized weakness, fatigue and passage of black stool. and She denies fever, diaphoresis, chills, nausea or vomiting. She  Denies NSAID us lately. She also had some URI symptoms with treatment but no improvement  She was seen by her PCP today and had some lab drawn, and informed her H/H is low.   In the ED H/H was 5.4/18.9    Overview/Hospital Course:  Admitted with symptom of anemia, status post 2 U of PRBC, H/H stable, awaits GI recommendation    Past Medical History:   Diagnosis Date    Anxiety and depression 2016    Chronic back pain     Hammer toe of left foot     Insomnia     Motion sickness     Overweight (BMI 25.0-29.9) 2016    PONV (postoperative nausea and vomiting)     Vitamin D deficiency 2016       Past Surgical History:   Procedure Laterality Date    AUGMENTATION OF BREAST Bilateral     BREAST SURGERY Bilateral      SECTION      two    foot surgery      left plantar fascial release    FOOT SURGERY Left 2017    2nd TOE, Hammer toe repair    HYSTERECTOMY      laparascopy      ovarian cysts    left knee surgery      TONSILLECTOMY      TOTAL ABDOMINAL HYSTERECTOMY W/ BILATERAL SALPINGOOPHORECTOMY         Review of patient's allergies indicates:   Allergen Reactions    Codeine Nausea And Vomiting       No current  facility-administered medications on file prior to encounter.      Current Outpatient Medications on File Prior to Encounter   Medication Sig    albuterol (PROVENTIL) 2.5 mg /3 mL (0.083 %) nebulizer solution Take 3 mLs (2.5 mg total) by nebulization every 6 (six) hours as needed for Wheezing. Rescue    albuterol (PROVENTIL/VENTOLIN HFA) 90 mcg/actuation inhaler Inhale 2 puffs into the lungs every 6 (six) hours as needed for Wheezing or Shortness of Breath. Rescue    amoxicillin (AMOXIL) 500 MG capsule Take 500 mg by mouth every 8 (eight) hours.    cefdinir (OMNICEF) 300 MG capsule Take 1 capsule (300 mg total) by mouth 2 (two) times daily. for 14 days    cevimeline (EVOXAC) 30 mg capsule TAKE 1 CAPSULE BY MOUTH TWICE DAILY    clindamycin-benzoyl peroxide gel APPLY TOPICALLY EVERY EVENING    cycloSPORINE (RESTASIS) 0.05 % ophthalmic emulsion Place 0.4 mLs (1 drop total) into both eyes 2 (two) times daily.    DULoxetine (CYMBALTA) 60 MG capsule Take 1 capsule (60 mg total) by mouth 2 (two) times daily.    ergocalciferol (VITAMIN D2) 50,000 unit Cap TAKE 1 CAPSULE EVERY 7 DAYS ON SUNDAYS    gabapentin (NEURONTIN) 300 MG capsule Take 1 capsule (300 mg total) by mouth 3 (three) times daily as needed.    HYDROcodone-acetaminophen (NORCO) 5-325 mg per tablet Take 1 tablet by mouth every 4 (four) hours as needed for Pain.    LORazepam (ATIVAN) 1 MG tablet TAKE 1/2 TO 1 (ONE-HALF TO ONE) TABLET BY MOUTH ONCE DAILY AS NEEDED FOR SEVERE ANXIETY    meloxicam (MOBIC) 15 MG tablet Take 1 tablet (15 mg total) by mouth daily as needed.    omeprazole (PRILOSEC) 40 MG capsule Take 1 capsule (40 mg total) by mouth once daily.    pantoprazole (PROTONIX) 40 MG tablet Take 1 tablet (40 mg total) by mouth once daily.    predniSONE (DELTASONE) 10 MG tablet Take 2 po bid x 3 days, one bid x 3 days, then one daily till finish    PREVIDENT 5000 DRY MOUTH 1.1 % Gel     promethazine-dextromethorphan (PROMETHAZINE-DM) 6.25-15  mg/5 mL Syrp Take one tsp po q 6 hrs prn cough     Family History     Problem Relation (Age of Onset)    Breast cancer Mother    Coronary artery disease Father    Diabetes type II Father, Sister, Brother    Glaucoma Paternal Grandmother    Hypertension Mother, Sister, Brother    Macular degeneration Father        Tobacco Use    Smoking status: Never Smoker    Smokeless tobacco: Never Used   Substance and Sexual Activity    Alcohol use: Yes     Comment: rare    Drug use: No    Sexual activity: Not on file     Review of Systems   Constitutional: Positive for fatigue. Negative for chills and fever.   HENT: Negative for congestion and tinnitus.    Eyes: Negative for pain.   Respiratory: Positive for shortness of breath. Negative for apnea and wheezing.    Cardiovascular: Positive for palpitations. Negative for chest pain.   Gastrointestinal: Negative for abdominal pain, nausea and vomiting.        Passage of dark stool   Musculoskeletal: Negative for joint swelling.   Neurological: Positive for weakness. Negative for dizziness, syncope, light-headedness, numbness and headaches.   Psychiatric/Behavioral: Negative for confusion and sleep disturbance.     Objective:     Vital Signs (Most Recent):  Temp: 98.6 °F (37 °C) (01/30/20 1617)  Pulse: 110 (01/30/20 1617)  Resp: 20 (01/30/20 1617)  BP: (!) 146/68 (01/30/20 1617)  SpO2: 98 % (01/30/20 1617) Vital Signs (24h Range):  Temp:  [98.6 °F (37 °C)] 98.6 °F (37 °C)  Pulse:  [110-112] 110  Resp:  [20] 20  SpO2:  [98 %] 98 %  BP: (146)/(68) 146/68     Weight: 68 kg (150 lb)  Body mass index is 27.44 kg/m².    Physical Exam   Constitutional: She is oriented to person, place, and time. She appears well-developed and well-nourished.   HENT:   Head: Normocephalic.   Eyes: Pupils are equal, round, and reactive to light.   Neck: Neck supple.   Cardiovascular: Normal rate, regular rhythm and normal heart sounds. Exam reveals no gallop and no friction rub.   No murmur  heard.  Pulmonary/Chest: Breath sounds normal.   Abdominal: There is no tenderness.   Musculoskeletal: She exhibits tenderness.   Lymphadenopathy:     She has no cervical adenopathy.   Neurological: She is alert and oriented to person, place, and time.   Skin: No rash noted.         CRANIAL NERVES     CN III, IV, VI   Pupils are equal, round, and reactive to light.       Significant Labs:   CBC:   Recent Labs   Lab 01/30/20  0907 01/30/20  1710   WBC 10.53 9.98   HGB 6.0* 5.4*   HCT 22.4* 18.9*   * 408*     CMP:   Recent Labs   Lab 01/30/20  1710      K 3.4*      CO2 29      BUN 19   CREATININE 1.0   CALCIUM 8.2*   PROT 6.7   ALBUMIN 3.4*   BILITOT 0.2   ALKPHOS 62   AST 14   ALT 9*   ANIONGAP 5*   EGFRNONAA >60     Coagulation:   Recent Labs   Lab 01/30/20  1710   INR 0.9     Lactic Acid: No results for input(s): LACTATE in the last 48 hours.  Magnesium: No results for input(s): MG in the last 48 hours.  Troponin: No results for input(s): TROPONINI in the last 48 hours.  TSH: No results for input(s): TSH in the last 4320 hours.        Imaging Results    None         Significant Imaging: I have reviewed all pertinent imaging results/findings within the past 24 hours.      Assessment/Plan:      * Symptomatic anemia  Gastroesophageal reflux disease  Admit H/H 5.4 /18.9  Transfuse with 2 U pRBC  NPO overnight  Protonix IV bid  Telemetry  Stool occult blood   Supplemental oxygen as needed  Consult GI      Chronic pain syndrome  Chronic back pain  Anxiety and depression  Continue Cymbalta, gabapentin      Insomnia  Melatonin        VTE Risk Mitigation (From admission, onward)         Ordered     IP VTE LOW RISK PATIENT  Once      01/30/20 2324     Place COLLETTE hose  Until discontinued      01/30/20 2324                      Lucinda George MD  Department of Hospital Medicine   [facility]

## 2020-01-31 NOTE — TRANSFER OF CARE
"Anesthesia Transfer of Care Note    Patient: Terese Barney    Procedure(s) Performed: Procedure(s) (LRB):  ESOPHAGOGASTRODUODENOSCOPY (EGD) (N/A)    Patient location: GI    Anesthesia Type: MAC    Transport from OR: Transported from OR on room air with adequate spontaneous ventilation    Post pain: adequate analgesia    Post assessment: no apparent anesthetic complications    Post vital signs: stable    Level of consciousness: awake    Nausea/Vomiting: no nausea/vomiting    Complications: none    Transfer of care protocol was followed      Last vitals:   Visit Vitals  /65 (Patient Position: Lying)   Pulse 90   Temp 37.5 °C (99.5 °F)   Resp 18   Ht 5' 2" (1.575 m)   Wt 69 kg (152 lb 1.9 oz)   LMP  (LMP Unknown)   SpO2 99%   Breastfeeding? No   BMI 27.82 kg/m²     "

## 2020-01-31 NOTE — PLAN OF CARE
The proper method of use, as well as anticipated side effects, of this aerosol treatment are discussed and demonstrated to the patient.   Pt on RA with documented sats.  Wi

## 2020-01-31 NOTE — CONSULTS
[facility]  Hematology/Oncology  Consult Note    Patient Name: Terese Barney  MRN: [id number]  Admission Date: 2020  Hospital Length of Stay: 0 days  Code Status: Full Code   Attending Provider: Lucinda George*  Consulting Provider: Jose Luis Guerrero MD  Primary Care Physician: Shanique Potts MD  Principal Problem:Symptomatic anemia    Consults  Subjective:     HPI:  59 year-old female was admitted with symptomatic anemia secondary to a gastrointestinal bleed. Consult is for iron deficiency anemia. She was scheduled to see me in clinic this afternoon, so I saw her in the hospital since she is hospitalized.    - she endorses fatigue, dyspnea upon exertion, poor exercise tolerance.  - She denies chest pain, nausea, vomiting, diarrhea, constipation.    Oncology Treatment Plan:   [No treatment plan]    Medications:  Continuous Infusions:  Scheduled Meds:   albuterol sulfate  2.5 mg Nebulization Q4H    DULoxetine  60 mg Oral BID     PRN Meds:acetaminophen, albuterol sulfate, Dextrose 10% Bolus, Dextrose 10% Bolus, gabapentin, glucagon (human recombinant), glucose, glucose, influenza, melatonin, ondansetron, potassium chloride 10%, sodium chloride 0.9%     Review of patient's allergies indicates:   Allergen Reactions    Codeine Nausea And Vomiting        Past Medical History:   Diagnosis Date    Anxiety and depression 2016    Chronic back pain     Hammer toe of left foot     Insomnia     Motion sickness     Overweight (BMI 25.0-29.9) 2016    PONV (postoperative nausea and vomiting)     Vitamin D deficiency 2016     Past Surgical History:   Procedure Laterality Date    AUGMENTATION OF BREAST Bilateral     BREAST SURGERY Bilateral      SECTION      two    foot surgery      left plantar fascial release    FOOT SURGERY Left 2017    2nd TOE, Hammer toe repair    HYSTERECTOMY      laparascopy      ovarian cysts    left knee surgery       TONSILLECTOMY      TOTAL ABDOMINAL HYSTERECTOMY W/ BILATERAL SALPINGOOPHORECTOMY  2004     Family History     Problem Relation (Age of Onset)    Breast cancer Mother    Coronary artery disease Father    Diabetes type II Father, Sister, Brother    Glaucoma Paternal Grandmother    Hypertension Mother, Sister, Brother    Macular degeneration Father        Tobacco Use    Smoking status: Never Smoker    Smokeless tobacco: Never Used   Substance and Sexual Activity    Alcohol use: Yes     Comment: rare    Drug use: No    Sexual activity: Not on file       Review of Systems   Constitutional: Positive for fatigue.   HENT: Negative for sore throat.    Eyes: Negative for visual disturbance.   Respiratory: Positive for shortness of breath. Negative for cough.    Cardiovascular: Negative for chest pain.   Gastrointestinal: Negative for abdominal pain, constipation, diarrhea, nausea and vomiting.   Genitourinary: Negative for dysuria.   Musculoskeletal: Negative for back pain.   Skin: Negative for rash.   Neurological: Negative for headaches.   Hematological: Negative for adenopathy.   Psychiatric/Behavioral: The patient is not nervous/anxious.      Objective:     Vital Signs (Most Recent):  Temp: 99.2 °F (37.3 °C) (01/31/20 0736)  Pulse: 90 (01/31/20 0737)  Resp: 20 (01/31/20 0737)  BP: (!) 143/70 (01/31/20 0736)  SpO2: 96 % (01/31/20 0737) Vital Signs (24h Range):  Temp:  [98.4 °F (36.9 °C)-99.7 °F (37.6 °C)] 99.2 °F (37.3 °C)  Pulse:  [] 90  Resp:  [15-39] 20  SpO2:  [91 %-100 %] 96 %  BP: (113-157)/(56-77) 143/70     Weight: 69 kg (152 lb 1.9 oz)  Body mass index is 27.82 kg/m².  Body surface area is 1.74 meters squared.      Intake/Output Summary (Last 24 hours) at 1/31/2020 1019  Last data filed at 1/31/2020 0600  Gross per 24 hour   Intake 775 ml   Output 500 ml   Net 275 ml       Physical Exam   Constitutional: She is oriented to person, place, and time. She appears well-developed and well-nourished.    fatigued   HENT:   Head: Normocephalic and atraumatic.   Eyes: Pupils are equal, round, and reactive to light. EOM are normal.   Neck: Normal range of motion. Neck supple.   Cardiovascular: Normal rate and regular rhythm.   Pulmonary/Chest: Effort normal and breath sounds normal.   Abdominal: Soft. Bowel sounds are normal.   Musculoskeletal: Normal range of motion. She exhibits no edema.   Neurological: She is alert and oriented to person, place, and time.   Skin: Skin is warm and dry.   Psychiatric: She has a normal mood and affect. Her behavior is normal. Judgment and thought content normal.   Nursing note and vitals reviewed.      Significant Labs:   Labs have been reviewed.    Lab Results   Component Value Date    WBC 10.47 01/31/2020    HGB 8.6 (L) 01/31/2020    HCT 29.4 (L) 01/31/2020    MCV 78 (L) 01/31/2020     01/31/2020           Assessment/Plan:     Iron deficiency anemia due to chronic blood loss  - I have reviewed her labs chart.  - in January 2019, her hemoglobin was normal. Hemoglobin levels for past few days have revealed a severe microcytic anemia.  - ferritin level is decreased at 3 ng/mL, consistent with severe iron deficiency anemia.  - although stool was negative for occult blood, she reports black stools.  - agree with gastroenterology workup for source of bleeding.  - I will give a dose of iron sucrose while she is hospitalized. I will also start oral ferrous sulfate.  - I will schedule a follow-up appointment in clinic and arrange for outpatient ferric carboxymaltose.        Thank you for your consult.     Jose Luis Guerrero MD  Hematology/Oncology  [facility]

## 2020-01-31 NOTE — ANESTHESIA POSTPROCEDURE EVALUATION
Anesthesia Post Evaluation    Patient: Terese Barney    Procedure(s) Performed: Procedure(s) (LRB):  ESOPHAGOGASTRODUODENOSCOPY (EGD) (N/A)    Final Anesthesia Type: MAC    Patient location during evaluation: GI PACU  Patient participation: Yes- Able to Participate  Level of consciousness: awake and alert  Post-procedure vital signs: reviewed and stable  Pain management: adequate  Airway patency: patent    PONV status at discharge: No PONV  Anesthetic complications: no      Cardiovascular status: blood pressure returned to baseline and hemodynamically stable  Respiratory status: unassisted, spontaneous ventilation and room air  Hydration status: euvolemic  Follow-up not needed.          Vitals Value Taken Time   /65 1/31/2020 11:42 AM   Temp 37.5 °C (99.5 °F) 1/31/2020  2:40 PM   Pulse 90 1/31/2020  2:40 PM   Resp 18 1/31/2020  2:40 PM   SpO2 99 % 1/31/2020  2:40 PM         No case tracking events are documented in the log.      Pain/Veronique Score: No data recorded

## 2020-01-31 NOTE — HPI
This is a 59-year-old female here for evaluation of weakness and lethargy noted to have severe anemia.  She presented with palpitations, lethargy and weakness noted to have a severe anemia with a hemoglobin of less than 7.  It is microcytic, severe without associated weight loss.  She did note some dark stools recently.  She has never had an upper endoscopy.  Some associated esophageal odynophagia as well. No other exacerbating or relieving factors or other associated symptoms.  She believes she has had a colonoscopy remotely.  No unintentional weight loss or change in stool caliber.  She denies any NSAID usage.  No hematochezia. +PICA    The following portions of the patient's history were reviewed and updated as appropriate: allergies, current medications, past family history, past medical history, past social history, past surgical history and problem list.

## 2020-01-31 NOTE — PLAN OF CARE
[place] met with pt   lives alone, mother Chelsey Morris  [phone] and sister Trice Beasley are available to help as needed   no dme, no hh  pt independent, drives.   Future Appointments   Date Time Provider Department Center   1/31/2020  3:30 PM Jose Luis Guerrero MD [facility] HEM ONC [place] [facility]   2/6/2020  8:00 AM Kalani Jackson MD [facility] CARDIO [place] [facility]   2/7/2020  9:40 AM Shanique Potts MD [facility] FAM MED [place] [facility]   3/17/2020  8:40 AM Shanique Potts MD [facility] FAM MED [place] [facility]     pt has transportation to home   Dr. Guerrero's office to call pt with f/u apt        01/31/20 1313   Discharge Assessment   Assessment Type Discharge Planning Assessment   Confirmed/corrected address and phone number on facesheet? Yes   Assessment information obtained from? Patient;Medical Record   Expected Length of Stay (days) 2   Communicated expected length of stay with patient/caregiver yes   Prior to hospitilization cognitive status: Alert/Oriented   Prior to hospitalization functional status: Independent   Current cognitive status: Alert/Oriented   Current Functional Status: Independent   Lives With alone   Able to Return to Prior Arrangements yes   Is patient able to care for self after discharge? Yes   Who are your caregiver(s) and their phone number(s)?   (mother Chelsey Morris  [phone] )   Patient's perception of discharge disposition home or selfcare   Readmission Within the Last 30 Days no previous admission in last 30 days   Patient currently being followed by outpatient case management? No   Patient currently receives any other outside agency services? No   Equipment Currently Used at Home none   Do you have any problems affording any of your prescribed medications? No  ([employer] [facility] [place] )   Is the patient taking medications as prescribed? yes   Does the patient have transportation home? Yes   Does the patient receive services at the [facility]? No   Discharge Plan A Home with family;Home   DME Needed Upon  Discharge  none   Patient/Family in Agreement with Plan yes

## 2020-01-31 NOTE — PLAN OF CARE
Progress notes reviewed. Evening rounds completed. Introduced self as VN for this shift. Admit questions and Med rec complete.Educated pt on VN's role in patient's care.  Plan of care reviewed with patient. Opportunity given for pt's questions. No questions or concerns expressed at this time. VN to continue to monitor.

## 2020-01-31 NOTE — DISCHARGE SUMMARY
[facility] Hospital Medicine  Discharge Summary      Patient Name: Terese Barney  MRN: [id number]  Admission Date: 1/30/2020  Hospital Length of Stay: 0 days  Discharge Date and Time: 1/31/2020  6:50 PM  Attending Physician: Lucinda George*   Discharging Provider: Lucinda George MD  Primary Care Provider: Shanique Potts MD      HPI:   Terese Barney is a 60 y/o F with PMH of anxiety, depression, chronic back pain, insomnia, vitamin D deficiency, sent to [facility] ED by PCP for anemia. Patient noted with 3 weeks hx of progressive shortness of breath worse with activity. There is associated palpitation, generalized weakness, fatigue and passage of black stool. and She denies fever, diaphoresis, chills, nausea or vomiting. She  Denies NSAID us lately. She also had some URI symptoms with treatment but no improvement  She was seen by her PCP today and had some lab drawn, and informed her H/H is low.   In the ED H/H was 5.4/18.9    Procedure(s) (LRB):  ESOPHAGOGASTRODUODENOSCOPY (EGD) (N/A)      Hospital Course:   Admitted with symptom of anemia, status post 2 U of PRBC, H/H stable, awaits GI recommendation- EGD reported candidal esophagitis, treat for 3 weeks and PPI And outpatient FU for colonoscopy     Consults:   Consults (From admission, onward)        Status Ordering Provider     Gastroenterology  Once     Provider:  Cristhian Mancilla MD    Completed LUCINDA GEORGE     Inpatient consult to [facility]  Once     Provider:  (Not yet assigned)    Acknowledged JOSELITO AGUIAR          * Symptomatic anemia  Gastroesophageal reflux disease  Admit H/H 5.4 /18.9  Transfuse with 2 U pRBC  NPO overnight  Protonix IV bid  Telemetry  Stool occult blood   Supplemental oxygen as needed  Consult GI- EGD reported candidal esophagitis, treat for 3 weeks and PPI And outpatient FU for colonoscopy      Chronic pain syndrome  Chronic back pain  Anxiety and  depression  Continue Cymbalta, gabapentin        Final Active Diagnoses:    Diagnosis Date Noted POA    PRINCIPAL PROBLEM:  Symptomatic anemia [D64.9] 01/30/2020 Yes    Iron deficiency anemia due to chronic blood loss [D50.0] 01/31/2020 Yes    Gastroesophageal reflux disease [K21.9] 08/28/2017 Yes    Chronic pain syndrome [G89.4] 05/05/2017 Yes    Vitamin D deficiency [E55.9] 11/13/2016 Yes    Anxiety and depression [F41.9, F32.9] 11/02/2016 Yes    Chronic back pain [M54.9, G89.29] 11/02/2016 Yes    Insomnia [G47.00] 11/02/2016 Yes      Problems Resolved During this Admission:       Discharged Condition: stable    Disposition: Home or Self Care    Follow Up:  Follow-up Information     Jose Luis Guerrero MD.    Specialty:  Hematology and Oncology  Why:  Dr. Guerrero's office will contact you with an apt   Contact information:  [address]  [address]  [place] [address]  [phone]             Kalani Jackson MD On 2/6/2020.    Specialty:  Cardiology  Why:  8:00 am  - previously booked   Contact information:  [address]  [address]  [place] [address]  [phone]             Shanique Potts MD On 2/7/2020.    Specialty:  Family Medicine  Why:  9:40 am      -- previously booked   Contact information:  [address]  [address]  [place] [address]  [phone]             Shanique Potts MD.    Specialty:  Family Medicine  Contact information:  [address]  [address]  [place] [address]  [phone]             Cristhian Mancilla MD In 1 week.    Specialty:  Gastroenterology  Contact information:  [address]  [address]  [place] [address]  [phone]                 Patient Instructions:   No discharge procedures on file.    Significant Diagnostic Studies:     Pending Diagnostic Studies:     Procedure Component Value Units Date/Time    Specimen to Pathology, Surgery Gastrointestinal tract [[id number]] Collected:  01/31/20 1520    Order Status:  Sent Lab Status:  In process Updated:   01/31/20 1520         Medications:  Reconciled Home Medications:      Medication List      START taking these medications    fluconazole 100 MG tablet  Commonly known as:  DIFLUCAN  Take 1 tablet (100 mg total) by mouth once daily. for 14 days  Start taking on:  February 1, 2020        CHANGE how you take these medications    pantoprazole 40 MG tablet  Commonly known as:  PROTONIX  Take 1 tablet (40 mg total) by mouth 2 (two) times daily.  What changed:  when to take this        CONTINUE taking these medications    * albuterol 90 mcg/actuation inhaler  Commonly known as:  PROVENTIL/VENTOLIN HFA  Inhale 2 puffs into the lungs every 6 (six) hours as needed for Wheezing or Shortness of Breath. Rescue     * albuterol 2.5 mg /3 mL (0.083 %) nebulizer solution  Commonly known as:  PROVENTIL  Take 3 mLs (2.5 mg total) by nebulization every 6 (six) hours as needed for Wheezing. Rescue     cefdinir 300 MG capsule  Commonly known as:  OMNICEF  Take 1 capsule (300 mg total) by mouth 2 (two) times daily. for 14 days     cevimeline 30 mg capsule  Commonly known as:  EVOXAC  TAKE 1 CAPSULE BY MOUTH TWICE DAILY     clindamycin-benzoyl peroxide gel  APPLY TOPICALLY EVERY EVENING     cycloSPORINE 0.05 % ophthalmic emulsion  Commonly known as:  Restasis  Place 0.4 mLs (1 drop total) into both eyes 2 (two) times daily.     DULoxetine 60 MG capsule  Commonly known as:  CYMBALTA  Take 1 capsule (60 mg total) by mouth 2 (two) times daily.     ergocalciferol 50,000 unit Cap  Commonly known as:  Vitamin D2  TAKE 1 CAPSULE EVERY 7 DAYS ON SUNDAYS     gabapentin 300 MG capsule  Commonly known as:  NEURONTIN  Take 1 capsule (300 mg total) by mouth 3 (three) times daily as needed.     HYDROcodone-acetaminophen 5-325 mg per tablet  Commonly known as:  NORCO  Take 1 tablet by mouth every 4 (four) hours as needed for Pain.     LORazepam 1 MG tablet  Commonly known as:  ATIVAN  TAKE 1/2 TO 1 (ONE-HALF TO ONE) TABLET BY MOUTH ONCE DAILY AS NEEDED  FOR SEVERE ANXIETY     predniSONE 10 MG tablet  Commonly known as:  DELTASONE  Take 2 po bid x 3 days, one bid x 3 days, then one daily till finish     PreviDent 5000 Dry Mouth 1.1 % Gel  Generic drug:  fluoride (sodium)     promethazine-dextromethorphan 6.25-15 mg/5 mL Syrp  Commonly known as:  PROMETHAZINE-DM  Take one tsp po q 6 hrs prn cough         * This list has 2 medication(s) that are the same as other medications prescribed for you. Read the directions carefully, and ask your doctor or other care provider to review them with you.            STOP taking these medications    amoxicillin 500 MG capsule  Commonly known as:  AMOXIL     meloxicam 15 MG tablet  Commonly known as:  MOBIC     omeprazole 40 MG capsule  Commonly known as:  PRILOSEC        ASK your doctor about these medications    SUPREP BOWEL PREP KIT ORAL  Take 1 kit by mouth.  Start taking on:  February 3, 2020            Indwelling Lines/Drains at time of discharge:   Lines/Drains/Airways     Airway                 Airway - Non-Surgical 01/31/20 1511 Nasal Cannula less than 1 day          Epidural Line                 Perineural Analgesia/Anesthesia Assessment (using dermatomes) Epidural 03/28/17 1211 1039 days                Time spent on the discharge of patient: 35 minutes  Patient was seen and examined on the date of discharge and determined to be suitable for discharge.         Lucinda George MD  Department of Hospital Medicine  [facility]

## 2020-01-31 NOTE — [employer] PATIENT INSTRUCTIONS
Discharge Summary/Instructions after an Endoscopic Procedure  Patient Name: Terese Barney  Patient MRN: [id number]  Patient YOB: 1960 Friday, January 31, 2020  Cristhian Mancilla MD  RESTRICTIONS:  During your procedure today, you received medications for sedation.  These   medications may affect your judgment, balance and coordination.  Therefore,   for 24 hours, you have the following restrictions:   - DO NOT drive a car, operate machinery, make legal/financial decisions,   sign important papers or drink alcohol.    ACTIVITY:  Today: no heavy lifting, straining or running due to procedural   sedation/anesthesia.  The following day: return to full activity including work.  DIET:  Eat and drink normally unless instructed otherwise.     TREATMENT FOR COMMON SIDE EFFECTS:  - Mild abdominal pain, nausea, belching, bloating or excessive gas:  rest,   eat lightly and use a heating pad.  - Sore Throat: treat with throat lozenges and/or gargle with warm salt   water.  - Because air was used during the procedure, expelling large amounts of air   from your rectum or belching is normal.  - If a bowel prep was taken, you may not have a bowel movement for 1-3 days.    This is normal.  SYMPTOMS TO WATCH FOR AND REPORT TO YOUR PHYSICIAN:  1. Abdominal pain or bloating, other than gas cramps.  2. Chest pain.  3. Back pain.  4. Signs of infection such as: chills or fever occurring within 24 hours   after the procedure.  5. Rectal bleeding, which would show as bright red, maroon, or black stools.   (A tablespoon of blood from the rectum is not serious, especially if   hemorrhoids are present.)  6. Vomiting.  7. Weakness or dizziness.  GO DIRECTLY TO THE NEAREST EMERGENCY ROOM IF YOU HAVE ANY OF THE FOLLOWING:      Difficulty breathing              Chills and/or fever over 101 F   Persistent vomiting and/or vomiting blood   Severe abdominal pain   Severe chest pain   Black, tarry stools   Bleeding- more than one  tablespoon   Any other symptom or condition that you feel may need urgent attention  Your doctor recommends these additional instructions:  If any biopsies were taken, your doctors clinic will contact you in 1 to 2   weeks with any results.  - Return patient to hospital [facility] for possible discharge same day.   - Advance diet as tolerated.   - Continue present medications.   - Await pathology results.   - Course of fluconazole x 21 days and PPI therapy. Severity of anemia seems   out of proportion to findings on this exam, though ulceration was noted. I   think a colonoscopy is warranted, ok to do asap as an outpatient from my   standpoint.  For questions, problems or results please call your physician - Cristhian aMncilla MD at Work:  [phone].  EMERGENCY PHONE NUMBER: [phone],  LAB RESULTS: [phone]  IF A COMPLICATION OR EMERGENCY SITUATION ARISES AND YOU ARE UNABLE TO REACH   YOUR PHYSICIAN - GO DIRECTLY TO THE EMERGENCY ROOM.  Cristhian Mancilla MD  1/31/2020 3:37:14 PM  This report has been verified and signed electronically.  [employer]

## 2020-01-31 NOTE — ASSESSMENT & PLAN NOTE
Gastroesophageal reflux disease  Admit H/H 5.4 /18.9  Transfuse with 2 U pRBC  NPO overnight  Protonix IV bid  Telemetry  Stool occult blood   Supplemental oxygen as needed  Consult GI- EGD reported candidal esophagitis, treat for 3 weeks and PPI And outpatient FU for colonoscopy

## 2020-01-31 NOTE — ANESTHESIA PREPROCEDURE EVALUATION
2020  Terese Barney is a 59 y.o., female admitted with anemia/GIB for EGD under MAC    Past Medical History:   Diagnosis Date    Anxiety and depression 2016    Chronic back pain     Hammer toe of left foot     Insomnia     Motion sickness     Overweight (BMI 25.0-29.9) 2016    PONV (postoperative nausea and vomiting)     Vitamin D deficiency 2016     Past Surgical History:   Procedure Laterality Date    AUGMENTATION OF BREAST Bilateral     BREAST SURGERY Bilateral      SECTION      two    foot surgery      left plantar fascial release    FOOT SURGERY Left 2017    2nd TOE, Hammer toe repair    HYSTERECTOMY      laparascopy      ovarian cysts    left knee surgery      TONSILLECTOMY      TOTAL ABDOMINAL HYSTERECTOMY W/ BILATERAL SALPINGOOPHORECTOMY           Anesthesia Evaluation    I have reviewed the Patient Summary Reports.     I have reviewed the Medications.     Review of Systems  Anesthesia Hx:  Personal Hx of Anesthesia complications, Post-Operative Nausea/Vomiting, in the past, but not with recent anesthetics / prophylaxis   Social:  Non-Smoker    Cardiovascular:   ECG has been reviewed.        Physical Exam  General:  Well nourished    Airway/Jaw/Neck:  Airway Findings: Mallampati: II      Chest/Lungs:  Chest/Lungs Clear    Heart/Vascular:  Heart Findings: Normal          Lab Results   Component Value Date    WBC 10.47 2020    HGB 8.6 (L) 2020    HCT 29.4 (L) 2020     2020    CHOL 189 2019    TRIG 153 (H) 2019    HDL 55 2019    ALT 9 (L) 2020    AST 14 2020     2020    K 3.4 (L) 2020     2020    CREATININE 1.0 2020    BUN 19 2020    CO2 29 2020    TSH 2.618 2019    INR 0.9 2020    HGBA1C 5.6 2019         Anesthesia  Plan  Type of Anesthesia, risks & benefits discussed:  Anesthesia Type:  MAC  Patient's Preference:   Intra-op Monitoring Plan:   Intra-op Monitoring Plan Comments:   Post Op Pain Control Plan:   Post Op Pain Control Plan Comments:   Induction:    Beta Blocker:  Patient is not currently on a Beta-Blocker (No further documentation required).       Informed Consent: Patient understands risks and agrees with Anesthesia plan.  Questions answered. Anesthesia consent signed with patient.  ASA Score: 2     Day of Surgery Review of History & Physical:            Ready For Surgery From Anesthesia Perspective.

## 2020-01-31 NOTE — H&P
[facility] Medicine  History & Physical    Patient Name: Terese Barney  MRN: [id number]  Admission Date: 2020  Attending Physician: Lucinda George MD  Primary Care Provider: Shanique Potts MD         Patient information was obtained from patient and ER records.     Subjective:     Principal Problem:Symptomatic anemia    Chief Complaint:   Chief Complaint   Patient presents with    Shortness of Breath     Reports SOB and palpitations over the past 4 wks. States SOB worse with exertion. Pt also reports was called today and told she is anemic. Pt reports has been having black stools.         HPI: Terese Barney is a 58 y/o F with PMH of anxiety, depression, chronic back pain, insomnia, vitamin D deficiency, sent to [facility] ED by PCP for anemia. Patient noted with 3 weeks hx of progressive shortness of breath worse with activity. There is associated palpitation, generalized weakness, fatigue and passage of black stool. and She denies fever, diaphoresis, chills, nausea or vomiting. She  Denies NSAID us lately. She also had some URI symptoms with treatment but no improvement  She was seen by her PCP today and had some lab drawn, and informed her H/H is low.   In the ED H/H was 5.4/18.9    Past Medical History:   Diagnosis Date    Anxiety and depression 2016    Chronic back pain     Hammer toe of left foot     Insomnia     Motion sickness     Overweight (BMI 25.0-29.9) 2016    PONV (postoperative nausea and vomiting)     Vitamin D deficiency 2016       Past Surgical History:   Procedure Laterality Date    AUGMENTATION OF BREAST Bilateral     BREAST SURGERY Bilateral      SECTION      two    foot surgery      left plantar fascial release    FOOT SURGERY Left 2017    2nd TOE, Hammer toe repair    HYSTERECTOMY      laparascopy      ovarian cysts    left knee surgery      TONSILLECTOMY      TOTAL ABDOMINAL HYSTERECTOMY W/  BILATERAL SALPINGOOPHORECTOMY  2004       Review of patient's allergies indicates:   Allergen Reactions    Codeine Nausea And Vomiting       No current facility-administered medications on file prior to encounter.      Current Outpatient Medications on File Prior to Encounter   Medication Sig    albuterol (PROVENTIL) 2.5 mg /3 mL (0.083 %) nebulizer solution Take 3 mLs (2.5 mg total) by nebulization every 6 (six) hours as needed for Wheezing. Rescue    albuterol (PROVENTIL/VENTOLIN HFA) 90 mcg/actuation inhaler Inhale 2 puffs into the lungs every 6 (six) hours as needed for Wheezing or Shortness of Breath. Rescue    amoxicillin (AMOXIL) 500 MG capsule Take 500 mg by mouth every 8 (eight) hours.    cefdinir (OMNICEF) 300 MG capsule Take 1 capsule (300 mg total) by mouth 2 (two) times daily. for 14 days    cevimeline (EVOXAC) 30 mg capsule TAKE 1 CAPSULE BY MOUTH TWICE DAILY    clindamycin-benzoyl peroxide gel APPLY TOPICALLY EVERY EVENING    cycloSPORINE (RESTASIS) 0.05 % ophthalmic emulsion Place 0.4 mLs (1 drop total) into both eyes 2 (two) times daily.    DULoxetine (CYMBALTA) 60 MG capsule Take 1 capsule (60 mg total) by mouth 2 (two) times daily.    ergocalciferol (VITAMIN D2) 50,000 unit Cap TAKE 1 CAPSULE EVERY 7 DAYS ON SUNDAYS    gabapentin (NEURONTIN) 300 MG capsule Take 1 capsule (300 mg total) by mouth 3 (three) times daily as needed.    HYDROcodone-acetaminophen (NORCO) 5-325 mg per tablet Take 1 tablet by mouth every 4 (four) hours as needed for Pain.    LORazepam (ATIVAN) 1 MG tablet TAKE 1/2 TO 1 (ONE-HALF TO ONE) TABLET BY MOUTH ONCE DAILY AS NEEDED FOR SEVERE ANXIETY    meloxicam (MOBIC) 15 MG tablet Take 1 tablet (15 mg total) by mouth daily as needed.    omeprazole (PRILOSEC) 40 MG capsule Take 1 capsule (40 mg total) by mouth once daily.    pantoprazole (PROTONIX) 40 MG tablet Take 1 tablet (40 mg total) by mouth once daily.    predniSONE (DELTASONE) 10 MG tablet Take 2 po bid x  3 days, one bid x 3 days, then one daily till finish    PREVIDENT 5000 DRY MOUTH 1.1 % Gel     promethazine-dextromethorphan (PROMETHAZINE-DM) 6.25-15 mg/5 mL Syrp Take one tsp po q 6 hrs prn cough     Family History     Problem Relation (Age of Onset)    Breast cancer Mother    Coronary artery disease Father    Diabetes type II Father, Sister, Brother    Glaucoma Paternal Grandmother    Hypertension Mother, Sister, Brother    Macular degeneration Father        Tobacco Use    Smoking status: Never Smoker    Smokeless tobacco: Never Used   Substance and Sexual Activity    Alcohol use: Yes     Comment: rare    Drug use: No    Sexual activity: Not on file     Review of Systems   Constitutional: Positive for fatigue. Negative for chills and fever.   HENT: Negative for congestion and tinnitus.    Eyes: Negative for pain.   Respiratory: Positive for shortness of breath. Negative for apnea and wheezing.    Cardiovascular: Positive for palpitations. Negative for chest pain.   Gastrointestinal: Negative for abdominal pain, nausea and vomiting.        Passage of dark stool   Musculoskeletal: Negative for joint swelling.   Neurological: Positive for weakness. Negative for dizziness, syncope, light-headedness, numbness and headaches.   Psychiatric/Behavioral: Negative for confusion and sleep disturbance.     Objective:     Vital Signs (Most Recent):  Temp: 98.6 °F (37 °C) (01/30/20 1617)  Pulse: 110 (01/30/20 1617)  Resp: 20 (01/30/20 1617)  BP: (!) 146/68 (01/30/20 1617)  SpO2: 98 % (01/30/20 1617) Vital Signs (24h Range):  Temp:  [98.6 °F (37 °C)] 98.6 °F (37 °C)  Pulse:  [110-112] 110  Resp:  [20] 20  SpO2:  [98 %] 98 %  BP: (146)/(68) 146/68     Weight: 68 kg (150 lb)  Body mass index is 27.44 kg/m².    Physical Exam   Constitutional: She is oriented to person, place, and time. She appears well-developed and well-nourished.   HENT:   Head: Normocephalic.   Eyes: Pupils are equal, round, and reactive to light.   Neck:  Neck supple.   Cardiovascular: Normal rate, regular rhythm and normal heart sounds. Exam reveals no gallop and no friction rub.   No murmur heard.  Pulmonary/Chest: Breath sounds normal.   Abdominal: There is no tenderness.   Musculoskeletal: She exhibits tenderness.   Lymphadenopathy:     She has no cervical adenopathy.   Neurological: She is alert and oriented to person, place, and time.   Skin: No rash noted.         CRANIAL NERVES     CN III, IV, VI   Pupils are equal, round, and reactive to light.       Significant Labs:   CBC:   Recent Labs   Lab 01/30/20  0907 01/30/20  1710   WBC 10.53 9.98   HGB 6.0* 5.4*   HCT 22.4* 18.9*   * 408*     CMP:   Recent Labs   Lab 01/30/20  1710      K 3.4*      CO2 29      BUN 19   CREATININE 1.0   CALCIUM 8.2*   PROT 6.7   ALBUMIN 3.4*   BILITOT 0.2   ALKPHOS 62   AST 14   ALT 9*   ANIONGAP 5*   EGFRNONAA >60     Coagulation:   Recent Labs   Lab 01/30/20  1710   INR 0.9     Lactic Acid: No results for input(s): LACTATE in the last 48 hours.  Magnesium: No results for input(s): MG in the last 48 hours.  Troponin: No results for input(s): TROPONINI in the last 48 hours.  TSH: No results for input(s): TSH in the last 4320 hours.        Imaging Results    None         Significant Imaging: I have reviewed all pertinent imaging results/findings within the past 24 hours.    Assessment/Plan:     * Symptomatic anemia  Gastroesophageal reflux disease      As clarification, on [Date of Order, e.g., 1/30/2020, admitted for hospital observation services under my care.  Admit H/H 5.4 /18.9  Transfuse with 2 U pRBC  NPO overnight  Protonix IV bid  Telemetry  Stool occult blood   Supplemental oxygen as needed  Consult GI      Chronic pain syndrome  Chronic back pain  Anxiety and depression  Continue Cymbalta, gabapentin      Insomnia  Melatonin        VTE Risk Mitigation (From admission, onward)    None             Lucinda George MD  Department of Hospital  Medicine   [facility]

## 2020-01-31 NOTE — ASSESSMENT & PLAN NOTE
- I have reviewed her labs chart.  - in January 2019, her hemoglobin was normal. Hemoglobin levels for past few days have revealed a severe microcytic anemia.  - ferritin level is decreased at 3 ng/mL, consistent with severe iron deficiency anemia.  - although stool was negative for occult blood, she reports black stools.  - agree with gastroenterology workup for source of bleeding.  - I will give a dose of iron sucrose while she is hospitalized. I will also start oral ferrous sulfate.  - I will schedule a follow-up appointment in clinic and arrange for outpatient ferric carboxymaltose.

## 2020-01-31 NOTE — TELEPHONE ENCOUNTER
Patient is admitted for transfusion and evaluation of GI bleeding, thanks for reaching out so quickly. I sent a note to Dr Guerrero to let him know that patient is currently admitted-- we will reschedule her hematology appointment after she is discharged if needed. I'll keep you posted, and again, thanks to you both!

## 2020-01-31 NOTE — TELEPHONE ENCOUNTER
Pt. Confirmed rescheduled appt. With Dr. Guerrero at 3pm on 2/6/20.    ----- Message from Artis Maciel sent at 1/31/2020 12:54 PM CST -----  Contact: Mary pritchett Case management /   Mary states that the patient is supposed to be getting a call back for a new appointment and wanted to make sure your office called the patient back at and not her. Please Advise.

## 2020-01-31 NOTE — SUBJECTIVE & OBJECTIVE
Past Medical History:   Diagnosis Date    Anxiety and depression 2016    Chronic back pain     Hammer toe of left foot     Insomnia     Motion sickness     Overweight (BMI 25.0-29.9) 2016    PONV (postoperative nausea and vomiting)     Vitamin D deficiency 2016       Past Surgical History:   Procedure Laterality Date    AUGMENTATION OF BREAST Bilateral     BREAST SURGERY Bilateral      SECTION      two    foot surgery      left plantar fascial release    FOOT SURGERY Left 2017    2nd TOE, Hammer toe repair    HYSTERECTOMY      laparascopy      ovarian cysts    left knee surgery      TONSILLECTOMY      TOTAL ABDOMINAL HYSTERECTOMY W/ BILATERAL SALPINGOOPHORECTOMY         Review of patient's allergies indicates:   Allergen Reactions    Codeine Nausea And Vomiting     Family History     Problem Relation (Age of Onset)    Breast cancer Mother    Coronary artery disease Father    Diabetes type II Father, Sister, Brother    Glaucoma Paternal Grandmother    Hypertension Mother, Sister, Brother    Macular degeneration Father        Tobacco Use    Smoking status: Never Smoker    Smokeless tobacco: Never Used   Substance and Sexual Activity    Alcohol use: Yes     Comment: rare    Drug use: No    Sexual activity: Not on file     Review of Systems   Constitutional: Positive for fatigue. Negative for appetite change, chills and fever.   HENT: Negative for postnasal drip and trouble swallowing.    Eyes: Negative for pain and redness.   Respiratory: Negative for cough, choking, chest tightness and shortness of breath.    Cardiovascular: Positive for palpitations. Negative for chest pain and leg swelling.   Gastrointestinal: Positive for blood in stool. Negative for abdominal distention, abdominal pain, anal bleeding, constipation, diarrhea, nausea, rectal pain and vomiting.   Endocrine: Negative for cold intolerance and heat intolerance.   Genitourinary: Negative for  difficulty urinating and hematuria.   Musculoskeletal: Negative for arthralgias and back pain.   Skin: Negative for color change and pallor.   Allergic/Immunologic: Negative for environmental allergies and food allergies.   Neurological: Positive for weakness. Negative for dizziness and light-headedness.   Hematological: Negative for adenopathy. Does not bruise/bleed easily.   Psychiatric/Behavioral: Negative for agitation and behavioral problems.     Objective:     Vital Signs (Most Recent):  Temp: 98.3 °F (36.8 °C) (01/31/20 1520)  Pulse: 97 (01/31/20 1604)  Resp: 16 (01/31/20 1544)  BP: (!) 120/51 (01/31/20 1544)  SpO2: (!) 94 % (01/31/20 1544) Vital Signs (24h Range):  Temp:  [97.2 °F (36.2 °C)-99.7 °F (37.6 °C)] 98.3 °F (36.8 °C)  Pulse:  [] 97  Resp:  [15-39] 16  SpO2:  [91 %-100 %] 94 %  BP: ()/(42-77) 120/51     Weight: 69 kg (152 lb 1.9 oz) (01/31/20 0600)  Body mass index is 27.82 kg/m².      Intake/Output Summary (Last 24 hours) at 1/31/2020 1606  Last data filed at 1/31/2020 1515  Gross per 24 hour   Intake 1075 ml   Output 500 ml   Net 575 ml       Lines/Drains/Airways     Airway                 Airway - Non-Surgical 01/31/20 1511 Nasal Cannula less than 1 day          Epidural Line                 Perineural Analgesia/Anesthesia Assessment (using dermatomes) Epidural 03/28/17 1211 1039 days          Peripheral Intravenous Line                 Peripheral IV - Single Lumen 01/30/20 1655 20 G Left Forearm less than 1 day         Peripheral IV - Single Lumen 01/30/20 1705 20 G Left Upper Arm less than 1 day                Physical Exam   Constitutional: She is oriented to person, place, and time. She appears well-developed and well-nourished. No distress.   HENT:   Head: Normocephalic and atraumatic.   Eyes: Conjunctivae are normal. No scleral icterus.   Neck: Normal range of motion. Neck supple. No tracheal deviation present. No thyromegaly present.   Cardiovascular: Normal rate and regular  rhythm. Exam reveals no gallop and no friction rub.   Pulmonary/Chest: Effort normal and breath sounds normal. No respiratory distress. She has no wheezes.   Abdominal: Soft. Bowel sounds are normal. She exhibits no distension. There is no tenderness.   Musculoskeletal:        Right wrist: She exhibits normal range of motion and no tenderness.        Left wrist: She exhibits normal range of motion and no tenderness.   Lymphadenopathy:        Head (right side): No submental and no submandibular adenopathy present.        Head (left side): No submental and no submandibular adenopathy present.   Neurological: She is alert and oriented to person, place, and time. Cranial nerve deficit:      Skin: Skin is warm and dry. No rash noted. She is not diaphoretic. No erythema.   Psychiatric: She has a normal mood and affect. Her behavior is normal.   Nursing note and vitals reviewed.      Significant Labs:  CBC:   Recent Labs   Lab 01/30/20  0907 01/30/20  1710 01/31/20  0704   WBC 10.53 9.98 10.47   HGB 6.0* 5.4* 8.6*   HCT 22.4* 18.9* 29.4*   * 408* 296       Significant Imaging:  Imaging results within the past 24 hours have been reviewed.

## 2020-02-03 ENCOUNTER — TELEPHONE (OUTPATIENT)
Dept: ENDOSCOPY | Facility: HOSPITAL | Age: 60
End: 2020-02-03

## 2020-02-03 LAB
A ALTERNATA IGE QN: <0.1 KU/L
A FUMIGATUS IGE QN: <0.1 KU/L
ALLERGEN CHAETOMIUM GLOBOSUM IGE: <0.1 KU/L
ALLERGEN WALNUT TREE IGE: <0.1 KU/L
ALLERGEN WHITE PINE TREE IGE: <0.1 KU/L
BAHIA GRASS IGE QN: <0.1 KU/L
BALD CYPRESS IGE QN: <0.1 KU/L
BERMUDA GRASS IGE QN: <0.1 KU/L
C HERBARUM IGE QN: <0.1 KU/L
C LUNATA IGE QN: <0.1 KU/L
CAT DANDER IGE QN: <0.1 KU/L
CHAETOMIUM GLOB. CLASS: NORMAL
COMMON RAGWEED IGE QN: <0.1 KU/L
COTTONWOOD IGE QN: <0.1 KU/L
D FARINAE IGE QN: <0.1 KU/L
D PTERONYSS IGE QN: <0.1 KU/L
DEPRECATED A ALTERNATA IGE RAST QL: NORMAL
DEPRECATED A FUMIGATUS IGE RAST QL: NORMAL
DEPRECATED BAHIA GRASS IGE RAST QL: NORMAL
DEPRECATED BALD CYPRESS IGE RAST QL: NORMAL
DEPRECATED BERMUDA GRASS IGE RAST QL: NORMAL
DEPRECATED C HERBARUM IGE RAST QL: NORMAL
DEPRECATED C LUNATA IGE RAST QL: NORMAL
DEPRECATED CAT DANDER IGE RAST QL: NORMAL
DEPRECATED COMMON RAGWEED IGE RAST QL: NORMAL
DEPRECATED COTTONWOOD IGE RAST QL: NORMAL
DEPRECATED D FARINAE IGE RAST QL: NORMAL
DEPRECATED D PTERONYSS IGE RAST QL: NORMAL
DEPRECATED DOG DANDER IGE RAST QL: NORMAL
DEPRECATED ELDER IGE RAST QL: NORMAL
DEPRECATED ENGL PLANTAIN IGE RAST QL: NORMAL
DEPRECATED JOHNSON GRASS IGE RAST QL: NORMAL
DEPRECATED LONDON PLANE IGE RAST QL: NORMAL
DEPRECATED MUGWORT IGE RAST QL: NORMAL
DEPRECATED P NOTATUM IGE RAST QL: NORMAL
DEPRECATED PECAN/HICK TREE IGE RAST QL: NORMAL
DEPRECATED ROACH IGE RAST QL: ABNORMAL
DEPRECATED S ROSTRATA IGE RAST QL: NORMAL
DEPRECATED SALTWORT IGE RAST QL: NORMAL
DEPRECATED SILVER BIRCH IGE RAST QL: NORMAL
DEPRECATED TIMOTHY IGE RAST QL: NORMAL
DEPRECATED WHITE OAK IGE RAST QL: NORMAL
DEPRECATED WILLOW IGE RAST QL: NORMAL
DOG DANDER IGE QN: <0.1 KU/L
ELDER IGE QN: <0.1 KU/L
ENGL PLANTAIN IGE QN: <0.1 KU/L
IGG1 SER-MCNC: 455 MG/DL (ref 382–929)
IGG2 SER-MCNC: 234 MG/DL (ref 242–700)
IGG3 SER-MCNC: 28 MG/DL (ref 22–176)
IGG4 SER-MCNC: 52 MG/DL (ref 4–86)
JOHNSON GRASS IGE QN: <0.1 KU/L
LONDON PLANE IGE QN: <0.1 KU/L
MUGWORT IGE QN: <0.1 KU/L
P NOTATUM IGE QN: <0.1 KU/L
PECAN/HICK TREE IGE QN: <0.1 KU/L
ROACH IGE QN: 0.28 KU/L
S ROSTRATA IGE QN: <0.1 KU/L
SALTWORT IGE QN: <0.1 KU/L
SILVER BIRCH IGE QN: <0.1 KU/L
TIMOTHY IGE QN: <0.1 KU/L
WALNUT TREE CLASS: NORMAL
WHITE OAK IGE QN: <0.1 KU/L
WHITE PINE CLASS: NORMAL
WILLOW IGE QN: <0.1 KU/L

## 2020-02-03 NOTE — TELEPHONE ENCOUNTER
Spoke with patient about arrival time @. 1215    Prep instructions reviewed: the day before the procedure, follow a clear liquid diet all day, then start the first 1/2 of prep at 5pm and take 2nd 1/2 of prep @.715  Pt must be completely NPO when prep completed @0915             Medications: Do not take Insulin or oral diabetic medications the day of the procedure.  Take as prescribed: heart, seizure and blood pressure medication in the morning with a sip of water (less than an ounce).  Take any breathing medications and bring inhalers to hospital with you Leave all valuables and jewelry at home.     Wear comfortable clothes to procedure to change into hospital gown You cannot drive for 24 hours after your procedure because you will receive sedation for your procedure to make you comfortable.  A ride must be provided at discharge.

## 2020-02-04 ENCOUNTER — ANESTHESIA (OUTPATIENT)
Dept: ENDOSCOPY | Facility: HOSPITAL | Age: 60
End: 2020-02-04
Payer: COMMERCIAL

## 2020-02-04 ENCOUNTER — HOSPITAL ENCOUNTER (OUTPATIENT)
Facility: HOSPITAL | Age: 60
Discharge: HOME | End: 2020-02-04
Attending: INTERNAL MEDICINE | Admitting: INTERNAL MEDICINE
Payer: COMMERCIAL

## 2020-02-04 ENCOUNTER — ANESTHESIA EVENT (OUTPATIENT)
Dept: ENDOSCOPY | Facility: HOSPITAL | Age: 60
End: 2020-02-04
Payer: COMMERCIAL

## 2020-02-04 VITALS
TEMPERATURE: 98 F | OXYGEN SATURATION: 97 % | HEART RATE: 86 BPM | HEIGHT: 62 IN | WEIGHT: 150 LBS | DIASTOLIC BLOOD PRESSURE: 70 MMHG | BODY MASS INDEX: 27.6 KG/M2 | RESPIRATION RATE: 19 BRPM | SYSTOLIC BLOOD PRESSURE: 116 MMHG

## 2020-02-04 DIAGNOSIS — K92.1 MELENA: ICD-10-CM

## 2020-02-04 PROCEDURE — 88305 TISSUE EXAM BY PATHOLOGIST: CPT | Mod: 26,,, | Performed by: PATHOLOGY

## 2020-02-04 PROCEDURE — 27201089 HC SNARE, DISP (ANY): Performed by: INTERNAL MEDICINE

## 2020-02-04 PROCEDURE — 37000008 HC ANESTHESIA 1ST 15 MINUTES: Performed by: INTERNAL MEDICINE

## 2020-02-04 PROCEDURE — 45385 COLONOSCOPY W/LESION REMOVAL: CPT | Mod: ,,, | Performed by: INTERNAL MEDICINE

## 2020-02-04 PROCEDURE — 45385 PR COLONOSCOPY,REMV LESN,SNARE: ICD-10-PCS | Mod: ,,, | Performed by: INTERNAL MEDICINE

## 2020-02-04 PROCEDURE — 88305 TISSUE EXAM BY PATHOLOGIST: ICD-10-PCS | Mod: 26,,, | Performed by: PATHOLOGY

## 2020-02-04 PROCEDURE — 63600175 PHARM REV CODE 636 W HCPCS: Performed by: NURSE ANESTHETIST, CERTIFIED REGISTERED

## 2020-02-04 PROCEDURE — 37000009 HC ANESTHESIA EA ADD 15 MINS: Performed by: INTERNAL MEDICINE

## 2020-02-04 PROCEDURE — 63600175 PHARM REV CODE 636 W HCPCS: Performed by: INTERNAL MEDICINE

## 2020-02-04 PROCEDURE — 88305 TISSUE EXAM BY PATHOLOGIST: CPT | Performed by: PATHOLOGY

## 2020-02-04 PROCEDURE — 45385 COLONOSCOPY W/LESION REMOVAL: CPT | Performed by: INTERNAL MEDICINE

## 2020-02-04 RX ORDER — PROPOFOL 10 MG/ML
VIAL (ML) INTRAVENOUS
Status: DISCONTINUED | OUTPATIENT
Start: 2020-02-04 | End: 2020-02-04

## 2020-02-04 RX ORDER — SODIUM CHLORIDE 9 MG/ML
INJECTION, SOLUTION INTRAVENOUS CONTINUOUS
Status: DISCONTINUED | OUTPATIENT
Start: 2020-02-04 | End: 2020-02-04 | Stop reason: HOSPADM

## 2020-02-04 RX ORDER — PROPOFOL 10 MG/ML
VIAL (ML) INTRAVENOUS CONTINUOUS PRN
Status: DISCONTINUED | OUTPATIENT
Start: 2020-02-04 | End: 2020-02-04

## 2020-02-04 RX ORDER — LIDOCAINE HCL/PF 100 MG/5ML
SYRINGE (ML) INTRAVENOUS
Status: DISCONTINUED | OUTPATIENT
Start: 2020-02-04 | End: 2020-02-04

## 2020-02-04 RX ORDER — SODIUM CHLORIDE 0.9 % (FLUSH) 0.9 %
10 SYRINGE (ML) INJECTION
Status: DISCONTINUED | OUTPATIENT
Start: 2020-02-04 | End: 2020-02-04 | Stop reason: HOSPADM

## 2020-02-04 RX ADMIN — Medication 100 MG: at 01:02

## 2020-02-04 RX ADMIN — PROPOFOL 150 MCG/KG/MIN: 10 INJECTION, EMULSION INTRAVENOUS at 01:02

## 2020-02-04 RX ADMIN — SODIUM CHLORIDE: 0.9 INJECTION, SOLUTION INTRAVENOUS at 01:02

## 2020-02-04 RX ADMIN — PROPOFOL 70 MG: 10 INJECTION, EMULSION INTRAVENOUS at 01:02

## 2020-02-04 NOTE — ANESTHESIA PREPROCEDURE EVALUATION
2020  Terese Barney is a 59 y.o., female  for colonoscopy under MAC    Past Medical History:   Diagnosis Date    Anxiety and depression 2016    Chronic back pain     Hammer toe of left foot     Insomnia     Motion sickness     Overweight (BMI 25.0-29.9) 2016    PONV (postoperative nausea and vomiting)     Vitamin D deficiency 2016     Past Surgical History:   Procedure Laterality Date    AUGMENTATION OF BREAST Bilateral     BREAST SURGERY Bilateral      SECTION      two    ESOPHAGOGASTRODUODENOSCOPY N/A 2020    Procedure: ESOPHAGOGASTRODUODENOSCOPY (EGD);  Surgeon: Cristhian Mancilla MD;  Location: [facility];  Service: Endoscopy;  Laterality: N/A;    foot surgery      left plantar fascial release    FOOT SURGERY Left 2017    2nd TOE, Hammer toe repair    HYSTERECTOMY      laparascopy      ovarian cysts    left knee surgery      TONSILLECTOMY      TOTAL ABDOMINAL HYSTERECTOMY W/ BILATERAL SALPINGOOPHORECTOMY           Pre-op Assessment    I have reviewed the Patient Summary Reports.      I have reviewed the Medications.     Review of Systems  Anesthesia Hx:  Personal Hx of Anesthesia complications, Post-Operative Nausea/Vomiting, in the past, but not with recent anesthetics / prophylaxis   Social:  Non-Smoker    Cardiovascular:   ECG has been reviewed.        Physical Exam  General:  Well nourished    Airway/Jaw/Neck:  Airway Findings: Mallampati: II      Chest/Lungs:  Chest/Lungs Clear    Heart/Vascular:  Heart Findings: Normal          Lab Results   Component Value Date    WBC 10.47 2020    HGB 8.6 (L) 2020    HCT 29.4 (L) 2020     2020    CHOL 189 2019    TRIG 153 (H) 2019    HDL 55 2019    ALT 9 (L) 2020    AST 14 2020     2020    K 3.4 (L) 2020      01/30/2020    CREATININE 1.0 01/30/2020    BUN 19 01/30/2020    CO2 29 01/30/2020    TSH 2.618 01/07/2019    INR 0.9 01/31/2020    HGBA1C 5.6 01/07/2019         Anesthesia Plan  Type of Anesthesia, risks & benefits discussed:  Anesthesia Type:  MAC  Patient's Preference:   Intra-op Monitoring Plan:   Intra-op Monitoring Plan Comments:   Post Op Pain Control Plan:   Post Op Pain Control Plan Comments:   Induction:    Beta Blocker:  Patient is not currently on a Beta-Blocker (No further documentation required).       Informed Consent: Patient understands risks and agrees with Anesthesia plan.  Questions answered. Anesthesia consent signed with patient.  ASA Score: 2     Day of Surgery Review of History & Physical:            Ready For Surgery From Anesthesia Perspective.

## 2020-02-04 NOTE — ANESTHESIA POSTPROCEDURE EVALUATION
Anesthesia Post Evaluation    Patient: Terese Barney    Procedure(s) Performed: Procedure(s) (LRB):  COLONOSCOPY/Suprep (N/A)    Final Anesthesia Type: MAC    Patient location during evaluation: GI PACU  Patient participation: Yes- Able to Participate  Level of consciousness: awake and alert and oriented  Post-procedure vital signs: reviewed and stable  Pain management: adequate  Airway patency: patent    PONV status at discharge: No PONV  Anesthetic complications: no      Cardiovascular status: blood pressure returned to baseline, hemodynamically stable and stable  Respiratory status: unassisted, spontaneous ventilation and room air  Hydration status: euvolemic  Follow-up not needed.          Vitals Value Taken Time   /63 2/4/2020 12:55 PM   Temp 36.8 °C (98.2 °F) 2/4/2020 12:55 PM   Pulse 101 2/4/2020 12:55 PM   Resp 16 2/4/2020 12:55 PM   SpO2 96 % 2/4/2020 12:55 PM         No case tracking events are documented in the log.      Pain/Veronique Score: No data recorded

## 2020-02-04 NOTE — [employer] PATIENT INSTRUCTIONS
Discharge Summary/Instructions after an Endoscopic Procedure  Patient Name: Terese Barney  Patient MRN: [id number]  Patient YOB: 1960 Tuesday, February 04, 2020  Cristhian Mancilla MD  RESTRICTIONS:  During your procedure today, you received medications for sedation.  These   medications may affect your judgment, balance and coordination.  Therefore,   for 24 hours, you have the following restrictions:   - DO NOT drive a car, operate machinery, make legal/financial decisions,   sign important papers or drink alcohol.    ACTIVITY:  Today: no heavy lifting, straining or running due to procedural   sedation/anesthesia.  The following day: return to full activity including work.  DIET:  Eat and drink normally unless instructed otherwise.     TREATMENT FOR COMMON SIDE EFFECTS:  - Mild abdominal pain, nausea, belching, bloating or excessive gas:  rest,   eat lightly and use a heating pad.  - Sore Throat: treat with throat lozenges and/or gargle with warm salt   water.  - Because air was used during the procedure, expelling large amounts of air   from your rectum or belching is normal.  - If a bowel prep was taken, you may not have a bowel movement for 1-3 days.    This is normal.  SYMPTOMS TO WATCH FOR AND REPORT TO YOUR PHYSICIAN:  1. Abdominal pain or bloating, other than gas cramps.  2. Chest pain.  3. Back pain.  4. Signs of infection such as: chills or fever occurring within 24 hours   after the procedure.  5. Rectal bleeding, which would show as bright red, maroon, or black stools.   (A tablespoon of blood from the rectum is not serious, especially if   hemorrhoids are present.)  6. Vomiting.  7. Weakness or dizziness.  GO DIRECTLY TO THE NEAREST EMERGENCY ROOM IF YOU HAVE ANY OF THE FOLLOWING:      Difficulty breathing              Chills and/or fever over 101 F   Persistent vomiting and/or vomiting blood   Severe abdominal pain   Severe chest pain   Black, tarry stools   Bleeding- more than one  tablespoon   Any other symptom or condition that you feel may need urgent attention  Your doctor recommends these additional instructions:  If any biopsies were taken, your doctors clinic will contact you in 1 to 2   weeks with any results.  - Discharge patient to home (via wheelchair).   - Patient has a contact number available for emergencies.  The signs and   symptoms of potential delayed complications were discussed with the   patient.  Return to normal activities tomorrow.  Written discharge   instructions were provided to the patient.   - Resume previous diet.   - Continue present medications.   - Await pathology results.   - Repeat colonoscopy date to be determined after pending pathology results   are reviewed for surveillance.   - Follow up with me in two weeks. Consider EGD with capsule endoscopy.  For questions, problems or results please call your physician - Cristhian Mancilla MD at Work:  [phone].  EMERGENCY PHONE NUMBER: [phone],  LAB RESULTS: [phone]  IF A COMPLICATION OR EMERGENCY SITUATION ARISES AND YOU ARE UNABLE TO REACH   YOUR PHYSICIAN - GO DIRECTLY TO THE EMERGENCY ROOM.  Cristhian Mancilla MD  2/4/2020 2:08:02 PM  This report has been verified and signed electronically.  [employer]

## 2020-02-04 NOTE — H&P
[facility]  Gastroenterology  H&P    Patient Name: Terese Barney  MRN: [id number]  Admission Date: 2020  Code Status: Full Code    Attending Provider: Cristhian Mancilla MD   Primary Care Physician: Shanique Potts MD  Principal Problem:<principal problem not specified>    Subjective:     History of Present Illness: Fe def anemia    Past Medical History:   Diagnosis Date    Anxiety and depression 2016    Chronic back pain     Hammer toe of left foot     Insomnia     Motion sickness     Overweight (BMI 25.0-29.9) 2016    PONV (postoperative nausea and vomiting)     Vitamin D deficiency 2016       Past Surgical History:   Procedure Laterality Date    AUGMENTATION OF BREAST Bilateral     BREAST SURGERY Bilateral      SECTION      two    ESOPHAGOGASTRODUODENOSCOPY N/A 2020    Procedure: ESOPHAGOGASTRODUODENOSCOPY (EGD);  Surgeon: Cristhian Mancilal MD;  Location: [facility];  Service: Endoscopy;  Laterality: N/A;    foot surgery      left plantar fascial release    FOOT SURGERY Left 2017    2nd TOE, Hammer toe repair    HYSTERECTOMY      laparascopy      ovarian cysts    left knee surgery      TONSILLECTOMY      TOTAL ABDOMINAL HYSTERECTOMY W/ BILATERAL SALPINGOOPHORECTOMY         Review of patient's allergies indicates:   Allergen Reactions    Codeine Nausea And Vomiting     Family History     Problem Relation (Age of Onset)    Breast cancer Mother    Coronary artery disease Father    Diabetes type II Father, Sister, Brother    Glaucoma Paternal Grandmother    Hypertension Mother, Sister, Brother    Macular degeneration Father        Tobacco Use    Smoking status: Never Smoker    Smokeless tobacco: Never Used   Substance and Sexual Activity    Alcohol use: Yes     Comment: rare    Drug use: No    Sexual activity: Not on file     Review of Systems   Constitutional: Negative for chills.   Respiratory: Negative for shortness of  breath and wheezing.    Cardiovascular: Negative for palpitations and leg swelling.   Gastrointestinal: Negative for abdominal distention and abdominal pain.     Objective:     Vital Signs (Most Recent):  Temp: 98.2 °F (36.8 °C) (02/04/20 1255)  Pulse: 101 (02/04/20 1255)  Resp: 16 (02/04/20 1255)  BP: 130/63 (02/04/20 1255)  SpO2: 96 % (02/04/20 1255) Vital Signs (24h Range):  Temp:  [98.2 °F (36.8 °C)] 98.2 °F (36.8 °C)  Pulse:  [101] 101  Resp:  [16] 16  SpO2:  [96 %] 96 %  BP: (130)/(63) 130/63     Weight: 68 kg (150 lb) (02/04/20 1255)  Body mass index is 27.44 kg/m².    No intake or output data in the 24 hours ending 02/04/20 1317    Lines/Drains/Airways     Airway                 Airway - Non-Surgical 01/31/20 1511 Nasal Cannula 3 days          Epidural Line                 Perineural Analgesia/Anesthesia Assessment (using dermatomes) Epidural 03/28/17 1211 1043 days          Peripheral Intravenous Line                 Peripheral IV - Single Lumen 02/04/20 1259 20 G Right Forearm less than 1 day                Physical Exam   Constitutional: She is oriented to person, place, and time. She appears well-developed and well-nourished. No distress.   HENT:   Head: Normocephalic and atraumatic.   Eyes: Conjunctivae are normal. No scleral icterus.   Neck: Normal range of motion. Neck supple. No tracheal deviation present. No thyromegaly present.   Cardiovascular: Normal rate and regular rhythm. Exam reveals no gallop and no friction rub.   No murmur heard.  Pulmonary/Chest: Effort normal and breath sounds normal. No respiratory distress. She has no wheezes.   Abdominal: Soft. Bowel sounds are normal. She exhibits no distension. There is no tenderness.   Musculoskeletal:        Right wrist: She exhibits normal range of motion and no tenderness.        Left wrist: She exhibits normal range of motion and no tenderness.   Lymphadenopathy:        Head (right side): No submental and no submandibular adenopathy present.         Head (left side): No submental and no submandibular adenopathy present.   Neurological: She is alert and oriented to person, place, and time.   Skin: Skin is warm and dry. No rash noted. She is not diaphoretic. No erythema.   Psychiatric: She has a normal mood and affect. Her behavior is normal.   Nursing note and vitals reviewed.      Assessment/Plan:     Active Diagnoses:    Diagnosis Date Noted POA    Melena [K92.1] 02/04/2020 Yes      Problems Resolved During this Admission:   - Fe def anemia    Plan  1. Colonoscopy    Cristhian Mancilla MD  Gastroenterology  [facility]

## 2020-02-04 NOTE — TRANSFER OF CARE
"Anesthesia Transfer of Care Note    Patient: Terese Barney    Procedure(s) Performed: Procedure(s) (LRB):  COLONOSCOPY/Suprep (N/A)    Patient location: GI    Anesthesia Type: MAC    Transport from OR: Transported from OR on room air with adequate spontaneous ventilation    Post pain: adequate analgesia    Post assessment: no apparent anesthetic complications and tolerated procedure well    Post vital signs: stable    Level of consciousness: awake, alert and oriented    Nausea/Vomiting: no nausea/vomiting    Complications: none    Transfer of care protocol was followed      Last vitals:   Visit Vitals  /63 (Patient Position: Lying)   Pulse 101   Temp 36.8 °C (98.2 °F) (Temporal)   Resp 16   Ht 5' 2" (1.575 m)   Wt 68 kg (150 lb)   LMP  (LMP Unknown)   SpO2 96%   Breastfeeding? No   BMI 27.44 kg/m²     "

## 2020-02-05 ENCOUNTER — TELEPHONE (OUTPATIENT)
Dept: ALLERGY | Facility: CLINIC | Age: 60
End: 2020-02-05

## 2020-02-05 NOTE — PROGRESS NOTES
Cardiology Clinic note    Subjective:   Patient ID:  Terese Barney is a 59 y.o. female who presents for palpitations    HPI:   Terese Barney is a 59 y.o. female h/o BMI 27 kg/m2 (overweight).    She reports having a knee injury last year, following which she gained a significant amount of weight, as she was not able to exercise. She then noticed ZHANG and palpitations, which she attributed to deconditioning. She was evaluated last month and noted to have a significantly decreased Hemoglobin level. Given blood transfusion. EGD and colonoscopy with no active bleeding (gastric ulcers identified). Biopsy results pending.     Her SOB and palpitations have significantly improved since the transfusion, although not completely resolved. She denies chest pain.    SH: Denies tobacco or ETOH  FH: Father CAD in 60s    Cardiac Studies  EC20  Sinus tachycardia  Otherwise normal ECG      Patient Active Problem List    Diagnosis Date Noted    Melena 2020    Iron deficiency anemia due to chronic blood loss 2020    Symptomatic anemia 2020    Osteoarthritis of left knee 2019     Worse in medial compartment-- xray 19      Acute medial meniscus tear of left knee 2019     has arthroscopic repair planned with Dr Linus poole 2019.       Osteopenia 2018    Gastroesophageal reflux disease 2017    Chronic pain syndrome 2017    Hammer toe of left foot 2017    Vitamin D deficiency 2016    Anxiety and depression 2016    Chronic back pain 2016    Overweight (BMI 25.0-29.9) 2016    Dry mouth 2016     improved with cevimiline, considering Sjogren's testing      Insomnia 2016    Motion sickness 2016     has not been helped from scopalamine patch-- re-try as need dramamine (can cause drowsiness)         Patient's Medications   New Prescriptions    No medications on file   Previous Medications     ALBUTEROL (PROVENTIL) 2.5 MG /3 ML (0.083 %) NEBULIZER SOLUTION    Take 3 mLs (2.5 mg total) by nebulization every 6 (six) hours as needed for Wheezing. Rescue    ALBUTEROL (PROVENTIL/VENTOLIN HFA) 90 MCG/ACTUATION INHALER    Inhale 2 puffs into the lungs every 6 (six) hours as needed for Wheezing or Shortness of Breath. Rescue    CEFDINIR (OMNICEF) 300 MG CAPSULE    Take 1 capsule (300 mg total) by mouth 2 (two) times daily. for 14 days    CEVIMELINE (EVOXAC) 30 MG CAPSULE    TAKE 1 CAPSULE BY MOUTH TWICE DAILY    CLINDAMYCIN-BENZOYL PEROXIDE GEL    APPLY TOPICALLY EVERY EVENING    CYCLOSPORINE (RESTASIS) 0.05 % OPHTHALMIC EMULSION    Place 0.4 mLs (1 drop total) into both eyes 2 (two) times daily.    DULOXETINE (CYMBALTA) 60 MG CAPSULE    Take 1 capsule (60 mg total) by mouth 2 (two) times daily.    ERGOCALCIFEROL (VITAMIN D2) 50,000 UNIT CAP    TAKE 1 CAPSULE EVERY 7 DAYS ON SUNDAYS    FLUCONAZOLE (DIFLUCAN) 100 MG TABLET    Take 1 tablet (100 mg total) by mouth once daily. for 14 days    GABAPENTIN (NEURONTIN) 300 MG CAPSULE    Take 1 capsule (300 mg total) by mouth 3 (three) times daily as needed.    HYDROCODONE-ACETAMINOPHEN (NORCO) 5-325 MG PER TABLET    Take 1 tablet by mouth every 4 (four) hours as needed for Pain.    LORAZEPAM (ATIVAN) 1 MG TABLET    TAKE 1/2 TO 1 (ONE-HALF TO ONE) TABLET BY MOUTH ONCE DAILY AS NEEDED FOR SEVERE ANXIETY    PANTOPRAZOLE (PROTONIX) 40 MG TABLET    Take 1 tablet (40 mg total) by mouth 2 (two) times daily.    PREDNISONE (DELTASONE) 10 MG TABLET    Take 2 po bid x 3 days, one bid x 3 days, then one daily till finish    PREVIDENT 5000 DRY MOUTH 1.1 % GEL        PROMETHAZINE-DEXTROMETHORPHAN (PROMETHAZINE-DM) 6.25-15 MG/5 ML SYRP    Take one tsp po q 6 hrs prn cough   Modified Medications    No medications on file   Discontinued Medications    No medications on file        Review of Systems   Constitution: Negative for chills, fever and night sweats.   HENT: Negative for  "nosebleeds.    Cardiovascular: Positive for dyspnea on exertion (Improved with blood transfusion) and palpitations (Significantly improved after blood transfusion). Negative for chest pain, irregular heartbeat, leg swelling, orthopnea, paroxysmal nocturnal dyspnea and syncope.   Respiratory: Negative for shortness of breath.    Hematologic/Lymphatic: Negative for bleeding problem. Does not bruise/bleed easily.   Skin: Negative for color change and nail changes.   Musculoskeletal: Positive for muscle cramps.   Gastrointestinal: Negative for hematochezia and melena (Was black last week; not any more).   Genitourinary: Negative for hematuria.   Neurological: Negative for seizures.         Objective:   Vitals  Vitals:    02/06/20 0816   BP: 135/80   Pulse: 99   SpO2: 95%   Weight: 70.8 kg (156 lb 1.4 oz)   Height: 5' 2" (1.575 m)          Physical Exam   Constitutional: She appears well-developed. No distress.   Neck: No JVD present.   Cardiovascular: Normal rate and regular rhythm. Exam reveals no gallop and no friction rub.   No murmur heard.  Pulmonary/Chest: Effort normal and breath sounds normal. No respiratory distress.   Abdominal: Soft. Bowel sounds are normal. There is no tenderness.   Musculoskeletal: She exhibits no edema.   Neurological: She is alert.   Skin: Skin is warm. She is not diaphoretic.   Psychiatric: She has a normal mood and affect.         Lab Results    Lab Results   Component Value Date    WBC 10.47 01/31/2020    HGB 8.6 (L) 01/31/2020    HCT 29.4 (L) 01/31/2020    MCV 78 (L) 01/31/2020       Lab Results   Component Value Date     01/31/2020    INR 0.9 01/31/2020       Lab Results   Component Value Date    K 3.4 (L) 01/30/2020    BUN 19 01/30/2020    CREATININE 1.0 01/30/2020       Lab Results   Component Value Date     01/30/2020    HGBA1C 5.6 01/07/2019       Lab Results   Component Value Date    AST 14 01/30/2020    ALT 9 (L) 01/30/2020    ALBUMIN 3.4 (L) 01/30/2020    PROT " 6.7 01/30/2020       Lab Results   Component Value Date    CHOL 189 01/07/2019    HDL 55 01/07/2019    LDLCALC 103.4 01/07/2019    TRIG 153 (H) 01/07/2019       No results found for: CRP, BNP      Assessment:     1. Palpitations    2. Overweight (BMI 25.0-29.9)        Plan:     Symptoms significantly improved with transfusion. Discussed possible underlying etiologies, with the anemia likely causing the symptoms. Will continue to follow for now. Palpitations have significantly improved. Will not pursue any additional testing at this point.    Discussed exercise (aquatic to reduced pressure on knees) and diet recommendations. Dietician referral placed.    Follow-up in 6 months. She understands to call for an earlier appointment as needed, or go to ED if persistent or worsening symptoms.    Continue with current medical plan and lifestyle changes.      Orders Placed This Encounter   Procedures    Ambulatory referral/consult to Nutrition Services     Standing Status:   Future     Standing Expiration Date:   3/6/2021     Referral Priority:   Routine     Referral Type:   Consultation     Referral Reason:   Specialty Services Required     Requested Specialty:   Nutrition     Number of Visits Requested:   1       She expressed verbal understanding and agreed with the plan    Thank you for the opportunity to care for this patient. Will be available for questions if needed.         Kalani Jackson MD  Interventional Cardiology  [facility]  Phone: [phone]

## 2020-02-05 NOTE — TELEPHONE ENCOUNTER
Spoke to pt, explained that Dr Cam did not admit her, that would be her PCP. Will provide a letter for the day she saw Dr Cam and advised her to get notes from her pcp and at each up coming appointment for time she misses work.       ----- Message from Sruthi Bonner sent at 2/5/2020 12:14 PM CST -----  Contact: pt at [phone]  [facility]ina pt-Pt was seen last Thursday.  Needs note for her work that she was seen there and was admitted.  Please email to [email or web address],  Needs to contains dates Jan. 30 through whenever she can go back.  Pt has multiple appts coming.  Will you be excusing her for all of her days. Kind of confused.  Was admitted by Dorina and pt has not been back.  Needs a note  From everyone who ordered tests.  Pt not even sure when she can go back to work.  Not sure who will cover all her missed days at work as she is still out.

## 2020-02-05 NOTE — PROGRESS NOTES
"PATIENT: Terese Barney  MRN: [id number]  DATE: 2/5/2020    Diagnosis:   1. Iron deficiency anemia due to chronic blood loss    2. Chronic pain syndrome    3. Advance care planning      Chief Complaint: iron deficiency anemia    Subjective:    History of Present Illness: Ms. Barney is a 59 y.o. female who presents for evaluation and management of iron deficiency anemia due to chronic blood loss. She is referred by Dr. Potts. I had seen her during a hospitalization in late January 2020.    Information from my consult note dated 1/31/20:  "Iron deficiency anemia due to chronic blood loss  - I have reviewed her labs chart.  - in January 2019, her hemoglobin was normal. Hemoglobin levels for past few days have revealed a severe microcytic anemia.  - ferritin level is decreased at 3 ng/mL, consistent with severe iron deficiency anemia.  - although stool was negative for occult blood, she reports black stools.  - agree with gastroenterology workup for source of bleeding.  - I will give a dose of iron sucrose while she is hospitalized. I will also start oral ferrous sulfate.  - I will schedule a follow-up appointment in clinic and arrange for outpatient ferric carboxymaltose."    Interval history:  - she presents for a follow-up appointment for her iron deficiency anemia due to chronic blood loss.  - she underwent upper GI endoscopy on 1/31/20 and colonoscopy on 2/4/20.  - she is going to have a video capsule study done.  - she endorses fatigue, dyspnea upon exertion. She denies chest pain, nausea, vomiting, diarrhea, constipation.    Past medical, surgical, family, and social histories have been reviewed and updated below.    Past Medical History:   Past Medical History:   Diagnosis Date    Anxiety and depression 11/2/2016    Chronic back pain     Hammer toe of left foot     Insomnia     Motion sickness     Overweight (BMI 25.0-29.9) 11/2/2016    PONV (postoperative nausea and vomiting)     Vitamin D " deficiency 2016       Past Surgical History:   Past Surgical History:   Procedure Laterality Date    AUGMENTATION OF BREAST Bilateral     BREAST SURGERY Bilateral      SECTION      two    ESOPHAGOGASTRODUODENOSCOPY N/A 2020    Procedure: ESOPHAGOGASTRODUODENOSCOPY (EGD);  Surgeon: Cristhian Mancilla MD;  [facility];  Service: Endoscopy;  Laterality: N/A;    foot surgery      left plantar fascial release    FOOT SURGERY Left 2017    2nd TOE, Hammer toe repair    HYSTERECTOMY      laparascopy      ovarian cysts    left knee surgery      TONSILLECTOMY      TOTAL ABDOMINAL HYSTERECTOMY W/ BILATERAL SALPINGOOPHORECTOMY         Family History:   Family History   Problem Relation Age of Onset    Hypertension Mother     Breast cancer Mother     Macular degeneration Father     Diabetes type II Father     Coronary artery disease Father     Hypertension Sister     Diabetes type II Sister     Diabetes type II Brother     Hypertension Brother     Glaucoma Paternal Grandmother        Social History:  reports that she has never smoked. She has never used smokeless tobacco. She reports that she drinks alcohol. She reports that she does not use drugs.    Allergies:  Review of patient's allergies indicates:   Allergen Reactions    Codeine Nausea And Vomiting       Medications:  Current Outpatient Medications   Medication Sig Dispense Refill    albuterol (PROVENTIL) 2.5 mg /3 mL (0.083 %) nebulizer solution Take 3 mLs (2.5 mg total) by nebulization every 6 (six) hours as needed for Wheezing. Rescue 2 Box 3    albuterol (PROVENTIL/VENTOLIN HFA) 90 mcg/actuation inhaler Inhale 2 puffs into the lungs every 6 (six) hours as needed for Wheezing or Shortness of Breath. Rescue 1 Inhaler 0    cefdinir (OMNICEF) 300 MG capsule Take 1 capsule (300 mg total) by mouth 2 (two) times daily. for 14 days 28 capsule 0    cevimeline (EVOXAC) 30 mg capsule TAKE 1 CAPSULE BY MOUTH TWICE DAILY  180 capsule 4    clindamycin-benzoyl peroxide gel APPLY TOPICALLY EVERY EVENING 45 g 11    cycloSPORINE (RESTASIS) 0.05 % ophthalmic emulsion Place 0.4 mLs (1 drop total) into both eyes 2 (two) times daily. 60 vial 12    DULoxetine (CYMBALTA) 60 MG capsule Take 1 capsule (60 mg total) by mouth 2 (two) times daily. 180 capsule 4    ergocalciferol (VITAMIN D2) 50,000 unit Cap TAKE 1 CAPSULE EVERY 7 DAYS ON SUNDAYS 15 capsule 3    fluconazole (DIFLUCAN) 100 MG tablet Take 1 tablet (100 mg total) by mouth once daily. for 14 days 14 tablet 0    gabapentin (NEURONTIN) 300 MG capsule Take 1 capsule (300 mg total) by mouth 3 (three) times daily as needed. 90 capsule 11    HYDROcodone-acetaminophen (NORCO) 5-325 mg per tablet Take 1 tablet by mouth every 4 (four) hours as needed for Pain. 18 tablet 0    LORazepam (ATIVAN) 1 MG tablet TAKE 1/2 TO 1 (ONE-HALF TO ONE) TABLET BY MOUTH ONCE DAILY AS NEEDED FOR SEVERE ANXIETY 30 tablet 5    pantoprazole (PROTONIX) 40 MG tablet Take 1 tablet (40 mg total) by mouth 2 (two) times daily. 60 tablet 1    predniSONE (DELTASONE) 10 MG tablet Take 2 po bid x 3 days, one bid x 3 days, then one daily till finish 30 tablet 0    PREVIDENT 5000 DRY MOUTH 1.1 % Gel       promethazine-dextromethorphan (PROMETHAZINE-DM) 6.25-15 mg/5 mL Syrp Take one tsp po q 6 hrs prn cough 180 mL 0     No current facility-administered medications for this visit.        Review of Systems   Constitutional: Positive for fatigue.   HENT: Negative for sore throat.    Eyes: Negative for visual disturbance.   Respiratory: Positive for shortness of breath. Negative for cough.    Cardiovascular: Negative for chest pain.   Gastrointestinal: Negative for abdominal pain, constipation, diarrhea, nausea and vomiting.   Genitourinary: Negative for dysuria.   Musculoskeletal: Negative for back pain.   Skin: Negative for rash.   Neurological: Negative for headaches.   Hematological: Negative for adenopathy.  "  Psychiatric/Behavioral: The patient is not nervous/anxious.        ECOG Performance Status:   ECOG SCORE 1       Objective:      Vitals:   Vitals:    02/06/20 1454   BP: (!) 144/84   Pulse: (!) 111   Resp: 18   Temp: 98.7 °F (37.1 °C)   TempSrc: Oral   SpO2: 95%   Weight: 70.7 kg (155 lb 13.8 oz)   Height: 5' 2" (1.575 m)     BMI: Body mass index is 28.51 kg/m².    Physical Exam   Constitutional: She is oriented to person, place, and time. She appears well-developed and well-nourished.   HENT:   Head: Normocephalic and atraumatic.   Eyes: Pupils are equal, round, and reactive to light. EOM are normal.   Neck: Normal range of motion. Neck supple.   Cardiovascular: Normal rate and regular rhythm.   Pulmonary/Chest: Effort normal and breath sounds normal.   Abdominal: Soft. Bowel sounds are normal.   Musculoskeletal: Normal range of motion. She exhibits no edema.   Neurological: She is alert and oriented to person, place, and time.   Skin: Skin is warm and dry.   Psychiatric: She has a normal mood and affect. Her behavior is normal. Judgment and thought content normal.   Nursing note and vitals reviewed.      Laboratory Data:  Labs have been reviewed.    Lab Results   Component Value Date    WBC 10.47 01/31/2020    HGB 8.6 (L) 01/31/2020    HCT 29.4 (L) 01/31/2020    MCV 78 (L) 01/31/2020     01/31/2020         Imaging:     Colonoscopy (2/4/20):  - One 6 mm polyp in the rectum, removed with a hot snare. Resected and retrieved.  - Diverticulosis in the sigmoid colon.    Upper GI endoscopy (1/31/20):  - Esophageal plaques were found, consistent with candidiasis.  - 4 cm hiatal hernia.  - Non-bleeding gastric ulcers with no stigmata of bleeding. Biopsied.  - Normal examined duodenum.    Assessment:       1. Iron deficiency anemia due to chronic blood loss    2. Chronic pain syndrome    3. Advance care planning         Plan:     1. Iron deficiency anemia due to chronic blood loss  - I have reviewed her labs " chart.  - in January 2019, her hemoglobin was normal. Hemoglobin levels for past few days have revealed a severe microcytic anemia.  - ferritin level is decreased at 3 ng/mL, consistent with severe iron deficiency anemia.  - although stool was negative for occult blood, she reported black stools.  - I gave a dose of iron sucrose while she was hospitalized on 1/31/20.  - I recommend ferric carboxymaltose x 2 doses.  - I will send a prescription for ferrous sulfate to her pharmacy.  - return to clinic in 2 months with repeat iron studies.    2. Advance Care Planning     Power of   I initiated the process of advance care planning today and explained the importance of this process to the patient.  I introduced the concept of advance directives to the patient, as well. Then the patient received detailed information about the importance of designating a Health Care Power of  (HCPOA). She was also instructed to communicate with this person about their wishes for future healthcare, should she become sick and lose decision-making capacity. The patient has not previously appointed a HCPOA. After our discussion, the patient has decided to complete a HCPOA and has appointed her mother Chelsey Morris ([phone]) and sister Trice Beasley ([phone]). I spent a total time of 16 minutes discussing this issue with the patient.          - return to clinic in 2 months with repeat iron studies.    Jose Luis Guerrero M.D.  Hematology/Oncology  [facility] [address], [address]  [place] [address]  Phone: [phone]  Fax: [phone]

## 2020-02-06 ENCOUNTER — PATIENT MESSAGE (OUTPATIENT)
Dept: ALLERGY | Facility: CLINIC | Age: 60
End: 2020-02-06

## 2020-02-06 ENCOUNTER — OFFICE VISIT (OUTPATIENT)
Dept: HEMATOLOGY/ONCOLOGY | Facility: CLINIC | Age: 60
End: 2020-02-06
Payer: COMMERCIAL

## 2020-02-06 ENCOUNTER — OFFICE VISIT (OUTPATIENT)
Dept: CARDIOLOGY | Facility: CLINIC | Age: 60
End: 2020-02-06
Payer: COMMERCIAL

## 2020-02-06 VITALS
HEART RATE: 111 BPM | TEMPERATURE: 99 F | HEIGHT: 62 IN | WEIGHT: 155.88 LBS | BODY MASS INDEX: 28.69 KG/M2 | RESPIRATION RATE: 18 BRPM | DIASTOLIC BLOOD PRESSURE: 84 MMHG | OXYGEN SATURATION: 95 % | SYSTOLIC BLOOD PRESSURE: 144 MMHG

## 2020-02-06 VITALS
HEART RATE: 99 BPM | HEIGHT: 62 IN | BODY MASS INDEX: 28.72 KG/M2 | WEIGHT: 156.06 LBS | OXYGEN SATURATION: 95 % | DIASTOLIC BLOOD PRESSURE: 80 MMHG | SYSTOLIC BLOOD PRESSURE: 135 MMHG

## 2020-02-06 DIAGNOSIS — R00.2 PALPITATIONS: Primary | ICD-10-CM

## 2020-02-06 DIAGNOSIS — G89.4 CHRONIC PAIN SYNDROME: ICD-10-CM

## 2020-02-06 DIAGNOSIS — D50.0 IRON DEFICIENCY ANEMIA DUE TO CHRONIC BLOOD LOSS: Primary | ICD-10-CM

## 2020-02-06 DIAGNOSIS — Z71.89 ADVANCE CARE PLANNING: ICD-10-CM

## 2020-02-06 DIAGNOSIS — E66.3 OVERWEIGHT (BMI 25.0-29.9): ICD-10-CM

## 2020-02-06 LAB
C DIPHTHERIAE AB SER IA-ACNC: >2 IU/ML
C TETANI AB SER-ACNC: >5.33 IU/ML
DEPRECATED S PNEUM 1 IGG SER-MCNC: 1.8 MCG/ML
DEPRECATED S PNEUM12 IGG SER-MCNC: <0.3 MCG/ML
DEPRECATED S PNEUM14 IGG SER-MCNC: 1.3 MCG/ML
DEPRECATED S PNEUM19 IGG SER-MCNC: 0.4 MCG/ML
DEPRECATED S PNEUM23 IGG SER-MCNC: 0.3 MCG/ML
DEPRECATED S PNEUM3 IGG SER-MCNC: <0.3 MCG/ML
DEPRECATED S PNEUM4 IGG SER-MCNC: <0.3 MCG/ML
DEPRECATED S PNEUM5 IGG SER-MCNC: <0.3 MCG/ML
DEPRECATED S PNEUM8 IGG SER-MCNC: 0.9 MCG/ML
DEPRECATED S PNEUM9 IGG SER-MCNC: 0.7 MCG/ML
HAEM INFLU B IGG SER-MCNC: 1.19 MCG/ML
S PNEUM DA 18C IGG SER-MCNC: 0.4 MCG/ML
S PNEUM DA 6B IGG SER-MCNC: 5.6 MCG/ML
S PNEUM DA 7F IGG SER-MCNC: 0.6 MCG/ML
S PNEUM DA 9V IGG SER-MCNC: <0.3 MCG/ML

## 2020-02-06 PROCEDURE — 99999 PR PBB SHADOW E&M-EST. PATIENT-LVL III: CPT | Mod: PBBFAC,,, | Performed by: INTERNAL MEDICINE

## 2020-02-06 PROCEDURE — 99214 OFFICE O/P EST MOD 30 MIN: CPT | Mod: S$GLB,,, | Performed by: INTERNAL MEDICINE

## 2020-02-06 PROCEDURE — 99497 PR ADVNCD CARE PLAN 30 MIN: ICD-10-PCS | Mod: S$GLB,,, | Performed by: INTERNAL MEDICINE

## 2020-02-06 PROCEDURE — 99497 ADVNCD CARE PLAN 30 MIN: CPT | Mod: S$GLB,,, | Performed by: INTERNAL MEDICINE

## 2020-02-06 PROCEDURE — 3008F PR BODY MASS INDEX (BMI) DOCUMENTED: ICD-10-PCS | Mod: CPTII,S$GLB,, | Performed by: INTERNAL MEDICINE

## 2020-02-06 PROCEDURE — 99999 PR PBB SHADOW E&M-EST. PATIENT-LVL IV: ICD-10-PCS | Mod: PBBFAC,,, | Performed by: INTERNAL MEDICINE

## 2020-02-06 PROCEDURE — 99214 PR OFFICE/OUTPT VISIT, EST, LEVL IV, 30-39 MIN: ICD-10-PCS | Mod: 25,S$GLB,, | Performed by: INTERNAL MEDICINE

## 2020-02-06 PROCEDURE — 99214 PR OFFICE/OUTPT VISIT, EST, LEVL IV, 30-39 MIN: ICD-10-PCS | Mod: S$GLB,,, | Performed by: INTERNAL MEDICINE

## 2020-02-06 PROCEDURE — 99999 PR PBB SHADOW E&M-EST. PATIENT-LVL III: ICD-10-PCS | Mod: PBBFAC,,, | Performed by: INTERNAL MEDICINE

## 2020-02-06 PROCEDURE — 99999 PR PBB SHADOW E&M-EST. PATIENT-LVL IV: CPT | Mod: PBBFAC,,, | Performed by: INTERNAL MEDICINE

## 2020-02-06 PROCEDURE — 3008F BODY MASS INDEX DOCD: CPT | Mod: CPTII,S$GLB,, | Performed by: INTERNAL MEDICINE

## 2020-02-06 PROCEDURE — 99214 OFFICE O/P EST MOD 30 MIN: CPT | Mod: 25,S$GLB,, | Performed by: INTERNAL MEDICINE

## 2020-02-06 RX ORDER — HEPARIN 100 UNIT/ML
500 SYRINGE INTRAVENOUS
Status: CANCELLED | OUTPATIENT
Start: 2020-02-11

## 2020-02-06 RX ORDER — SODIUM CHLORIDE 0.9 % (FLUSH) 0.9 %
10 SYRINGE (ML) INJECTION
Status: CANCELLED | OUTPATIENT
Start: 2020-02-18

## 2020-02-06 RX ORDER — HEPARIN 100 UNIT/ML
500 SYRINGE INTRAVENOUS
Status: CANCELLED | OUTPATIENT
Start: 2020-02-18

## 2020-02-06 RX ORDER — FERROUS SULFATE 325(65) MG
325 TABLET, DELAYED RELEASE (ENTERIC COATED) ORAL DAILY
Qty: 90 TABLET | Refills: 3 | Status: SHIPPED | OUTPATIENT
Start: 2020-02-06 | End: 2021-02-05

## 2020-02-06 RX ORDER — SODIUM CHLORIDE 0.9 % (FLUSH) 0.9 %
10 SYRINGE (ML) INJECTION
Status: CANCELLED | OUTPATIENT
Start: 2020-02-11

## 2020-02-06 NOTE — LETTER
February 6, 2020      [facility]ology Oncology  [address]  [place] [address]  Phone: [phone]       Patient: Terese Barney   YOB: 1960  Date of Visit: 02/06/2020    To Whom It May Concern:    Katt Barney  was at [facility] on 02/06/2020. I also saw her when she was admitted in the hospital on 1/31/20. Please excuse her from these absences from work. If you have any questions or concerns, or if I can be of further assistance, please do not hesitate to contact me.    Sincerely,          Jose Luis Guerrero MD

## 2020-02-06 NOTE — LETTER
February 6, 2020      Shanique Potts MD  [address]  [address]  [facility] [address]           [facility]  [address] [facility] [address]  [facility] [address]  Phone: [phone]          Patient: Terese Barney   MR Number: [id number]   YOB: 1960   Date of Visit: 2/6/2020       Dear Dr. Shanique Potts:    Thank you for referring Terese Barney to me for evaluation. Attached you will find relevant portions of my assessment and plan of care.    If you have questions, please do not hesitate to call me. I look forward to following Terese Barney along with you.    Sincerely,    Jose Luis Guerrero MD    Enclosure  CC:  No Recipients    If you would like to receive this communication electronically, please contact [email or web address] or [phone] to request more information on readfy Link access.    For providers and/or their staff who would like to refer a patient to [facility], please contact us through our one-stop-shop provider referral line, [facility], at [phone].    If you feel you have received this communication in error or would no longer like to receive these types of communications, please e-mail [email or web address]

## 2020-02-07 ENCOUNTER — LAB VISIT (OUTPATIENT)
Dept: LAB | Facility: HOSPITAL | Age: 60
End: 2020-02-07
Attending: FAMILY MEDICINE
Payer: COMMERCIAL

## 2020-02-07 ENCOUNTER — PATIENT MESSAGE (OUTPATIENT)
Dept: GASTROENTEROLOGY | Facility: CLINIC | Age: 60
End: 2020-02-07

## 2020-02-07 ENCOUNTER — OFFICE VISIT (OUTPATIENT)
Dept: FAMILY MEDICINE | Facility: CLINIC | Age: 60
End: 2020-02-07
Payer: COMMERCIAL

## 2020-02-07 VITALS
HEART RATE: 102 BPM | WEIGHT: 152.56 LBS | HEIGHT: 62 IN | DIASTOLIC BLOOD PRESSURE: 78 MMHG | OXYGEN SATURATION: 98 % | SYSTOLIC BLOOD PRESSURE: 124 MMHG | BODY MASS INDEX: 28.07 KG/M2

## 2020-02-07 DIAGNOSIS — G47.00 INSOMNIA, UNSPECIFIED TYPE: ICD-10-CM

## 2020-02-07 DIAGNOSIS — Z11.4 ENCOUNTER FOR SCREENING FOR HIV: ICD-10-CM

## 2020-02-07 DIAGNOSIS — E55.9 VITAMIN D DEFICIENCY: ICD-10-CM

## 2020-02-07 DIAGNOSIS — K21.9 GASTROESOPHAGEAL REFLUX DISEASE, ESOPHAGITIS PRESENCE NOT SPECIFIED: ICD-10-CM

## 2020-02-07 DIAGNOSIS — Z00.00 ROUTINE GENERAL MEDICAL EXAMINATION AT A HEALTH CARE FACILITY: Primary | ICD-10-CM

## 2020-02-07 DIAGNOSIS — M85.80 OSTEOPENIA, UNSPECIFIED LOCATION: ICD-10-CM

## 2020-02-07 DIAGNOSIS — J30.89 NON-SEASONAL ALLERGIC RHINITIS, UNSPECIFIED TRIGGER: ICD-10-CM

## 2020-02-07 DIAGNOSIS — Z00.00 ROUTINE GENERAL MEDICAL EXAMINATION AT A HEALTH CARE FACILITY: ICD-10-CM

## 2020-02-07 DIAGNOSIS — F32.A ANXIETY AND DEPRESSION: ICD-10-CM

## 2020-02-07 DIAGNOSIS — E66.3 OVERWEIGHT (BMI 25.0-29.9): ICD-10-CM

## 2020-02-07 DIAGNOSIS — D50.0 IRON DEFICIENCY ANEMIA DUE TO CHRONIC BLOOD LOSS: ICD-10-CM

## 2020-02-07 DIAGNOSIS — F41.9 ANXIETY AND DEPRESSION: ICD-10-CM

## 2020-02-07 DIAGNOSIS — M17.12 OSTEOARTHRITIS OF LEFT KNEE, UNSPECIFIED OSTEOARTHRITIS TYPE: ICD-10-CM

## 2020-02-07 LAB
25(OH)D3+25(OH)D2 SERPL-MCNC: 29 NG/ML (ref 30–96)
ALBUMIN SERPL BCP-MCNC: 3.5 G/DL (ref 3.5–5.2)
ALP SERPL-CCNC: 71 U/L (ref 55–135)
ALT SERPL W/O P-5'-P-CCNC: 11 U/L (ref 10–44)
ANION GAP SERPL CALC-SCNC: 9 MMOL/L (ref 8–16)
AST SERPL-CCNC: 18 U/L (ref 10–40)
BILIRUB SERPL-MCNC: 0.3 MG/DL (ref 0.1–1)
BUN SERPL-MCNC: 11 MG/DL (ref 6–20)
CALCIUM SERPL-MCNC: 9.2 MG/DL (ref 8.7–10.5)
CHLORIDE SERPL-SCNC: 107 MMOL/L (ref 95–110)
CHOLEST SERPL-MCNC: 180 MG/DL (ref 120–199)
CHOLEST/HDLC SERPL: 3.3 {RATIO} (ref 2–5)
CO2 SERPL-SCNC: 27 MMOL/L (ref 23–29)
CREAT SERPL-MCNC: 0.8 MG/DL (ref 0.5–1.4)
EST. GFR  (AFRICAN AMERICAN): >60 ML/MIN/1.73 M^2
EST. GFR  (NON AFRICAN AMERICAN): >60 ML/MIN/1.73 M^2
ESTIMATED AVG GLUCOSE: 103 MG/DL (ref 68–131)
GLUCOSE SERPL-MCNC: 101 MG/DL (ref 70–110)
HBA1C MFR BLD HPLC: 5.2 % (ref 4–5.6)
HDLC SERPL-MCNC: 54 MG/DL (ref 40–75)
HDLC SERPL: 30 % (ref 20–50)
LDLC SERPL CALC-MCNC: 98 MG/DL (ref 63–159)
MAGNESIUM SERPL-MCNC: 2.1 MG/DL (ref 1.6–2.6)
NONHDLC SERPL-MCNC: 126 MG/DL
POTASSIUM SERPL-SCNC: 4 MMOL/L (ref 3.5–5.1)
PROT SERPL-MCNC: 7.4 G/DL (ref 6–8.4)
SODIUM SERPL-SCNC: 143 MMOL/L (ref 136–145)
TRIGL SERPL-MCNC: 140 MG/DL (ref 30–150)
TSH SERPL DL<=0.005 MIU/L-ACNC: 0.95 UIU/ML (ref 0.4–4)
VIT B12 SERPL-MCNC: 922 PG/ML (ref 210–950)

## 2020-02-07 PROCEDURE — 36415 COLL VENOUS BLD VENIPUNCTURE: CPT

## 2020-02-07 PROCEDURE — 83735 ASSAY OF MAGNESIUM: CPT

## 2020-02-07 PROCEDURE — 80053 COMPREHEN METABOLIC PANEL: CPT

## 2020-02-07 PROCEDURE — 80061 LIPID PANEL: CPT

## 2020-02-07 PROCEDURE — 83036 HEMOGLOBIN GLYCOSYLATED A1C: CPT

## 2020-02-07 PROCEDURE — 82607 VITAMIN B-12: CPT

## 2020-02-07 PROCEDURE — 99999 PR PBB SHADOW E&M-EST. PATIENT-LVL III: ICD-10-PCS | Mod: PBBFAC,,, | Performed by: FAMILY MEDICINE

## 2020-02-07 PROCEDURE — 86703 HIV-1/HIV-2 1 RESULT ANTBDY: CPT

## 2020-02-07 PROCEDURE — 82306 VITAMIN D 25 HYDROXY: CPT

## 2020-02-07 PROCEDURE — 84443 ASSAY THYROID STIM HORMONE: CPT

## 2020-02-07 PROCEDURE — 99396 PREV VISIT EST AGE 40-64: CPT | Mod: S$GLB,,, | Performed by: FAMILY MEDICINE

## 2020-02-07 PROCEDURE — 99999 PR PBB SHADOW E&M-EST. PATIENT-LVL III: CPT | Mod: PBBFAC,,, | Performed by: FAMILY MEDICINE

## 2020-02-07 PROCEDURE — 99396 PR PREVENTIVE VISIT,EST,40-64: ICD-10-PCS | Mod: S$GLB,,, | Performed by: FAMILY MEDICINE

## 2020-02-07 RX ORDER — DOXYCYCLINE 100 MG/1
CAPSULE ORAL
COMMUNITY
Start: 2020-01-03 | End: 2022-07-27

## 2020-02-07 RX ORDER — CEPHALEXIN 500 MG/1
CAPSULE ORAL
COMMUNITY
Start: 2019-10-31 | End: 2021-11-02 | Stop reason: ALTCHOICE

## 2020-02-07 RX ORDER — DIAZEPAM 5 MG/1
TABLET ORAL
COMMUNITY
Start: 2020-01-03 | End: 2020-09-28

## 2020-02-07 RX ORDER — BROMPHENIRAMINE MALEATE, PSEUDOEPHEDRINE HYDROCHLORIDE, AND DEXTROMETHORPHAN HYDROBROMIDE 2; 30; 10 MG/5ML; MG/5ML; MG/5ML
SYRUP ORAL
COMMUNITY
Start: 2020-01-03 | End: 2022-07-27

## 2020-02-07 RX ORDER — PREDNISONE 20 MG/1
TABLET ORAL
COMMUNITY
Start: 2020-01-03 | End: 2021-11-02 | Stop reason: ALTCHOICE

## 2020-02-07 RX ORDER — SODIUM, POTASSIUM,MAG SULFATES 17.5-3.13G
SOLUTION, RECONSTITUTED, ORAL ORAL
COMMUNITY
Start: 2020-02-01 | End: 2022-05-18 | Stop reason: ALTCHOICE

## 2020-02-07 RX ORDER — ONDANSETRON 4 MG/1
TABLET, FILM COATED ORAL
COMMUNITY
Start: 2019-10-31 | End: 2022-10-18 | Stop reason: CLARIF

## 2020-02-07 NOTE — PROGRESS NOTES
CC:Annual     HPI  Ms. Barney is a 59 year old woman with osteoarthritis of her left knee, fe deficiency anemia, anxiety, and chronic low back pain is here for her annual visit.     She was hospitalized at the end of January for Fe deficiency anemia. Had SOB, palpitations, intermittent dark stools, and excessive fatigue for 3-4 weeks. She had labs drawn by her allergist which revealed a low Hb/Hct and was told to present to the ED. Hb was as low as 5.4. Endoscopy revealed a hiatal hernia and ulcer with no active bleeding. A rectal polyp 6 mm was found on colonoscopy with no bleeding, pathology pending. Given 2 units while in the hospital. Has followed up with Hem and GI since discharge. Pt says symptoms have improved but still some SOB and fatigue. She denies dark stools or palpitations. CBC, Iron, Ferritin followed and ordered by hem. Due for a capsule endoscopy that has not have scheduled yet but GI will follow up with her. Taking Fe pills and has two more Fe transfusions over the next two weeks.    Chronic L knee pain since a fall she had in 2018. Had a meniscectomy at that time which improved her pain for a short period of time. Difficult for her to walk and limits her abilities at work,  at [employer]. She denies stiffness or taking any medications for the pain. She went to PT after the surgery but didn't feel as though it was helpful. Does not do these PT exercises at home. Followed by Ortho Dr. Coffey. Imaging reveals arthritis in three compartments and some degenerative mensicus changes. Has had steroid and lubrication injections in the knee with minimal benefit.     Pt has had bronchitis over the past few weeks diagnosed by Allergist. Taking omnicef 300 mg 2 x day for 14 days. Has had congestions and cough. No fevers. Using her albuterol more often but well controlled. Only uses albuterol when she is sick.     Pt has had anxiety for which she says is controlled. Taking duloxetine 60 mg  x 2 day and lorazepam for sleep.    Says she is sleeping more than usual and attributes this to her anemia. She has not been exercising regularly because of L knee pain and eating out often. Has gained 20 lbs over the last year.    Review of Systems   Constitutional: Positive for malaise/fatigue. Negative for chills and fever.   HENT: Positive for congestion. Negative for hearing loss.    Respiratory: Positive for cough and shortness of breath. Negative for wheezing.    Cardiovascular: Negative for chest pain and palpitations.   Gastrointestinal: Negative for abdominal pain, blood in stool, constipation, diarrhea, heartburn, melena, nausea and vomiting.   Musculoskeletal: Positive for joint pain.   Neurological: Positive for weakness. Negative for dizziness.       Past Medical History:   Diagnosis Date    Anxiety and depression 2016    Chronic back pain     Hammer toe of left foot     Insomnia     Motion sickness     Overweight (BMI 25.0-29.9) 2016    PONV (postoperative nausea and vomiting)     Vitamin D deficiency 2016     Past Surgical History:   Procedure Laterality Date    AUGMENTATION OF BREAST Bilateral     BREAST SURGERY Bilateral      SECTION      two    COLONOSCOPY N/A 2020    Procedure: COLONOSCOPY/Suprep;  Surgeon: Cristhian Mancilla MD;  Location: [facility];  Service: Endoscopy;  Laterality: N/A;    ESOPHAGOGASTRODUODENOSCOPY N/A 2020    Procedure: ESOPHAGOGASTRODUODENOSCOPY (EGD);  Surgeon: Cristhian Mancilla MD;  Location: [facility];  Service: Endoscopy;  Laterality: N/A;    foot surgery      left plantar fascial release    FOOT SURGERY Left 2017    2nd TOE, Hammer toe repair    HYSTERECTOMY      laparascopy      ovarian cysts    left knee surgery      TONSILLECTOMY      TOTAL ABDOMINAL HYSTERECTOMY W/ BILATERAL SALPINGOOPHORECTOMY       Family History   Problem Relation Age of Onset    Hypertension Mother     Breast cancer Mother      Macular degeneration Father     Diabetes type II Father     Coronary artery disease Father     Hypertension Sister     Diabetes type II Sister     Diabetes type II Brother     Hypertension Brother     Glaucoma Paternal Grandmother      Review of patient's allergies indicates:   Allergen Reactions    Codeine Nausea And Vomiting     Social History     Tobacco Use    Smoking status: Never Smoker    Smokeless tobacco: Never Used   Substance Use Topics    Alcohol use: Yes     Comment: rare    Drug use: No     Screening   -Mammogram ordered, last on 1/2019   -No longer receiving Paps because hysterectomy   -Colonoscopy on 2/2020  Revealed rectal polyp, pending path    Immunizations   -Flu 1/2020   -Tdap 2019   -No shingles vaccine, ordered    -Pneumo vaccine not needed at this time    Vitals:    02/07/20 0950   BP: 124/78   Pulse: 102     Exam  Pt appears comfortable, pale complexation  HEENT:Fluid behind the eardrum bilaterally but no erythema or buldging, mucus in oropharnyx but minimal erythema and no exudate or tonsillary enlargement, mild pallor of conjunctiva  CVS:tachycardic, regular rhythm, S1 and S2 heard, no added heart sounds, murmurs, rubs, or gallops  Resp:Lungs clear bilaterally, no wheezing, or resp. Distress  Abdo: soft, non-tender, active bowel sounds  LE: No edema      Assessment  Ms. Barney is a 59 year old woman here for an annual exam and hospital follow up    Plan  Fe deficiency anemia    -Followed by Hem and GI   -pill endoscopy scheduled by GI   -Picking up ferrous sulfate 325 mg today for daily use   -Had 2 units of RBC in hospital   -Has 2 more Fe transfusions over the next two weeks   -CBC, Iron, Ferritin ordered by Hem.    -Improving symptoms     L knee pain   -Followed by ortho and given steroid and lubrications injections   -Aware she needs a knee replacement in the future but does not want one at this time   -Advised to start riding her bike at home to strengthen quads and  hamstrings    Bronchitis   -Continue omnicef and albuterol PRN   -Return if wheezing or SOB worsens    -Can use OTC medications for congestion    Lifestyle   -Pt advised to increase physical activity with biking at home    -Given information on diet changes and 30 day [facility] Nutrition handout   -Meal prep for foods and minimize the amount of fast food and take out     Screenings   -Mammogram ordered    Lab   -ordered lipids, CMP, TSH, A1C, Vit D, B12, and HIV screening        I hereby acknowledge that I am relying upon documentation authored by a medical student working under my supervision and further I hereby attest that I have verified the student documentation or findings by personally re-performing the physical exam and medical decision making activities of the Evaluation and Management service to be billed.  Shanique Potts

## 2020-02-10 ENCOUNTER — INFUSION (OUTPATIENT)
Dept: INFUSION THERAPY | Facility: HOSPITAL | Age: 60
End: 2020-02-10
Attending: INTERNAL MEDICINE
Payer: COMMERCIAL

## 2020-02-10 ENCOUNTER — TELEPHONE (OUTPATIENT)
Dept: FAMILY MEDICINE | Facility: CLINIC | Age: 60
End: 2020-02-10

## 2020-02-10 VITALS
RESPIRATION RATE: 18 BRPM | HEART RATE: 103 BPM | WEIGHT: 152.13 LBS | SYSTOLIC BLOOD PRESSURE: 137 MMHG | OXYGEN SATURATION: 98 % | BODY MASS INDEX: 27.99 KG/M2 | HEIGHT: 62 IN | TEMPERATURE: 98 F | DIASTOLIC BLOOD PRESSURE: 62 MMHG

## 2020-02-10 DIAGNOSIS — B99.9 RECURRENT INFECTIONS: ICD-10-CM

## 2020-02-10 DIAGNOSIS — D50.0 IRON DEFICIENCY ANEMIA DUE TO CHRONIC BLOOD LOSS: Primary | ICD-10-CM

## 2020-02-10 DIAGNOSIS — R06.02 SHORTNESS OF BREATH: Primary | ICD-10-CM

## 2020-02-10 LAB
FINAL PATHOLOGIC DIAGNOSIS: NORMAL
GROSS: NORMAL
HIV 1+2 AB+HIV1 P24 AG SERPL QL IA: NEGATIVE
MICROSCOPIC EXAM: NORMAL

## 2020-02-10 PROCEDURE — A4216 STERILE WATER/SALINE, 10 ML: HCPCS | Performed by: INTERNAL MEDICINE

## 2020-02-10 PROCEDURE — 96365 THER/PROPH/DIAG IV INF INIT: CPT

## 2020-02-10 PROCEDURE — 63600175 PHARM REV CODE 636 W HCPCS: Performed by: INTERNAL MEDICINE

## 2020-02-10 PROCEDURE — 25000003 PHARM REV CODE 250: Performed by: INTERNAL MEDICINE

## 2020-02-10 RX ORDER — HEPARIN 100 UNIT/ML
500 SYRINGE INTRAVENOUS
Status: DISCONTINUED | OUTPATIENT
Start: 2020-02-10 | End: 2020-02-10 | Stop reason: HOSPADM

## 2020-02-10 RX ORDER — SODIUM CHLORIDE 0.9 % (FLUSH) 0.9 %
10 SYRINGE (ML) INJECTION
Status: DISCONTINUED | OUTPATIENT
Start: 2020-02-10 | End: 2020-02-10 | Stop reason: HOSPADM

## 2020-02-10 RX ADMIN — SODIUM CHLORIDE: 0.9 INJECTION, SOLUTION INTRAVENOUS at 12:02

## 2020-02-10 RX ADMIN — FERRIC CARBOXYMALTOSE INJECTION 750 MG: 50 INJECTION, SOLUTION INTRAVENOUS at 12:02

## 2020-02-10 RX ADMIN — Medication 10 ML: at 01:02

## 2020-02-10 NOTE — TELEPHONE ENCOUNTER
Please put the following on official letter head.    02/10/2020     To Whom It May Concern:    Terese Barney has been out of work as of January 30, 2020 when she was instructed to go to the emergency room for critically low blood counts.  She was hospitalized to receive blood transfusions, monitoring of her blood counts, evaluation for the source of her bleeding, and to initiate consultation with a hematologist.  Her levels are improving, but she is still needing to follow-up for iron infusions and with the hematologist.  She is needing to follow up with a gastrointestinal specialist regarding a capsule endoscopy to see if she is losing blood from her digestive system.  I anticipate that she will be out of work through 02/24/2020, and then she will likely be able to return on a full-time basis, however, she will need to work around some follow-up appointments. She may need some intermittent leave after Feb 24, 2020 depending on how much her doctor's visits conflict with her work schedule.  We will plan to re-evaluate at that time.    Please contact my office if you have further questions or concerns.  I have additionally submitted the paperwork given to me by [employer] regarding her medical condition and associated leave.    Sincerely,              Shanique Potts MD, Board Certified Family Physician, and PCP for Terese Barney

## 2020-02-12 ENCOUNTER — TELEPHONE (OUTPATIENT)
Dept: FAMILY MEDICINE | Facility: CLINIC | Age: 60
End: 2020-02-12

## 2020-02-12 ENCOUNTER — PATIENT MESSAGE (OUTPATIENT)
Dept: FAMILY MEDICINE | Facility: CLINIC | Age: 60
End: 2020-02-12

## 2020-02-12 LAB
FINAL PATHOLOGIC DIAGNOSIS: NORMAL
GROSS: NORMAL

## 2020-02-13 ENCOUNTER — TELEPHONE (OUTPATIENT)
Dept: FAMILY MEDICINE | Facility: CLINIC | Age: 60
End: 2020-02-13

## 2020-02-13 NOTE — TELEPHONE ENCOUNTER
Dr dodd stated that the pneum vac would help with her current situation. That's why she asked about. But an appt is already scheduled. Pt is asking if she has to keep both appts for gastro.

## 2020-02-13 NOTE — TELEPHONE ENCOUNTER
She can get the pneumonia vaccine at the allergist appointment tomorrow. If there are any issues with that I can then enter [facility] for her to receive it at a nurse visit.  Also, I only see one upcoming gastro appointment with Dr Mancilla-- she should clarify any confusion with gastro appointments with them, because they are wanting her to to have a capsule endoscopy test, thanks

## 2020-02-13 NOTE — TELEPHONE ENCOUNTER
Only labs that she needs coming up are the ones ordered by Heme/Onc regarding her blood counts and iron levels. Normally pneumovax are given at 65 and over-- she does not really have an indication for early pneumovax (she doesn't smoke, has no chronic lung disease or diabetes). Is there a specific reason she is wanting one?     One vaccine that she *should* have is the SHINGRIX through the pharmacy.

## 2020-02-14 ENCOUNTER — CLINICAL SUPPORT (OUTPATIENT)
Dept: ALLERGY | Facility: CLINIC | Age: 60
End: 2020-02-14
Payer: COMMERCIAL

## 2020-02-14 PROCEDURE — 90732 PNEUMOCOCCAL POLYSACCHARIDE VACCINE 23-VALENT =>2YO SQ IM: ICD-10-PCS | Mod: S$GLB,,, | Performed by: ALLERGY & IMMUNOLOGY

## 2020-02-14 PROCEDURE — 90471 PNEUMOCOCCAL POLYSACCHARIDE VACCINE 23-VALENT =>2YO SQ IM: ICD-10-PCS | Mod: S$GLB,,, | Performed by: ALLERGY & IMMUNOLOGY

## 2020-02-14 PROCEDURE — 99999 PR PBB SHADOW E&M-EST. PATIENT-LVL I: CPT | Mod: PBBFAC,,,

## 2020-02-14 PROCEDURE — 90471 IMMUNIZATION ADMIN: CPT | Mod: S$GLB,,, | Performed by: ALLERGY & IMMUNOLOGY

## 2020-02-14 PROCEDURE — 90732 PPSV23 VACC 2 YRS+ SUBQ/IM: CPT | Mod: S$GLB,,, | Performed by: ALLERGY & IMMUNOLOGY

## 2020-02-14 PROCEDURE — 99999 PR PBB SHADOW E&M-EST. PATIENT-LVL I: ICD-10-PCS | Mod: PBBFAC,,,

## 2020-02-17 ENCOUNTER — INFUSION (OUTPATIENT)
Dept: INFUSION THERAPY | Facility: HOSPITAL | Age: 60
End: 2020-02-17
Attending: INTERNAL MEDICINE
Payer: COMMERCIAL

## 2020-02-17 ENCOUNTER — PATIENT MESSAGE (OUTPATIENT)
Dept: ALLERGY | Facility: CLINIC | Age: 60
End: 2020-02-17

## 2020-02-17 VITALS
SYSTOLIC BLOOD PRESSURE: 144 MMHG | BODY MASS INDEX: 27.99 KG/M2 | HEIGHT: 62 IN | DIASTOLIC BLOOD PRESSURE: 72 MMHG | TEMPERATURE: 98 F | WEIGHT: 152.13 LBS | HEART RATE: 105 BPM

## 2020-02-17 DIAGNOSIS — D50.0 IRON DEFICIENCY ANEMIA DUE TO CHRONIC BLOOD LOSS: Primary | ICD-10-CM

## 2020-02-17 PROCEDURE — 96365 THER/PROPH/DIAG IV INF INIT: CPT

## 2020-02-17 PROCEDURE — 25000003 PHARM REV CODE 250: Performed by: INTERNAL MEDICINE

## 2020-02-17 PROCEDURE — A4216 STERILE WATER/SALINE, 10 ML: HCPCS | Performed by: INTERNAL MEDICINE

## 2020-02-17 PROCEDURE — 63600175 PHARM REV CODE 636 W HCPCS: Mod: JG | Performed by: INTERNAL MEDICINE

## 2020-02-17 RX ORDER — SODIUM CHLORIDE 0.9 % (FLUSH) 0.9 %
10 SYRINGE (ML) INJECTION
Status: DISCONTINUED | OUTPATIENT
Start: 2020-02-17 | End: 2020-02-17 | Stop reason: HOSPADM

## 2020-02-17 RX ADMIN — FERRIC CARBOXYMALTOSE INJECTION 750 MG: 50 INJECTION, SOLUTION INTRAVENOUS at 12:02

## 2020-02-17 RX ADMIN — Medication 10 ML: at 01:02

## 2020-02-17 RX ADMIN — SODIUM CHLORIDE: 0.9 INJECTION, SOLUTION INTRAVENOUS at 12:02

## 2020-02-17 NOTE — NURSING
Pt tolerated Injectafer infusion well.  No adverse reaction noted.  Pt education reinforced on potential side effects, what to expect, and when to call physician;understanding verbalized.IV flushed with NS and D/C per protocol.Patient ambulated out of clinic in no acute distress.

## 2020-02-18 ENCOUNTER — PATIENT MESSAGE (OUTPATIENT)
Dept: ALLERGY | Facility: CLINIC | Age: 60
End: 2020-02-18

## 2020-02-18 RX ORDER — BENZONATATE 200 MG/1
200 CAPSULE ORAL 3 TIMES DAILY PRN
Qty: 90 CAPSULE | Refills: 0 | Status: SHIPPED | OUTPATIENT
Start: 2020-02-18 | End: 2020-02-28

## 2020-02-19 ENCOUNTER — PATIENT MESSAGE (OUTPATIENT)
Dept: HEMATOLOGY/ONCOLOGY | Facility: CLINIC | Age: 60
End: 2020-02-19

## 2020-02-19 ENCOUNTER — PATIENT MESSAGE (OUTPATIENT)
Dept: GASTROENTEROLOGY | Facility: CLINIC | Age: 60
End: 2020-02-19

## 2020-02-19 ENCOUNTER — OFFICE VISIT (OUTPATIENT)
Dept: GASTROENTEROLOGY | Facility: CLINIC | Age: 60
End: 2020-02-19
Payer: COMMERCIAL

## 2020-02-19 ENCOUNTER — PATIENT MESSAGE (OUTPATIENT)
Dept: FAMILY MEDICINE | Facility: CLINIC | Age: 60
End: 2020-02-19

## 2020-02-19 VITALS
DIASTOLIC BLOOD PRESSURE: 61 MMHG | HEART RATE: 113 BPM | WEIGHT: 153.88 LBS | SYSTOLIC BLOOD PRESSURE: 132 MMHG | BODY MASS INDEX: 28.15 KG/M2

## 2020-02-19 DIAGNOSIS — R13.10 DYSPHAGIA, UNSPECIFIED TYPE: ICD-10-CM

## 2020-02-19 DIAGNOSIS — K44.9 HIATAL HERNIA: ICD-10-CM

## 2020-02-19 DIAGNOSIS — D50.0 IRON DEFICIENCY ANEMIA DUE TO CHRONIC BLOOD LOSS: Primary | ICD-10-CM

## 2020-02-19 PROCEDURE — 99214 PR OFFICE/OUTPT VISIT, EST, LEVL IV, 30-39 MIN: ICD-10-PCS | Mod: S$GLB,,, | Performed by: INTERNAL MEDICINE

## 2020-02-19 PROCEDURE — 99999 PR PBB SHADOW E&M-EST. PATIENT-LVL III: CPT | Mod: PBBFAC,,, | Performed by: INTERNAL MEDICINE

## 2020-02-19 PROCEDURE — 99214 OFFICE O/P EST MOD 30 MIN: CPT | Mod: S$GLB,,, | Performed by: INTERNAL MEDICINE

## 2020-02-19 PROCEDURE — 3008F PR BODY MASS INDEX (BMI) DOCUMENTED: ICD-10-PCS | Mod: CPTII,S$GLB,, | Performed by: INTERNAL MEDICINE

## 2020-02-19 PROCEDURE — 3008F BODY MASS INDEX DOCD: CPT | Mod: CPTII,S$GLB,, | Performed by: INTERNAL MEDICINE

## 2020-02-19 PROCEDURE — 99999 PR PBB SHADOW E&M-EST. PATIENT-LVL III: ICD-10-PCS | Mod: PBBFAC,,, | Performed by: INTERNAL MEDICINE

## 2020-02-19 NOTE — PROGRESS NOTES
Subjective:       Patient ID: Terese Barney is a 59 y.o. female.    Chief Complaint: Follow-up (test results)    This is a 53-year-old female here for a follow-up visit regarding iron deficiency anemia in addition to dysphagia.  She noted the dysphagia for year described esophageal dysphagia, predominantly to meats.  She underwent an upper endoscopy noting suspected candidal esophagitis without a focal stricture.  She treated with fluconazole.  She still notes intermittent dysphagia, similar in character to previous.  Regarding her anemia the upper endoscopy did note some linear ulcerations and a 4 cm hiatal hernia. She has had iron infusions with an improvement in her hematocrit now up in the upper 20s.  Colonoscopy with removal of several small polyps.  No overt bleeding is noted.    The following portions of the patient's history were reviewed and updated as appropriate: allergies, current medications, past family history, past medical history, past social history, past surgical history and problem list.    (Portions of this note were dictated using voice recognition software and may contain dictation related errors in spelling/grammar/syntax not found on text review)  HPI  Review of Systems   Constitutional: Positive for fatigue. Negative for chills and unexpected weight change.   HENT: Positive for trouble swallowing. Negative for tinnitus.    Respiratory: Positive for cough.    Cardiovascular: Negative for chest pain and palpitations.   Gastrointestinal: Negative for abdominal distention and abdominal pain.         Objective:      Physical Exam   Constitutional: She is oriented to person, place, and time. She appears well-developed and well-nourished. No distress.   HENT:   Head: Normocephalic and atraumatic.   Eyes: Conjunctivae are normal. No scleral icterus.   Neck: Normal range of motion. Neck supple. No tracheal deviation present. No thyromegaly present.   Cardiovascular: Normal rate and regular  rhythm. Exam reveals no gallop and no friction rub.   No murmur heard.  Pulmonary/Chest: Effort normal and breath sounds normal. No respiratory distress. She has no wheezes.   Abdominal: Soft. Bowel sounds are normal. She exhibits no distension. There is no tenderness.   Neurological: She is alert and oriented to person, place, and time.   Skin: Skin is warm and dry. No rash noted. She is not diaphoretic. No erythema.   Psychiatric: She has a normal mood and affect. Her behavior is normal.   Nursing note and vitals reviewed.        Labs/imaging; reviewed  Assessment:       1. Iron deficiency anemia due to chronic blood loss    2. Hiatal hernia    3. Dysphagia, unspecified type        Plan:   1. Continue meds  2. Consider esophagram if dysphagia persists  3. Monitor fe labs, cbc (ordered)  4. Capsule endoscopy if needed

## 2020-02-20 ENCOUNTER — PATIENT MESSAGE (OUTPATIENT)
Dept: GASTROENTEROLOGY | Facility: CLINIC | Age: 60
End: 2020-02-20

## 2020-02-20 ENCOUNTER — PATIENT MESSAGE (OUTPATIENT)
Dept: ALLERGY | Facility: CLINIC | Age: 60
End: 2020-02-20

## 2020-02-20 DIAGNOSIS — F41.9 ANXIETY AND DEPRESSION: ICD-10-CM

## 2020-02-20 DIAGNOSIS — E55.9 VITAMIN D DEFICIENCY: ICD-10-CM

## 2020-02-20 DIAGNOSIS — F32.A ANXIETY AND DEPRESSION: ICD-10-CM

## 2020-02-20 RX ORDER — ERGOCALCIFEROL 1.25 MG/1
CAPSULE ORAL
Qty: 15 CAPSULE | Refills: 4 | Status: SHIPPED | OUTPATIENT
Start: 2020-02-20 | End: 2021-04-20 | Stop reason: ALTCHOICE

## 2020-02-20 RX ORDER — DULOXETIN HYDROCHLORIDE 60 MG/1
CAPSULE, DELAYED RELEASE ORAL
Qty: 180 CAPSULE | Refills: 4 | Status: SHIPPED | OUTPATIENT
Start: 2020-02-20 | End: 2021-10-26 | Stop reason: SDUPTHER

## 2020-02-20 NOTE — TELEPHONE ENCOUNTER
Called pt and explained to her that her labs have been moved to 3/17. She verbalized understanding.

## 2020-02-23 RX ORDER — PROMETHAZINE HYDROCHLORIDE AND DEXTROMETHORPHAN HYDROBROMIDE 6.25; 15 MG/5ML; MG/5ML
SYRUP ORAL
Qty: 180 ML | Refills: 0 | Status: SHIPPED | OUTPATIENT
Start: 2020-02-23 | End: 2021-04-26 | Stop reason: ALTCHOICE

## 2020-02-27 ENCOUNTER — TELEPHONE (OUTPATIENT)
Dept: GASTROENTEROLOGY | Facility: CLINIC | Age: 60
End: 2020-02-27

## 2020-02-27 NOTE — TELEPHONE ENCOUNTER
----- Message from Artis Maciel sent at 2/27/2020  1:01 PM CST -----  Contact: [phone] / bqes   Patient is returning a call from your office regarding test results. Please Advise.

## 2020-03-02 ENCOUNTER — PATIENT MESSAGE (OUTPATIENT)
Dept: FAMILY MEDICINE | Facility: CLINIC | Age: 60
End: 2020-03-02

## 2020-03-02 DIAGNOSIS — R09.81 CHRONIC NASAL CONGESTION: Primary | ICD-10-CM

## 2020-03-02 RX ORDER — FLUTICASONE PROPIONATE 50 MCG
2 SPRAY, SUSPENSION (ML) NASAL DAILY
Qty: 16 G | Refills: 11 | Status: SHIPPED | OUTPATIENT
Start: 2020-03-02 | End: 2021-04-26 | Stop reason: ALTCHOICE

## 2020-03-03 ENCOUNTER — PATIENT OUTREACH (OUTPATIENT)
Dept: ADMINISTRATIVE | Facility: HOSPITAL | Age: 60
End: 2020-03-03

## 2020-03-17 ENCOUNTER — LAB VISIT (OUTPATIENT)
Dept: LAB | Facility: HOSPITAL | Age: 60
End: 2020-03-17
Attending: FAMILY MEDICINE
Payer: COMMERCIAL

## 2020-03-17 ENCOUNTER — OFFICE VISIT (OUTPATIENT)
Dept: FAMILY MEDICINE | Facility: CLINIC | Age: 60
End: 2020-03-17
Payer: COMMERCIAL

## 2020-03-17 VITALS
WEIGHT: 155.88 LBS | OXYGEN SATURATION: 98 % | HEART RATE: 90 BPM | TEMPERATURE: 98 F | DIASTOLIC BLOOD PRESSURE: 88 MMHG | HEIGHT: 62 IN | BODY MASS INDEX: 28.69 KG/M2 | SYSTOLIC BLOOD PRESSURE: 140 MMHG

## 2020-03-17 DIAGNOSIS — D50.0 IRON DEFICIENCY ANEMIA DUE TO CHRONIC BLOOD LOSS: ICD-10-CM

## 2020-03-17 DIAGNOSIS — F32.A ANXIETY AND DEPRESSION: ICD-10-CM

## 2020-03-17 DIAGNOSIS — M17.12 PRIMARY OSTEOARTHRITIS OF LEFT KNEE: Primary | ICD-10-CM

## 2020-03-17 DIAGNOSIS — M85.80 OSTEOPENIA, UNSPECIFIED LOCATION: ICD-10-CM

## 2020-03-17 DIAGNOSIS — D64.9 SEVERE ANEMIA: ICD-10-CM

## 2020-03-17 DIAGNOSIS — Z12.31 ENCOUNTER FOR SCREENING MAMMOGRAM FOR BREAST CANCER: ICD-10-CM

## 2020-03-17 DIAGNOSIS — Z78.0 POSTMENOPAUSAL: ICD-10-CM

## 2020-03-17 DIAGNOSIS — E66.3 OVERWEIGHT (BMI 25.0-29.9): ICD-10-CM

## 2020-03-17 DIAGNOSIS — F41.9 ANXIETY AND DEPRESSION: ICD-10-CM

## 2020-03-17 DIAGNOSIS — Z23 NEED FOR SHINGLES VACCINE: ICD-10-CM

## 2020-03-17 DIAGNOSIS — K21.9 GASTROESOPHAGEAL REFLUX DISEASE, ESOPHAGITIS PRESENCE NOT SPECIFIED: ICD-10-CM

## 2020-03-17 DIAGNOSIS — J06.9 URI WITH COUGH AND CONGESTION: ICD-10-CM

## 2020-03-17 DIAGNOSIS — B99.9 RECURRENT INFECTIONS: ICD-10-CM

## 2020-03-17 PROBLEM — K92.1 MELENA: Status: RESOLVED | Noted: 2020-02-04 | Resolved: 2020-03-17

## 2020-03-17 LAB
ALBUMIN SERPL BCP-MCNC: 3.7 G/DL (ref 3.5–5.2)
ALP SERPL-CCNC: 112 U/L (ref 55–135)
ALT SERPL W/O P-5'-P-CCNC: 17 U/L (ref 10–44)
ANION GAP SERPL CALC-SCNC: 5 MMOL/L (ref 8–16)
ANISOCYTOSIS BLD QL SMEAR: ABNORMAL
AST SERPL-CCNC: 24 U/L (ref 10–40)
BASOPHILS # BLD AUTO: ABNORMAL K/UL (ref 0–0.2)
BASOPHILS NFR BLD: 0 % (ref 0–1.9)
BILIRUB SERPL-MCNC: 0.5 MG/DL (ref 0.1–1)
BUN SERPL-MCNC: 14 MG/DL (ref 6–20)
CALCIUM SERPL-MCNC: 9.6 MG/DL (ref 8.7–10.5)
CHLORIDE SERPL-SCNC: 107 MMOL/L (ref 95–110)
CO2 SERPL-SCNC: 28 MMOL/L (ref 23–29)
CREAT SERPL-MCNC: 0.8 MG/DL (ref 0.5–1.4)
DIFFERENTIAL METHOD: ABNORMAL
EOSINOPHIL # BLD AUTO: ABNORMAL K/UL (ref 0–0.5)
EOSINOPHIL NFR BLD: 7 % (ref 0–8)
ERYTHROCYTE [DISTWIDTH] IN BLOOD BY AUTOMATED COUNT: 23.5 % (ref 11.5–14.5)
EST. GFR  (AFRICAN AMERICAN): >60 ML/MIN/1.73 M^2
EST. GFR  (NON AFRICAN AMERICAN): >60 ML/MIN/1.73 M^2
FERRITIN SERPL-MCNC: 487 NG/ML (ref 20–300)
GLUCOSE SERPL-MCNC: 102 MG/DL (ref 70–110)
HAPTOGLOB SERPL-MCNC: 108 MG/DL (ref 30–250)
HCT VFR BLD AUTO: 41.6 % (ref 37–48.5)
HGB BLD-MCNC: 12.8 G/DL (ref 12–16)
IGG SERPL-MCNC: 1123 MG/DL (ref 650–1600)
IMM GRANULOCYTES # BLD AUTO: ABNORMAL K/UL (ref 0–0.04)
IMM GRANULOCYTES NFR BLD AUTO: ABNORMAL % (ref 0–0.5)
IRON SERPL-MCNC: 113 UG/DL (ref 30–160)
LDH SERPL L TO P-CCNC: 234 U/L (ref 110–260)
LYMPHOCYTES # BLD AUTO: ABNORMAL K/UL (ref 1–4.8)
LYMPHOCYTES NFR BLD: 37 % (ref 18–48)
MCH RBC QN AUTO: 27.8 PG (ref 27–31)
MCHC RBC AUTO-ENTMCNC: 30.8 G/DL (ref 32–36)
MCV RBC AUTO: 90 FL (ref 82–98)
MONOCYTES # BLD AUTO: ABNORMAL K/UL (ref 0.3–1)
MONOCYTES NFR BLD: 9 % (ref 4–15)
NEUTROPHILS NFR BLD: 47 % (ref 38–73)
NRBC BLD-RTO: 0 /100 WBC
OVALOCYTES BLD QL SMEAR: ABNORMAL
PLATELET # BLD AUTO: 282 K/UL (ref 150–350)
PLATELET BLD QL SMEAR: ABNORMAL
PMV BLD AUTO: 9 FL (ref 9.2–12.9)
POIKILOCYTOSIS BLD QL SMEAR: SLIGHT
POTASSIUM SERPL-SCNC: 4.4 MMOL/L (ref 3.5–5.1)
PROT SERPL-MCNC: 7.3 G/DL (ref 6–8.4)
RBC # BLD AUTO: 4.61 M/UL (ref 4–5.4)
SATURATED IRON: 39 % (ref 20–50)
SODIUM SERPL-SCNC: 140 MMOL/L (ref 136–145)
TOTAL IRON BINDING CAPACITY: 290 UG/DL (ref 250–450)
TRANSFERRIN SERPL-MCNC: 196 MG/DL (ref 200–375)
WBC # BLD AUTO: 4.51 K/UL (ref 3.9–12.7)

## 2020-03-17 PROCEDURE — 99999 PR PBB SHADOW E&M-EST. PATIENT-LVL IV: ICD-10-PCS | Mod: PBBFAC,,, | Performed by: FAMILY MEDICINE

## 2020-03-17 PROCEDURE — 3008F BODY MASS INDEX DOCD: CPT | Mod: CPTII,S$GLB,, | Performed by: FAMILY MEDICINE

## 2020-03-17 PROCEDURE — 85007 BL SMEAR W/DIFF WBC COUNT: CPT

## 2020-03-17 PROCEDURE — 82784 ASSAY IGA/IGD/IGG/IGM EACH: CPT

## 2020-03-17 PROCEDURE — 99999 PR PBB SHADOW E&M-EST. PATIENT-LVL IV: CPT | Mod: PBBFAC,,, | Performed by: FAMILY MEDICINE

## 2020-03-17 PROCEDURE — 82787 IGG 1 2 3 OR 4 EACH: CPT

## 2020-03-17 PROCEDURE — 83615 LACTATE (LD) (LDH) ENZYME: CPT

## 2020-03-17 PROCEDURE — 99214 PR OFFICE/OUTPT VISIT, EST, LEVL IV, 30-39 MIN: ICD-10-PCS | Mod: S$GLB,,, | Performed by: FAMILY MEDICINE

## 2020-03-17 PROCEDURE — 85027 COMPLETE CBC AUTOMATED: CPT

## 2020-03-17 PROCEDURE — 86774 TETANUS ANTIBODY: CPT

## 2020-03-17 PROCEDURE — 82728 ASSAY OF FERRITIN: CPT

## 2020-03-17 PROCEDURE — 80053 COMPREHEN METABOLIC PANEL: CPT

## 2020-03-17 PROCEDURE — 83010 ASSAY OF HAPTOGLOBIN QUANT: CPT

## 2020-03-17 PROCEDURE — 83540 ASSAY OF IRON: CPT

## 2020-03-17 PROCEDURE — 99214 OFFICE O/P EST MOD 30 MIN: CPT | Mod: S$GLB,,, | Performed by: FAMILY MEDICINE

## 2020-03-17 PROCEDURE — 3008F PR BODY MASS INDEX (BMI) DOCUMENTED: ICD-10-PCS | Mod: CPTII,S$GLB,, | Performed by: FAMILY MEDICINE

## 2020-03-17 RX ORDER — LORAZEPAM 1 MG/1
TABLET ORAL
Qty: 30 TABLET | Refills: 5 | Status: SHIPPED | OUTPATIENT
Start: 2020-03-17 | End: 2020-09-28

## 2020-03-17 NOTE — PATIENT INSTRUCTIONS
Your upper respiratory infection seems to be resolving, however, I would recommend nasal washes twice daily with simply saline isotonic premixed nasal rinse due to residual congestion and crusting.    Follow-up with orthopedics-doctor Jamila as scheduled.    Follow-up with allergy/immunology regarding the pending results of today's labs.    ADVISE LOOKING INTO NEW 2-PART, NON-LIVE SHINGRIX SHINGLES VACCINE THROUGH YOUR LOCAL PHARMACY.

## 2020-03-17 NOTE — PROGRESS NOTES
Office Visit    Patient Name: Terese Barney    : 1960  MRN: [id number]    Subjective:  Terese is a 59 y.o. female who presents today for:    Follow-up    59 year old female patient of mine most recently seen 20 for annual physical and hospital follow up, here for follow up visit of chronic medical conditions that include osteoarthritis of her left knee, anxiety, chronic low back pain, and recent severe iron deficiency anemia secondary to GI bleed.      She was hospitalized at the end of January for Fe deficiency anemia. Had SOB, palpitations, intermittent dark stools, and excessive fatigue for 3-4 weeks. She had labs drawn by her allergist which revealed a low Hb/Hct and was told to present to the ED. Hb was as low as 5.4. Endoscopy revealed a hiatal hernia and ulcer with no active bleeding. A rectal polyp 6 mm was found on colonoscopy with no bleeding(tubular adenoma, 5 year recall). Given 2 units while in the hospital. Has followed up with Heme and GI since discharge.  Most recently seen by GI doctor Laurent on  2020 with recommendation to consider esophagram if dysphagia persists (EGD showed hiatal hernia and ulcers but with no stigmata of bleeding), consideration for capsule endoscopy if needed.    She was most recently seen by heme/Onc Dr. Guerrero on 2020.  She received 3 doses of parenteral iron and is also on oral iron.  Her most recent labs today show normal H&H of 12.8/41.6, with normal MCV.  Ferritin is now 487 after being 3 1 month ago. Symptoms including shortness of breath and fatigue have resolved.  She is continuing oral iron and has heme/ Onc follow-up     Chronic L knee pain since a fall she had in 2018. Had a meniscectomy at that time which improved her pain for a short period of time. Difficult for her to walk and limits her abilities at work,  at [employer].  She went to PT after the surgery but didn't feel as though it was helpful. Does not do these PT  exercises at home. Followed by Ortho Dr. Coffey. Imaging reveals arthritis in three compartments and some degenerative mensicus changes. Has had steroid and lubrication injections in the knee with minimal benefit. Back at work but this is very challenging with her Left knee pain-- can't take antiinflammatories, trying brace from Dr Coffey, painful at all times, but no instability.      Pt has had multiple episodes of recent URI/bronchitis.  She is under the evaluation of allergy/immunology with labs pending from today.  She reports some significant benefit following her most recent round of antibiotics and with resolution of her recent severe anemia.  Does have some residual nasal congestion and ear plugging but her cough and sinus pressure has largely resolved.     Pt has had anxiety for which she says is controlled. Taking duloxetine 60 mg daily and lorazepam for sleep.    Past Medical History  Past Medical History:   Diagnosis Date    Anxiety and depression 2016    Chronic back pain     Hammer toe of left foot     Insomnia     Motion sickness     Overweight (BMI 25.0-29.9) 2016    PONV (postoperative nausea and vomiting)     Vitamin D deficiency 2016       Past Surgical History  Past Surgical History:   Procedure Laterality Date    AUGMENTATION OF BREAST Bilateral     BREAST SURGERY Bilateral      SECTION      two    COLONOSCOPY N/A 2020    Procedure: COLONOSCOPY/Suprep;  Surgeon: Cristhian Mancilla MD;  Location: [facility];  Service: Endoscopy;  Laterality: N/A;    ESOPHAGOGASTRODUODENOSCOPY N/A 2020    Procedure: ESOPHAGOGASTRODUODENOSCOPY (EGD);  Surgeon: Crsithian Mancilla MD;  Location: [facility];  Service: Endoscopy;  Laterality: N/A;    foot surgery      left plantar fascial release    FOOT SURGERY Left 2017    2nd TOE, Hammer toe repair    HYSTERECTOMY      laparascopy      ovarian cysts    left knee surgery      TONSILLECTOMY      TOTAL ABDOMINAL  HYSTERECTOMY W/ BILATERAL SALPINGOOPHORECTOMY  2004       Family History  Family History   Problem Relation Age of Onset    Hypertension Mother     Breast cancer Mother     Macular degeneration Father     Diabetes type II Father     Coronary artery disease Father     Hypertension Sister     Diabetes type II Sister     Diabetes type II Brother     Hypertension Brother     Glaucoma Paternal Grandmother        Social History  Social History     Socioeconomic History    Marital status:      Spouse name: Not on file    Number of children: Not on file    Years of education: Not on file    Highest education level: Not on file   Occupational History    Occupation:      Comment: middle school   Social Needs    Financial resource strain: Not on file    Food insecurity:     Worry: Not on file     Inability: Not on file    Transportation needs:     Medical: Not on file     Non-medical: Not on file   Tobacco Use    Smoking status: Never Smoker    Smokeless tobacco: Never Used   Substance and Sexual Activity    Alcohol use: Yes     Comment: rare    Drug use: No    Sexual activity: Not on file   Lifestyle    Physical activity:     Days per week: Not on file     Minutes per session: Not on file    Stress: Not on file   Relationships    Social connections:     Talks on phone: Not on file     Gets together: Not on file     Attends [employer] service: Not on file     Active member of club or organization: Not on file     Attends meetings of clubs or organizations: Not on file     Relationship status: Not on file   Other Topics Concern    Not on file   Social History Narrative    Not on file       Current Medications  Medications reviewed and updated.     Allergies   Review of patient's allergies indicates:   Allergen Reactions    Codeine Nausea And Vomiting       Review of Systems (Pertinent positives)  Review of Systems    BP (!) 140/88 (BP Location: Left arm, Patient Position:  "Sitting)   Pulse 90   Temp 98.2 °F (36.8 °C) (Oral)   Ht 5' 2" (1.575 m)   Wt 70.7 kg (155 lb 13.8 oz)   LMP  (LMP Unknown)   SpO2 98%   BMI 28.51 kg/m²     Physical Exam   Constitutional: She is oriented to person, place, and time. She appears well-developed and well-nourished. No distress.   HENT:   Head: Normocephalic and atraumatic.   Right Ear: Tympanic membrane is not erythematous and not bulging. A middle ear effusion is present.   Left Ear: Tympanic membrane is not erythematous and not bulging. A middle ear effusion is present.   Nose: Mucosal edema and rhinorrhea present.   Mouth/Throat: Oropharynx is clear and moist. No oropharyngeal exudate, posterior oropharyngeal edema or posterior oropharyngeal erythema.   Eyes: Conjunctivae are normal.   Cardiovascular: Normal rate and regular rhythm.   Pulmonary/Chest: Effort normal and breath sounds normal.   Musculoskeletal: She exhibits no edema.   Neurological: She is alert and oriented to person, place, and time.   Skin: Skin is warm and dry.   Psychiatric: She has a normal mood and affect.   Vitals reviewed.        Assessment/Plan:  Terese Barney is a 59 y.o. female who presents today for :    Terese was seen today for follow-up.    Diagnoses and all orders for this visit:    Primary osteoarthritis of left knee  Comments:  Poor response to conservative management that has included PT/bracing/injections.  Will follow-up with ortho to discuss knee replacement  Orders:  -     Ambulatory referral/consult to Orthopedics; Future    Iron deficiency anemia due to chronic blood loss  Comments:  h/h now normalized s/p 3 doeses of IV iron, has heme/onc follow up to review labs and recommendations regarding oral iron    Overweight (BMI 25.0-29.9)    Osteopenia, unspecified location  Comments:  Mild as per DEXA in 2018, on calcium/vitamin-D, due for repeat DEXA next month-ordered to accompany mammogram order    Gastroesophageal reflux disease, esophagitis presence " not specified  Comments:  Following w/ GI Dr Mancilla. On daily Protonix & doing well.  PPI labs recently normal other than mildly low vitamin-D. Consider esophagram/capsule endoscopy PRN    Anxiety and depression  Comments:  Stable on Cymbalta 60 mg daily.  Orders:  -     LORazepam (ATIVAN) 1 MG tablet; TAKE 1/2 TO 1 (ONE-HALF TO ONE) TABLET BY MOUTH ONCE DAILY AS NEEDED FOR SEVERE ANXIETY, SLEEP    Encounter for screening mammogram for breast cancer  -     Mammo Digital Screening Bilat; Future    URI with cough and congestion  Comments:  Recently w/ recurring URIs. Doing better w/ improvement in anemia and following most recent course of antibiotics.  Follow-up w/ Allergy/Immunology    Need for shingles vaccine    Postmenopausal  -     DXA Bone Density Spine And Hip; Future            ICD-10-CM ICD-9-CM    1. Primary osteoarthritis of left knee M17.12 715.16 Ambulatory referral/consult to Orthopedics    Poor response to conservative management that has included PT/bracing/injections.  Will follow-up with ortho to discuss knee replacement   2. Iron deficiency anemia due to chronic blood loss D50.0 280.0     h/h now normalized s/p 3 doeses of IV iron, has heme/onc follow up to review labs and recommendations regarding oral iron   3. Overweight (BMI 25.0-29.9) E66.3 278.02    4. Osteopenia, unspecified location M85.80 733.90     Mild as per DEXA in 2018, on calcium/vitamin-D, due for repeat DEXA next month-ordered to accompany mammogram order   5. Gastroesophageal reflux disease, esophagitis presence not specified K21.9 530.81     Following w/ GI Dr Mancilla. On daily Protonix & doing well.  PPI labs recently normal other than mildly low vitamin-D. Consider esophagram/capsule endoscopy PRN   6. Anxiety and depression F41.9 300.00 LORazepam (ATIVAN) 1 MG tablet    F32.9 311     Stable on Cymbalta 60 mg daily.   7. Encounter for screening mammogram for breast cancer Z12.31 V76.12 Mammo Digital Screening Bilat   8. URI with  cough and congestion J06.9 465.9     Recently w/ recurring URIs. Doing better w/ improvement in anemia and following most recent course of antibiotics.  Follow-up w/ Allergy/Immunology   9. Need for shingles vaccine Z23 V04.89    10. Postmenopausal Z78.0 V49.81 DXA Bone Density Spine And Hip       Patient Instructions   Your upper respiratory infection seems to be resolving, however, I would recommend nasal washes twice daily with simply saline isotonic premixed nasal rinse due to residual congestion and crusting.    Follow-up with orthopedics-doctor Jamila as scheduled.    Follow-up with allergy/immunology regarding the pending results of today's labs.    ADVISE LOOKING INTO NEW 2-PART, NON-LIVE SHINGRIX SHINGLES VACCINE THROUGH YOUR LOCAL PHARMACY.           Follow up for to follow up on lab results, return as needed for new concerns.

## 2020-03-18 LAB
IGG1 SER-MCNC: 620 MG/DL (ref 382–929)
IGG2 SER-MCNC: 363 MG/DL (ref 242–700)
IGG3 SER-MCNC: 28 MG/DL (ref 22–176)
IGG4 SER-MCNC: 69 MG/DL (ref 4–86)

## 2020-03-20 ENCOUNTER — TELEPHONE (OUTPATIENT)
Dept: HEMATOLOGY/ONCOLOGY | Facility: CLINIC | Age: 60
End: 2020-03-20

## 2020-03-20 NOTE — TELEPHONE ENCOUNTER
Pt. Confirmed cancelled appt. With Dr. Guerrero on 3/26/20 and requests that he call her on 3/27/20 with lab results.  Assured her that Dr. Guerrero will be notified.

## 2020-03-22 ENCOUNTER — PATIENT MESSAGE (OUTPATIENT)
Dept: ALLERGY | Facility: CLINIC | Age: 60
End: 2020-03-22

## 2020-03-23 LAB
C DIPHTHERIAE AB SER IA-ACNC: 1.86 IU/ML
C TETANI AB SER-ACNC: >5.33 IU/ML
DEPRECATED S PNEUM 1 IGG SER-MCNC: 19.9 MCG/ML
DEPRECATED S PNEUM12 IGG SER-MCNC: 4.8 MCG/ML
DEPRECATED S PNEUM14 IGG SER-MCNC: 103.8 MCG/ML
DEPRECATED S PNEUM19 IGG SER-MCNC: 13.4 MCG/ML
DEPRECATED S PNEUM23 IGG SER-MCNC: 3.3 MCG/ML
DEPRECATED S PNEUM3 IGG SER-MCNC: 4.8 MCG/ML
DEPRECATED S PNEUM4 IGG SER-MCNC: <0.3 MCG/ML
DEPRECATED S PNEUM5 IGG SER-MCNC: 2 MCG/ML
DEPRECATED S PNEUM8 IGG SER-MCNC: 14.5 MCG/ML
DEPRECATED S PNEUM9 IGG SER-MCNC: 21.9 MCG/ML
HAEM INFLU B IGG SER-MCNC: 0.52 MCG/ML
S PNEUM DA 18C IGG SER-MCNC: 10.8 MCG/ML
S PNEUM DA 6B IGG SER-MCNC: 37.6 MCG/ML
S PNEUM DA 7F IGG SER-MCNC: 22.1 MCG/ML
S PNEUM DA 9V IGG SER-MCNC: 3.4 MCG/ML

## 2020-03-24 ENCOUNTER — TELEPHONE (OUTPATIENT)
Dept: HEMATOLOGY/ONCOLOGY | Facility: CLINIC | Age: 60
End: 2020-03-24

## 2020-03-24 ENCOUNTER — PATIENT MESSAGE (OUTPATIENT)
Dept: HEMATOLOGY/ONCOLOGY | Facility: CLINIC | Age: 60
End: 2020-03-24

## 2020-03-24 NOTE — TELEPHONE ENCOUNTER
I called Ms. Barney. I reviewed her labs from last week. Her iron deficiency anemia has improved dramatically. She no longer has iron deficiency nor anemia.    I recommend repeating the iron studies and CBC in 3 months. Continue ferrous sulfate.    Patient is in agreement with the proposed treatment plan. All questions were answered to her satisfaction.    Jose Luis Guerrero M.D.  Hematology/Oncology  [facility] - [address], [address]  [place] [address]  Phone: [phone]  Fax: [phone]

## 2020-03-24 NOTE — TELEPHONE ENCOUNTER
----- Message from Guillaume Lin RN sent at 3/20/2020  4:53 PM CDT -----  Pt. requests that you call her on 3/27/20 with lab results.    Thanks,    Guillaume

## 2020-03-26 ENCOUNTER — TELEPHONE (OUTPATIENT)
Dept: GASTROENTEROLOGY | Facility: CLINIC | Age: 60
End: 2020-03-26

## 2020-03-26 NOTE — TELEPHONE ENCOUNTER
----- Message from Cristhian Mancilla MD sent at 3/26/2020 10:48 AM CDT -----  Labs have normalized. F/u with me in 4 months (video visit or in person is ok)

## 2020-03-30 ENCOUNTER — TELEPHONE (OUTPATIENT)
Dept: ORTHOPEDICS | Facility: CLINIC | Age: 60
End: 2020-03-30

## 2020-03-30 NOTE — TELEPHONE ENCOUNTER
I called pt and had to leave a message that her appt on 04/27/2020 is being cancelled, because the physician is not seeing pt and she can call to schedule on his next available appt in July or call back when they need to be seen.

## 2020-04-02 ENCOUNTER — PATIENT MESSAGE (OUTPATIENT)
Dept: FAMILY MEDICINE | Facility: CLINIC | Age: 60
End: 2020-04-02

## 2020-04-02 DIAGNOSIS — K21.9 GASTROESOPHAGEAL REFLUX DISEASE, ESOPHAGITIS PRESENCE NOT SPECIFIED: Primary | ICD-10-CM

## 2020-04-03 RX ORDER — PANTOPRAZOLE SODIUM 40 MG/1
40 TABLET, DELAYED RELEASE ORAL
Qty: 180 TABLET | Refills: 1 | Status: SHIPPED | OUTPATIENT
Start: 2020-04-03 | End: 2022-05-18 | Stop reason: SDUPTHER

## 2020-04-23 ENCOUNTER — TELEPHONE (OUTPATIENT)
Dept: SPORTS MEDICINE | Facility: CLINIC | Age: 60
End: 2020-04-23

## 2020-05-04 ENCOUNTER — HOSPITAL ENCOUNTER (OUTPATIENT)
Dept: RADIOLOGY | Facility: HOSPITAL | Age: 60
Discharge: HOME | End: 2020-05-04
Attending: FAMILY MEDICINE
Payer: COMMERCIAL

## 2020-05-04 DIAGNOSIS — Z78.0 POSTMENOPAUSAL: ICD-10-CM

## 2020-05-04 DIAGNOSIS — Z12.31 ENCOUNTER FOR SCREENING MAMMOGRAM FOR BREAST CANCER: ICD-10-CM

## 2020-05-04 PROCEDURE — 77063 MAMMO DIGITAL SCREENING BILAT WITH TOMOSYNTHESIS_CAD: ICD-10-PCS | Mod: 26,,, | Performed by: RADIOLOGY

## 2020-05-04 PROCEDURE — 77080 DEXA BONE DENSITY SPINE HIP: ICD-10-PCS | Mod: 26,,, | Performed by: RADIOLOGY

## 2020-05-04 PROCEDURE — 77067 MAMMO DIGITAL SCREENING BILAT WITH TOMOSYNTHESIS_CAD: ICD-10-PCS | Mod: 26,,, | Performed by: RADIOLOGY

## 2020-05-04 PROCEDURE — 77063 BREAST TOMOSYNTHESIS BI: CPT | Mod: 26,,, | Performed by: RADIOLOGY

## 2020-05-04 PROCEDURE — 77080 DXA BONE DENSITY AXIAL: CPT | Mod: TC

## 2020-05-04 PROCEDURE — 77080 DXA BONE DENSITY AXIAL: CPT | Mod: 26,,, | Performed by: RADIOLOGY

## 2020-05-04 PROCEDURE — 77067 SCR MAMMO BI INCL CAD: CPT | Mod: TC

## 2020-05-04 PROCEDURE — 77067 SCR MAMMO BI INCL CAD: CPT | Mod: 26,,, | Performed by: RADIOLOGY

## 2020-05-09 ENCOUNTER — PATIENT MESSAGE (OUTPATIENT)
Dept: FAMILY MEDICINE | Facility: CLINIC | Age: 60
End: 2020-05-09

## 2020-05-12 ENCOUNTER — OFFICE VISIT (OUTPATIENT)
Dept: FAMILY MEDICINE | Facility: CLINIC | Age: 60
End: 2020-05-12
Payer: COMMERCIAL

## 2020-05-12 ENCOUNTER — LAB VISIT (OUTPATIENT)
Dept: LAB | Facility: HOSPITAL | Age: 60
End: 2020-05-12
Attending: FAMILY MEDICINE
Payer: COMMERCIAL

## 2020-05-12 VITALS
TEMPERATURE: 98 F | SYSTOLIC BLOOD PRESSURE: 118 MMHG | WEIGHT: 155.63 LBS | HEART RATE: 89 BPM | BODY MASS INDEX: 28.64 KG/M2 | DIASTOLIC BLOOD PRESSURE: 77 MMHG | OXYGEN SATURATION: 97 % | HEIGHT: 62 IN

## 2020-05-12 DIAGNOSIS — Z01.810 PREOP CARDIOVASCULAR EXAM: ICD-10-CM

## 2020-05-12 DIAGNOSIS — E55.9 VITAMIN D DEFICIENCY: ICD-10-CM

## 2020-05-12 DIAGNOSIS — Z87.19 HISTORY OF GI BLEED: ICD-10-CM

## 2020-05-12 DIAGNOSIS — M17.12 PRIMARY OSTEOARTHRITIS OF LEFT KNEE: ICD-10-CM

## 2020-05-12 DIAGNOSIS — D50.0 IRON DEFICIENCY ANEMIA DUE TO CHRONIC BLOOD LOSS: ICD-10-CM

## 2020-05-12 DIAGNOSIS — G89.29 CHRONIC PAIN OF LEFT KNEE: Primary | ICD-10-CM

## 2020-05-12 DIAGNOSIS — E66.3 OVERWEIGHT (BMI 25.0-29.9): ICD-10-CM

## 2020-05-12 DIAGNOSIS — M85.80 OSTEOPENIA, UNSPECIFIED LOCATION: ICD-10-CM

## 2020-05-12 DIAGNOSIS — M25.562 CHRONIC PAIN OF LEFT KNEE: Primary | ICD-10-CM

## 2020-05-12 LAB
25(OH)D3+25(OH)D2 SERPL-MCNC: 29 NG/ML (ref 30–96)
ALBUMIN SERPL BCP-MCNC: 3.9 G/DL (ref 3.5–5.2)
ALP SERPL-CCNC: 106 U/L (ref 55–135)
ALT SERPL W/O P-5'-P-CCNC: 17 U/L (ref 10–44)
ANION GAP SERPL CALC-SCNC: 7 MMOL/L (ref 8–16)
AST SERPL-CCNC: 24 U/L (ref 10–40)
BASOPHILS # BLD AUTO: 0.03 K/UL (ref 0–0.2)
BASOPHILS NFR BLD: 0.6 % (ref 0–1.9)
BILIRUB SERPL-MCNC: 0.5 MG/DL (ref 0.1–1)
BUN SERPL-MCNC: 14 MG/DL (ref 6–20)
CALCIUM SERPL-MCNC: 9.6 MG/DL (ref 8.7–10.5)
CHLORIDE SERPL-SCNC: 107 MMOL/L (ref 95–110)
CO2 SERPL-SCNC: 28 MMOL/L (ref 23–29)
CREAT SERPL-MCNC: 0.8 MG/DL (ref 0.5–1.4)
DIFFERENTIAL METHOD: ABNORMAL
EOSINOPHIL # BLD AUTO: 0.2 K/UL (ref 0–0.5)
EOSINOPHIL NFR BLD: 3.2 % (ref 0–8)
ERYTHROCYTE [DISTWIDTH] IN BLOOD BY AUTOMATED COUNT: 12.8 % (ref 11.5–14.5)
EST. GFR  (AFRICAN AMERICAN): >60 ML/MIN/1.73 M^2
EST. GFR  (NON AFRICAN AMERICAN): >60 ML/MIN/1.73 M^2
FERRITIN SERPL-MCNC: 314 NG/ML (ref 20–300)
GLUCOSE SERPL-MCNC: 107 MG/DL (ref 70–110)
HCT VFR BLD AUTO: 40.5 % (ref 37–48.5)
HGB BLD-MCNC: 13 G/DL (ref 12–16)
IMM GRANULOCYTES # BLD AUTO: 0.01 K/UL (ref 0–0.04)
IMM GRANULOCYTES NFR BLD AUTO: 0.2 % (ref 0–0.5)
IRON SERPL-MCNC: 151 UG/DL (ref 30–160)
LYMPHOCYTES # BLD AUTO: 1.2 K/UL (ref 1–4.8)
LYMPHOCYTES NFR BLD: 26.5 % (ref 18–48)
MCH RBC QN AUTO: 31.6 PG (ref 27–31)
MCHC RBC AUTO-ENTMCNC: 32.1 G/DL (ref 32–36)
MCV RBC AUTO: 99 FL (ref 82–98)
MONOCYTES # BLD AUTO: 0.5 K/UL (ref 0.3–1)
MONOCYTES NFR BLD: 9.8 % (ref 4–15)
NEUTROPHILS # BLD AUTO: 2.8 K/UL (ref 1.8–7.7)
NEUTROPHILS NFR BLD: 59.7 % (ref 38–73)
NRBC BLD-RTO: 0 /100 WBC
PLATELET # BLD AUTO: 285 K/UL (ref 150–350)
PMV BLD AUTO: 9.4 FL (ref 9.2–12.9)
POTASSIUM SERPL-SCNC: 4.6 MMOL/L (ref 3.5–5.1)
PROT SERPL-MCNC: 7.3 G/DL (ref 6–8.4)
RBC # BLD AUTO: 4.11 M/UL (ref 4–5.4)
SATURATED IRON: 46 % (ref 20–50)
SODIUM SERPL-SCNC: 142 MMOL/L (ref 136–145)
TOTAL IRON BINDING CAPACITY: 327 UG/DL (ref 250–450)
TRANSFERRIN SERPL-MCNC: 221 MG/DL (ref 200–375)
WBC # BLD AUTO: 4.68 K/UL (ref 3.9–12.7)

## 2020-05-12 PROCEDURE — 3008F BODY MASS INDEX DOCD: CPT | Mod: CPTII,S$GLB,, | Performed by: FAMILY MEDICINE

## 2020-05-12 PROCEDURE — 85025 COMPLETE CBC W/AUTO DIFF WBC: CPT

## 2020-05-12 PROCEDURE — 99214 PR OFFICE/OUTPT VISIT, EST, LEVL IV, 30-39 MIN: ICD-10-PCS | Mod: S$GLB,,, | Performed by: FAMILY MEDICINE

## 2020-05-12 PROCEDURE — 99999 PR PBB SHADOW E&M-EST. PATIENT-LVL III: ICD-10-PCS | Mod: PBBFAC,,, | Performed by: FAMILY MEDICINE

## 2020-05-12 PROCEDURE — 82306 VITAMIN D 25 HYDROXY: CPT

## 2020-05-12 PROCEDURE — 80053 COMPREHEN METABOLIC PANEL: CPT

## 2020-05-12 PROCEDURE — 99999 PR PBB SHADOW E&M-EST. PATIENT-LVL III: CPT | Mod: PBBFAC,,, | Performed by: FAMILY MEDICINE

## 2020-05-12 PROCEDURE — 99214 OFFICE O/P EST MOD 30 MIN: CPT | Mod: S$GLB,,, | Performed by: FAMILY MEDICINE

## 2020-05-12 PROCEDURE — 83540 ASSAY OF IRON: CPT

## 2020-05-12 PROCEDURE — 3008F PR BODY MASS INDEX (BMI) DOCUMENTED: ICD-10-PCS | Mod: CPTII,S$GLB,, | Performed by: FAMILY MEDICINE

## 2020-05-12 PROCEDURE — 82728 ASSAY OF FERRITIN: CPT

## 2020-05-12 PROCEDURE — 36415 COLL VENOUS BLD VENIPUNCTURE: CPT

## 2020-05-12 RX ORDER — DICLOFENAC SODIUM 10 MG/G
4 GEL TOPICAL 4 TIMES DAILY PRN
Qty: 100 G | Refills: 11 | Status: SHIPPED | OUTPATIENT
Start: 2020-05-12 | End: 2022-05-18

## 2020-05-12 NOTE — PROGRESS NOTES
Office Visit    Patient Name: Terese Barney    : 1960  MRN: [id number]    Subjective:  Terese is a 59 y.o. female who presents today for:    Pre-op Exam    Surgery: LEFT KNEE REPLACEMENT, PARTIAL VS TOTAL TBD AT TIME OF SURGERY  Indication: severe worsening pain and instability with inadequate response to conservative therapy  Surgeon and anticipated date of surgery: Dr Lilly, [facility] Scheduled for May 28, 2020     Feeling Healthy and Well Without Symptoms of Illness: YES: denies, f/c/n/v, sore throat/URI sx, dysuria, diarrhea, rash.     Exercise Tolerance:  Could exercise vigorously-- ie walk 3-5 miles prior to hurting her knee. Did not have and does not have shortness of breath, palpitations, chest pain.  DENIES dizziness/lightheadedness, edema.     Previous Trouble with Anesthesia: NONE     Easy Bleeding/Bruising?  NO BLEEDING, and mildly easy bruising. Use of anticoagulants, blood thinners?:     Chronic Conditions well Controlled: yes,  Iron deficiency anemia now resolved status post iron infusions, previous transfusions.  Most recent CBC With H&H 12.1/41.6 on  2020.   Her iron deficiency was thought to be secondary to GI bleed following prolonged use of anti-inflammatories she was taking for her knee pain.  EGD showed hiatal hernia and ulcers but with no stigmata of bleeding), consideration for capsule endoscopy if needed.  Has followed up with GI doctor Laurent.       Past Medical History  Past Medical History:   Diagnosis Date    Anxiety and depression 2016    Chronic back pain     Hammer toe of left foot     Insomnia     Motion sickness     Overweight (BMI 25.0-29.9) 2016    PONV (postoperative nausea and vomiting)     Vitamin D deficiency 2016       Past Surgical History  Past Surgical History:   Procedure Laterality Date    AUGMENTATION OF BREAST Bilateral     BREAST SURGERY Bilateral      SECTION      two    COLONOSCOPY N/A 2020    Procedure:  COLONOSCOPY/Suprep;  Surgeon: Cristhian Mancilla MD;  Location: [facility];  Service: Endoscopy;  Laterality: N/A;    ESOPHAGOGASTRODUODENOSCOPY N/A 1/31/2020    Procedure: ESOPHAGOGASTRODUODENOSCOPY (EGD);  Surgeon: Cristhian Mancilla MD;  Location: [facility];  Service: Endoscopy;  Laterality: N/A;    foot surgery      left plantar fascial release    FOOT SURGERY Left 03/28/2017    2nd TOE, Hammer toe repair    HYSTERECTOMY      laparascopy      ovarian cysts    left knee surgery      TONSILLECTOMY      TOTAL ABDOMINAL HYSTERECTOMY W/ BILATERAL SALPINGOOPHORECTOMY  2004       Family History  Family History   Problem Relation Age of Onset    Hypertension Mother     Breast cancer Mother     Macular degeneration Father     Diabetes type II Father     Coronary artery disease Father     Hypertension Sister     Diabetes type II Sister     Diabetes type II Brother     Hypertension Brother     Glaucoma Paternal Grandmother        Social History  Social History     Socioeconomic History    Marital status:      Spouse name: Not on file    Number of children: Not on file    Years of education: Not on file    Highest education level: Not on file   Occupational History    Occupation:      Comment: middle school   Social Needs    Financial resource strain: Not on file    Food insecurity:     Worry: Not on file     Inability: Not on file    Transportation needs:     Medical: Not on file     Non-medical: Not on file   Tobacco Use    Smoking status: Never Smoker    Smokeless tobacco: Never Used   Substance and Sexual Activity    Alcohol use: Yes     Comment: rare    Drug use: No    Sexual activity: Not on file   Lifestyle    Physical activity:     Days per week: Not on file     Minutes per session: Not on file    Stress: Not on file   Relationships    Social connections:     Talks on phone: Not on file     Gets together: Not on file     Attends Scientologist service: Not on file      "Active member of club or organization: Not on file     Attends meetings of clubs or organizations: Not on file     Relationship status: Not on file   Other Topics Concern    Not on file   Social History Narrative    Not on file       Current Medications  Medications reviewed and updated.     Allergies   Review of patient's allergies indicates:   Allergen Reactions    Codeine Nausea And Vomiting       Review of Systems (Pertinent positives)  Review of Systems   Constitutional: Negative for chills, fever and unexpected weight change.   HENT: Negative for congestion and sore throat.    Eyes: Negative for visual disturbance.   Respiratory: Negative for chest tightness and shortness of breath.    Cardiovascular: Negative for chest pain, palpitations and leg swelling.   Gastrointestinal: Positive for constipation (mild, from chronic iron ). Negative for nausea and vomiting.   Genitourinary: Negative for difficulty urinating.   Musculoskeletal: Positive for arthralgias.   Neurological: Negative for dizziness, light-headedness and headaches.       /77   Pulse 89   Temp 98.3 °F (36.8 °C)   Ht 5' 2" (1.575 m)   Wt 70.6 kg (155 lb 10.3 oz)   LMP  (LMP Unknown)   SpO2 97%   BMI 28.47 kg/m²     Physical Exam   Constitutional: She is oriented to person, place, and time. She appears well-developed and well-nourished. No distress.   HENT:   Head: Normocephalic and atraumatic.   Eyes: Conjunctivae are normal.   Cardiovascular: Normal rate and regular rhythm.   Pulmonary/Chest: Effort normal and breath sounds normal.   Musculoskeletal: She exhibits no edema.   Neurological: She is alert and oriented to person, place, and time.   Skin: Skin is warm and dry.   Psychiatric: She has a normal mood and affect.   Vitals reviewed.        Assessment/Plan:  Terese Barney is a 59 y.o. female who presents today for :    Terese was seen today for pre-op exam.    Diagnoses and all orders for this visit:    Chronic pain of left " knee  Comments:   has upcoming left knee replacement -partial versus total scheduled w/ [facility] Dr Lilly.  Clear for surgery as per below  Orders:  -     diclofenac sodium (VOLTAREN) 1 % Gel; Apply 4 g topically 4 (four) times daily as needed.    Primary osteoarthritis of left knee  -     diclofenac sodium (VOLTAREN) 1 % Gel; Apply 4 g topically 4 (four) times daily as needed.    Preop cardiovascular exam  Comments:  HISTORY OF EXCELLENT EXERCISE TOLERANCE (LIMITED 2/2 KNEE PAIN),  CHRONIC CONDITIONS CONTROL -ANEMIA RESOLVED. CLEAR FOR SURGERY PENDING LAB RESULTS, EKG WNL   Orders:  -     Comprehensive metabolic panel; Future  -     CBC auto differential; Future    Iron deficiency anemia due to chronic blood loss  Comments:  as of most recent labs anemia has resolved and she has no signs or symptoms of ongoing active bleeding.  Check CBC, iron studies today  Orders:  -     Comprehensive metabolic panel; Future  -     CBC auto differential; Future  -     Iron and TIBC; Future  -     Ferritin; Future    History of GI bleed  Comments:  thought to be secondary to NSAID use though status post overall unremarkable EGD evaluation.  Now stable on Protonix, follow-up with GI.  off NSAIDs    Vitamin D deficiency  Comments:   significant improvement with weekly vitamin-D supplementation and will add daily multivitamin that has some additional vitamin-D 3  Orders:  -     Vitamin D; Future    Overweight (BMI 25.0-29.9)    Osteopenia, unspecified location            ICD-10-CM ICD-9-CM    1. Chronic pain of left knee M25.562 719.46 diclofenac sodium (VOLTAREN) 1 % Gel    G89.29 338.29      has upcoming left knee replacement -partial versus total scheduled w/ [facility] Dr Lilly.  Clear for surgery as per below   2. Primary osteoarthritis of left knee M17.12 715.16 diclofenac sodium (VOLTAREN) 1 % Gel   3. Preop cardiovascular exam Z01.810 V72.81 Comprehensive metabolic panel      CBC auto differential    HISTORY OF EXCELLENT EXERCISE  TOLERANCE (LIMITED 2/2 KNEE PAIN),  CHRONIC CONDITIONS CONTROL -ANEMIA RESOLVED. CLEAR FOR SURGERY PENDING LAB RESULTS, EKG WNL    4. Iron deficiency anemia due to chronic blood loss D50.0 280.0 Comprehensive metabolic panel      CBC auto differential      Iron and TIBC      Ferritin    as of most recent labs anemia has resolved and she has no signs or symptoms of ongoing active bleeding.  Check CBC, iron studies today   5. History of GI bleed Z87.19 V12.79     thought to be secondary to NSAID use though status post overall unremarkable EGD evaluation.  Now stable on Protonix, follow-up with GI.  off NSAIDs   6. Vitamin D deficiency E55.9 268.9 Vitamin D     significant improvement with weekly vitamin-D supplementation and will add daily multivitamin that has some additional vitamin-D 3   7. Overweight (BMI 25.0-29.9) E66.3 278.02    8. Osteopenia, unspecified location M85.80 733.90        Patient Instructions   OK TO CUT BACK ON PROTONIX TO ONCE DAILY IF NO GI CONCERNS STARTING 6 WEEKS POST OP FROM KNEE SURGERY.    CONTINUE TO MINIMIZE ANTIINFLAMMATORIES.               Follow up for to follow up on lab results, return as needed for new concerns.

## 2020-05-12 NOTE — LETTER
May 12, 2020    Terese Barney  [address] [address]             [facility]  [address]  [facility] [address]  Phone: [phone]  Fax: [phone] Dear [facility] [employer]:    My patient Terese Barney ( 1960) has severe left knee pain with underlying osteoarthritis. She has surgery scheduled on May 28, 2020. Until the date of her surgery she should avoid prolonged standing or walking-- I would advise a 10 min break every hour to allow her to elevate her leg briefly.     Please assign her to a position that best accomodates these needs.    Thank you for your consideration.        If you have any questions or concerns, please don't hesitate to call.    Sincerely,          Shanique Potts MD, Board Certified Family Physician,   and PCP for Terese Barney

## 2020-05-12 NOTE — PATIENT INSTRUCTIONS
OK TO CUT BACK ON PROTONIX TO ONCE DAILY IF NO GI CONCERNS STARTING 6 WEEKS POST OP FROM KNEE SURGERY.    CONTINUE TO MINIMIZE ANTIINFLAMMATORIES.

## 2020-05-26 ENCOUNTER — TELEPHONE (OUTPATIENT)
Dept: FAMILY MEDICINE | Facility: CLINIC | Age: 60
End: 2020-05-26

## 2020-05-26 NOTE — TELEPHONE ENCOUNTER
----- Message from Amira Monge sent at 5/26/2020  2:36 PM CDT -----  Contact: Dr Lilly Vbbzaf-[phone]-  Dr Maxx Ozuna is returning your call.

## 2020-05-26 NOTE — TELEPHONE ENCOUNTER
Advised Alise with Dr Mendoza's office that paperwork was on Dr Potts's desk to be signed.  Will have it signed once she is back in clinic.

## 2020-05-26 NOTE — TELEPHONE ENCOUNTER
----- Message from Venus Lyudmila sent at 5/26/2020 10:53 AM CDT -----  Contact: Dr. Maxx Garcia office, [phone]  Called in requesting patient's medical clearance faxed to [phone]. Please advise.

## 2020-05-27 ENCOUNTER — TELEPHONE (OUTPATIENT)
Dept: FAMILY MEDICINE | Facility: CLINIC | Age: 60
End: 2020-05-27

## 2020-05-27 NOTE — TELEPHONE ENCOUNTER
----- Message from Alissa Barry sent at 5/27/2020 10:02 AM CDT -----  Contact: [place]  CALL TYPE: OUTSIDE REQUEST    Contact/Name of Who is Calling: Luana  What is the request in detail: medical clearance for procedure tomorrow  Call Back Number: [phone]  [phone] FAX

## 2020-06-23 ENCOUNTER — TELEPHONE (OUTPATIENT)
Dept: FAMILY MEDICINE | Facility: CLINIC | Age: 60
End: 2020-06-23

## 2020-06-23 NOTE — TELEPHONE ENCOUNTER
----- Message from Alissa Barry sent at 6/23/2020  7:46 AM CDT -----  Regarding: pharmacy  Type:  Pharmacy Calling to Clarify an RX    Name of Caller: Karina  Pharmacy Name: [facility]  Prescription Name: LORazepam (ATIVAN) 1 MG tablet  What do they need to clarify?: drug interaction, pt filled Tramadol for a 1 week supply  Best Call Back Number: [phone]  Additional Information: try after 9am

## 2020-06-23 NOTE — TELEPHONE ENCOUNTER
Spoke to Karina at [facility] pharmacy and would like to know if pt should be taking Lorazepam and pain medications. Pt filled the Tramadol 50mg and Oxycodone 5mg from 2 different doctors at different pharmacies last month. Pls advise regarding the Lorazepam.

## 2020-06-24 ENCOUNTER — LAB VISIT (OUTPATIENT)
Dept: LAB | Facility: HOSPITAL | Age: 60
End: 2020-06-24
Attending: INTERNAL MEDICINE
Payer: COMMERCIAL

## 2020-06-24 DIAGNOSIS — D50.0 IRON DEFICIENCY ANEMIA DUE TO CHRONIC BLOOD LOSS: ICD-10-CM

## 2020-06-24 LAB
BASOPHILS # BLD AUTO: 0.03 K/UL (ref 0–0.2)
BASOPHILS NFR BLD: 0.6 % (ref 0–1.9)
DIFFERENTIAL METHOD: ABNORMAL
EOSINOPHIL # BLD AUTO: 0.2 K/UL (ref 0–0.5)
EOSINOPHIL NFR BLD: 3.4 % (ref 0–8)
ERYTHROCYTE [DISTWIDTH] IN BLOOD BY AUTOMATED COUNT: 12.1 % (ref 11.5–14.5)
FERRITIN SERPL-MCNC: 219 NG/ML (ref 20–300)
HCT VFR BLD AUTO: 38.5 % (ref 37–48.5)
HGB BLD-MCNC: 12.2 G/DL (ref 12–16)
IMM GRANULOCYTES # BLD AUTO: 0.01 K/UL (ref 0–0.04)
IMM GRANULOCYTES NFR BLD AUTO: 0.2 % (ref 0–0.5)
IRON SERPL-MCNC: 130 UG/DL (ref 30–160)
LYMPHOCYTES # BLD AUTO: 1.2 K/UL (ref 1–4.8)
LYMPHOCYTES NFR BLD: 24.5 % (ref 18–48)
MCH RBC QN AUTO: 32.7 PG (ref 27–31)
MCHC RBC AUTO-ENTMCNC: 31.7 G/DL (ref 32–36)
MCV RBC AUTO: 103 FL (ref 82–98)
MONOCYTES # BLD AUTO: 0.4 K/UL (ref 0.3–1)
MONOCYTES NFR BLD: 8.6 % (ref 4–15)
NEUTROPHILS # BLD AUTO: 3.2 K/UL (ref 1.8–7.7)
NEUTROPHILS NFR BLD: 62.7 % (ref 38–73)
NRBC BLD-RTO: 0 /100 WBC
PLATELET # BLD AUTO: 309 K/UL (ref 150–350)
PMV BLD AUTO: 9.7 FL (ref 9.2–12.9)
RBC # BLD AUTO: 3.73 M/UL (ref 4–5.4)
SATURATED IRON: 37 % (ref 20–50)
TOTAL IRON BINDING CAPACITY: 349 UG/DL (ref 250–450)
TRANSFERRIN SERPL-MCNC: 236 MG/DL (ref 200–375)
WBC # BLD AUTO: 5.02 K/UL (ref 3.9–12.7)

## 2020-06-24 PROCEDURE — 36415 COLL VENOUS BLD VENIPUNCTURE: CPT | Mod: PO

## 2020-06-24 PROCEDURE — 82728 ASSAY OF FERRITIN: CPT

## 2020-06-24 PROCEDURE — 83540 ASSAY OF IRON: CPT | Mod: PO

## 2020-06-24 PROCEDURE — 85025 COMPLETE CBC W/AUTO DIFF WBC: CPT | Mod: PO

## 2020-06-25 ENCOUNTER — TELEPHONE (OUTPATIENT)
Dept: FAMILY MEDICINE | Facility: CLINIC | Age: 60
End: 2020-06-25

## 2020-06-25 NOTE — TELEPHONE ENCOUNTER
"She just had a knee replacement, so I think the Oxycodone was very temporary and she is "stepping down" to tramadol. Please thank pharmacy for the head's up, but let them know that it's ok to fill the lorazepam at this time, LFM   "

## 2020-06-25 NOTE — TELEPHONE ENCOUNTER
Patient went to a place called [facility] to get a prescription for Adipex.  Patient provided staff with a copy of her medication list.  Due to patient taking the medication cevimeline (EVOXAC) 30 mg capsule she was denied the prescription for Adipex.  Patient would like to stop the medication cevimeline (EVOXAC) 30 mg capsule in order to obtain the Adipex.      She is requesting a note from provider stating that it is ok for her stop taking the medication/ stopped the medication.  Please advise.

## 2020-06-25 NOTE — TELEPHONE ENCOUNTER
Spoke with pharmacist and ok'd for prescription to be filled.  Pharmacist verbalized understanding.

## 2020-06-25 NOTE — LETTER
2020    Terese Barney  [address] [facility] [address]             [facility]  [address]  [facility] [address]  Phone: [phone]  Fax: [phone] To Whom It May Concern:     My patient Terese Barney ( 1960) no longer takes the medication  (EVOXAC) 30 mg capsule.     If you have any questions please contact my office.     Sincerely,          Shanique Potts MD, Board Certified Family Physician,   and PCP for Terese Barney

## 2020-06-25 NOTE — TELEPHONE ENCOUNTER
----- Message from Zora Stallings sent at 6/25/2020 12:19 PM CDT -----  Contact: [phone]/Self  Patient is requesting to speak with nurse regarding medication. Please advise.

## 2020-06-27 NOTE — TELEPHONE ENCOUNTER
She may stop th (EVOXAC) 30 mg capsule and I have routed a note saying this too (for some reason it doesn't look like it posted to portal). Please advise that she be careful with the Adipex-- these weight loss clinics often prescribe the Adipex for longer than the FDA approved 90 days total and she should have her heart rate and blood pressure checked as well.   Thanks, LFMICHAEL

## 2020-06-29 NOTE — TELEPHONE ENCOUNTER
Informed pt that she may stop th (EVOXAC) 30 mg capsule and I have routed a note saying this too (for some reason it doesn't look like it posted to portal). Please advise that she be careful with the Adipex-- these weight loss clinics often prescribe the Adipex for longer than the FDA approved 90 days total and she should have her heart rate and blood pressure checked as well. Pt verbalized understanding.

## 2020-07-02 ENCOUNTER — TELEPHONE (OUTPATIENT)
Dept: HEMATOLOGY/ONCOLOGY | Facility: CLINIC | Age: 60
End: 2020-07-02

## 2020-07-02 DIAGNOSIS — D50.0 IRON DEFICIENCY ANEMIA DUE TO CHRONIC BLOOD LOSS: Primary | ICD-10-CM

## 2020-07-02 NOTE — TELEPHONE ENCOUNTER
I called and left a voicemail. Her iron studies look great, and she is not anemic. I will repeat iron studies in 6 months.    Jose Luis Guerrero M.D.  Hematology/Oncology  [facility] - [address], [address]  [place] [address]  Phone: [phone]  Fax: [phone]

## 2020-07-07 ENCOUNTER — TELEPHONE (OUTPATIENT)
Dept: HEMATOLOGY/ONCOLOGY | Facility: CLINIC | Age: 60
End: 2020-07-07

## 2020-09-28 ENCOUNTER — PATIENT MESSAGE (OUTPATIENT)
Dept: FAMILY MEDICINE | Facility: CLINIC | Age: 60
End: 2020-09-28

## 2021-01-04 ENCOUNTER — LAB VISIT (OUTPATIENT)
Dept: LAB | Facility: HOSPITAL | Age: 61
End: 2021-01-04
Attending: INTERNAL MEDICINE
Payer: COMMERCIAL

## 2021-01-04 DIAGNOSIS — D50.0 IRON DEFICIENCY ANEMIA DUE TO CHRONIC BLOOD LOSS: ICD-10-CM

## 2021-01-04 LAB
BASOPHILS # BLD AUTO: 0.04 K/UL (ref 0–0.2)
BASOPHILS NFR BLD: 0.8 % (ref 0–1.9)
DIFFERENTIAL METHOD: ABNORMAL
EOSINOPHIL # BLD AUTO: 0.2 K/UL (ref 0–0.5)
EOSINOPHIL NFR BLD: 3.4 % (ref 0–8)
ERYTHROCYTE [DISTWIDTH] IN BLOOD BY AUTOMATED COUNT: 12 % (ref 11.5–14.5)
FERRITIN SERPL-MCNC: 112 NG/ML (ref 20–300)
HCT VFR BLD AUTO: 41.1 % (ref 37–48.5)
HGB BLD-MCNC: 13.6 G/DL (ref 12–16)
IMM GRANULOCYTES # BLD AUTO: 0.02 K/UL (ref 0–0.04)
IMM GRANULOCYTES NFR BLD AUTO: 0.4 % (ref 0–0.5)
IRON SERPL-MCNC: 122 UG/DL (ref 30–160)
LYMPHOCYTES # BLD AUTO: 1.5 K/UL (ref 1–4.8)
LYMPHOCYTES NFR BLD: 30.7 % (ref 18–48)
MCH RBC QN AUTO: 32 PG (ref 27–31)
MCHC RBC AUTO-ENTMCNC: 33.1 G/DL (ref 32–36)
MCV RBC AUTO: 97 FL (ref 82–98)
MONOCYTES # BLD AUTO: 0.5 K/UL (ref 0.3–1)
MONOCYTES NFR BLD: 10 % (ref 4–15)
NEUTROPHILS # BLD AUTO: 2.8 K/UL (ref 1.8–7.7)
NEUTROPHILS NFR BLD: 54.7 % (ref 38–73)
NRBC BLD-RTO: 0 /100 WBC
PLATELET # BLD AUTO: 288 K/UL (ref 150–350)
PMV BLD AUTO: 9.7 FL (ref 9.2–12.9)
RBC # BLD AUTO: 4.25 M/UL (ref 4–5.4)
SATURATED IRON: 35 % (ref 20–50)
TOTAL IRON BINDING CAPACITY: 351 UG/DL (ref 250–450)
TRANSFERRIN SERPL-MCNC: 237 MG/DL (ref 200–375)
WBC # BLD AUTO: 5.02 K/UL (ref 3.9–12.7)

## 2021-01-04 PROCEDURE — 83540 ASSAY OF IRON: CPT

## 2021-01-04 PROCEDURE — 85025 COMPLETE CBC W/AUTO DIFF WBC: CPT

## 2021-01-04 PROCEDURE — 36415 COLL VENOUS BLD VENIPUNCTURE: CPT

## 2021-01-04 PROCEDURE — 82728 ASSAY OF FERRITIN: CPT

## 2021-01-05 ENCOUNTER — TELEPHONE (OUTPATIENT)
Dept: HEMATOLOGY/ONCOLOGY | Facility: CLINIC | Age: 61
End: 2021-01-05

## 2021-01-25 ENCOUNTER — PATIENT MESSAGE (OUTPATIENT)
Dept: FAMILY MEDICINE | Facility: CLINIC | Age: 61
End: 2021-01-25

## 2021-01-25 DIAGNOSIS — F41.9 ANXIETY AND DEPRESSION: ICD-10-CM

## 2021-01-25 DIAGNOSIS — F32.A ANXIETY AND DEPRESSION: ICD-10-CM

## 2021-01-26 RX ORDER — LORAZEPAM 1 MG/1
TABLET ORAL
Qty: 90 TABLET | Refills: 0 | Status: SHIPPED | OUTPATIENT
Start: 2021-01-26 | End: 2021-01-27 | Stop reason: SDUPTHER

## 2021-01-27 DIAGNOSIS — F32.A ANXIETY AND DEPRESSION: ICD-10-CM

## 2021-01-27 DIAGNOSIS — F41.9 ANXIETY AND DEPRESSION: ICD-10-CM

## 2021-01-28 RX ORDER — LORAZEPAM 1 MG/1
TABLET ORAL
Qty: 10 TABLET | Refills: 1 | Status: SHIPPED | OUTPATIENT
Start: 2021-01-28 | End: 2021-04-26 | Stop reason: SDUPTHER

## 2021-02-24 ENCOUNTER — PATIENT MESSAGE (OUTPATIENT)
Dept: FAMILY MEDICINE | Facility: CLINIC | Age: 61
End: 2021-02-24

## 2021-02-24 RX ORDER — CEVIMELINE HYDROCHLORIDE 30 MG/1
30 CAPSULE ORAL 2 TIMES DAILY
Qty: 180 CAPSULE | Refills: 3 | Status: SHIPPED | OUTPATIENT
Start: 2021-02-24 | End: 2021-05-25 | Stop reason: SDUPTHER

## 2021-04-13 ENCOUNTER — PATIENT MESSAGE (OUTPATIENT)
Dept: FAMILY MEDICINE | Facility: CLINIC | Age: 61
End: 2021-04-13

## 2021-04-13 ENCOUNTER — TELEPHONE (OUTPATIENT)
Dept: FAMILY MEDICINE | Facility: CLINIC | Age: 61
End: 2021-04-13

## 2021-04-13 DIAGNOSIS — Z00.00 LABORATORY EXAM ORDERED AS PART OF ROUTINE GENERAL MEDICAL EXAMINATION: Primary | ICD-10-CM

## 2021-04-19 ENCOUNTER — LAB VISIT (OUTPATIENT)
Dept: LAB | Facility: HOSPITAL | Age: 61
End: 2021-04-19
Attending: FAMILY MEDICINE
Payer: COMMERCIAL

## 2021-04-19 DIAGNOSIS — Z00.00 LABORATORY EXAM ORDERED AS PART OF ROUTINE GENERAL MEDICAL EXAMINATION: ICD-10-CM

## 2021-04-19 LAB
25(OH)D3+25(OH)D2 SERPL-MCNC: 28 NG/ML (ref 30–96)
ALBUMIN SERPL BCP-MCNC: 3.9 G/DL (ref 3.5–5.2)
ALP SERPL-CCNC: 86 U/L (ref 55–135)
ALT SERPL W/O P-5'-P-CCNC: 10 U/L (ref 10–44)
ANION GAP SERPL CALC-SCNC: 7 MMOL/L (ref 8–16)
AST SERPL-CCNC: 19 U/L (ref 10–40)
BASOPHILS # BLD AUTO: 0.04 K/UL (ref 0–0.2)
BASOPHILS NFR BLD: 0.8 % (ref 0–1.9)
BILIRUB SERPL-MCNC: 0.5 MG/DL (ref 0.1–1)
BUN SERPL-MCNC: 19 MG/DL (ref 6–20)
CALCIUM SERPL-MCNC: 8.9 MG/DL (ref 8.7–10.5)
CHLORIDE SERPL-SCNC: 108 MMOL/L (ref 95–110)
CHOLEST SERPL-MCNC: 212 MG/DL (ref 120–199)
CHOLEST/HDLC SERPL: 3.5 {RATIO} (ref 2–5)
CO2 SERPL-SCNC: 25 MMOL/L (ref 23–29)
CREAT SERPL-MCNC: 0.8 MG/DL (ref 0.5–1.4)
DIFFERENTIAL METHOD: ABNORMAL
EOSINOPHIL # BLD AUTO: 0.3 K/UL (ref 0–0.5)
EOSINOPHIL NFR BLD: 6.1 % (ref 0–8)
ERYTHROCYTE [DISTWIDTH] IN BLOOD BY AUTOMATED COUNT: 12.1 % (ref 11.5–14.5)
EST. GFR  (AFRICAN AMERICAN): >60 ML/MIN/1.73 M^2
EST. GFR  (NON AFRICAN AMERICAN): >60 ML/MIN/1.73 M^2
ESTIMATED AVG GLUCOSE: 108 MG/DL (ref 68–131)
FERRITIN SERPL-MCNC: 53 NG/ML (ref 20–300)
GLUCOSE SERPL-MCNC: 108 MG/DL (ref 70–110)
HBA1C MFR BLD: 5.4 % (ref 4–5.6)
HCT VFR BLD AUTO: 41.1 % (ref 37–48.5)
HDLC SERPL-MCNC: 60 MG/DL (ref 40–75)
HDLC SERPL: 28.3 % (ref 20–50)
HGB BLD-MCNC: 13.5 G/DL (ref 12–16)
IMM GRANULOCYTES # BLD AUTO: 0.01 K/UL (ref 0–0.04)
IMM GRANULOCYTES NFR BLD AUTO: 0.2 % (ref 0–0.5)
IRON SERPL-MCNC: 96 UG/DL (ref 30–160)
LDLC SERPL CALC-MCNC: 126.4 MG/DL (ref 63–159)
LYMPHOCYTES # BLD AUTO: 1.5 K/UL (ref 1–4.8)
LYMPHOCYTES NFR BLD: 30.6 % (ref 18–48)
MAGNESIUM SERPL-MCNC: 2.1 MG/DL (ref 1.6–2.6)
MCH RBC QN AUTO: 31.8 PG (ref 27–31)
MCHC RBC AUTO-ENTMCNC: 32.8 G/DL (ref 32–36)
MCV RBC AUTO: 97 FL (ref 82–98)
MONOCYTES # BLD AUTO: 0.5 K/UL (ref 0.3–1)
MONOCYTES NFR BLD: 10.1 % (ref 4–15)
NEUTROPHILS # BLD AUTO: 2.5 K/UL (ref 1.8–7.7)
NEUTROPHILS NFR BLD: 52.2 % (ref 38–73)
NONHDLC SERPL-MCNC: 152 MG/DL
NRBC BLD-RTO: 0 /100 WBC
PLATELET # BLD AUTO: 264 K/UL (ref 150–450)
PMV BLD AUTO: 9.4 FL (ref 9.2–12.9)
POTASSIUM SERPL-SCNC: 4.4 MMOL/L (ref 3.5–5.1)
PROT SERPL-MCNC: 7.2 G/DL (ref 6–8.4)
RBC # BLD AUTO: 4.24 M/UL (ref 4–5.4)
SATURATED IRON: 25 % (ref 20–50)
SODIUM SERPL-SCNC: 140 MMOL/L (ref 136–145)
TOTAL IRON BINDING CAPACITY: 382 UG/DL (ref 250–450)
TRANSFERRIN SERPL-MCNC: 258 MG/DL (ref 200–375)
TRIGL SERPL-MCNC: 128 MG/DL (ref 30–150)
TSH SERPL DL<=0.005 MIU/L-ACNC: 3.02 UIU/ML (ref 0.4–4)
VIT B12 SERPL-MCNC: >2000 PG/ML (ref 210–950)
WBC # BLD AUTO: 4.77 K/UL (ref 3.9–12.7)

## 2021-04-19 PROCEDURE — 83036 HEMOGLOBIN GLYCOSYLATED A1C: CPT | Performed by: FAMILY MEDICINE

## 2021-04-19 PROCEDURE — 82306 VITAMIN D 25 HYDROXY: CPT | Performed by: FAMILY MEDICINE

## 2021-04-19 PROCEDURE — 82607 VITAMIN B-12: CPT | Performed by: FAMILY MEDICINE

## 2021-04-19 PROCEDURE — 85025 COMPLETE CBC W/AUTO DIFF WBC: CPT | Performed by: FAMILY MEDICINE

## 2021-04-19 PROCEDURE — 83540 ASSAY OF IRON: CPT | Performed by: FAMILY MEDICINE

## 2021-04-19 PROCEDURE — 83735 ASSAY OF MAGNESIUM: CPT | Performed by: FAMILY MEDICINE

## 2021-04-19 PROCEDURE — 82728 ASSAY OF FERRITIN: CPT | Performed by: FAMILY MEDICINE

## 2021-04-19 PROCEDURE — 80061 LIPID PANEL: CPT | Performed by: FAMILY MEDICINE

## 2021-04-19 PROCEDURE — 36415 COLL VENOUS BLD VENIPUNCTURE: CPT | Performed by: FAMILY MEDICINE

## 2021-04-19 PROCEDURE — 84443 ASSAY THYROID STIM HORMONE: CPT | Performed by: FAMILY MEDICINE

## 2021-04-19 PROCEDURE — 80053 COMPREHEN METABOLIC PANEL: CPT | Performed by: FAMILY MEDICINE

## 2021-04-20 ENCOUNTER — TELEPHONE (OUTPATIENT)
Dept: FAMILY MEDICINE | Facility: CLINIC | Age: 61
End: 2021-04-20

## 2021-04-20 DIAGNOSIS — E55.9 VITAMIN D DEFICIENCY: Primary | ICD-10-CM

## 2021-04-20 RX ORDER — CHOLECALCIFEROL (VITAMIN D3) 125 MCG
5000 CAPSULE ORAL
Qty: 100 CAPSULE | Refills: 4 | Status: SHIPPED | OUTPATIENT
Start: 2021-04-20 | End: 2022-01-28 | Stop reason: SDUPTHER

## 2021-04-26 ENCOUNTER — HOSPITAL ENCOUNTER (OUTPATIENT)
Dept: RADIOLOGY | Facility: HOSPITAL | Age: 61
Discharge: HOME | End: 2021-04-26
Attending: FAMILY MEDICINE
Payer: COMMERCIAL

## 2021-04-26 ENCOUNTER — OFFICE VISIT (OUTPATIENT)
Dept: FAMILY MEDICINE | Facility: CLINIC | Age: 61
End: 2021-04-26
Payer: COMMERCIAL

## 2021-04-26 VITALS
HEART RATE: 94 BPM | SYSTOLIC BLOOD PRESSURE: 128 MMHG | DIASTOLIC BLOOD PRESSURE: 78 MMHG | OXYGEN SATURATION: 98 % | BODY MASS INDEX: 28.16 KG/M2 | WEIGHT: 153 LBS | HEIGHT: 62 IN

## 2021-04-26 DIAGNOSIS — N95.1 MENOPAUSAL VAGINAL DRYNESS: ICD-10-CM

## 2021-04-26 DIAGNOSIS — F41.9 ANXIETY AND DEPRESSION: ICD-10-CM

## 2021-04-26 DIAGNOSIS — Z00.00 ROUTINE GENERAL MEDICAL EXAMINATION AT A HEALTH CARE FACILITY: Primary | ICD-10-CM

## 2021-04-26 DIAGNOSIS — G89.29 CHRONIC PAIN OF LEFT KNEE: ICD-10-CM

## 2021-04-26 DIAGNOSIS — Z12.31 ENCOUNTER FOR SCREENING MAMMOGRAM FOR BREAST CANCER: ICD-10-CM

## 2021-04-26 DIAGNOSIS — N94.10 DYSPAREUNIA, FEMALE: ICD-10-CM

## 2021-04-26 DIAGNOSIS — M25.562 CHRONIC PAIN OF LEFT KNEE: ICD-10-CM

## 2021-04-26 DIAGNOSIS — R20.0 NUMBNESS AND TINGLING IN RIGHT HAND: ICD-10-CM

## 2021-04-26 DIAGNOSIS — M85.80 OSTEOPENIA, UNSPECIFIED LOCATION: ICD-10-CM

## 2021-04-26 DIAGNOSIS — Z23 HIGH PRIORITY FOR 2019-NCOV VACCINE: ICD-10-CM

## 2021-04-26 DIAGNOSIS — M17.12 PRIMARY OSTEOARTHRITIS OF LEFT KNEE: ICD-10-CM

## 2021-04-26 DIAGNOSIS — E66.3 OVERWEIGHT (BMI 25.0-29.9): ICD-10-CM

## 2021-04-26 DIAGNOSIS — Z23 NEED FOR SHINGLES VACCINE: ICD-10-CM

## 2021-04-26 DIAGNOSIS — M54.9 CHRONIC BACK PAIN, UNSPECIFIED BACK LOCATION, UNSPECIFIED BACK PAIN LATERALITY: ICD-10-CM

## 2021-04-26 DIAGNOSIS — R20.2 NUMBNESS AND TINGLING IN RIGHT HAND: ICD-10-CM

## 2021-04-26 DIAGNOSIS — F32.A ANXIETY AND DEPRESSION: ICD-10-CM

## 2021-04-26 DIAGNOSIS — M79.641 RIGHT HAND PAIN: ICD-10-CM

## 2021-04-26 DIAGNOSIS — K21.9 GASTROESOPHAGEAL REFLUX DISEASE, UNSPECIFIED WHETHER ESOPHAGITIS PRESENT: ICD-10-CM

## 2021-04-26 DIAGNOSIS — G89.29 CHRONIC BACK PAIN, UNSPECIFIED BACK LOCATION, UNSPECIFIED BACK PAIN LATERALITY: ICD-10-CM

## 2021-04-26 PROBLEM — D64.9 SYMPTOMATIC ANEMIA: Status: RESOLVED | Noted: 2020-01-30 | Resolved: 2021-04-26

## 2021-04-26 PROCEDURE — 99396 PR PREVENTIVE VISIT,EST,40-64: ICD-10-PCS | Mod: S$GLB,,, | Performed by: FAMILY MEDICINE

## 2021-04-26 PROCEDURE — 73130 XR HAND COMPLETE 3 VIEW RIGHT: ICD-10-PCS | Mod: 26,RT,, | Performed by: RADIOLOGY

## 2021-04-26 PROCEDURE — 73130 X-RAY EXAM OF HAND: CPT | Mod: 26,RT,, | Performed by: RADIOLOGY

## 2021-04-26 PROCEDURE — 99999 PR PBB SHADOW E&M-EST. PATIENT-LVL V: ICD-10-PCS | Mod: PBBFAC,,, | Performed by: FAMILY MEDICINE

## 2021-04-26 PROCEDURE — 99999 PR PBB SHADOW E&M-EST. PATIENT-LVL V: CPT | Mod: PBBFAC,,, | Performed by: FAMILY MEDICINE

## 2021-04-26 PROCEDURE — 1125F PR PAIN SEVERITY QUANTIFIED, PAIN PRESENT: ICD-10-PCS | Mod: S$GLB,,, | Performed by: FAMILY MEDICINE

## 2021-04-26 PROCEDURE — 1125F AMNT PAIN NOTED PAIN PRSNT: CPT | Mod: S$GLB,,, | Performed by: FAMILY MEDICINE

## 2021-04-26 PROCEDURE — 73130 X-RAY EXAM OF HAND: CPT | Mod: TC,FY,RT

## 2021-04-26 PROCEDURE — 3008F BODY MASS INDEX DOCD: CPT | Mod: CPTII,S$GLB,, | Performed by: FAMILY MEDICINE

## 2021-04-26 PROCEDURE — 3008F PR BODY MASS INDEX (BMI) DOCUMENTED: ICD-10-PCS | Mod: CPTII,S$GLB,, | Performed by: FAMILY MEDICINE

## 2021-04-26 PROCEDURE — 99396 PREV VISIT EST AGE 40-64: CPT | Mod: S$GLB,,, | Performed by: FAMILY MEDICINE

## 2021-04-26 RX ORDER — ESTRADIOL 0.1 MG/G
1 CREAM VAGINAL EVERY OTHER DAY
Qty: 42.5 G | Refills: 11 | Status: SHIPPED | OUTPATIENT
Start: 2021-04-26 | End: 2021-06-17

## 2021-04-26 RX ORDER — LORAZEPAM 1 MG/1
TABLET ORAL
Qty: 90 TABLET | Refills: 1 | Status: SHIPPED | OUTPATIENT
Start: 2021-04-26 | End: 2022-01-14 | Stop reason: SDUPTHER

## 2021-04-27 ENCOUNTER — TELEPHONE (OUTPATIENT)
Dept: FAMILY MEDICINE | Facility: CLINIC | Age: 61
End: 2021-04-27

## 2021-04-27 DIAGNOSIS — M18.11 PRIMARY OSTEOARTHRITIS OF FIRST CARPOMETACARPAL JOINT OF RIGHT HAND: Primary | ICD-10-CM

## 2021-05-05 ENCOUNTER — PATIENT MESSAGE (OUTPATIENT)
Dept: FAMILY MEDICINE | Facility: CLINIC | Age: 61
End: 2021-05-05

## 2021-05-27 RX ORDER — CEVIMELINE HYDROCHLORIDE 30 MG/1
30 CAPSULE ORAL 2 TIMES DAILY
Qty: 180 CAPSULE | Refills: 3 | Status: SHIPPED | OUTPATIENT
Start: 2021-05-27 | End: 2021-09-17 | Stop reason: SDUPTHER

## 2021-05-28 ENCOUNTER — HOSPITAL ENCOUNTER (OUTPATIENT)
Dept: RADIOLOGY | Facility: HOSPITAL | Age: 61
Discharge: HOME | End: 2021-05-28
Attending: FAMILY MEDICINE
Payer: COMMERCIAL

## 2021-05-28 DIAGNOSIS — Z12.31 ENCOUNTER FOR SCREENING MAMMOGRAM FOR BREAST CANCER: ICD-10-CM

## 2021-05-28 PROCEDURE — 77067 SCR MAMMO BI INCL CAD: CPT | Mod: 26,,, | Performed by: RADIOLOGY

## 2021-05-28 PROCEDURE — 77067 SCR MAMMO BI INCL CAD: CPT | Mod: TC

## 2021-05-28 PROCEDURE — 77063 BREAST TOMOSYNTHESIS BI: CPT | Mod: 26,,, | Performed by: RADIOLOGY

## 2021-05-28 PROCEDURE — 77067 MAMMO DIGITAL SCREENING BILAT WITH TOMO: ICD-10-PCS | Mod: 26,,, | Performed by: RADIOLOGY

## 2021-05-28 PROCEDURE — 77063 MAMMO DIGITAL SCREENING BILAT WITH TOMO: ICD-10-PCS | Mod: 26,,, | Performed by: RADIOLOGY

## 2021-07-13 ENCOUNTER — PATIENT MESSAGE (OUTPATIENT)
Dept: FAMILY MEDICINE | Facility: CLINIC | Age: 61
End: 2021-07-13

## 2021-07-13 DIAGNOSIS — F51.01 PRIMARY INSOMNIA: Primary | ICD-10-CM

## 2021-07-13 PROBLEM — Z96.659 HISTORY OF TOTAL KNEE ARTHROPLASTY: Status: ACTIVE | Noted: 2020-07-29

## 2021-07-13 RX ORDER — AMOXICILLIN 500 MG/1
CAPSULE ORAL
COMMUNITY
End: 2021-11-02 | Stop reason: ALTCHOICE

## 2021-07-13 RX ORDER — PHENTERMINE HYDROCHLORIDE 37.5 MG/1
18.75 TABLET ORAL 2 TIMES DAILY
COMMUNITY
Start: 2021-06-09 | End: 2021-11-02 | Stop reason: ALTCHOICE

## 2021-07-13 RX ORDER — CLINDAMYCIN HYDROCHLORIDE 300 MG/1
CAPSULE ORAL
COMMUNITY
End: 2022-07-27

## 2021-07-13 RX ORDER — PHENTERMINE HYDROCHLORIDE 37.5 MG/1
TABLET ORAL
COMMUNITY
End: 2022-07-27

## 2021-07-13 RX ORDER — VALACYCLOVIR HYDROCHLORIDE 1 G/1
TABLET, FILM COATED ORAL
COMMUNITY
End: 2021-11-02 | Stop reason: ALTCHOICE

## 2021-07-13 RX ORDER — IBUPROFEN 800 MG/1
800 TABLET ORAL EVERY 6 HOURS
COMMUNITY
Start: 2021-04-26 | End: 2021-11-02 | Stop reason: ALTCHOICE

## 2021-07-15 ENCOUNTER — TELEPHONE (OUTPATIENT)
Dept: FAMILY MEDICINE | Facility: CLINIC | Age: 61
End: 2021-07-15

## 2021-07-15 DIAGNOSIS — D50.0 IRON DEFICIENCY ANEMIA DUE TO CHRONIC BLOOD LOSS: Primary | ICD-10-CM

## 2021-07-15 RX ORDER — FERROUS SULFATE 325(65) MG
325 TABLET ORAL
Qty: 90 TABLET | Refills: 4 | Status: SHIPPED | OUTPATIENT
Start: 2021-07-15 | End: 2022-08-22 | Stop reason: SDUPTHER

## 2021-07-17 RX ORDER — ZOLPIDEM TARTRATE 10 MG/1
10 TABLET ORAL NIGHTLY PRN
Qty: 30 TABLET | Refills: 1 | Status: SHIPPED | OUTPATIENT
Start: 2021-07-17 | End: 2021-09-17 | Stop reason: SDUPTHER

## 2021-09-17 DIAGNOSIS — F51.01 PRIMARY INSOMNIA: ICD-10-CM

## 2021-09-17 RX ORDER — CEVIMELINE HYDROCHLORIDE 30 MG/1
30 CAPSULE ORAL 2 TIMES DAILY
Qty: 180 CAPSULE | Refills: 3 | Status: SHIPPED | OUTPATIENT
Start: 2021-09-17 | End: 2022-02-02

## 2021-09-17 RX ORDER — ZOLPIDEM TARTRATE 10 MG/1
10 TABLET ORAL NIGHTLY PRN
Qty: 30 TABLET | Refills: 1 | Status: ON HOLD | OUTPATIENT
Start: 2021-09-17 | End: 2022-03-03

## 2021-10-26 DIAGNOSIS — F41.9 ANXIETY AND DEPRESSION: ICD-10-CM

## 2021-10-26 DIAGNOSIS — F32.A ANXIETY AND DEPRESSION: ICD-10-CM

## 2021-10-28 RX ORDER — CLINDAMYCIN PHOSPHATE AND BENZOYL PEROXIDE 10; 50 MG/G; MG/G
GEL TOPICAL NIGHTLY
Qty: 45 G | Refills: 11 | Status: SHIPPED | OUTPATIENT
Start: 2021-10-28 | End: 2022-05-18 | Stop reason: ALTCHOICE

## 2021-10-28 RX ORDER — DULOXETIN HYDROCHLORIDE 60 MG/1
60 CAPSULE, DELAYED RELEASE ORAL 2 TIMES DAILY
Qty: 180 CAPSULE | Refills: 4 | Status: SHIPPED | OUTPATIENT
Start: 2021-10-28 | End: 2021-11-21

## 2021-10-31 ENCOUNTER — PATIENT MESSAGE (OUTPATIENT)
Dept: FAMILY MEDICINE | Facility: CLINIC | Age: 61
End: 2021-10-31
Payer: COMMERCIAL

## 2021-10-31 DIAGNOSIS — L98.8 AGE-RELATED FACIAL WRINKLES: Primary | ICD-10-CM

## 2021-11-03 DIAGNOSIS — L98.8 AGE-RELATED FACIAL WRINKLES: ICD-10-CM

## 2021-11-03 RX ORDER — ADAPALENE AND BENZOYL PEROXIDE GEL, 0.1%/2.5% 1; 25 MG/G; MG/G
GEL TOPICAL
Qty: 45 G | Refills: 11 | Status: SHIPPED | OUTPATIENT
Start: 2021-11-03 | End: 2021-11-05 | Stop reason: SDUPTHER

## 2021-11-05 RX ORDER — ADAPALENE AND BENZOYL PEROXIDE GEL, 0.1%/2.5% 1; 25 MG/G; MG/G
GEL TOPICAL
Qty: 45 G | Refills: 11 | Status: SHIPPED | OUTPATIENT
Start: 2021-11-05

## 2022-01-07 ENCOUNTER — PATIENT MESSAGE (OUTPATIENT)
Dept: FAMILY MEDICINE | Facility: CLINIC | Age: 62
End: 2022-01-07
Payer: COMMERCIAL

## 2022-01-07 DIAGNOSIS — J45.21 MILD INTERMITTENT ASTHMA WITH (ACUTE) EXACERBATION: Primary | ICD-10-CM

## 2022-01-07 NOTE — TELEPHONE ENCOUNTER
Here is the order for nebulizer supplies with mouthpiece.  I included the fax number on the order form-please print, fax, and notify patient when done, thank you

## 2022-01-10 ENCOUNTER — TELEPHONE (OUTPATIENT)
Dept: FAMILY MEDICINE | Facility: CLINIC | Age: 62
End: 2022-01-10
Payer: COMMERCIAL

## 2022-01-10 NOTE — TELEPHONE ENCOUNTER
----- Message from Holli Saba sent at 1/10/2022  2:12 PM CST -----  Contact: [phone]/ self  Who Called: pt   Regarding: requesting lab work orders   Would the patient rather a call back or a response via [facility]? Call back  Best Call Back Number: [phone]  Additional Information:

## 2022-01-14 ENCOUNTER — PATIENT MESSAGE (OUTPATIENT)
Dept: FAMILY MEDICINE | Facility: CLINIC | Age: 62
End: 2022-01-14
Payer: COMMERCIAL

## 2022-01-14 ENCOUNTER — TELEPHONE (OUTPATIENT)
Dept: FAMILY MEDICINE | Facility: CLINIC | Age: 62
End: 2022-01-14
Payer: COMMERCIAL

## 2022-01-14 DIAGNOSIS — J98.9 REACTIVE AIRWAY DISEASE WITHOUT ASTHMA: Primary | ICD-10-CM

## 2022-01-14 DIAGNOSIS — F32.A ANXIETY AND DEPRESSION: ICD-10-CM

## 2022-01-14 DIAGNOSIS — Z00.00 LABORATORY EXAM ORDERED AS PART OF ROUTINE GENERAL MEDICAL EXAMINATION: ICD-10-CM

## 2022-01-14 DIAGNOSIS — F41.9 ANXIETY AND DEPRESSION: ICD-10-CM

## 2022-01-14 DIAGNOSIS — D50.0 IRON DEFICIENCY ANEMIA DUE TO CHRONIC BLOOD LOSS: ICD-10-CM

## 2022-01-14 RX ORDER — LORAZEPAM 1 MG/1
TABLET ORAL
Qty: 30 TABLET | Refills: 0 | Status: SHIPPED | OUTPATIENT
Start: 2022-01-14 | End: 2022-02-16

## 2022-01-14 NOTE — TELEPHONE ENCOUNTER
----- Message from Jada Felipe sent at 1/14/2022  1:05 PM CST -----  Regarding: advice  Contact: [phone]  Type:  Needs Medical Advice    Who Called:  self   Symptoms (please be specific):   starting to have leg cramps and shortness of breathe like she had when she was anemic. She will be in town on 01/28th and would like to have labs and an office visit.   How long has patient had these symptoms:   a month with cramps and 1 week shortness of breath and eating ice again  Pharmacy name and phone #:     Would the patient rather a call back or a response via [facility]?  call  Best Call Back Number:  [phone]  Additional Information:  she has the [employer] portal        
Care Due:                  Date            Visit Type   Department     Provider  --------------------------------------------------------------------------------                                             [facility]  Last Visit: 04-      None         MEDICINE       Shanique Potts  Next Visit: None Scheduled  None         None Found                                                            Last  Test          Frequency    Reason                     Performed    Due Date  --------------------------------------------------------------------------------    Cr..........  12 months..  DULoxetine...............  04- 04-    Powered by [employer]. Reference number: [id number].   1/14/2022 10:43:07 AM CST  
20-Jun-2019 02:34

## 2022-01-14 NOTE — TELEPHONE ENCOUNTER
Scheduled patient appointment for when she is back in town.  She c/o leg cramps and SOB for the past week, similar to when she was anemic.  She been eating ice again. She's requesting labs prior to appointment.  Please advise.

## 2022-01-16 NOTE — TELEPHONE ENCOUNTER
Ordered labs-- I ordered a full set under the routine annual labs code, which I think will be fine since it's a new calendar year since her prior labs. Looking at her chart, however, it seems that she had her annual labs last year 4/19/21. If she wants, I could just order a few labs currently under the codes for leg cramps and history of iron deficiency and save the full lab orders with diabetic screen, cholesterol etc until next April.     Please let me know if she knows her insurance policy on this/ what she prefers.     I was just trying to be prepared to evaluate her cramps and do her physical with full labs too if her insurance will cover it at this point in the year since she is now living out of town. Thanks!

## 2022-01-19 NOTE — TELEPHONE ENCOUNTER
Patient would like to wait to complete annual labs and only complete labs for leg cramping/SOB/anemia.  Which labs would you like her to complete?    Also, she is requesting order for nebulizer tubing. She is requesting paper order so she can shop around due to trouble with receiving supplies. She is also requesting for the nebulizer solution medication to be sent via [employer].  Please advise.

## 2022-01-20 RX ORDER — ALBUTEROL SULFATE 0.83 MG/ML
2.5 SOLUTION RESPIRATORY (INHALATION) EVERY 6 HOURS PRN
Qty: 2 EACH | Refills: 3 | Status: ON HOLD | OUTPATIENT
Start: 2022-01-20 | End: 2022-03-03

## 2022-01-20 NOTE — TELEPHONE ENCOUNTER
Placed separate limited lab orders today 1/20/22 for just non-fasting CBC and iron studies-- please order those for asap and then save other lab orders for in 3 months & prior to her physical.    Nebulizer solution sent to [email or web address] but I am concerned about why she is a nebulizer-- has no diagnosis of asthma on file, so I'm concerned that the shortness of breath may be coming from severe anemia. Needs labs asap and then ER precautions if she gets weak or lightheaded.     Have order for nebulizer supplies available to print, but we need to investigate why she is getting shortness of breath and will advise further once lab results reviewed , thanks

## 2022-01-28 ENCOUNTER — LAB VISIT (OUTPATIENT)
Dept: LAB | Facility: HOSPITAL | Age: 62
End: 2022-01-28
Attending: FAMILY MEDICINE
Payer: COMMERCIAL

## 2022-01-28 ENCOUNTER — OFFICE VISIT (OUTPATIENT)
Dept: FAMILY MEDICINE | Facility: CLINIC | Age: 62
End: 2022-01-28
Payer: COMMERCIAL

## 2022-01-28 VITALS
DIASTOLIC BLOOD PRESSURE: 81 MMHG | HEART RATE: 103 BPM | BODY MASS INDEX: 29.38 KG/M2 | SYSTOLIC BLOOD PRESSURE: 128 MMHG | HEIGHT: 62 IN | WEIGHT: 159.63 LBS | OXYGEN SATURATION: 98 %

## 2022-01-28 DIAGNOSIS — Z87.11 HISTORY OF GASTRIC ULCER: ICD-10-CM

## 2022-01-28 DIAGNOSIS — Z51.81 ENCOUNTER FOR MONITORING LONG-TERM PROTON PUMP INHIBITOR THERAPY: ICD-10-CM

## 2022-01-28 DIAGNOSIS — E66.3 OVERWEIGHT (BMI 25.0-29.9): ICD-10-CM

## 2022-01-28 DIAGNOSIS — E55.9 VITAMIN D DEFICIENCY: ICD-10-CM

## 2022-01-28 DIAGNOSIS — Z23 NEEDS FLU SHOT: ICD-10-CM

## 2022-01-28 DIAGNOSIS — K21.9 GASTROESOPHAGEAL REFLUX DISEASE, UNSPECIFIED WHETHER ESOPHAGITIS PRESENT: ICD-10-CM

## 2022-01-28 DIAGNOSIS — D50.0 IRON DEFICIENCY ANEMIA DUE TO CHRONIC BLOOD LOSS: ICD-10-CM

## 2022-01-28 DIAGNOSIS — Z79.899 ENCOUNTER FOR MONITORING LONG-TERM PROTON PUMP INHIBITOR THERAPY: ICD-10-CM

## 2022-01-28 DIAGNOSIS — Z96.652 HISTORY OF TOTAL LEFT KNEE REPLACEMENT: ICD-10-CM

## 2022-01-28 DIAGNOSIS — D50.0 IRON DEFICIENCY ANEMIA DUE TO CHRONIC BLOOD LOSS: Primary | ICD-10-CM

## 2022-01-28 LAB
BASOPHILS # BLD AUTO: 0.03 K/UL (ref 0–0.2)
BASOPHILS NFR BLD: 0.6 % (ref 0–1.9)
DIFFERENTIAL METHOD: ABNORMAL
EOSINOPHIL # BLD AUTO: 0.2 K/UL (ref 0–0.5)
EOSINOPHIL NFR BLD: 3.2 % (ref 0–8)
ERYTHROCYTE [DISTWIDTH] IN BLOOD BY AUTOMATED COUNT: 15.6 % (ref 11.5–14.5)
FERRITIN SERPL-MCNC: 41 NG/ML (ref 20–300)
HCT VFR BLD AUTO: 36.2 % (ref 37–48.5)
HGB BLD-MCNC: 11.2 G/DL (ref 12–16)
IMM GRANULOCYTES # BLD AUTO: 0.01 K/UL (ref 0–0.04)
IMM GRANULOCYTES NFR BLD AUTO: 0.2 % (ref 0–0.5)
IRON SERPL-MCNC: 71 UG/DL (ref 30–160)
LYMPHOCYTES # BLD AUTO: 1.2 K/UL (ref 1–4.8)
LYMPHOCYTES NFR BLD: 24.8 % (ref 18–48)
MCH RBC QN AUTO: 29.8 PG (ref 27–31)
MCHC RBC AUTO-ENTMCNC: 30.9 G/DL (ref 32–36)
MCV RBC AUTO: 96 FL (ref 82–98)
MONOCYTES # BLD AUTO: 0.4 K/UL (ref 0.3–1)
MONOCYTES NFR BLD: 9.3 % (ref 4–15)
NEUTROPHILS # BLD AUTO: 2.9 K/UL (ref 1.8–7.7)
NEUTROPHILS NFR BLD: 61.9 % (ref 38–73)
NRBC BLD-RTO: 0 /100 WBC
PLATELET # BLD AUTO: 297 K/UL (ref 150–450)
PMV BLD AUTO: 9.4 FL (ref 9.2–12.9)
RBC # BLD AUTO: 3.76 M/UL (ref 4–5.4)
SATURATED IRON: 15 % (ref 20–50)
TOTAL IRON BINDING CAPACITY: 465 UG/DL (ref 250–450)
TRANSFERRIN SERPL-MCNC: 314 MG/DL (ref 200–375)
WBC # BLD AUTO: 4.63 K/UL (ref 3.9–12.7)

## 2022-01-28 PROCEDURE — 99999 PR PBB SHADOW E&M-EST. PATIENT-LVL V: CPT | Mod: PBBFAC,,, | Performed by: FAMILY MEDICINE

## 2022-01-28 PROCEDURE — 90686 FLU VACCINE (QUAD) GREATER THAN OR EQUAL TO 3YO PRESERVATIVE FREE IM: ICD-10-PCS | Mod: S$GLB,,, | Performed by: FAMILY MEDICINE

## 2022-01-28 PROCEDURE — 99999 PR PBB SHADOW E&M-EST. PATIENT-LVL V: ICD-10-PCS | Mod: PBBFAC,,, | Performed by: FAMILY MEDICINE

## 2022-01-28 PROCEDURE — 85025 COMPLETE CBC W/AUTO DIFF WBC: CPT | Performed by: FAMILY MEDICINE

## 2022-01-28 PROCEDURE — 90686 IIV4 VACC NO PRSV 0.5 ML IM: CPT | Mod: S$GLB,,, | Performed by: FAMILY MEDICINE

## 2022-01-28 PROCEDURE — 90471 IMMUNIZATION ADMIN: CPT | Mod: S$GLB,,, | Performed by: FAMILY MEDICINE

## 2022-01-28 PROCEDURE — 36415 COLL VENOUS BLD VENIPUNCTURE: CPT | Performed by: FAMILY MEDICINE

## 2022-01-28 PROCEDURE — 90471 FLU VACCINE (QUAD) GREATER THAN OR EQUAL TO 3YO PRESERVATIVE FREE IM: ICD-10-PCS | Mod: S$GLB,,, | Performed by: FAMILY MEDICINE

## 2022-01-28 PROCEDURE — 99214 PR OFFICE/OUTPT VISIT, EST, LEVL IV, 30-39 MIN: ICD-10-PCS | Mod: 25,S$GLB,, | Performed by: FAMILY MEDICINE

## 2022-01-28 PROCEDURE — 84466 ASSAY OF TRANSFERRIN: CPT | Performed by: FAMILY MEDICINE

## 2022-01-28 PROCEDURE — 82728 ASSAY OF FERRITIN: CPT | Performed by: FAMILY MEDICINE

## 2022-01-28 PROCEDURE — 99214 OFFICE O/P EST MOD 30 MIN: CPT | Mod: 25,S$GLB,, | Performed by: FAMILY MEDICINE

## 2022-01-28 RX ORDER — CHOLECALCIFEROL (VITAMIN D3) 125 MCG
5000 CAPSULE ORAL
Qty: 100 CAPSULE | Refills: 4 | Status: SHIPPED | OUTPATIENT
Start: 2022-01-28 | End: 2022-12-12 | Stop reason: SDUPTHER

## 2022-01-28 NOTE — PROGRESS NOTES
Office Visit    Patient Name: Terese Barney    : 1960  MRN: [id number]    Subjective:  Terese is a 61 y.o. female who presents today for:    Leg Pain (Leg cramps )    61-year-old patient of mine, most recently seen by me for annual physical 2021 in her chronic conditions include osteoarthritis of her left knee s/p meniscus surgery per ortho Dr Barriga and now following with Ortho Dr Lilly who performed a subsequent partial left knee replacement, H/O Fe deficiency anemia, anxiety/depression, and chronic low back pain who presents today to discuss bilateral leg cramps.    She is having bilateral leny horse cramps the length of both of her legs similar to what she experienced previously withiron deficiency.     Having some ice cravings and dyspnea on exertion again similar to what she had with the iron deficiency previously.     She recently resumed oral iron-- Ferrous sulfate 325 mg daily-- having some increased GERD and constipation.  Not really noticing that the oral iron is helping her leg cramps or other symptoms such as shortness of breath, ice cravings yet.    Labs at the time of her previous physical in about 9 months ago were unremarkable other than mildly low vitamin-D.  Iron studies and CBC were normal 21.     She has relocated for work to [place]-- [employer] in [place] and this has been a good career move.      She was previously hospitalized at the end of January for Fe deficiency anemia. Had SOB, palpitations, intermittent dark stools, and excessive fatigue for 3-4 weeks. Hb was as low as 5.4.  Endoscopy revealed a hiatal hernia and ulcer with no active bleeding. Colonoscopy 20  (-) FOR BLEEDING +TUBULAR ADENOMA. Given 2 units while in the hospital. Followed up with Heme and GI.   SHE RECEIVED IV IRON INFUSIONS WITH SUBSEQUENT RESOLUTION OF HER IRON DEFICIENCY ANEMIA.  SHE HAS REMAINED OFF OF NSAIDS GIVEN HISTORY OF STOMACH ULCER.        PAST MEDICAL HISTORY,  SURGICAL/SOCIAL/FAMILY HISTORY REVIEWED AS PER CHART, WITH PERTINENT FINDINGS INCLUDED IN HISTORY SECTION OF NOTE.     Current Medications    Medication List with Changes/Refills   Current Medications    ADAPALENE-BENZOYL PEROXIDE (EPIDUO) 0.1-2.5 % GLWP    Apply thin layer to clean, dry skin of face nightly    ALBUTEROL (PROVENTIL) 2.5 MG /3 ML (0.083 %) NEBULIZER SOLUTION    Take 3 mLs (2.5 mg total) by nebulization every 6 (six) hours as needed for Wheezing. Rescue    ALBUTEROL (PROVENTIL/VENTOLIN HFA) 90 MCG/ACTUATION INHALER    Inhale 2 puffs into the lungs every 6 (six) hours as needed for Wheezing or Shortness of Breath. Rescue    BROMPHENIRAMINE-PSEUDOEPH-DM (BROMFED DM) 2-30-10 MG/5 ML SYRP        CEVIMELINE (EVOXAC) 30 MG CAPSULE    Take 1 capsule (30 mg total) by mouth 2 (two) times daily.    CLINDAMYCIN (CLEOCIN) 300 MG CAPSULE    clindamycin HCl 300 mg capsule    CLINDAMYCIN-BENZOYL PEROXIDE GEL    Apply to affected area every evening.    DICLOFENAC SODIUM (VOLTAREN) 1 % GEL    Apply 4 g topically 4 (four) times daily as needed.    DOXYCYCLINE (MONODOX) 100 MG CAPSULE        DULOXETINE (CYMBALTA) 60 MG CAPSULE    Take 1 capsule (60 mg total) by mouth 2 (two) times daily.    ESTRADIOL (ESTRACE) 0.01 % (0.1 MG/GRAM) VAGINAL CREAM    PLACE 1 G VAGINALLY EVERY OTHER DAY.    FERROUS SULFATE (FEOSOL) 325 MG (65 MG IRON) TAB TABLET    Take 1 tablet (325 mg total) by mouth daily with breakfast.    LORAZEPAM (ATIVAN) 1 MG TABLET    TAKE 1/2 TO 1 (ONE-HALF TO ONE) TABLET BY MOUTH ONCE DAILY AS NEEDED FOR  SEVERE  ANXIETY  OR  SLEEP    ONDANSETRON (ZOFRAN) 4 MG TABLET        PANTOPRAZOLE (PROTONIX) 40 MG TABLET    Take 1 tablet (40 mg total) by mouth 2 (two) times daily before meals.    PHENTERMINE (ADIPEX-P) 37.5 MG TABLET    phentermine 37.5 mg tablet   TAKE 1/2 TABLET BY MOUTH TWICE A DAY    PREVIDENT 5000 DRY MOUTH 1.1 % GEL        SUPREP BOWEL PREP KIT 17.5-3.13-1.6 GRAM SOLR    TAKE AS DIRECTED    ZOLPIDEM  "(AMBIEN) 10 MG TAB    Take 1 tablet (10 mg total) by mouth nightly as needed (insomnia).   Changed and/or Refilled Medications    Modified Medication Previous Medication    CHOLECALCIFEROL, VITAMIN D3, 125 MCG (5,000 UNIT) CAPSULE cholecalciferol, vitamin D3, 125 mcg (5,000 unit) capsule       Take 1 capsule (5,000 Units total) by mouth daily with breakfast.    Take 1 capsule (5,000 Units total) by mouth daily with breakfast.       Allergies   Review of patient's allergies indicates:   Allergen Reactions    Codeine Nausea And Vomiting         Review of Systems (Pertinent positives)  Review of Systems   Constitutional: Positive for fatigue.   Respiratory: Positive for shortness of breath.    Musculoskeletal: Positive for arthralgias and myalgias (leg cramps).       /81 (BP Location: Right arm, Patient Position: Sitting)   Pulse 103   Ht 5' 2" (1.575 m)   Wt 72.4 kg (159 lb 9.8 oz)   LMP  (LMP Unknown)   SpO2 98%   BMI 29.19 kg/m²     Physical Exam  Vitals reviewed.   Constitutional:       General: She is not in acute distress.     Appearance: Normal appearance. She is well-developed.   HENT:      Head: Normocephalic and atraumatic.   Eyes:      Conjunctiva/sclera: Conjunctivae normal.   Cardiovascular:      Rate and Rhythm: Normal rate and regular rhythm.   Pulmonary:      Effort: Pulmonary effort is normal.      Breath sounds: Normal breath sounds.   Musculoskeletal:      Right lower leg: No edema.      Left lower leg: No edema.   Skin:     General: Skin is warm and dry.   Neurological:      General: No focal deficit present.      Mental Status: She is alert and oriented to person, place, and time.   Psychiatric:         Mood and Affect: Mood normal.         Behavior: Behavior normal.           Assessment/Plan:  Terese Barney is a 61 y.o. female who presents today for :        ICD-10-CM ICD-9-CM    1. Iron deficiency anemia due to chronic blood loss  D50.0 280.0 Ambulatory referral/consult to " Hematology / Oncology   2. Overweight (BMI 25.0-29.9)  E66.3 278.02    3. History of total left knee replacement  Z96.652 V43.65    4. Gastroesophageal reflux disease, unspecified whether esophagitis present  K21.9 530.81    5. History of gastric ulcer  Z87.11 V12.79    6. Encounter for monitoring long-term proton pump inhibitor therapy  Z51.81 V58.83     Z79.899 V58.69    7. Vitamin D deficiency  E55.9 268.9 cholecalciferol, vitamin D3, 125 mcg (5,000 unit) capsule   8. Needs flu shot  Z23 V04.81 Influenza - Quadrivalent *Preferred* (6 months+) (PF)     61-year-old female patient of mine with prior history of iron deficiency anemia, resolved following transfusion &  IV iron x 2 and with GI eval remarkable for non bleeding gastric ulcer, who presents today with recurring symptoms of iron deficiency.    She is experiencing dyspnea, ice cravings, leg cramps similar to what she was previously experiencing when her iron levels were very low-her ferritin was previously 3 back in 2020.     Has done well up until about a month ago-resumed oral iron but without noted benefits as of yet.    Updated lab results are partially back showing recurrent anemia and significant drop in ferritin over the last year from the 300s down to 41. Iron/TIBC not yet back, but suspect will be low.  Hematocrit has dropped from 46.1 down of 36.2.    Referral placed for her to go back to Dr. Guerrero who previously evaluated her and treated her with the IV iron.    May need repeat GI eval with consideration for repeat EGD and/or capsule endoscopy.          There are no Patient Instructions on file for this visit.      Follow up for to follow up on lab results, return as needed for new concerns.

## 2022-01-29 ENCOUNTER — TELEPHONE (OUTPATIENT)
Dept: FAMILY MEDICINE | Facility: CLINIC | Age: 62
End: 2022-01-29
Payer: COMMERCIAL

## 2022-01-29 NOTE — TELEPHONE ENCOUNTER
This patient of Dr Guerrero's has recurrent evidence of iron deficiency anemia that is symptomatic-- leg cramps, fatigue, ice craving etc. She is primarily living in [place] for work at this time but currently in town and wondering if she might be able to see Dr Guerrero.      I let her know that I would send a message about getting her scheduled ASAP  to discuss repeat iv iron infusions and any additional GI work up as to the cause of her issues. Thanks!   IQRA Potts, PCP for Terese Barney

## 2022-01-30 DIAGNOSIS — D50.0 IRON DEFICIENCY ANEMIA DUE TO CHRONIC BLOOD LOSS: Primary | ICD-10-CM

## 2022-01-30 RX ORDER — HEPARIN 100 UNIT/ML
500 SYRINGE INTRAVENOUS
Status: CANCELLED | OUTPATIENT
Start: 2022-02-03

## 2022-01-30 RX ORDER — SODIUM CHLORIDE 0.9 % (FLUSH) 0.9 %
10 SYRINGE (ML) INJECTION
Status: CANCELLED | OUTPATIENT
Start: 2022-02-03

## 2022-01-30 RX ORDER — HEPARIN 100 UNIT/ML
500 SYRINGE INTRAVENOUS
Status: CANCELLED | OUTPATIENT
Start: 2022-02-10

## 2022-01-30 RX ORDER — SODIUM CHLORIDE 0.9 % (FLUSH) 0.9 %
10 SYRINGE (ML) INJECTION
Status: CANCELLED | OUTPATIENT
Start: 2022-02-10

## 2022-01-31 ENCOUNTER — TELEPHONE (OUTPATIENT)
Dept: HEMATOLOGY/ONCOLOGY | Facility: CLINIC | Age: 62
End: 2022-01-31
Payer: COMMERCIAL

## 2022-01-31 ENCOUNTER — TELEPHONE (OUTPATIENT)
Dept: FAMILY MEDICINE | Facility: CLINIC | Age: 62
End: 2022-01-31
Payer: COMMERCIAL

## 2022-01-31 NOTE — TELEPHONE ENCOUNTER
"Labs previously pursued with comments:  "Recurrent evidence of iron deficiency-- hematology Dr Guerrero's office messaged regarding setting up follow up appointment, Dr Potts."   "

## 2022-01-31 NOTE — TELEPHONE ENCOUNTER
----- Message from Elena Storey sent at 1/31/2022  9:03 AM CST -----  Contact: pt  Type:  Sooner Apoointment Request    Caller is requesting a sooner appointment.  Caller declined first available appointment listed below.  Caller will not accept being placed on the waitlist and is requesting a message be sent to doctor.  Name of Caller: pt  When is the first available appointment?  Symptoms:Iron deficiency anemia due to chronic blood loss  Would the patient rather a call back or a response via [facility]? call  Best Call Back Number: [phone]  Additional Information:  pt is wanting a sooner appt. She is sched for 2/10. Pt lives in [place] and wants to know if appt can be done virtual after lunch. Dr. Potts wanted the patient to be seen today ( pt will be in town part of the day).

## 2022-01-31 NOTE — TELEPHONE ENCOUNTER
----- Message from Elena Storey sent at 1/31/2022  9:07 AM CST -----  Contact: pt  Type:  Test Results    Who Called: pt  Name of Test (Lab/Mammo/Etc): labs  Date of Test: 1/28/22  Ordering Provider:   Where the test was performed: [facility]  Would the patient rather a call back or a response via [facility]? call  Best Call Back Number: [phone]  Additional Information:   pt states a 3rd was pending..

## 2022-01-31 NOTE — PROGRESS NOTES
"PATIENT: Terese Barney  MRN: [id number]  DATE: 2/1/2022    Diagnosis:   1. Iron deficiency anemia due to chronic blood loss    2. Chronic pain syndrome      Chief Complaint: iron deficiency anemia    Telemedicine visit:  The patient location is: home  The chief complaint leading to consultation is: iron deficiency anemia    Visit type: audiovisual    Face to Face time with patient: 10 minutes  15 minutes of total time spent on the encounter, which includes face to face time and non-face to face time preparing to see the patient (eg, review of tests), Obtaining and/or reviewing separately obtained history, Documenting clinical information in the electronic or other health record, Independently interpreting results (not separately reported) and communicating results to the patient/family/caregiver, or Care coordination (not separately reported).     Each patient to whom he or she provides medical services by telemedicine is:  (1) informed of the relationship between the physician and patient and the respective role of any other health care provider with respect to management of the patient; and (2) notified that he or she may decline to receive medical services by telemedicine and may withdraw from such care at any time.    Notes: see below    Subjective:    History of Present Illness: Ms. Barney is a 61 y.o. female who presented in February 2020 for evaluation and management of iron deficiency anemia due to chronic blood loss. She was referred by Dr. Potts. I had seen her during a hospitalization in late January 2020.    Information from my consult note dated 1/31/20:  "Iron deficiency anemia due to chronic blood loss  - I have reviewed her labs chart.  - in January 2019, her hemoglobin was normal. Hemoglobin levels for past few days have revealed a severe microcytic anemia.  - ferritin level is decreased at 3 ng/mL, consistent with severe iron deficiency anemia.  - although stool was negative for occult blood, " "she reports black stools.  - agree with gastroenterology workup for source of bleeding.  - I will give a dose of iron sucrose while she is hospitalized. I will also start oral ferrous sulfate.  - I will schedule a follow-up appointment in clinic and arrange for outpatient ferric carboxymaltose."    - she underwent upper GI endoscopy on 20 and colonoscopy on 20.    Interval history:  - she presents for a follow-up appointment for her iron deficiency anemia due to chronic blood loss.  - she endorses fatigue, dyspnea upon exertion, leg cramps, ice craving. She lives in [place] but would like to get her IV iron in [place].  - She denies chest pain, nausea, vomiting, diarrhea, constipation.    Past medical, surgical, family, and social histories have been reviewed and updated below.    Past Medical History:   Past Medical History:   Diagnosis Date    Anxiety and depression 2016    Chronic back pain     Hammer toe of left foot     Insomnia     Motion sickness     Overweight (BMI 25.0-29.9) 2016    PONV (postoperative nausea and vomiting)     Vitamin D deficiency 2016       Past Surgical History:   Past Surgical History:   Procedure Laterality Date    AUGMENTATION OF BREAST Bilateral     BREAST SURGERY Bilateral      SECTION      two    COLONOSCOPY N/A 2020    Procedure: COLONOSCOPY/Suprep;  Surgeon: Cristhian Mancilla MD;  Location[facility];  Service: Endoscopy;  Laterality: N/A;    ESOPHAGOGASTRODUODENOSCOPY N/A 2020    Procedure: ESOPHAGOGASTRODUODENOSCOPY (EGD);  Surgeon: Cristhian Mancilla MD;  Location: [facility];  Service: Endoscopy;  Laterality: N/A;    foot surgery      left plantar fascial release    FOOT SURGERY Left 2017    2nd TOE, Hammer toe repair    HYSTERECTOMY      laparascopy      ovarian cysts    left knee surgery      TONSILLECTOMY      TOTAL ABDOMINAL HYSTERECTOMY W/ BILATERAL SALPINGOOPHORECTOMY         Family History:   Family " History   Problem Relation Age of Onset    Hypertension Mother     Breast cancer Mother     Macular degeneration Father     Diabetes type II Father     Coronary artery disease Father     Hypertension Sister     Diabetes type II Sister     Diabetes type II Brother     Hypertension Brother     Glaucoma Paternal Grandmother        Social History:  reports that she has never smoked. She has never used smokeless tobacco. She reports current alcohol use. She reports that she does not use drugs.    Allergies:  Review of patient's allergies indicates:   Allergen Reactions    Codeine Nausea And Vomiting       Medications:  Current Outpatient Medications   Medication Sig Dispense Refill    adapalene-benzoyl peroxide (EPIDUO) 0.1-2.5 % GlwP Apply thin layer to clean, dry skin of face nightly 45 g 11    albuterol (PROVENTIL) 2.5 mg /3 mL (0.083 %) nebulizer solution Take 3 mLs (2.5 mg total) by nebulization every 6 (six) hours as needed for Wheezing. Rescue 2 each 3    albuterol (PROVENTIL/VENTOLIN HFA) 90 mcg/actuation inhaler Inhale 2 puffs into the lungs every 6 (six) hours as needed for Wheezing or Shortness of Breath. Rescue 1 Inhaler 0    brompheniramine-pseudoeph-DM (BROMFED DM) 2-30-10 mg/5 mL Syrp       cevimeline (EVOXAC) 30 mg capsule Take 1 capsule (30 mg total) by mouth 2 (two) times daily. 180 capsule 3    cholecalciferol, vitamin D3, 125 mcg (5,000 unit) capsule Take 1 capsule (5,000 Units total) by mouth daily with breakfast. 100 capsule 4    clindamycin (CLEOCIN) 300 MG capsule clindamycin HCl 300 mg capsule      clindamycin-benzoyl peroxide gel Apply to affected area every evening. (Patient not taking: Reported on 1/28/2022) 45 g 11    diclofenac sodium (VOLTAREN) 1 % Gel Apply 4 g topically 4 (four) times daily as needed. 100 g 11    doxycycline (MONODOX) 100 MG capsule       DULoxetine (CYMBALTA) 60 MG capsule Take 1 capsule (60 mg total) by mouth 2 (two) times daily. 180 capsule 4     estradioL (ESTRACE) 0.01 % (0.1 mg/gram) vaginal cream PLACE 1 G VAGINALLY EVERY OTHER DAY. (Patient not taking: Reported on 1/28/2022) 126 g 4    ferrous sulfate (FEOSOL) 325 mg (65 mg iron) Tab tablet Take 1 tablet (325 mg total) by mouth daily with breakfast. 90 tablet 4    LORazepam (ATIVAN) 1 MG tablet TAKE 1/2 TO 1 (ONE-HALF TO ONE) TABLET BY MOUTH ONCE DAILY AS NEEDED FOR  SEVERE  ANXIETY  OR  SLEEP 30 tablet 0    ondansetron (ZOFRAN) 4 MG tablet       pantoprazole (PROTONIX) 40 MG tablet Take 1 tablet (40 mg total) by mouth 2 (two) times daily before meals. 180 tablet 1    phentermine (ADIPEX-P) 37.5 mg tablet phentermine 37.5 mg tablet   TAKE 1/2 TABLET BY MOUTH TWICE A DAY      PREVIDENT 5000 DRY MOUTH 1.1 % Gel       SUPREP BOWEL PREP KIT 17.5-3.13-1.6 gram SolR TAKE AS DIRECTED      zolpidem (AMBIEN) 10 mg Tab Take 1 tablet (10 mg total) by mouth nightly as needed (insomnia). 30 tablet 1     No current facility-administered medications for this visit.       Review of Systems   Constitutional: Positive for fatigue.   HENT: Negative for sore throat.    Eyes: Negative for visual disturbance.   Respiratory: Positive for shortness of breath. Negative for cough.    Cardiovascular: Negative for chest pain.   Gastrointestinal: Negative for abdominal pain, constipation, diarrhea, nausea and vomiting.   Genitourinary: Negative for dysuria.   Musculoskeletal: Negative for back pain.   Skin: Negative for rash.   Neurological: Negative for headaches.   Hematological: Negative for adenopathy.   Psychiatric/Behavioral: The patient is not nervous/anxious.        ECOG Performance Status:   ECOG SCORE 1       Objective:      Vitals:   There were no vitals filed for this visit.  BMI: There is no height or weight on file to calculate BMI.  Deferred due to telemedicine visit.    Physical Exam  Deferred due to telemedicine visit.    Laboratory Data:  Labs have been reviewed.    Lab Results   Component Value Date     WBC 4.63 01/28/2022    HGB 11.2 (L) 01/28/2022    HCT 36.2 (L) 01/28/2022    MCV 96 01/28/2022     01/28/2022             Imaging:     Colonoscopy (2/4/20):  - One 6 mm polyp in the rectum, removed with a hot snare. Resected and retrieved.  - Diverticulosis in the sigmoid colon.    Upper GI endoscopy (1/31/20):  - Esophageal plaques were found, consistent with candidiasis.  - 4 cm hiatal hernia.  - Non-bleeding gastric ulcers with no stigmata of bleeding. Biopsied.  - Normal examined duodenum.    Assessment:       1. Iron deficiency anemia due to chronic blood loss    2. Chronic pain syndrome         Plan:     1. Iron deficiency anemia due to chronic blood loss  - I have reviewed her labs chart.  - in January 2019, her hemoglobin was normal. Hemoglobin levels for past few days have revealed a severe microcytic anemia.  - ferritin level is decreased at 3 ng/mL, consistent with severe iron deficiency anemia.  - although stool was negative for occult blood, she reported black stools.  - I gave a dose of iron sucrose while she was hospitalized on 1/31/20.  - I recommended ferric carboxymaltose x 2 doses.  - she had subsequent improvement in her iron deficiency anemia after ferric carboxymaltose  - labs on 1/28/22 reveal recurrent iron deficiency anemia  - I recommend ferric carboxymaltose x 2 dose  - continue ferrous sulfate.  - refer back to gastroenterology for consideration for upper GI endoscopy.  - return to clinic in 6 months with repeat iron studies.    2. Advance Care Planning     Power of   After our discussion (at previous visit), the patient decided to complete a HCPOA and appointed her mother Chelsey Morris ([phone]) and sister Trice Beasley ([phone]).           - return to clinic in 6 months with repeat iron studies.    Jose Luis Guerrero M.D.  Hematology/Oncology  [facility] - [address], [address]  [place] [address]  Phone: [phone]  Fax: [phone]

## 2022-02-01 ENCOUNTER — OFFICE VISIT (OUTPATIENT)
Dept: HEMATOLOGY/ONCOLOGY | Facility: CLINIC | Age: 62
End: 2022-02-01
Payer: COMMERCIAL

## 2022-02-01 ENCOUNTER — TELEPHONE (OUTPATIENT)
Dept: GASTROENTEROLOGY | Facility: CLINIC | Age: 62
End: 2022-02-01
Payer: COMMERCIAL

## 2022-02-01 DIAGNOSIS — D50.0 IRON DEFICIENCY ANEMIA DUE TO CHRONIC BLOOD LOSS: Primary | ICD-10-CM

## 2022-02-01 DIAGNOSIS — G89.4 CHRONIC PAIN SYNDROME: ICD-10-CM

## 2022-02-01 PROCEDURE — 99214 OFFICE O/P EST MOD 30 MIN: CPT | Mod: 95,,, | Performed by: INTERNAL MEDICINE

## 2022-02-01 PROCEDURE — 99214 PR OFFICE/OUTPT VISIT, EST, LEVL IV, 30-39 MIN: ICD-10-PCS | Mod: 95,,, | Performed by: INTERNAL MEDICINE

## 2022-02-01 NOTE — TELEPHONE ENCOUNTER
----- Message from Angela Alfred RN sent at 2/1/2022 10:35 AM CST -----  Good morning,  Dr. Guerrero is referring patient to GI. I tried to schedule, but it kept changing patient to establish. All your help is appreciated.     Thanks

## 2022-02-01 NOTE — TELEPHONE ENCOUNTER
Called patient to get her scheduled for a clinic visit to see Yuliet. Patient did not answer, left VM

## 2022-02-01 NOTE — Clinical Note
1. Schedule injectafer x 2 doses in [facility]. 2. Schedule referral to gastroenterology 3. Schedule labs cbc, ferritin, iron/tibc in 6 months. 4. Schedule virtual visit day after labs.  Thanks!

## 2022-02-02 ENCOUNTER — TELEPHONE (OUTPATIENT)
Dept: GASTROENTEROLOGY | Facility: CLINIC | Age: 62
End: 2022-02-02
Payer: COMMERCIAL

## 2022-02-02 NOTE — TELEPHONE ENCOUNTER
----- Message from Khushi Raymond sent at 2/2/2022  2:31 PM CST -----  Regarding: referral  Dr Cesar Farmer put in a referral for this pt. Dx is Iron deficiency anemia due to chronic blood loss. Pt currently lives in [place]. Can someone please contact pt to work with her to get an appt for when she comes back in town?    Thanks,  Khushi  Physician Referral Specialist

## 2022-02-07 ENCOUNTER — TELEPHONE (OUTPATIENT)
Dept: HEMATOLOGY/ONCOLOGY | Facility: CLINIC | Age: 62
End: 2022-02-07
Payer: COMMERCIAL

## 2022-02-09 ENCOUNTER — TELEPHONE (OUTPATIENT)
Dept: HEMATOLOGY/ONCOLOGY | Facility: CLINIC | Age: 62
End: 2022-02-09
Payer: COMMERCIAL

## 2022-02-09 RX ORDER — HEPARIN 100 UNIT/ML
500 SYRINGE INTRAVENOUS
Status: CANCELLED | OUTPATIENT
Start: 2022-02-16

## 2022-02-09 RX ORDER — SODIUM CHLORIDE 0.9 % (FLUSH) 0.9 %
10 SYRINGE (ML) INJECTION
Status: CANCELLED | OUTPATIENT
Start: 2022-02-23

## 2022-02-09 RX ORDER — HEPARIN 100 UNIT/ML
500 SYRINGE INTRAVENOUS
Status: CANCELLED | OUTPATIENT
Start: 2022-03-02

## 2022-02-09 RX ORDER — HEPARIN 100 UNIT/ML
500 SYRINGE INTRAVENOUS
Status: CANCELLED | OUTPATIENT
Start: 2022-02-23

## 2022-02-09 RX ORDER — SODIUM CHLORIDE 0.9 % (FLUSH) 0.9 %
10 SYRINGE (ML) INJECTION
Status: CANCELLED | OUTPATIENT
Start: 2022-02-16

## 2022-02-09 RX ORDER — HEPARIN 100 UNIT/ML
500 SYRINGE INTRAVENOUS
Status: CANCELLED | OUTPATIENT
Start: 2022-03-09

## 2022-02-09 RX ORDER — SODIUM CHLORIDE 0.9 % (FLUSH) 0.9 %
10 SYRINGE (ML) INJECTION
Status: CANCELLED | OUTPATIENT
Start: 2022-03-09

## 2022-02-09 RX ORDER — SODIUM CHLORIDE 0.9 % (FLUSH) 0.9 %
10 SYRINGE (ML) INJECTION
Status: CANCELLED | OUTPATIENT
Start: 2022-03-02

## 2022-02-10 ENCOUNTER — TELEPHONE (OUTPATIENT)
Dept: HEMATOLOGY/ONCOLOGY | Facility: CLINIC | Age: 62
End: 2022-02-10
Payer: COMMERCIAL

## 2022-02-15 DIAGNOSIS — F41.9 ANXIETY AND DEPRESSION: ICD-10-CM

## 2022-02-15 DIAGNOSIS — F32.A ANXIETY AND DEPRESSION: ICD-10-CM

## 2022-02-16 ENCOUNTER — PATIENT OUTREACH (OUTPATIENT)
Dept: ADMINISTRATIVE | Facility: OTHER | Age: 62
End: 2022-02-16
Payer: COMMERCIAL

## 2022-02-16 RX ORDER — LORAZEPAM 1 MG/1
TABLET ORAL
Qty: 30 TABLET | Refills: 5 | Status: SHIPPED | OUTPATIENT
Start: 2022-02-16 | End: 2022-08-22 | Stop reason: SDUPTHER

## 2022-02-16 NOTE — PROGRESS NOTES
Health Maintenance Due   Topic Date Due    COVID-19 Vaccine (1) Never done    Shingles Vaccine (1 of 2) Never done     Updates were requested from care everywhere.  Chart was reviewed for overdue Proactive [facility] Encounters (JULIET) topics (CRS, Breast Cancer Screening, Eye exam)  Health Maintenance has been updated.  LINKS immunization registry triggered.  Immunizations were reconciled.

## 2022-02-16 NOTE — TELEPHONE ENCOUNTER
No new care gaps identified.  Powered by [employer]. Reference number: [id number].   2/15/2022 9:08:10 PM CST

## 2022-02-18 ENCOUNTER — INFUSION (OUTPATIENT)
Dept: INFUSION THERAPY | Facility: HOSPITAL | Age: 62
End: 2022-02-18
Attending: INTERNAL MEDICINE
Payer: COMMERCIAL

## 2022-02-18 ENCOUNTER — OFFICE VISIT (OUTPATIENT)
Dept: GASTROENTEROLOGY | Facility: CLINIC | Age: 62
End: 2022-02-18
Payer: COMMERCIAL

## 2022-02-18 VITALS — HEIGHT: 62 IN | WEIGHT: 155.44 LBS | BODY MASS INDEX: 28.61 KG/M2

## 2022-02-18 VITALS
BODY MASS INDEX: 28.61 KG/M2 | SYSTOLIC BLOOD PRESSURE: 136 MMHG | WEIGHT: 155.44 LBS | DIASTOLIC BLOOD PRESSURE: 75 MMHG | HEART RATE: 97 BPM | TEMPERATURE: 99 F | HEIGHT: 62 IN | RESPIRATION RATE: 17 BRPM | OXYGEN SATURATION: 94 %

## 2022-02-18 DIAGNOSIS — D50.0 IRON DEFICIENCY ANEMIA DUE TO CHRONIC BLOOD LOSS: Primary | ICD-10-CM

## 2022-02-18 DIAGNOSIS — Z86.010 HISTORY OF COLON POLYPS: ICD-10-CM

## 2022-02-18 PROCEDURE — 99999 PR PBB SHADOW E&M-EST. PATIENT-LVL IV: ICD-10-PCS | Mod: PBBFAC,,, | Performed by: INTERNAL MEDICINE

## 2022-02-18 PROCEDURE — 99215 PR OFFICE/OUTPT VISIT, EST, LEVL V, 40-54 MIN: ICD-10-PCS | Mod: S$GLB,,, | Performed by: INTERNAL MEDICINE

## 2022-02-18 PROCEDURE — 25000003 PHARM REV CODE 250: Performed by: INTERNAL MEDICINE

## 2022-02-18 PROCEDURE — 99999 PR PBB SHADOW E&M-EST. PATIENT-LVL IV: CPT | Mod: PBBFAC,,, | Performed by: INTERNAL MEDICINE

## 2022-02-18 PROCEDURE — 99215 OFFICE O/P EST HI 40 MIN: CPT | Mod: S$GLB,,, | Performed by: INTERNAL MEDICINE

## 2022-02-18 PROCEDURE — 63600175 PHARM REV CODE 636 W HCPCS: Performed by: INTERNAL MEDICINE

## 2022-02-18 PROCEDURE — 96365 THER/PROPH/DIAG IV INF INIT: CPT

## 2022-02-18 PROCEDURE — A4216 STERILE WATER/SALINE, 10 ML: HCPCS | Performed by: INTERNAL MEDICINE

## 2022-02-18 RX ORDER — HEPARIN 100 UNIT/ML
500 SYRINGE INTRAVENOUS
Status: DISCONTINUED | OUTPATIENT
Start: 2022-02-18 | End: 2022-02-18 | Stop reason: HOSPADM

## 2022-02-18 RX ORDER — SODIUM CHLORIDE 0.9 % (FLUSH) 0.9 %
10 SYRINGE (ML) INJECTION
Status: DISCONTINUED | OUTPATIENT
Start: 2022-02-18 | End: 2022-02-18 | Stop reason: HOSPADM

## 2022-02-18 RX ADMIN — IRON SUCROSE 200 MG: 20 INJECTION, SOLUTION INTRAVENOUS at 10:02

## 2022-02-18 RX ADMIN — SODIUM CHLORIDE: 0.9 INJECTION, SOLUTION INTRAVENOUS at 10:02

## 2022-02-18 RX ADMIN — SODIUM CHLORIDE, PRESERVATIVE FREE 10 ML: 5 INJECTION INTRAVENOUS at 11:02

## 2022-02-18 NOTE — PROGRESS NOTES
Subjective:       Patient ID: Terese Barney is a 61 y.o. female.    Chief Complaint:  Iron deficiency anemia    Patient here today to reestablish care with aforementioned complaints.  She has previously seen by Dr. Trinidad in 2020 with similar complaints.  Today patient reports ongoing dysphagia as well as return of iron deficiency anemia.  Although not as significant as previously she again is experiencing significant fatigue/weakness.  Labs noted return in anemia and iron infusions have been scheduled.  Concerning her dysphagia she does report ongoing issues.  She was previously treated with Diflucan, however is uncertain if it made significant difference.  She did have a rather significant hiatal hernia at time of last endoscopy.    Review of Systems   Constitutional: Positive for fatigue.   HENT: Positive for trouble swallowing.    Neurological: Positive for weakness.       The following portions of the patient's history were reviewed and updated as appropriate: allergies, current medications, past family history, past medical history, past social history, past surgical history and problem list.    Objective:      Physical Exam  Constitutional:       Appearance: She is well-developed and well-nourished.   HENT:      Head: Normocephalic and atraumatic.   Eyes:      Extraocular Movements: EOM normal.      Conjunctiva/sclera: Conjunctivae normal.   Pulmonary:      Effort: Pulmonary effort is normal. No respiratory distress.   Musculoskeletal:      Cervical back: Normal range of motion.   Neurological:      Mental Status: She is alert and oriented to person, place, and time.   Psychiatric:         Mood and Affect: Mood and affect normal.         Behavior: Behavior normal.         Thought Content: Thought content normal.         Judgment: Judgment normal.           Pertinent labs and imaging studies reviewed    Assessment:       1. Iron deficiency anemia due to chronic blood loss    2. History of colon polyps         Plan:       Repeat EGD.  If no significant pathology noted will deployed video capsule  Patient up-to-date with age-appropriate colon cancer screening.  Repeat colonoscopy in 2025 as recommended by previous endoscopist  Continue iron per Hematology  FMLA paperwork filled out    42 minutes of total time spent on the encounter, which includes face to face time and non-face to face time preparing to see the patient (eg, review of tests), Obtaining and/or reviewing separately obtained history, Documenting clinical information in the electronic or other health record, Independently interpreting results (not separately reported) and communicating results to the patient/family/caregiver, or Care coordination (not separately reported).     (Portions of this note were dictated using voice recognition software and may contain dictation related errors in spelling/grammar/syntax not found on text review)

## 2022-02-18 NOTE — PATIENT INSTRUCTIONS
EGD Instructions    [facility] [address]  Clinic Office [phone]  Endoscopy Lab [phone]    You are scheduled for an EGD with  DR. GUILLE BARBOUR   on   03/03/2022(THURSDAY)  at [facility] in [place].    Check in at the Hospital -1st floor, Information desk.   Call [phone] to reschedule.     You cannot have anything to eat or drink after Midnight. You can brush your teeth with a sip of water.      An adult friend/family member must come with you to drive you home.  You cannot drive, take a taxi, Uber/Lyft or bus to leave the Endoscopy Center alone.  If you do not have someone to drive you home, your test will be cancelled.      Please follow the directions of your doctor if you take any pills that thin your blood. If you take these meds: Aggrenox, Brilinta, Effient, Eliquis, Lovenox, Plavix, Pletal, Pradaxa, Ticilid, Xarelto or Coumadin, let the doctor's office know.     DON'T: On the morning of the test do not take insulin or pills for diabetes.      DO: On the morning of the test, do take any pills for blood pressure, heart, anti-rejection and or seizures with a small sip of water. Bring any inhalers with you.     A Covid test is required 72 hours before the procedure. The test is not needed with proof of vaccination > 2 weeks or previous Covid infection.     Leave all valuables and jewelry at home. You will be at the hospital for 2-4 hours.     Call the Endoscopy department at [phone] with any questions about your procedure.    Thank you for choosing [facility].

## 2022-02-21 ENCOUNTER — TELEPHONE (OUTPATIENT)
Dept: GASTROENTEROLOGY | Facility: CLINIC | Age: 62
End: 2022-02-21
Payer: COMMERCIAL

## 2022-02-21 ENCOUNTER — PATIENT MESSAGE (OUTPATIENT)
Dept: HEMATOLOGY/ONCOLOGY | Facility: CLINIC | Age: 62
End: 2022-02-21
Payer: COMMERCIAL

## 2022-02-21 ENCOUNTER — PATIENT MESSAGE (OUTPATIENT)
Dept: ENDOSCOPY | Facility: HOSPITAL | Age: 62
End: 2022-02-21
Payer: COMMERCIAL

## 2022-02-21 NOTE — TELEPHONE ENCOUNTER
Spoke to patient in regards to her message. I informed patient that Dr. Horvath filled out the [facility] paperwork on Friday. I told patient that I can fax the paperwork [place], I can mail it to her, or I can fax it over directly to the company. Patient stated that she needed the paperwork, and she stated that she was already in [place] so there was no way to pick it up.  She stated that she was going to find a fax machine over there and call me back so I can fax it at wherever she wants me to fax it too.

## 2022-02-21 NOTE — TELEPHONE ENCOUNTER
Spoke to patient, patient gave me the fax number to fax over the [facility] paperwork

## 2022-02-21 NOTE — TELEPHONE ENCOUNTER
----- Message from Bria Watters sent at 2/21/2022 11:31 AM CST -----  Contact: [phone]  Who Called: PT  Regarding: returning call    Would the patient rather a call back or a response via [facility]? Call back  Best Call Back Number: [phone]   Additional Information:

## 2022-02-23 ENCOUNTER — TELEPHONE (OUTPATIENT)
Dept: HEMATOLOGY/ONCOLOGY | Facility: CLINIC | Age: 62
End: 2022-02-23
Payer: COMMERCIAL

## 2022-02-23 NOTE — TELEPHONE ENCOUNTER
----- Message from Jose Luis Guerrero MD sent at 2/23/2022 12:28 PM CST -----  Contact: PT  Let her know I emailed the updated form to her.    Thanks,  jose luis  ----- Message -----  From: Elena Newsomeroe  Sent: 2/23/2022  11:17 AM CST  To: [facility] Staff    Type:  Needs Medical Advice    Who Called: PT  Symptoms (please be specific):    How long has patient had these symptoms:    Pharmacy name and phone #:    Would the patient rather a call back or a response via [facility]? CALL & EMAIL  Best Call Back Number: [phone]  Additional Information: PT NEEDS A CALL AND NEW COPY OF HER FMLA TO GIVE TO HER WORK. SHE WILL BE LEAVING TODAY AT 2:30 PM

## 2022-02-25 ENCOUNTER — INFUSION (OUTPATIENT)
Dept: INFUSION THERAPY | Facility: HOSPITAL | Age: 62
End: 2022-02-25
Attending: INTERNAL MEDICINE
Payer: COMMERCIAL

## 2022-02-25 VITALS
WEIGHT: 155.44 LBS | SYSTOLIC BLOOD PRESSURE: 133 MMHG | RESPIRATION RATE: 18 BRPM | DIASTOLIC BLOOD PRESSURE: 74 MMHG | HEART RATE: 97 BPM | HEIGHT: 62 IN | BODY MASS INDEX: 28.61 KG/M2 | TEMPERATURE: 99 F | OXYGEN SATURATION: 96 %

## 2022-02-25 DIAGNOSIS — D50.0 IRON DEFICIENCY ANEMIA DUE TO CHRONIC BLOOD LOSS: Primary | ICD-10-CM

## 2022-02-25 PROCEDURE — 25000003 PHARM REV CODE 250: Performed by: INTERNAL MEDICINE

## 2022-02-25 PROCEDURE — A4216 STERILE WATER/SALINE, 10 ML: HCPCS | Performed by: INTERNAL MEDICINE

## 2022-02-25 PROCEDURE — 96365 THER/PROPH/DIAG IV INF INIT: CPT

## 2022-02-25 PROCEDURE — 63600175 PHARM REV CODE 636 W HCPCS: Performed by: INTERNAL MEDICINE

## 2022-02-25 RX ORDER — SODIUM CHLORIDE 0.9 % (FLUSH) 0.9 %
10 SYRINGE (ML) INJECTION
Status: DISCONTINUED | OUTPATIENT
Start: 2022-02-25 | End: 2022-02-25 | Stop reason: HOSPADM

## 2022-02-25 RX ORDER — HEPARIN 100 UNIT/ML
500 SYRINGE INTRAVENOUS
Status: DISCONTINUED | OUTPATIENT
Start: 2022-02-25 | End: 2022-02-25 | Stop reason: HOSPADM

## 2022-02-25 RX ADMIN — Medication 10 ML: at 09:02

## 2022-02-25 RX ADMIN — SODIUM CHLORIDE: 0.9 INJECTION, SOLUTION INTRAVENOUS at 08:02

## 2022-02-25 RX ADMIN — IRON SUCROSE 200 MG: 20 INJECTION, SOLUTION INTRAVENOUS at 08:02

## 2022-02-25 NOTE — NURSING
Pt tolerated Venofer infusion 2 of 4 well.  No adverse reaction noted. No complaints at this time. IV flushed with NS and D/C per protocol.AVS printed and reviewed. Patient left clinic in no acute distress.

## 2022-02-28 ENCOUNTER — TELEPHONE (OUTPATIENT)
Dept: ENDOSCOPY | Facility: HOSPITAL | Age: 62
End: 2022-02-28
Payer: COMMERCIAL

## 2022-02-28 NOTE — TELEPHONE ENCOUNTER
Spoke with patient about arrival time @ 0700     EGD and VEC    Covid test = Doing home test morning of appt    NPO status reviewed: Patient must have nothing to eat after midnight.   Instructions for EGD w/VCE sent to portal.     Medications: Do not take Insulin or oral diabetic medications the day of the procedure.  Take as prescribed: heart, seizure and blood pressure medication in the morning with a sip of water (less than an ounce).  Take any breathing medications and bring inhalers to hospital with you Leave all valuables and jewelry at home.     Wear comfortable clothes to procedure to change into hospital gown You cannot drive for 24 hours after your procedure because you will receive sedation for your procedure to make you comfortable.  A ride must be provided at discharge.

## 2022-03-03 ENCOUNTER — HOSPITAL ENCOUNTER (OUTPATIENT)
Facility: HOSPITAL | Age: 62
Discharge: HOME | End: 2022-03-03
Attending: INTERNAL MEDICINE | Admitting: INTERNAL MEDICINE
Payer: COMMERCIAL

## 2022-03-03 ENCOUNTER — ANESTHESIA (OUTPATIENT)
Dept: ENDOSCOPY | Facility: HOSPITAL | Age: 62
End: 2022-03-03
Payer: COMMERCIAL

## 2022-03-03 ENCOUNTER — ANESTHESIA EVENT (OUTPATIENT)
Dept: ENDOSCOPY | Facility: HOSPITAL | Age: 62
End: 2022-03-03
Payer: COMMERCIAL

## 2022-03-03 VITALS
HEIGHT: 62 IN | OXYGEN SATURATION: 98 % | SYSTOLIC BLOOD PRESSURE: 114 MMHG | RESPIRATION RATE: 20 BRPM | HEART RATE: 79 BPM | TEMPERATURE: 97 F | DIASTOLIC BLOOD PRESSURE: 70 MMHG | BODY MASS INDEX: 28.52 KG/M2 | WEIGHT: 155 LBS

## 2022-03-03 DIAGNOSIS — D50.0 IRON DEFICIENCY ANEMIA DUE TO CHRONIC BLOOD LOSS: ICD-10-CM

## 2022-03-03 PROBLEM — K44.9 HIATAL HERNIA WITH GERD: Status: ACTIVE | Noted: 2017-08-28

## 2022-03-03 LAB
CTP QC/QA: YES
SARS-COV-2 AG RESP QL IA.RAPID: NEGATIVE

## 2022-03-03 PROCEDURE — 91110 GI TRC IMG INTRAL ESOPH-ILE: CPT | Mod: 26,NSCH,, | Performed by: INTERNAL MEDICINE

## 2022-03-03 PROCEDURE — 43235 EGD DIAGNOSTIC BRUSH WASH: CPT | Performed by: INTERNAL MEDICINE

## 2022-03-03 PROCEDURE — 37000008 HC ANESTHESIA 1ST 15 MINUTES: Performed by: INTERNAL MEDICINE

## 2022-03-03 PROCEDURE — 43235 PR EGD, FLEX, DIAGNOSTIC: ICD-10-PCS | Mod: NSCH,,, | Performed by: INTERNAL MEDICINE

## 2022-03-03 PROCEDURE — 63600175 PHARM REV CODE 636 W HCPCS: Performed by: NURSE ANESTHETIST, CERTIFIED REGISTERED

## 2022-03-03 PROCEDURE — 25000003 PHARM REV CODE 250: Performed by: INTERNAL MEDICINE

## 2022-03-03 PROCEDURE — 91110 PR GI TRACT CAPSULE ENDOSCOPY: ICD-10-PCS | Mod: 26,NSCH,, | Performed by: INTERNAL MEDICINE

## 2022-03-03 PROCEDURE — 25000003 PHARM REV CODE 250: Performed by: NURSE ANESTHETIST, CERTIFIED REGISTERED

## 2022-03-03 PROCEDURE — 37000009 HC ANESTHESIA EA ADD 15 MINS: Performed by: INTERNAL MEDICINE

## 2022-03-03 PROCEDURE — 27200952 HC CAPSULE DELIVERY DEVICE: Performed by: INTERNAL MEDICINE

## 2022-03-03 PROCEDURE — 43235 EGD DIAGNOSTIC BRUSH WASH: CPT | Mod: NSCH,,, | Performed by: INTERNAL MEDICINE

## 2022-03-03 RX ORDER — PROPOFOL 10 MG/ML
VIAL (ML) INTRAVENOUS
Status: DISCONTINUED | OUTPATIENT
Start: 2022-03-03 | End: 2022-03-03

## 2022-03-03 RX ORDER — SODIUM CHLORIDE 9 MG/ML
INJECTION, SOLUTION INTRAVENOUS CONTINUOUS
Status: DISCONTINUED | OUTPATIENT
Start: 2022-03-03 | End: 2022-03-03 | Stop reason: HOSPADM

## 2022-03-03 RX ORDER — ONDANSETRON 2 MG/ML
INJECTION INTRAMUSCULAR; INTRAVENOUS
Status: DISCONTINUED | OUTPATIENT
Start: 2022-03-03 | End: 2022-03-03

## 2022-03-03 RX ORDER — LIDOCAINE HYDROCHLORIDE 20 MG/ML
INJECTION INTRAVENOUS
Status: DISCONTINUED | OUTPATIENT
Start: 2022-03-03 | End: 2022-03-03

## 2022-03-03 RX ORDER — SODIUM CHLORIDE 0.9 % (FLUSH) 0.9 %
10 SYRINGE (ML) INJECTION
Status: DISCONTINUED | OUTPATIENT
Start: 2022-03-03 | End: 2022-03-03 | Stop reason: HOSPADM

## 2022-03-03 RX ADMIN — ONDANSETRON 8 MG: 2 INJECTION, SOLUTION INTRAMUSCULAR; INTRAVENOUS at 08:03

## 2022-03-03 RX ADMIN — SODIUM CHLORIDE: 0.9 INJECTION, SOLUTION INTRAVENOUS at 07:03

## 2022-03-03 RX ADMIN — PROPOFOL 150 MCG/KG/MIN: 10 INJECTION, EMULSION INTRAVENOUS at 07:03

## 2022-03-03 RX ADMIN — PROPOFOL 40 MG: 10 INJECTION, EMULSION INTRAVENOUS at 07:03

## 2022-03-03 RX ADMIN — TOPICAL ANESTHETIC 1 EACH: 200 SPRAY DENTAL; PERIODONTAL at 07:03

## 2022-03-03 RX ADMIN — LIDOCAINE HYDROCHLORIDE 100 MG: 20 INJECTION, SOLUTION INTRAVENOUS at 07:03

## 2022-03-03 NOTE — TRANSFER OF CARE
"Anesthesia Transfer of Care Note    Patient: Terese Barney    Procedure(s) Performed: Procedure(s) (LRB):  EGD (ESOPHAGOGASTRODUODENOSCOPY) (N/A)  IMAGING PROCEDURE, GI TRACT, INTRALUMINAL, VIA CAPSULE (N/A)    Patient location: GI    Anesthesia Type: MAC    Transport from OR: Transported from OR on room air with adequate spontaneous ventilation    Post pain: adequate analgesia    Post assessment: no apparent anesthetic complications and tolerated procedure well    Post vital signs: stable    Level of consciousness: awake and alert    Nausea/Vomiting: no nausea/vomiting    Complications: none    Transfer of care protocol was followed      Last vitals:   Visit Vitals  /75   Pulse 80   Temp 36.6 °C (97.9 °F)   Resp 18   Ht 5' 2" (1.575 m)   Wt 70.3 kg (155 lb)   LMP  (LMP Unknown)   SpO2 (!) 94%   BMI 28.35 kg/m²     "

## 2022-03-03 NOTE — ANESTHESIA POSTPROCEDURE EVALUATION
Anesthesia Post Evaluation    Patient: Terese Barney    Procedure(s) Performed: Procedure(s) (LRB):  EGD (ESOPHAGOGASTRODUODENOSCOPY) (N/A)  IMAGING PROCEDURE, GI TRACT, INTRALUMINAL, VIA CAPSULE (N/A)    Final Anesthesia Type: MAC      Patient location during evaluation: GI PACU  Patient participation: Yes- Able to Participate  Level of consciousness: awake and alert and awake  Post-procedure vital signs: reviewed and stable  Pain management: adequate  Airway patency: patent    PONV status at discharge: No PONV  Anesthetic complications: no      Cardiovascular status: blood pressure returned to baseline, hemodynamically stable and stable  Respiratory status: unassisted, spontaneous ventilation and room air  Hydration status: euvolemic  Follow-up not needed.          Vitals Value Taken Time   BP  03/03/22 0811   Temp  03/03/22 0811   Pulse  03/03/22 0811   Resp  03/03/22 0811   SpO2  03/03/22 0811     See nursing notes for vitals     No case tracking events are documented in the log.      Pain/Veronique Score: No data recorded

## 2022-03-03 NOTE — PROVATION PATIENT INSTRUCTIONS
Discharge Summary/Instructions after an Endoscopic Procedure  Patient Name: Terese Barney  Patient MRN: [id number]  Patient YOB: 1960  Thursday, March 3, 2022  Ward Horvath MD  Dear patient,  As a result of recent federal legislation (The Federal Cures Act), you may   receive lab or pathology results from your procedure in your [facility]   account before your physician is able to contact you. Your physician or   their representative will relay the results to you with their   recommendations at their soonest availability.  Thank you,  Your health is very important to us during the Covid Crisis. Following your   procedure today, you will receive a daily text for 2 weeks asking about   signs or symptoms of Covid 19.  Please respond to this text when you   receive it so we can follow up and keep you as safe as possible.   RESTRICTIONS:  During your procedure today, you received medications for sedation.  These   medications may affect your judgment, balance and coordination.  Therefore,   for 24 hours, you have the following restrictions:   - DO NOT drive a car, operate machinery, make legal/financial decisions,   sign important papers or drink alcohol.    ACTIVITY:  Today: no heavy lifting, straining or running due to procedural   sedation/anesthesia.  The following day: return to full activity including work.  DIET:  Eat and drink normally unless instructed otherwise.     TREATMENT FOR COMMON SIDE EFFECTS:  - Mild abdominal pain, nausea, belching, bloating or excessive gas:  rest,   eat lightly and use a heating pad.  - Sore Throat: treat with throat lozenges and/or gargle with warm salt   water.  - Because air was used during the procedure, expelling large amounts of air   from your rectum or belching is normal.  - If a bowel prep was taken, you may not have a bowel movement for 1-3 days.    This is normal.  SYMPTOMS TO WATCH FOR AND REPORT TO YOUR PHYSICIAN:  1. Abdominal pain or bloating,  other than gas cramps.  2. Chest pain.  3. Back pain.  4. Signs of infection such as: chills or fever occurring within 24 hours   after the procedure.  5. Rectal bleeding, which would show as bright red, maroon, or black stools.   (A tablespoon of blood from the rectum is not serious, especially if   hemorrhoids are present.)  6. Vomiting.  7. Weakness or dizziness.  GO DIRECTLY TO THE NEAREST EMERGENCY ROOM IF YOU HAVE ANY OF THE FOLLOWING:      Difficulty breathing              Chills and/or fever over 101 F   Persistent vomiting and/or vomiting blood   Severe abdominal pain   Severe chest pain   Black, tarry stools   Bleeding- more than one tablespoon   Any other symptom or condition that you feel may need urgent attention  Your doctor recommends these additional instructions:  If any biopsies were taken, your doctors clinic will contact you in 1 to 2   weeks with any results.  - Discharge patient to home.   - Resume previous diet.   - Continue present medications.   - Return to my office in 4 weeks.  For questions, problems or results please call your physician - Ward Horvath MD.  EMERGENCY PHONE NUMBER: [phone],  LAB RESULTS: [phone]  IF A COMPLICATION OR EMERGENCY SITUATION ARISES AND YOU ARE UNABLE TO REACH   YOUR PHYSICIAN - GO DIRECTLY TO THE EMERGENCY ROOM.  Ward Horvath MD  3/3/2022 8:12:55 AM  This report has been verified and signed electronically.  Dear patient,  As a result of recent federal legislation (The Federal Cures Act), you may   receive lab or pathology results from your procedure in your [facility]   account before your physician is able to contact you. Your physician or   their representative will relay the results to you with their   recommendations at their soonest availability.  Thank you,  PROVATION

## 2022-03-03 NOTE — ANESTHESIA PREPROCEDURE EVALUATION
2022  Terese Barney is a 61 y.o., female admitted with anemia/GIB for EGD under MAC    Past Medical History:   Diagnosis Date    Anxiety and depression 2016    Chronic back pain     Hammer toe of left foot     Insomnia     Motion sickness     Overweight (BMI 25.0-29.9) 2016    PONV (postoperative nausea and vomiting)     Vitamin D deficiency 2016     Past Surgical History:   Procedure Laterality Date    AUGMENTATION OF BREAST Bilateral     BREAST SURGERY Bilateral      SECTION      two    COLONOSCOPY N/A 2020    Procedure: COLONOSCOPY/Suprep;  Surgeon: Cristhian Mancilla MD;  Location: [facility] ENDO;  Service: Endoscopy;  Laterality: N/A;    ESOPHAGOGASTRODUODENOSCOPY N/A 2020    Procedure: ESOPHAGOGASTRODUODENOSCOPY (EGD);  Surgeon: Cristhian Mancilla MD;  Location: [facility] ENDO;  Service: Endoscopy;  Laterality: N/A;    foot surgery      left plantar fascial release    FOOT SURGERY Left 2017    2nd TOE, Hammer toe repair    HYSTERECTOMY      laparascopy      ovarian cysts    left knee surgery      TONSILLECTOMY      TOTAL ABDOMINAL HYSTERECTOMY W/ BILATERAL SALPINGOOPHORECTOMY           Pre-op Assessment    I have reviewed the Patient Summary Reports.      I have reviewed the Medications.     Review of Systems  Anesthesia Hx:  Personal Hx of Anesthesia complications, Post-Operative Nausea/Vomiting, in the past, but not with recent anesthetics / prophylaxis   Social:  Non-Smoker    Cardiovascular:   ECG has been reviewed.        Physical Exam  General:  Well nourished      Airway/Jaw/Neck:  Airway Findings: Mallampati: II      Chest/Lungs:  Chest/Lungs Clear    Heart/Vascular:  Heart Findings: Normal          Lab Results   Component Value Date    WBC 4.63 2022    HGB 11.2 (L) 2022    HCT 36.2 (L) 2022     2022    CHOL  212 (H) 04/19/2021    TRIG 128 04/19/2021    HDL 60 04/19/2021    ALT 10 04/19/2021    AST 19 04/19/2021     04/19/2021    K 4.4 04/19/2021     04/19/2021    CREATININE 0.8 04/19/2021    BUN 19 04/19/2021    CO2 25 04/19/2021    TSH 3.020 04/19/2021    INR 0.9 01/31/2020    HGBA1C 5.4 04/19/2021         Anesthesia Plan  Type of Anesthesia, risks & benefits discussed:  Anesthesia Type:  MAC    Patient's Preference:   Plan Factors:          Intra-op Monitoring Plan: Standard ASA Monitors  Intra-op Monitoring Plan Comments:   Post Op Pain Control Plan: multimodal analgesia  Post Op Pain Control Plan Comments:     Induction:   IV  Beta Blocker:  Patient is not currently on a Beta-Blocker (No further documentation required).       Informed Consent: Informed consent signed with the Patient and all parties understand the risks and agree with anesthesia plan.  All questions answered.  Anesthesia consent signed with patient.  ASA Score: 2     Day of Surgery Review of History & Physical:              Ready For Surgery From Anesthesia Perspective.           Physical Exam  General: Well nourished    Airway:  Mallampati: II             Anesthesia Plan  Type of Anesthesia, risks & benefits discussed:    Anesthesia Type: MAC  Intra-op Monitoring Plan: Standard ASA Monitors  Post Op Pain Control Plan: multimodal analgesia  Induction:  IV  Airway Plan: Direct, Post-Induction  Informed Consent: Informed consent signed with the Patient and all parties understand the risks and agree with anesthesia plan.  All questions answered.   ASA Score: 2    Ready For Surgery From Anesthesia Perspective.       .

## 2022-03-04 ENCOUNTER — INFUSION (OUTPATIENT)
Dept: INFUSION THERAPY | Facility: HOSPITAL | Age: 62
End: 2022-03-04
Attending: INTERNAL MEDICINE
Payer: COMMERCIAL

## 2022-03-04 VITALS
RESPIRATION RATE: 20 BRPM | HEART RATE: 104 BPM | HEIGHT: 62 IN | TEMPERATURE: 99 F | OXYGEN SATURATION: 97 % | WEIGHT: 155 LBS | BODY MASS INDEX: 28.52 KG/M2 | SYSTOLIC BLOOD PRESSURE: 134 MMHG | DIASTOLIC BLOOD PRESSURE: 86 MMHG

## 2022-03-04 DIAGNOSIS — D50.0 IRON DEFICIENCY ANEMIA DUE TO CHRONIC BLOOD LOSS: Primary | ICD-10-CM

## 2022-03-04 PROCEDURE — A4216 STERILE WATER/SALINE, 10 ML: HCPCS | Performed by: INTERNAL MEDICINE

## 2022-03-04 PROCEDURE — 25000003 PHARM REV CODE 250: Performed by: INTERNAL MEDICINE

## 2022-03-04 PROCEDURE — 63600175 PHARM REV CODE 636 W HCPCS: Performed by: INTERNAL MEDICINE

## 2022-03-04 PROCEDURE — 96365 THER/PROPH/DIAG IV INF INIT: CPT

## 2022-03-04 RX ORDER — SODIUM CHLORIDE 0.9 % (FLUSH) 0.9 %
10 SYRINGE (ML) INJECTION
Status: DISCONTINUED | OUTPATIENT
Start: 2022-03-04 | End: 2022-03-04 | Stop reason: HOSPADM

## 2022-03-04 RX ADMIN — SODIUM CHLORIDE: 0.9 INJECTION, SOLUTION INTRAVENOUS at 01:03

## 2022-03-04 RX ADMIN — Medication 10 ML: at 02:03

## 2022-03-04 RX ADMIN — IRON SUCROSE 200 MG: 20 INJECTION, SOLUTION INTRAVENOUS at 01:03

## 2022-03-04 NOTE — NURSING
Pt tolerated Venofer infusion 3 of 4 well.  No adverse reaction noted. IV flushed with NS and D/C per protocol. Patient left clinic in no acute distress.

## 2022-03-11 ENCOUNTER — INFUSION (OUTPATIENT)
Dept: INFUSION THERAPY | Facility: HOSPITAL | Age: 62
End: 2022-03-11
Attending: INTERNAL MEDICINE
Payer: COMMERCIAL

## 2022-03-11 VITALS
OXYGEN SATURATION: 96 % | SYSTOLIC BLOOD PRESSURE: 128 MMHG | RESPIRATION RATE: 18 BRPM | HEIGHT: 62 IN | DIASTOLIC BLOOD PRESSURE: 86 MMHG | HEART RATE: 95 BPM | BODY MASS INDEX: 28.52 KG/M2 | WEIGHT: 155 LBS | TEMPERATURE: 99 F

## 2022-03-11 DIAGNOSIS — D50.0 IRON DEFICIENCY ANEMIA DUE TO CHRONIC BLOOD LOSS: Primary | ICD-10-CM

## 2022-03-11 PROCEDURE — A4216 STERILE WATER/SALINE, 10 ML: HCPCS | Performed by: INTERNAL MEDICINE

## 2022-03-11 PROCEDURE — 63600175 PHARM REV CODE 636 W HCPCS: Performed by: INTERNAL MEDICINE

## 2022-03-11 PROCEDURE — 25000003 PHARM REV CODE 250: Performed by: INTERNAL MEDICINE

## 2022-03-11 PROCEDURE — 96365 THER/PROPH/DIAG IV INF INIT: CPT

## 2022-03-11 RX ORDER — SODIUM CHLORIDE 0.9 % (FLUSH) 0.9 %
10 SYRINGE (ML) INJECTION
Status: DISCONTINUED | OUTPATIENT
Start: 2022-03-11 | End: 2022-03-11 | Stop reason: HOSPADM

## 2022-03-11 RX ADMIN — IRON SUCROSE 200 MG: 20 INJECTION, SOLUTION INTRAVENOUS at 09:03

## 2022-03-11 RX ADMIN — Medication 10 ML: at 10:03

## 2022-03-11 RX ADMIN — SODIUM CHLORIDE: 0.9 INJECTION, SOLUTION INTRAVENOUS at 09:03

## 2022-03-11 NOTE — NURSING
Pt received IV Venofer infusion dose 4/4. Pt tolerated it well. AVS given.  Pt will follow up with MD. Discharged with no acute distress.

## 2022-03-11 NOTE — PROVATION PATIENT INSTRUCTIONS
Discharge Summary/Instructions after an Endoscopic Procedure  Patient Name: Terese Barney  Patient MRN: [id number]  Patient YOB: 1960  Thursday, March 3, 2022  Ward Horvath MD  Dear patient,  As a result of recent federal legislation (The Federal Cures Act), you may   receive lab or pathology results from your procedure in your [facility]   account before your physician is able to contact you. Your physician or   their representative will relay the results to you with their   recommendations at their soonest availability.  Thank you,  Your health is very important to us during the Covid Crisis. Following your   procedure today, you will receive a daily text for 2 weeks asking about   signs or symptoms of Covid 19.  Please respond to this text when you   receive it so we can follow up and keep you as safe as possible.   RESTRICTIONS:  During your procedure today, you received medications for sedation.  These   medications may affect your judgment, balance and coordination.  Therefore,   for 24 hours, you have the following restrictions:   - DO NOT drive a car, operate machinery, make legal/financial decisions,   sign important papers or drink alcohol.    ACTIVITY:  Today: no heavy lifting, straining or running due to procedural   sedation/anesthesia.  The following day: return to full activity including work.  DIET:  Eat and drink normally unless instructed otherwise.     TREATMENT FOR COMMON SIDE EFFECTS:  - Mild abdominal pain, nausea, belching, bloating or excessive gas:  rest,   eat lightly and use a heating pad.  - Sore Throat: treat with throat lozenges and/or gargle with warm salt   water.  - Because air was used during the procedure, expelling large amounts of air   from your rectum or belching is normal.  - If a bowel prep was taken, you may not have a bowel movement for 1-3 days.    This is normal.  SYMPTOMS TO WATCH FOR AND REPORT TO YOUR PHYSICIAN:  1. Abdominal pain or bloating,  other than gas cramps.  2. Chest pain.  3. Back pain.  4. Signs of infection such as: chills or fever occurring within 24 hours   after the procedure.  5. Rectal bleeding, which would show as bright red, maroon, or black stools.   (A tablespoon of blood from the rectum is not serious, especially if   hemorrhoids are present.)  6. Vomiting.  7. Weakness or dizziness.  GO DIRECTLY TO THE NEAREST EMERGENCY ROOM IF YOU HAVE ANY OF THE FOLLOWING:      Difficulty breathing              Chills and/or fever over 101 F   Persistent vomiting and/or vomiting blood   Severe abdominal pain   Severe chest pain   Black, tarry stools   Bleeding- more than one tablespoon   Any other symptom or condition that you feel may need urgent attention  Your doctor recommends these additional instructions:  If any biopsies were taken, your doctors clinic will contact you in 1 to 2   weeks with any results.  - Discharge patient to home.   - Resume previous diet.   - Continue present medications.   - Recommend an iron supplement.  For questions, problems or results please call your physician - Ward Horvath MD.  EMERGENCY PHONE NUMBER: [phone],  LAB RESULTS: [phone]  IF A COMPLICATION OR EMERGENCY SITUATION ARISES AND YOU ARE UNABLE TO REACH   YOUR PHYSICIAN - GO DIRECTLY TO THE EMERGENCY ROOM.  Ward Horvath MD  3/11/2022 1:41:53 PM  This report has been verified and signed electronically.  Dear patient,  As a result of recent federal legislation (The Federal Cures Act), you may   receive lab or pathology results from your procedure in your [facility]   account before your physician is able to contact you. Your physician or   their representative will relay the results to you with their   recommendations at their soonest availability.  Thank you,  PROVATION

## 2022-04-07 ENCOUNTER — TELEPHONE (OUTPATIENT)
Dept: FAMILY MEDICINE | Facility: CLINIC | Age: 62
End: 2022-04-07
Payer: COMMERCIAL

## 2022-04-07 DIAGNOSIS — Z12.31 ENCOUNTER FOR SCREENING MAMMOGRAM FOR BREAST CANCER: Primary | ICD-10-CM

## 2022-04-07 NOTE — TELEPHONE ENCOUNTER
----- Message from [employer] sent at 4/7/2022  2:30 PM CDT -----  Contact: patient  [phone]  Type:  Mammogram    Caller is requesting to schedule their annual mammogram appointment.  Order is not listed in EPIC.  Please enter order and contact patient to schedule.    Name of Caller:patient   Where would they like the mammogram performed? [facility]   Would the patient rather a call back or a response via [facility]? Callback   Best Call Back Number: [phone]  Additional Information: none

## 2022-05-13 ENCOUNTER — LAB VISIT (OUTPATIENT)
Dept: LAB | Facility: HOSPITAL | Age: 62
End: 2022-05-13
Attending: FAMILY MEDICINE
Payer: COMMERCIAL

## 2022-05-13 DIAGNOSIS — Z00.00 LABORATORY EXAM ORDERED AS PART OF ROUTINE GENERAL MEDICAL EXAMINATION: ICD-10-CM

## 2022-05-13 LAB
ESTIMATED AVG GLUCOSE: 100 MG/DL (ref 68–131)
FERRITIN SERPL-MCNC: 52 NG/ML (ref 20–300)
HBA1C MFR BLD: 5.1 % (ref 4–5.6)
IRON SERPL-MCNC: 79 UG/DL (ref 30–160)
MAGNESIUM SERPL-MCNC: 2.1 MG/DL (ref 1.6–2.6)
SATURATED IRON: 17 % (ref 20–50)
TOTAL IRON BINDING CAPACITY: 457 UG/DL (ref 250–450)
TRANSFERRIN SERPL-MCNC: 309 MG/DL (ref 200–375)
VIT B12 SERPL-MCNC: 1220 PG/ML (ref 210–950)

## 2022-05-13 PROCEDURE — 83735 ASSAY OF MAGNESIUM: CPT | Mod: PO | Performed by: FAMILY MEDICINE

## 2022-05-13 PROCEDURE — 84466 ASSAY OF TRANSFERRIN: CPT | Mod: PO | Performed by: FAMILY MEDICINE

## 2022-05-13 PROCEDURE — 83036 HEMOGLOBIN GLYCOSYLATED A1C: CPT | Performed by: FAMILY MEDICINE

## 2022-05-13 PROCEDURE — 82652 VIT D 1 25-DIHYDROXY: CPT | Mod: PO | Performed by: FAMILY MEDICINE

## 2022-05-13 PROCEDURE — 82728 ASSAY OF FERRITIN: CPT | Performed by: FAMILY MEDICINE

## 2022-05-13 PROCEDURE — 82607 VITAMIN B-12: CPT | Mod: PO | Performed by: FAMILY MEDICINE

## 2022-05-17 LAB — 1,25(OH)2D3 SERPL-MCNC: 71 PG/ML (ref 20–79)

## 2022-05-18 ENCOUNTER — OFFICE VISIT (OUTPATIENT)
Dept: FAMILY MEDICINE | Facility: CLINIC | Age: 62
End: 2022-05-18
Payer: COMMERCIAL

## 2022-05-18 VITALS
OXYGEN SATURATION: 99 % | HEIGHT: 62 IN | SYSTOLIC BLOOD PRESSURE: 134 MMHG | HEART RATE: 97 BPM | WEIGHT: 158.5 LBS | DIASTOLIC BLOOD PRESSURE: 81 MMHG | BODY MASS INDEX: 29.17 KG/M2

## 2022-05-18 DIAGNOSIS — E66.3 OVERWEIGHT (BMI 25.0-29.9): ICD-10-CM

## 2022-05-18 DIAGNOSIS — Z51.81 ENCOUNTER FOR MONITORING LONG-TERM PROTON PUMP INHIBITOR THERAPY: ICD-10-CM

## 2022-05-18 DIAGNOSIS — E55.9 VITAMIN D DEFICIENCY: ICD-10-CM

## 2022-05-18 DIAGNOSIS — Z96.652 HISTORY OF TOTAL LEFT KNEE REPLACEMENT: ICD-10-CM

## 2022-05-18 DIAGNOSIS — Z28.310 COVID-19 VACCINE SERIES NOT ADMINISTERED: ICD-10-CM

## 2022-05-18 DIAGNOSIS — Z23 NEED FOR SHINGLES VACCINE: ICD-10-CM

## 2022-05-18 DIAGNOSIS — M17.12 PRIMARY OSTEOARTHRITIS OF LEFT KNEE: ICD-10-CM

## 2022-05-18 DIAGNOSIS — K21.9 HIATAL HERNIA WITH GERD: ICD-10-CM

## 2022-05-18 DIAGNOSIS — K44.9 HIATAL HERNIA WITH GERD: ICD-10-CM

## 2022-05-18 DIAGNOSIS — Z78.0 POSTMENOPAUSAL: ICD-10-CM

## 2022-05-18 DIAGNOSIS — Z79.899 ENCOUNTER FOR MONITORING LONG-TERM PROTON PUMP INHIBITOR THERAPY: ICD-10-CM

## 2022-05-18 DIAGNOSIS — M85.80 OSTEOPENIA, UNSPECIFIED LOCATION: ICD-10-CM

## 2022-05-18 DIAGNOSIS — F32.A ANXIETY AND DEPRESSION: ICD-10-CM

## 2022-05-18 DIAGNOSIS — G47.00 INSOMNIA, UNSPECIFIED TYPE: ICD-10-CM

## 2022-05-18 DIAGNOSIS — D50.0 IRON DEFICIENCY ANEMIA DUE TO CHRONIC BLOOD LOSS: ICD-10-CM

## 2022-05-18 DIAGNOSIS — Z79.899 MEDICATION MANAGEMENT: ICD-10-CM

## 2022-05-18 DIAGNOSIS — F41.9 ANXIETY AND DEPRESSION: ICD-10-CM

## 2022-05-18 DIAGNOSIS — Z00.00 ROUTINE GENERAL MEDICAL EXAMINATION AT A HEALTH CARE FACILITY: Primary | ICD-10-CM

## 2022-05-18 DIAGNOSIS — R68.2 DRY MOUTH: ICD-10-CM

## 2022-05-18 DIAGNOSIS — Z28.9 COVID-19 VACCINE SERIES NOT ADMINISTERED: ICD-10-CM

## 2022-05-18 PROCEDURE — 99999 PR PBB SHADOW E&M-EST. PATIENT-LVL IV: CPT | Mod: PBBFAC,,, | Performed by: FAMILY MEDICINE

## 2022-05-18 PROCEDURE — 99999 PR PBB SHADOW E&M-EST. PATIENT-LVL IV: ICD-10-PCS | Mod: PBBFAC,,, | Performed by: FAMILY MEDICINE

## 2022-05-18 PROCEDURE — 99396 PREV VISIT EST AGE 40-64: CPT | Mod: S$GLB,,, | Performed by: FAMILY MEDICINE

## 2022-05-18 PROCEDURE — 99396 PR PREVENTIVE VISIT,EST,40-64: ICD-10-PCS | Mod: S$GLB,,, | Performed by: FAMILY MEDICINE

## 2022-05-18 RX ORDER — PANTOPRAZOLE SODIUM 40 MG/1
40 TABLET, DELAYED RELEASE ORAL
Qty: 90 TABLET | Refills: 4 | Status: SHIPPED | OUTPATIENT
Start: 2022-05-18 | End: 2022-12-12

## 2022-05-18 NOTE — PROGRESS NOTES
Office Visit    Patient Name: Terese Barney    : 1960  MRN: [id number]    Subjective:  Terese is a 61 y.o. female who presents today for:    Annual Exam    Ms. Barney is a 61  year old patient of mine with osteoarthritis of her left knee s/p meniscus surgery per ortho Dr Barriga and now following with Ortho Dr Lilly who performed a partial left knee replacement, H/O recurrent Fe deficiency anemia under eval per GI, anxiety/depression, and chronic low back pain who presents for annual physical with lab review.   She was hospitalized at the end of 2020 for Fe deficiency anemia. Had SOB, palpitations, intermittent dark stools, and excessive fatigue for 3-4 weeks. Hb was as low as 5.4.  Endoscopy revealed a hiatal hernia and ulcer with no active bleeding. Colonoscopy (-) FOR BLEEDING +TUBULAR ADENOMA. Given 2 units while in the hospital. Followed up with Heme and GI.   SHE RECEIVED IV IRON INFUSIONS WITH SUBSEQUENT RESOLUTION OF HER IRON DEFICIENCY ANEMIA.  SHE HAS REMAINED OFF OF NSAIDS GIVEN HISTORY OF STOMACH ULCER.     Labs 22 showed recurrent evidence of iron deficiency anemia and she followed up with heme/onc Dr Guerrero who ordered 2 more IV iron infusions-- completed  and 22.   Labs 22 show slight improvement in iron studies but not c/w with expected response to infusion. CBC not rechecked-- labs pending per Heme/Onc  Lipid 21 with   a1c 22 5.1   Calcitriol and B12 unremarkable    No acute complaints but still fatigued, whicih she attributes to low iron and dealing with L knee issues.     Followed up with GI dr Horvath-- had EGD 3/3/22 showing normal esophagus, 7 cm hiatal hernia, Normal examined duodenum, and successful deployment of video capsule.   Capsule endoscopy showed Localized erythematous mucosa without active bleeding and with stigmata of bleeding was found in the duodenum.    She is barely been able to tolerate oral iron due to GI side effects.  Has follow up labs and appointment with Dr Park in august( about 3 months).   Mood stable on Cymbalta and with ativan prn for sleep.   Depressed about the impact of her ongoing knee issues on her career as a . In a court case regarding the fall that flared her knee pain in the first place.     She has had some family stress-- her dad  in 2020. Mom is my patient Chelsey Morris. Relationship issues too-- has severe  vaginal dryness and dyspareunia interfering with her desired intimacy.  She is postmenopausal since BRUNO/BSO in  and no longer needs paps. Previously was on HRT but has not been on HRT in several years. Had previous side effects from HRT.      Following intermittently with Dr Lilly 3/5/21 and has follow up tomorrow-- she is considering a L knee adjustment surgery.   She completed lots of physical therapy that improved her ROM but did not help the pain and also now having some instability.     General lifestyle habits are as follows:    Diet: fair-- struggling to cook consistently especially since it is just for herself, needs more water  Exercise: poor-  trying to start using a treadmill, no longer has a bike  Sleep: good -sleeps well nightly with use of Ativan.  This is working better than Ambien.     Weight up about a few pounds in the las few month and now BMI 29.      Immunizations: TDaP , yearly influenza 22, COVID-19 vaccine advised, SHINGRIX vaccine advised, Pneumovax 23 20     Screening Tests: colonoscopy 20--> Tubular adenoma and REPEAT 5 YEARS & EGD 3/3/22, yearly mammogram--> scheduled for 22& ORDERED, DEXA 20-- OSTEOPENIA & REPEAT 2 YEARS- ORDERED , no longer needs paps, hep C (-) 2019, HIV 9-) 20     Eye/Dental:  Eye exam up to date-[facility] exam, dental UTD               PAST MEDICAL HISTORY, SURGICAL/SOCIAL/FAMILY HISTORY REVIEWED AS PER CHART, WITH PERTINENT FINDINGS INCLUDED IN HISTORY SECTION OF NOTE.      Current Medications    Medication List with Changes/Refills   Current Medications    ADAPALENE-BENZOYL PEROXIDE (EPIDUO) 0.1-2.5 % GLWP    Apply thin layer to clean, dry skin of face nightly    ALBUTEROL (PROVENTIL/VENTOLIN HFA) 90 MCG/ACTUATION INHALER    Inhale 2 puffs into the lungs every 6 (six) hours as needed for Wheezing or Shortness of Breath. Rescue    BROMPHENIRAMINE-PSEUDOEPH-DM (BROMFED DM) 2-30-10 MG/5 ML SYRP        CEVIMELINE (EVOXAC) 30 MG CAPSULE    TAKE 1 CAPSULE (30 MG TOTAL) BY MOUTH 2 (TWO) TIMES DAILY.    CHOLECALCIFEROL, VITAMIN D3, 125 MCG (5,000 UNIT) CAPSULE    Take 1 capsule (5,000 Units total) by mouth daily with breakfast.    CLINDAMYCIN (CLEOCIN) 300 MG CAPSULE    clindamycin HCl 300 mg capsule    DOXYCYCLINE (MONODOX) 100 MG CAPSULE        DULOXETINE (CYMBALTA) 60 MG CAPSULE    Take 1 capsule (60 mg total) by mouth 2 (two) times daily.    ESTRADIOL (ESTRACE) 0.01 % (0.1 MG/GRAM) VAGINAL CREAM    PLACE 1 G VAGINALLY EVERY OTHER DAY.    FERROUS SULFATE (FEOSOL) 325 MG (65 MG IRON) TAB TABLET    Take 1 tablet (325 mg total) by mouth daily with breakfast.    LORAZEPAM (ATIVAN) 1 MG TABLET    TAKE 1/2 TO 1 TABLET ONCE  DAILY AS NEEDED FOR SEVERE ANXIETY OR SLEEP    ONDANSETRON (ZOFRAN) 4 MG TABLET        PANTOPRAZOLE (PROTONIX) 40 MG TABLET    Take 1 tablet (40 mg total) by mouth 2 (two) times daily before meals.    PHENTERMINE (ADIPEX-P) 37.5 MG TABLET    phentermine 37.5 mg tablet   TAKE 1/2 TABLET BY MOUTH TWICE A DAY    PREVIDENT 5000 DRY MOUTH 1.1 % GEL       Discontinued Medications    CLINDAMYCIN-BENZOYL PEROXIDE GEL    Apply to affected area every evening.    DICLOFENAC SODIUM (VOLTAREN) 1 % GEL    Apply 4 g topically 4 (four) times daily as needed.    SUPREP BOWEL PREP KIT 17.5-3.13-1.6 GRAM SOLR    TAKE AS DIRECTED       Allergies   Review of patient's allergies indicates:   Allergen Reactions    Codeine Nausea And Vomiting         Review of Systems (Pertinent  "positives)  Review of Systems   Constitutional: Positive for fatigue and unexpected weight change (wt gain).   HENT: Positive for postnasal drip.    Respiratory: Positive for cough (mild, residual after a recent cold).    Gastrointestinal: Abdominal pain: reflux.   Allergic/Immunologic: Positive for environmental allergies.   Neurological: Positive for light-headedness (occasional).   Psychiatric/Behavioral: Negative for dysphoric mood.       /81 (BP Location: Right arm, Patient Position: Sitting)   Pulse 97   Ht 5' 2" (1.575 m)   Wt 71.9 kg (158 lb 8.2 oz)   LMP  (LMP Unknown)   SpO2 99%   BMI 28.99 kg/m²     Physical Exam  Vitals reviewed.   Constitutional:       General: She is not in acute distress.     Appearance: Normal appearance. She is well-developed.   HENT:      Head: Normocephalic and atraumatic.      Right Ear: Ear canal normal. Tympanic membrane is not erythematous or bulging.      Left Ear: Ear canal normal. Tympanic membrane is not erythematous or bulging.      Nose: Nose normal.      Mouth/Throat:      Mouth: Mucous membranes are moist.      Pharynx: No oropharyngeal exudate.   Eyes:      Extraocular Movements: Extraocular movements intact.      Conjunctiva/sclera: Conjunctivae normal.   Neck:      Thyroid: No thyroid mass or thyromegaly.      Vascular: No carotid bruit.   Cardiovascular:      Rate and Rhythm: Normal rate and regular rhythm.      Pulses:           Dorsalis pedis pulses are 2+ on the right side and 2+ on the left side.      Heart sounds: Normal heart sounds. No murmur heard.  Pulmonary:      Effort: Pulmonary effort is normal. No respiratory distress.      Breath sounds: Normal breath sounds.   Abdominal:      General: Bowel sounds are normal. There is no distension.      Palpations: Abdomen is soft. There is no mass.      Tenderness: There is no abdominal tenderness.   Musculoskeletal:         General: Normal range of motion.      Right lower leg: No edema.      Left " lower leg: No edema.   Lymphadenopathy:      Cervical: No cervical adenopathy.   Skin:     General: Skin is warm and dry.      Findings: No rash.   Neurological:      General: No focal deficit present.      Mental Status: She is alert and oriented to person, place, and time.   Psychiatric:         Mood and Affect: Mood normal.         Behavior: Behavior normal.           Assessment/Plan:  Terese Barney is a 61 y.o. female who presents today for :        ICD-10-CM ICD-9-CM    1. Routine general medical examination at a health care facility  Z00.00 V70.0    2. Overweight (BMI 25.0-29.9)  E66.3 278.02    3. Iron deficiency anemia due to chronic blood loss  D50.0 280.0     Capsule endoscopy 3/3/22 showed Localized erythematous mucosa without active bleeding and with stigmata of bleeding found in the duodenum   4. Hiatal hernia with GERD  K21.9 530.81     K44.9 553.3    5. Encounter for monitoring long-term proton pump inhibitor therapy  Z51.81 V58.83     Z79.899 V58.69    6. Osteopenia, unspecified location  M85.80 733.90    7. Vitamin D deficiency  E55.9 268.9    8. Anxiety and depression  F41.9 300.00     F32.A 311    9. Insomnia, unspecified type  G47.00 780.52    10. Primary osteoarthritis of left knee  M17.12 715.16    11. History of total left knee replacement  Z96.652 V43.65    12. Dry mouth  R68.2 527.7    13. Medication management  Z79.899 V58.69    14. Postmenopausal  Z78.0 V49.81 DXA Bone Density Spine And Hip   15. Need for shingles vaccine  Z23 V04.89    16. COVID-19 vaccine series not administered  Z28.310 V64.00      ADVISED ON DIET/EXERCISE/SLEEP, ROUTINE EYE/DENTAL EXAMS, AND THE IMPORTANCE OF KEEPING UP WITH APPROPRIATE SCREENING TESTS BASED ON AGE AND RISK FACTORS.  MAMMOGRAM/ DEXA SCHEDULED FOR MAY 30., SHINGLES VACCINE AND COVID-19 VACCINES ADVISED.  COLONOSCOPY DUE 2025.     OVERWEIGHT BMI:   considering a formal diet plan like short course of [employer], has a new treadmill and will try to get  access to an exercise bike as this may be beneficial for her knee     ANXIETY AND DEPRESSION:   stable on Cymbalta 60 mg daily with Ativan nightly for help with sleep.     CHRONIC LEFT KNEE PAIN:   status post partial knee replacement per Ortho Dr. Lilly, status post multiple rounds of physical therapy, considering adjustment knee surgery due to on going alignment issues and chronic pain concerns.  Taking Tylenol p.r.n., avoiding NSAIDs given history of gastric ulcer    GERD WITH HISTORY OF GI ULCER ON EGD/HIATAL HERNIA, CAPSULE ENDOSCOPY SHOWING ERYTHEMATOUS DUODENUM WITH STIGMATA OF BLEEDING:  Given history of GI bleed, recurrent iron deficiency and active GERD symptoms, have advise resuming a daily PPI.  Tolerating oral iron only a few times per week, advised taking with a stool softener consistently due to constipation.  Scheduled a follow-up with Dr. Horvath to discuss the findings of the capsule endoscopy showing stigmata of bleeding in the duodenum.      HISTORY OF IRON DEFICIENCY ANEMIA:   GI workup has revealed prior non bleeding stomach ulcer, erythematous duodenum.  Has had recurrent iron deficiency anemia.  Following with heme/onc Dr Guerrero.  Did not have as good of a response to most recent iron infusions.  Has labs prior to follow-up in about 2 and half months.-- August 2022.    OSTEOPENIA:  CONTINUE CALCIUM,VITAMIN-D-5000 IU DAILY .  Repeat DEXA ordered    There are no Patient Instructions on file for this visit.      Follow up in about 1 year (around 5/18/2023) for to follow up on lab results, return as needed for new concerns.

## 2022-05-20 ENCOUNTER — PATIENT MESSAGE (OUTPATIENT)
Dept: GASTROENTEROLOGY | Facility: CLINIC | Age: 62
End: 2022-05-20
Payer: COMMERCIAL

## 2022-05-20 ENCOUNTER — PATIENT MESSAGE (OUTPATIENT)
Dept: HEMATOLOGY/ONCOLOGY | Facility: CLINIC | Age: 62
End: 2022-05-20
Payer: COMMERCIAL

## 2022-06-30 ENCOUNTER — OFFICE VISIT (OUTPATIENT)
Dept: GASTROENTEROLOGY | Facility: CLINIC | Age: 62
End: 2022-06-30
Payer: COMMERCIAL

## 2022-06-30 ENCOUNTER — HOSPITAL ENCOUNTER (OUTPATIENT)
Dept: RADIOLOGY | Facility: HOSPITAL | Age: 62
Discharge: HOME | End: 2022-06-30
Attending: FAMILY MEDICINE
Payer: COMMERCIAL

## 2022-06-30 VITALS — WEIGHT: 157.63 LBS | HEIGHT: 62 IN | BODY MASS INDEX: 29.01 KG/M2

## 2022-06-30 DIAGNOSIS — K21.9 HIATAL HERNIA WITH GERD: ICD-10-CM

## 2022-06-30 DIAGNOSIS — D50.0 IRON DEFICIENCY ANEMIA DUE TO CHRONIC BLOOD LOSS: ICD-10-CM

## 2022-06-30 DIAGNOSIS — Z78.0 POSTMENOPAUSAL: ICD-10-CM

## 2022-06-30 DIAGNOSIS — K44.9 HIATAL HERNIA WITH GERD: ICD-10-CM

## 2022-06-30 DIAGNOSIS — Z12.31 ENCOUNTER FOR SCREENING MAMMOGRAM FOR BREAST CANCER: ICD-10-CM

## 2022-06-30 PROCEDURE — 99999 PR PBB SHADOW E&M-EST. PATIENT-LVL III: CPT | Mod: PBBFAC,,, | Performed by: INTERNAL MEDICINE

## 2022-06-30 PROCEDURE — 77063 MAMMO DIGITAL SCREENING BILAT WITH TOMO: ICD-10-PCS | Mod: 26,,, | Performed by: RADIOLOGY

## 2022-06-30 PROCEDURE — 77067 SCR MAMMO BI INCL CAD: CPT | Mod: TC

## 2022-06-30 PROCEDURE — 99999 PR PBB SHADOW E&M-EST. PATIENT-LVL III: ICD-10-PCS | Mod: PBBFAC,,, | Performed by: INTERNAL MEDICINE

## 2022-06-30 PROCEDURE — 77063 BREAST TOMOSYNTHESIS BI: CPT | Mod: 26,,, | Performed by: RADIOLOGY

## 2022-06-30 PROCEDURE — 77063 BREAST TOMOSYNTHESIS BI: CPT | Mod: TC

## 2022-06-30 PROCEDURE — 77080 DEXA BONE DENSITY SPINE HIP: ICD-10-PCS | Mod: 26,,, | Performed by: RADIOLOGY

## 2022-06-30 PROCEDURE — 77067 MAMMO DIGITAL SCREENING BILAT WITH TOMO: ICD-10-PCS | Mod: 26,,, | Performed by: RADIOLOGY

## 2022-06-30 PROCEDURE — 77080 DXA BONE DENSITY AXIAL: CPT | Mod: TC

## 2022-06-30 PROCEDURE — 99214 PR OFFICE/OUTPT VISIT, EST, LEVL IV, 30-39 MIN: ICD-10-PCS | Mod: S$GLB,,, | Performed by: INTERNAL MEDICINE

## 2022-06-30 PROCEDURE — 77067 SCR MAMMO BI INCL CAD: CPT | Mod: 26,,, | Performed by: RADIOLOGY

## 2022-06-30 PROCEDURE — 77080 DXA BONE DENSITY AXIAL: CPT | Mod: 26,,, | Performed by: RADIOLOGY

## 2022-06-30 PROCEDURE — 99214 OFFICE O/P EST MOD 30 MIN: CPT | Mod: S$GLB,,, | Performed by: INTERNAL MEDICINE

## 2022-06-30 NOTE — PROGRESS NOTES
Subjective:       Patient ID: Terese Barney is a 61 y.o. female.    Chief Complaint: No chief complaint on file.     Patient here today to follow-up with aforementioned complaints.  She was previously seen by me in February with similar complaints.  She was sent for EGD with video capsule deployment which was performed in March that month.  Exams were significant only for a large hiatal hernia. No significant source of bleeding was noted.  She subsequently followed up with Dr. Guerrero and has been started on IV iron therapy.  Today patient reports doing okay.  She continues to experience a fatigue with exertion, however denies  Significant dyspnea she was experiencing leading up to previous hospitalization for symptomatic anemia.  Some she did have iron infusion.  Recent lab work without significant improvement.  She denies overt blood in stool.  Alternatively she does report ongoing dysphagia to solid foods.  She is taking a PPI for reflux with minor improvement.    Review of Systems   Constitutional: Positive for fatigue.   HENT: Positive for trouble swallowing.    Respiratory: Negative for shortness of breath.    Cardiovascular: Positive for chest pain.   Gastrointestinal: Positive for abdominal distention and abdominal pain.   Neurological: Positive for weakness.       The following portions of the patient's history were reviewed and updated as appropriate: allergies, current medications, past family history, past medical history, past social history, past surgical history and problem list.    Objective:      Physical Exam  Constitutional:       Appearance: She is well-developed.   HENT:      Head: Normocephalic and atraumatic.   Eyes:      Conjunctiva/sclera: Conjunctivae normal.   Pulmonary:      Effort: Pulmonary effort is normal. No respiratory distress.   Musculoskeletal:      Cervical back: Normal range of motion.   Neurological:      Mental Status: She is alert and oriented to person, place, and time.    Psychiatric:         Behavior: Behavior normal.         Thought Content: Thought content normal.         Judgment: Judgment normal.           Pertinent labs and imaging studies reviewed    Assessment:       1. Iron deficiency anemia due to chronic blood loss    2. Hiatal hernia with GERD        Plan:         Labs reviewed.  CBC was not drawn at time of most recent blood work.  Will check today   A month certain why she did not respond as you would expect to IV infusion of iron.  Patient will discuss with Dr. Guerrero at time of upcoming follow-up  Dysphagia likely due to large hiatal hernia.  Will refer to General surgery for evaluation and potential repair  Continue PPI in the meantime    30 minutes of total time spent on the encounter, which includes face to face time and non-face to face time preparing to see the patient (eg, review of tests), Obtaining and/or reviewing separately obtained history, Documenting clinical information in the electronic or other health record, Independently interpreting results (not separately reported) and communicating results to the patient/family/caregiver, or Care coordination (not separately reported).     (Portions of this note were dictated using voice recognition software and may contain dictation related errors in spelling/grammar/syntax not found on text review)

## 2022-07-18 ENCOUNTER — TELEPHONE (OUTPATIENT)
Dept: ADMINISTRATIVE | Facility: OTHER | Age: 62
End: 2022-07-18
Payer: COMMERCIAL

## 2022-07-26 ENCOUNTER — LAB VISIT (OUTPATIENT)
Dept: LAB | Facility: HOSPITAL | Age: 62
End: 2022-07-26
Attending: INTERNAL MEDICINE
Payer: COMMERCIAL

## 2022-07-26 DIAGNOSIS — D50.0 IRON DEFICIENCY ANEMIA DUE TO CHRONIC BLOOD LOSS: ICD-10-CM

## 2022-07-26 LAB
BASOPHILS # BLD AUTO: 0.05 K/UL (ref 0–0.2)
BASOPHILS NFR BLD: 0.7 % (ref 0–1.9)
DIFFERENTIAL METHOD: ABNORMAL
EOSINOPHIL # BLD AUTO: 0.1 K/UL (ref 0–0.5)
EOSINOPHIL NFR BLD: 1.7 % (ref 0–8)
ERYTHROCYTE [DISTWIDTH] IN BLOOD BY AUTOMATED COUNT: 12.5 % (ref 11.5–14.5)
FERRITIN SERPL-MCNC: 36 NG/ML (ref 20–300)
HCT VFR BLD AUTO: 36.2 % (ref 37–48.5)
HGB BLD-MCNC: 12 G/DL (ref 12–16)
IMM GRANULOCYTES # BLD AUTO: 0.03 K/UL (ref 0–0.04)
IMM GRANULOCYTES NFR BLD AUTO: 0.4 % (ref 0–0.5)
IRON SERPL-MCNC: 61 UG/DL (ref 30–160)
LYMPHOCYTES # BLD AUTO: 1.4 K/UL (ref 1–4.8)
LYMPHOCYTES NFR BLD: 20.2 % (ref 18–48)
MCH RBC QN AUTO: 31.7 PG (ref 27–31)
MCHC RBC AUTO-ENTMCNC: 33.1 G/DL (ref 32–36)
MCV RBC AUTO: 96 FL (ref 82–98)
MONOCYTES # BLD AUTO: 0.7 K/UL (ref 0.3–1)
MONOCYTES NFR BLD: 9.7 % (ref 4–15)
NEUTROPHILS # BLD AUTO: 4.7 K/UL (ref 1.8–7.7)
NEUTROPHILS NFR BLD: 67.3 % (ref 38–73)
NRBC BLD-RTO: 0 /100 WBC
PLATELET # BLD AUTO: 296 K/UL (ref 150–450)
PMV BLD AUTO: 9.6 FL (ref 9.2–12.9)
RBC # BLD AUTO: 3.78 M/UL (ref 4–5.4)
SATURATED IRON: 15 % (ref 20–50)
TOTAL IRON BINDING CAPACITY: 404 UG/DL (ref 250–450)
TRANSFERRIN SERPL-MCNC: 273 MG/DL (ref 200–375)
WBC # BLD AUTO: 6.92 K/UL (ref 3.9–12.7)

## 2022-07-26 PROCEDURE — 85025 COMPLETE CBC W/AUTO DIFF WBC: CPT | Performed by: INTERNAL MEDICINE

## 2022-07-26 PROCEDURE — 36415 COLL VENOUS BLD VENIPUNCTURE: CPT | Performed by: INTERNAL MEDICINE

## 2022-07-26 PROCEDURE — 82728 ASSAY OF FERRITIN: CPT | Performed by: INTERNAL MEDICINE

## 2022-07-26 PROCEDURE — 84466 ASSAY OF TRANSFERRIN: CPT | Performed by: INTERNAL MEDICINE

## 2022-07-27 ENCOUNTER — OFFICE VISIT (OUTPATIENT)
Dept: HEMATOLOGY/ONCOLOGY | Facility: CLINIC | Age: 62
End: 2022-07-27
Payer: COMMERCIAL

## 2022-07-27 VITALS
DIASTOLIC BLOOD PRESSURE: 74 MMHG | WEIGHT: 157.63 LBS | OXYGEN SATURATION: 98 % | BODY MASS INDEX: 28.83 KG/M2 | RESPIRATION RATE: 18 BRPM | SYSTOLIC BLOOD PRESSURE: 122 MMHG | HEART RATE: 103 BPM

## 2022-07-27 DIAGNOSIS — D50.0 IRON DEFICIENCY ANEMIA DUE TO CHRONIC BLOOD LOSS: Primary | ICD-10-CM

## 2022-07-27 DIAGNOSIS — G89.4 CHRONIC PAIN SYNDROME: ICD-10-CM

## 2022-07-27 PROCEDURE — 99999 PR PBB SHADOW E&M-EST. PATIENT-LVL III: ICD-10-PCS | Mod: PBBFAC,,, | Performed by: INTERNAL MEDICINE

## 2022-07-27 PROCEDURE — 99214 OFFICE O/P EST MOD 30 MIN: CPT | Mod: S$GLB,,, | Performed by: INTERNAL MEDICINE

## 2022-07-27 PROCEDURE — 99999 PR PBB SHADOW E&M-EST. PATIENT-LVL III: CPT | Mod: PBBFAC,,, | Performed by: INTERNAL MEDICINE

## 2022-07-27 PROCEDURE — 99214 PR OFFICE/OUTPT VISIT, EST, LEVL IV, 30-39 MIN: ICD-10-PCS | Mod: S$GLB,,, | Performed by: INTERNAL MEDICINE

## 2022-07-27 RX ORDER — HEPARIN 100 UNIT/ML
500 SYRINGE INTRAVENOUS
Status: CANCELLED | OUTPATIENT
Start: 2022-09-03

## 2022-07-27 RX ORDER — HEPARIN 100 UNIT/ML
500 SYRINGE INTRAVENOUS
Status: CANCELLED | OUTPATIENT
Start: 2022-08-27

## 2022-07-27 RX ORDER — SODIUM CHLORIDE 0.9 % (FLUSH) 0.9 %
10 SYRINGE (ML) INJECTION
Status: CANCELLED | OUTPATIENT
Start: 2022-09-03

## 2022-07-27 RX ORDER — HEPARIN 100 UNIT/ML
500 SYRINGE INTRAVENOUS
Status: CANCELLED | OUTPATIENT
Start: 2022-08-20

## 2022-07-27 RX ORDER — SODIUM CHLORIDE 0.9 % (FLUSH) 0.9 %
10 SYRINGE (ML) INJECTION
Status: CANCELLED | OUTPATIENT
Start: 2022-08-27

## 2022-07-27 RX ORDER — HEPARIN 100 UNIT/ML
500 SYRINGE INTRAVENOUS
Status: CANCELLED | OUTPATIENT
Start: 2022-08-03

## 2022-07-27 RX ORDER — SODIUM CHLORIDE 0.9 % (FLUSH) 0.9 %
10 SYRINGE (ML) INJECTION
Status: CANCELLED | OUTPATIENT
Start: 2022-08-03

## 2022-07-27 RX ORDER — SODIUM CHLORIDE 0.9 % (FLUSH) 0.9 %
10 SYRINGE (ML) INJECTION
Status: CANCELLED | OUTPATIENT
Start: 2022-08-20

## 2022-07-27 NOTE — PROGRESS NOTES
"PATIENT: Terese Barney  MRN: [id number]  DATE: 7/27/2022    Diagnosis:   1. Iron deficiency anemia due to chronic blood loss    2. Chronic pain syndrome      Chief Complaint: Anemia      Subjective:    History of Present Illness: Ms. Barney is a 61 y.o. female who presented in February 2020 for evaluation and management of iron deficiency anemia due to chronic blood loss. She was referred by Dr. Potts. I had seen her during a hospitalization in late January 2020.    Information from my consult note dated 1/31/20:  "Iron deficiency anemia due to chronic blood loss  - I have reviewed her labs chart.  - in January 2019, her hemoglobin was normal. Hemoglobin levels for past few days have revealed a severe microcytic anemia.  - ferritin level is decreased at 3 ng/mL, consistent with severe iron deficiency anemia.  - although stool was negative for occult blood, she reports black stools.  - agree with gastroenterology workup for source of bleeding.  - I will give a dose of iron sucrose while she is hospitalized. I will also start oral ferrous sulfate.  - I will schedule a follow-up appointment in clinic and arrange for outpatient ferric carboxymaltose."    - she underwent upper GI endoscopy on 1/31/20 and colonoscopy on 2/4/20.    Interval history:  - she presents for a follow-up appointment for her iron deficiency anemia due to chronic blood loss.  - she received iron sucrose x 4 doses in February/March 2022.  - she endorses fatigue, dyspnea upon exertion.  - She denies chest pain, nausea, vomiting, diarrhea, constipation.    Past medical, surgical, family, and social histories have been reviewed and updated below.    Past Medical History:   Past Medical History:   Diagnosis Date    Anxiety and depression 11/2/2016    Chronic back pain     Hammer toe of left foot     Insomnia     Motion sickness     Overweight (BMI 25.0-29.9) 11/2/2016    PONV (postoperative nausea and vomiting)     Vitamin D deficiency " 2016       Past Surgical History:   Past Surgical History:   Procedure Laterality Date    AUGMENTATION OF BREAST Bilateral     BREAST SURGERY Bilateral      SECTION      two    COLONOSCOPY N/A 2020    Procedure: COLONOSCOPY/Suprep;  Surgeon: Cristhian Mancilla MD;  Location: [facility];  Service: Endoscopy;  Laterality: N/A;    ESOPHAGOGASTRODUODENOSCOPY N/A 2020    Procedure: ESOPHAGOGASTRODUODENOSCOPY (EGD);  Surgeon: Cristhian Mancilla MD;  Location: [facility];  Service: Endoscopy;  Laterality: N/A;    ESOPHAGOGASTRODUODENOSCOPY N/A 3/3/2022    Procedure: EGD (ESOPHAGOGASTRODUODENOSCOPY);  Surgeon: Ward Horvath MD;  Location: [facility];  Service: Endoscopy;  Laterality: N/A;    foot surgery      left plantar fascial release    FOOT SURGERY Left 2017    2nd TOE, Hammer toe repair    HYSTERECTOMY      INTRALUMINAL GASTROINTESTINAL TRACT IMAGING VIA CAPSULE N/A 3/3/2022    Procedure: IMAGING PROCEDURE, GI TRACT, INTRALUMINAL, VIA CAPSULE;  Surgeon: Ward Horvath MD;  Location: [facility];  Service: Endoscopy;  Laterality: N/A;    laparascopy      ovarian cysts    left knee surgery      TONSILLECTOMY      TOTAL ABDOMINAL HYSTERECTOMY W/ BILATERAL SALPINGOOPHORECTOMY         Family History:   Family History   Problem Relation Age of Onset    Hypertension Mother     Breast cancer Mother     Macular degeneration Father     Diabetes type II Father     Coronary artery disease Father     Hypertension Sister     Diabetes type II Sister     Diabetes type II Brother     Hypertension Brother     Glaucoma Paternal Grandmother        Social History:  reports that she has never smoked. She has never used smokeless tobacco. She reports current alcohol use. She reports that she does not use drugs.    Allergies:  Review of patient's allergies indicates:   Allergen Reactions    Codeine Nausea And Vomiting       Medications:  Current Outpatient Medications    Medication Sig Dispense Refill    adapalene-benzoyl peroxide (EPIDUO) 0.1-2.5 % GlwP Apply thin layer to clean, dry skin of face nightly 45 g 11    albuterol (PROVENTIL/VENTOLIN HFA) 90 mcg/actuation inhaler Inhale 2 puffs into the lungs every 6 (six) hours as needed for Wheezing or Shortness of Breath. Rescue 1 Inhaler 0    brompheniramine-pseudoeph-DM (BROMFED DM) 2-30-10 mg/5 mL Syrp       cevimeline (EVOXAC) 30 mg capsule TAKE 1 CAPSULE (30 MG TOTAL) BY MOUTH 2 (TWO) TIMES DAILY. 180 capsule 4    cholecalciferol, vitamin D3, 125 mcg (5,000 unit) capsule Take 1 capsule (5,000 Units total) by mouth daily with breakfast. 100 capsule 4    clindamycin (CLEOCIN) 300 MG capsule clindamycin HCl 300 mg capsule      doxycycline (MONODOX) 100 MG capsule       DULoxetine (CYMBALTA) 60 MG capsule Take 1 capsule (60 mg total) by mouth 2 (two) times daily. 180 capsule 4    estradioL (ESTRACE) 0.01 % (0.1 mg/gram) vaginal cream PLACE 1 G VAGINALLY EVERY OTHER DAY. 126 g 4    ferrous sulfate (FEOSOL) 325 mg (65 mg iron) Tab tablet Take 1 tablet (325 mg total) by mouth daily with breakfast. 90 tablet 4    LORazepam (ATIVAN) 1 MG tablet TAKE 1/2 TO 1 TABLET ONCE  DAILY AS NEEDED FOR SEVERE ANXIETY OR SLEEP 30 tablet 5    ondansetron (ZOFRAN) 4 MG tablet       pantoprazole (PROTONIX) 40 MG tablet Take 1 tablet (40 mg total) by mouth before breakfast. 90 tablet 4    phentermine (ADIPEX-P) 37.5 mg tablet phentermine 37.5 mg tablet   TAKE 1/2 TABLET BY MOUTH TWICE A DAY      PREVIDENT 5000 DRY MOUTH 1.1 % Gel        No current facility-administered medications for this visit.       Review of Systems   Constitutional: Positive for fatigue.   HENT: Negative for sore throat.    Eyes: Negative for visual disturbance.   Respiratory: Positive for shortness of breath. Negative for cough.    Cardiovascular: Negative for chest pain.   Gastrointestinal: Negative for abdominal pain, constipation, diarrhea, nausea and vomiting.    Genitourinary: Negative for dysuria.   Musculoskeletal: Negative for back pain.   Skin: Negative for rash.   Neurological: Negative for headaches.   Hematological: Negative for adenopathy.   Psychiatric/Behavioral: The patient is not nervous/anxious.        ECOG Performance Status:   ECOG SCORE 1       Objective:      Vitals:   Vitals:    07/27/22 1001   BP: 122/74   BP Location: Right arm   Patient Position: Sitting   BP Method: Large (Automatic)   Pulse: 103   Resp: 18   SpO2: 98%   Weight: 71.5 kg (157 lb 10.1 oz)     BMI: Body mass index is 28.83 kg/m².      Physical Exam  Vitals and nursing note reviewed.   Constitutional:       Appearance: She is well-developed.   HENT:      Head: Normocephalic and atraumatic.   Eyes:      Pupils: Pupils are equal, round, and reactive to light.   Cardiovascular:      Rate and Rhythm: Normal rate and regular rhythm.   Pulmonary:      Effort: Pulmonary effort is normal.      Breath sounds: Normal breath sounds.   Abdominal:      General: Bowel sounds are normal.      Palpations: Abdomen is soft.   Musculoskeletal:         General: Normal range of motion.      Cervical back: Normal range of motion and neck supple.   Skin:     General: Skin is warm and dry.   Neurological:      Mental Status: She is alert and oriented to person, place, and time.   Psychiatric:         Behavior: Behavior normal.         Thought Content: Thought content normal.         Judgment: Judgment normal.           Laboratory Data:  Labs have been reviewed.    Lab Results   Component Value Date    WBC 6.92 07/26/2022    HGB 12.0 07/26/2022    HCT 36.2 (L) 07/26/2022    MCV 96 07/26/2022     07/26/2022             Imaging:     Colonoscopy (2/4/20):  - One 6 mm polyp in the rectum, removed with a hot snare. Resected and retrieved.  - Diverticulosis in the sigmoid colon.    Upper GI endoscopy (1/31/20):  - Esophageal plaques were found, consistent with candidiasis.  - 4 cm hiatal hernia.  - Non-bleeding  gastric ulcers with no stigmata of bleeding. Biopsied.  - Normal examined duodenum.    Assessment:       1. Iron deficiency anemia due to chronic blood loss    2. Chronic pain syndrome         Plan:     1. Iron deficiency anemia due to chronic blood loss  - I have reviewed her labs chart.  - in January 2019, her hemoglobin was normal. Hemoglobin levels for past few days have revealed a severe microcytic anemia.  - ferritin level is decreased at 3 ng/mL, consistent with severe iron deficiency anemia.  - although stool was negative for occult blood, she reported black stools.  - I gave a dose of iron sucrose while she was hospitalized on 1/31/20.  - I recommended ferric carboxymaltose x 2 doses.  - she had subsequent improvement in her iron deficiency anemia after ferric carboxymaltose  - labs on 1/28/22 reveal recurrent iron deficiency anemia  - I recommended iron sucrose x 4 dose  - she received iron sucrose x 4 doses in February/March 2022.  - continue ferrous sulfate.  - Labs have been reviewed. Hemoglobin is normal at 12 g/dL. Total iron binding capacity decreased, which suggests an improvement in her iron. Her Ferritin however remains low at 36 ng/mL.  - we will arrange for iron sucrose x 4 doses. We will try to give 300mg with each dose.  - return to clinic in 6 months with repeat iron studies.    2. Advance Care Planning     Power of   After our discussion (at previous visit), the patient decided to complete a HCPOA and appointed her mother Chelsey Morris ([phone]) and sister Trice Beasley ([phone]).           - return to clinic in 6 months with repeat iron studies.    Jose Luis Guerrero M.D.  Hematology/Oncology  [facility] - [address], [address]  [place] [address]  Phone: [phone]  Fax: [phone]   mild demand ischemia in the setting of covid  supportive care

## 2022-07-28 ENCOUNTER — OFFICE VISIT (OUTPATIENT)
Dept: SURGERY | Facility: CLINIC | Age: 62
End: 2022-07-28
Payer: COMMERCIAL

## 2022-07-28 ENCOUNTER — TELEPHONE (OUTPATIENT)
Dept: HEMATOLOGY/ONCOLOGY | Facility: CLINIC | Age: 62
End: 2022-07-28
Payer: COMMERCIAL

## 2022-07-28 VITALS
DIASTOLIC BLOOD PRESSURE: 66 MMHG | BODY MASS INDEX: 29.13 KG/M2 | WEIGHT: 158.31 LBS | HEIGHT: 62 IN | HEART RATE: 106 BPM | SYSTOLIC BLOOD PRESSURE: 141 MMHG

## 2022-07-28 DIAGNOSIS — K44.9 HIATAL HERNIA WITH GERD: Primary | ICD-10-CM

## 2022-07-28 DIAGNOSIS — K21.9 HIATAL HERNIA WITH GERD: Primary | ICD-10-CM

## 2022-07-28 PROCEDURE — 99204 OFFICE O/P NEW MOD 45 MIN: CPT | Mod: S$GLB,,, | Performed by: SURGERY

## 2022-07-28 PROCEDURE — 99204 PR OFFICE/OUTPT VISIT, NEW, LEVL IV, 45-59 MIN: ICD-10-PCS | Mod: S$GLB,,, | Performed by: SURGERY

## 2022-07-28 PROCEDURE — 99999 PR PBB SHADOW E&M-EST. PATIENT-LVL III: ICD-10-PCS | Mod: PBBFAC,,, | Performed by: SURGERY

## 2022-07-28 PROCEDURE — 99999 PR PBB SHADOW E&M-EST. PATIENT-LVL III: CPT | Mod: PBBFAC,,, | Performed by: SURGERY

## 2022-07-28 NOTE — LETTER
[facility]  [address]  [facility] [address]  Phone: [phone]               July 28, 2022      Patient: Terese Barney   MR Number: [id number]   YOB: 1960   Date of Visit: 7/28/2022     Ward Horvath MD  [address] [place] [id number]    Dear Dr. Horvath:    Thank you for referring Terese Barney to me for evaluation. Attached you will find relevant portions of my assessment and plan of care.    If you have questions, please do not hesitate to call me. I look forward to following Terese Barney along with you.      Sincerely,          Graham Back MD        CC  Shanique Potts MD    Enclosure

## 2022-07-28 NOTE — PROGRESS NOTES
I have seen the patient, reviewed the Resident's history and physical, assessment and plan. I have personally interviewed and examined the patient at bedside and: agree with the findings.     62y/o with bmi 28.83, gerd and hiatal hernia.  Her main complaint is dysphagia to solids and she also has heartburn.  See symptom scores in resident note.  Eckardt score 5.  She doesn't like to take medication and stopped ppi.  She has gained 30 pounds since 2020.    Cbc, cmp reviewed, basically ok  Cxr 2020 reviewed, likely hh  Video endoscopy 2022 reviewed duodenitis  Egd 2022 reviewed, normal duodenum, 7cm hh  Egd 2020 reviewed, 4cm hh, gastric ulcers  Ekg 2020 reviewed, normal    Growing hh with anemia (possibly due to hh), dysphagia and gerd.  Obtain motility study.  May need timed bas with tablet.  I don't think ph is necessary as we have plenty of indication for repair already.

## 2022-07-28 NOTE — PROGRESS NOTES
GENERAL SURGERY CLINIC NOTE    CC:  Dysphagia    HPI:  Terese Barney is a 61 y.o. female with Hx of Iron Deficiency Anemia and GERD who presents to clinic for evaluation of Hiatal Hernia. She has been having symptoms of dysphagia to solids for several years and symptoms of GERD, see questionnaire below. Most recently, she has been experiencing symptoms of iron deficiency anemia requiring multiple blood transfusions. She has underwent an EGD and Video endoscopy, results are below. Eckardt score is 5 today. She is prescribed pantoprazole for GERD but does not take it. She has gained 30 lbs since .    Aspirin: No. Anticoagulants: No  PSHx: Hysterectomy, BLT knee replacements.   Smoking: No .  DMII: No     GERD Questionnaire:  PPI - yes  Typical heartburn - yes  Regurgitation - yes  Dysphagia solids - yes  Dysphagia liquids - no  Hoarseness - yes  Sore throat - yes  Cough - yes  Asthma - seasonal  Chest pain - no  Water brash - yes  Globus - yes  Nausea - no  Vomiting - no    ROS:   Review of Systems   Constitutional: Positive for malaise/fatigue. Negative for weight loss.   HENT: Negative.    Eyes: Negative.    Respiratory: Positive for cough.    Cardiovascular: Negative.    Gastrointestinal: Positive for heartburn. Negative for abdominal pain, blood in stool, constipation, diarrhea, nausea and vomiting.   Genitourinary: Negative.    Musculoskeletal: Negative.    Skin: Negative.    Neurological: Negative.    Endo/Heme/Allergies: Negative.    Psychiatric/Behavioral: Negative.         Past Medical History:   Diagnosis Date    Anxiety and depression 2016    Chronic back pain     Hammer toe of left foot     Insomnia     Motion sickness     Overweight (BMI 25.0-29.9) 2016    PONV (postoperative nausea and vomiting)     Vitamin D deficiency 2016       Past Surgical History:   Procedure Laterality Date    AUGMENTATION OF BREAST Bilateral     BREAST SURGERY Bilateral      SECTION       two    COLONOSCOPY N/A 2/4/2020    Procedure: COLONOSCOPY/Suprep;  Surgeon: Cristhian Mancilla MD;  Location: [facility];  Service: Endoscopy;  Laterality: N/A;    ESOPHAGOGASTRODUODENOSCOPY N/A 1/31/2020    Procedure: ESOPHAGOGASTRODUODENOSCOPY (EGD);  Surgeon: Cristhian Mancilla MD;  Location: [facility];  Service: Endoscopy;  Laterality: N/A;    ESOPHAGOGASTRODUODENOSCOPY N/A 3/3/2022    Procedure: EGD (ESOPHAGOGASTRODUODENOSCOPY);  Surgeon: Ward Horvath MD;  Location: [facility] END[facility];  Service: Endoscopy;  Laterality: N/A;    foot surgery      left plantar fascial release    FOOT SURGERY Left 03/28/2017    2nd TOE, Hammer toe repair    HYSTERECTOMY      INTRALUMINAL GASTROINTESTINAL TRACT IMAGING VIA CAPSULE N/A 3/3/2022    Procedure: IMAGING PROCEDURE, GI TRACT, INTRALUMINAL, VIA CAPSULE;  Surgeon: Ward Horvath MD;  Location: [facility];  Service: Endoscopy;  Laterality: N/A;    laparascopy      ovarian cysts    left knee surgery      TONSILLECTOMY      TOTAL ABDOMINAL HYSTERECTOMY W/ BILATERAL SALPINGOOPHORECTOMY  2004       Current Outpatient Medications on File Prior to Visit   Medication Sig Dispense Refill    adapalene-benzoyl peroxide (EPIDUO) 0.1-2.5 % GlwP Apply thin layer to clean, dry skin of face nightly 45 g 11    albuterol (PROVENTIL/VENTOLIN HFA) 90 mcg/actuation inhaler Inhale 2 puffs into the lungs every 6 (six) hours as needed for Wheezing or Shortness of Breath. Rescue 1 Inhaler 0    cevimeline (EVOXAC) 30 mg capsule TAKE 1 CAPSULE (30 MG TOTAL) BY MOUTH 2 (TWO) TIMES DAILY. 180 capsule 4    cholecalciferol, vitamin D3, 125 mcg (5,000 unit) capsule Take 1 capsule (5,000 Units total) by mouth daily with breakfast. 100 capsule 4    DULoxetine (CYMBALTA) 60 MG capsule Take 1 capsule (60 mg total) by mouth 2 (two) times daily. 180 capsule 4    ferrous sulfate (FEOSOL) 325 mg (65 mg iron) Tab tablet Take 1 tablet (325 mg total) by mouth daily with breakfast. 90 tablet 4     LORazepam (ATIVAN) 1 MG tablet TAKE 1/2 TO 1 TABLET ONCE  DAILY AS NEEDED FOR SEVERE ANXIETY OR SLEEP 30 tablet 5    ondansetron (ZOFRAN) 4 MG tablet       pantoprazole (PROTONIX) 40 MG tablet Take 1 tablet (40 mg total) by mouth before breakfast. 90 tablet 4     No current facility-administered medications on file prior to visit.       Social History     Socioeconomic History    Marital status:    Occupational History    Occupation:      Comment: middle school   Tobacco Use    Smoking status: Never Smoker    Smokeless tobacco: Never Used   Substance and Sexual Activity    Alcohol use: Yes     Comment: rare    Drug use: No       Review of patient's allergies indicates:   Allergen Reactions    Codeine Nausea And Vomiting         PHYSICAL EXAM:  Vitals:    07/28/22 1105   BP: (!) 141/66   Pulse: 106       Physical Exam  Vitals and nursing note reviewed.   Constitutional:       Appearance: Normal appearance. She is normal weight.   HENT:      Head: Normocephalic.      Nose: Nose normal.      Mouth/Throat:      Mouth: Mucous membranes are moist.      Pharynx: Oropharynx is clear.   Eyes:      Pupils: Pupils are equal, round, and reactive to light.   Cardiovascular:      Rate and Rhythm: Normal rate and regular rhythm.      Pulses: Normal pulses.      Heart sounds: Normal heart sounds.   Pulmonary:      Effort: Pulmonary effort is normal.      Breath sounds: Normal breath sounds.   Abdominal:      General: Abdomen is flat. Bowel sounds are normal.      Palpations: Abdomen is soft.   Musculoskeletal:         General: Normal range of motion.      Cervical back: Normal range of motion.   Skin:     General: Skin is warm.   Neurological:      General: No focal deficit present.      Mental Status: She is alert and oriented to person, place, and time.   Psychiatric:         Mood and Affect: Mood normal.         Behavior: Behavior normal.         Thought Content: Thought content normal.          PERTINENT IMAGING:  Attending MD: Ward Horvath MD   Procedure:             Upper GI endoscopy   Indications:           Iron deficiency anemia   Findings:        The esophagus was normal.        A 7 cm hiatal hernia was present.        No other significant abnormalities were identified in a careful        examination of the stomach.        The examined duodenum was normal. Using the endoscope, the video        capsule enteroscope was advanced into the first portion of the        duodenum.   Impression:            - Normal esophagus.                          - 7 cm hiatal hernia.                          - Normal examined duodenum.                          - Successful completion of the Video Capsule     Attending MD: Ward Horvath MD   Procedure:             Upper GI endoscopy   Indications:           Iron deficiency anemia   Findings:        The video capsule had previously been placed into the stomach        endoscopically, so no images from the esophagus are available.        The small bowel passage time of the capsule enteroscope was 2-hours        33-minutes.        Localized erythematous mucosa without active bleeding and with        stigmata of bleeding was found in the duodenum at 0-hours 3-minutes.        Exam of the small bowel was otherwise normal.       ASSESSMENT/PLAN:  Terese Barney is a 61 y.o. female with iron deficiency anemia, GERD, and Dysphagia with 7 cm HH. Her symptoms could be due to the HH but further studies are needed to evaluate.    We will obtain an esophageal manometry study to evaluate her dysphagia and will follow up with results.     Chidi Sullivan MD  [facility] PGY-I  Pager: [phone]

## 2022-07-28 NOTE — TELEPHONE ENCOUNTER
Confirmed upcoming infusion appts. ( pt requested Fridays)  8/19 at 2p  8/26 at 2p  9/2 at 2p  9/9 at 2p

## 2022-07-29 ENCOUNTER — HOSPITAL ENCOUNTER (OUTPATIENT)
Dept: RADIOLOGY | Facility: HOSPITAL | Age: 62
Discharge: HOME | End: 2022-07-29
Attending: FAMILY MEDICINE
Payer: COMMERCIAL

## 2022-07-29 DIAGNOSIS — R92.8 ABNORMAL MAMMOGRAM: ICD-10-CM

## 2022-07-29 PROCEDURE — 77061 MAMMO DIGITAL DIAGNOSTIC LEFT WITH TOMO: ICD-10-PCS | Mod: 26,LT,, | Performed by: RADIOLOGY

## 2022-07-29 PROCEDURE — 77065 DX MAMMO INCL CAD UNI: CPT | Mod: TC,LT

## 2022-07-29 PROCEDURE — 77065 DX MAMMO INCL CAD UNI: CPT | Mod: 26,LT,, | Performed by: RADIOLOGY

## 2022-07-29 PROCEDURE — 77065 MAMMO DIGITAL DIAGNOSTIC LEFT WITH TOMO: ICD-10-PCS | Mod: 26,LT,, | Performed by: RADIOLOGY

## 2022-07-29 PROCEDURE — 77061 BREAST TOMOSYNTHESIS UNI: CPT | Mod: 26,LT,, | Performed by: RADIOLOGY

## 2022-08-02 ENCOUNTER — PATIENT MESSAGE (OUTPATIENT)
Dept: SURGERY | Facility: CLINIC | Age: 62
End: 2022-08-02
Payer: COMMERCIAL

## 2022-08-03 ENCOUNTER — PATIENT MESSAGE (OUTPATIENT)
Dept: ENDOSCOPY | Facility: HOSPITAL | Age: 62
End: 2022-08-03
Payer: COMMERCIAL

## 2022-08-03 ENCOUNTER — TELEPHONE (OUTPATIENT)
Dept: INFUSION THERAPY | Facility: HOSPITAL | Age: 62
End: 2022-08-03
Payer: COMMERCIAL

## 2022-08-03 ENCOUNTER — TELEPHONE (OUTPATIENT)
Dept: SURGERY | Facility: CLINIC | Age: 62
End: 2022-08-03
Payer: COMMERCIAL

## 2022-08-03 DIAGNOSIS — K21.9 HIATAL HERNIA WITH GERD: Primary | ICD-10-CM

## 2022-08-03 DIAGNOSIS — K44.9 HIATAL HERNIA WITH GERD: Primary | ICD-10-CM

## 2022-08-03 NOTE — TELEPHONE ENCOUNTER
Pt called to reschedule/ change appointment times for venofer infusions scheduled 8/19, 8/26, 9/2, 9/9 because 2pm appointment times will not work for her. Rescheduled to morning appointments per pt request.

## 2022-08-09 ENCOUNTER — PATIENT MESSAGE (OUTPATIENT)
Dept: HEMATOLOGY/ONCOLOGY | Facility: CLINIC | Age: 62
End: 2022-08-09
Payer: COMMERCIAL

## 2022-08-19 ENCOUNTER — INFUSION (OUTPATIENT)
Dept: INFUSION THERAPY | Facility: HOSPITAL | Age: 62
End: 2022-08-19
Attending: INTERNAL MEDICINE
Payer: COMMERCIAL

## 2022-08-19 ENCOUNTER — HOSPITAL ENCOUNTER (OUTPATIENT)
Facility: HOSPITAL | Age: 62
Discharge: HOME | End: 2022-08-19
Attending: INTERNAL MEDICINE | Admitting: INTERNAL MEDICINE
Payer: COMMERCIAL

## 2022-08-19 VITALS
TEMPERATURE: 99 F | BODY MASS INDEX: 29.13 KG/M2 | HEIGHT: 62 IN | DIASTOLIC BLOOD PRESSURE: 72 MMHG | SYSTOLIC BLOOD PRESSURE: 134 MMHG | WEIGHT: 158.31 LBS | OXYGEN SATURATION: 95 % | HEART RATE: 94 BPM | RESPIRATION RATE: 18 BRPM

## 2022-08-19 VITALS
HEART RATE: 73 BPM | OXYGEN SATURATION: 97 % | TEMPERATURE: 98 F | SYSTOLIC BLOOD PRESSURE: 136 MMHG | HEIGHT: 61 IN | BODY MASS INDEX: 29.27 KG/M2 | DIASTOLIC BLOOD PRESSURE: 78 MMHG | WEIGHT: 155 LBS | RESPIRATION RATE: 16 BRPM

## 2022-08-19 DIAGNOSIS — D50.0 IRON DEFICIENCY ANEMIA DUE TO CHRONIC BLOOD LOSS: Primary | ICD-10-CM

## 2022-08-19 DIAGNOSIS — K44.9 HIATAL HERNIA WITH GERD: ICD-10-CM

## 2022-08-19 DIAGNOSIS — K21.9 HIATAL HERNIA WITH GERD: ICD-10-CM

## 2022-08-19 LAB
CTP QC/QA: YES
SARS-COV-2 AG RESP QL IA.RAPID: NEGATIVE

## 2022-08-19 PROCEDURE — 96365 THER/PROPH/DIAG IV INF INIT: CPT

## 2022-08-19 PROCEDURE — 91010 ESOPHAGUS MOTILITY STUDY: CPT | Mod: TC | Performed by: INTERNAL MEDICINE

## 2022-08-19 PROCEDURE — 25000003 PHARM REV CODE 250: Performed by: INTERNAL MEDICINE

## 2022-08-19 PROCEDURE — 91037 ESOPH IMPED FUNCTION TEST: CPT | Mod: TC | Performed by: INTERNAL MEDICINE

## 2022-08-19 PROCEDURE — A4216 STERILE WATER/SALINE, 10 ML: HCPCS | Performed by: INTERNAL MEDICINE

## 2022-08-19 PROCEDURE — 96366 THER/PROPH/DIAG IV INF ADDON: CPT

## 2022-08-19 PROCEDURE — 63600175 PHARM REV CODE 636 W HCPCS: Performed by: INTERNAL MEDICINE

## 2022-08-19 RX ORDER — LIDOCAINE HYDROCHLORIDE 20 MG/ML
JELLY TOPICAL ONCE
Status: COMPLETED | OUTPATIENT
Start: 2022-08-19 | End: 2022-08-19

## 2022-08-19 RX ORDER — SODIUM CHLORIDE 0.9 % (FLUSH) 0.9 %
10 SYRINGE (ML) INJECTION
Status: DISCONTINUED | OUTPATIENT
Start: 2022-08-19 | End: 2022-08-19 | Stop reason: HOSPADM

## 2022-08-19 RX ADMIN — Medication 10 ML: at 10:08

## 2022-08-19 RX ADMIN — SODIUM CHLORIDE: 9 INJECTION, SOLUTION INTRAVENOUS at 09:08

## 2022-08-19 RX ADMIN — IRON SUCROSE 300 MG: 20 INJECTION, SOLUTION INTRAVENOUS at 09:08

## 2022-08-19 RX ADMIN — LIDOCAINE HYDROCHLORIDE 10 ML: 20 JELLY TOPICAL at 02:08

## 2022-08-19 NOTE — PLAN OF CARE
Pt tolerated procedure well. Full protocol followed with 200 cc NS bolus. Pt verbalized understanding of AVS

## 2022-08-19 NOTE — NURSING
Pt tolerated Venofer infusion 1 of 4 well.  No adverse reaction noted. No complaints at this time. IV flushed with NS and D/C per protocol.  Patient left clinic in no acute distress.

## 2022-08-25 ENCOUNTER — PATIENT MESSAGE (OUTPATIENT)
Dept: SURGERY | Facility: CLINIC | Age: 62
End: 2022-08-25
Payer: COMMERCIAL

## 2022-08-25 PROCEDURE — 91037 PR GERD TST W/ NASAL IMPEDENCE ELECTROD: ICD-10-PCS | Mod: 26,,, | Performed by: INTERNAL MEDICINE

## 2022-08-25 PROCEDURE — 91010 PR ESOPHAGEAL MOTILITY STUDY, MA2METRY: ICD-10-PCS | Mod: 26,,, | Performed by: INTERNAL MEDICINE

## 2022-08-25 PROCEDURE — 91037 ESOPH IMPED FUNCTION TEST: CPT | Mod: 26,,, | Performed by: INTERNAL MEDICINE

## 2022-08-25 PROCEDURE — 91010 ESOPHAGUS MOTILITY STUDY: CPT | Mod: 26,,, | Performed by: INTERNAL MEDICINE

## 2022-08-25 NOTE — PROVATION PATIENT INSTRUCTIONS
Discharge Summary/Instructions after an Endoscopic Procedure  Patient Name: Terese Barney  Patient MRN: [id number]  Patient YOB: 1960 Friday, August 19, 2022  Jose Quijano MD  Dear patient,  As a result of recent federal legislation (The Federal Cures Act), you may   receive lab or pathology results from your procedure in your [facility]   account before your physician is able to contact you. Your physician or   their representative will relay the results to you with their   recommendations at their soonest availability.  Thank you,  RESTRICTIONS:  During your procedure today, you received medications for sedation.  These   medications may affect your judgment, balance and coordination.  Therefore,   for 24 hours, you have the following restrictions:   - DO NOT drive a car, operate machinery, make legal/financial decisions,   sign important papers or drink alcohol.    ACTIVITY:  Today: no heavy lifting, straining or running due to procedural   sedation/anesthesia.  The following day: return to full activity including work.  DIET:  Eat and drink normally unless instructed otherwise.     TREATMENT FOR COMMON SIDE EFFECTS:  - Mild abdominal pain, nausea, belching, bloating or excessive gas:  rest,   eat lightly and use a heating pad.  - Sore Throat: treat with throat lozenges and/or gargle with warm salt   water.  - Because air was used during the procedure, expelling large amounts of air   from your rectum or belching is normal.  - If a bowel prep was taken, you may not have a bowel movement for 1-3 days.    This is normal.  SYMPTOMS TO WATCH FOR AND REPORT TO YOUR PHYSICIAN:  1. Abdominal pain or bloating, other than gas cramps.  2. Chest pain.  3. Back pain.  4. Signs of infection such as: chills or fever occurring within 24 hours   after the procedure.  5. Rectal bleeding, which would show as bright red, maroon, or black stools.   (A tablespoon of blood from the rectum is not serious, especially if    hemorrhoids are present.)  6. Vomiting.  7. Weakness or dizziness.  GO DIRECTLY TO THE NEAREST EMERGENCY ROOM IF YOU HAVE ANY OF THE FOLLOWING:      Difficulty breathing              Chills and/or fever over 101 F   Persistent vomiting and/or vomiting blood   Severe abdominal pain   Severe chest pain   Black, tarry stools   Bleeding- more than one tablespoon   Any other symptom or condition that you feel may need urgent attention  Your doctor recommends these additional instructions:  If any biopsies were taken, your doctors clinic will contact you in 1 to 2   weeks with any results.  - Return to referring physician.   For questions, problems or results please call your physician - Jose Quijano MD at Work:  [phone].  [facility], EMERGENCY ROOM PHONE NUMBER: [phone]  IF A COMPLICATION OR EMERGENCY SITUATION ARISES AND YOU ARE UNABLE TO REACH   YOUR PHYSICIAN - GO DIRECTLY TO THE EMERGENCY ROOM.  Jose Quijano MD  8/25/2022 2:01:37 PM  This report has been verified and signed electronically.  Dear patient,  As a result of recent federal legislation (The Federal Cures Act), you may   receive lab or pathology results from your procedure in your [facility]   account before your physician is able to contact you. Your physician or   their representative will relay the results to you with their   recommendations at their soonest availability.  Thank you,  PROVATION

## 2022-08-26 ENCOUNTER — INFUSION (OUTPATIENT)
Dept: INFUSION THERAPY | Facility: HOSPITAL | Age: 62
End: 2022-08-26
Attending: INTERNAL MEDICINE
Payer: COMMERCIAL

## 2022-08-26 VITALS
RESPIRATION RATE: 17 BRPM | BODY MASS INDEX: 29.27 KG/M2 | WEIGHT: 155 LBS | TEMPERATURE: 98 F | SYSTOLIC BLOOD PRESSURE: 146 MMHG | DIASTOLIC BLOOD PRESSURE: 71 MMHG | HEIGHT: 61 IN | OXYGEN SATURATION: 96 % | HEART RATE: 95 BPM

## 2022-08-26 DIAGNOSIS — D50.0 IRON DEFICIENCY ANEMIA DUE TO CHRONIC BLOOD LOSS: Primary | ICD-10-CM

## 2022-08-26 PROCEDURE — A4216 STERILE WATER/SALINE, 10 ML: HCPCS | Performed by: INTERNAL MEDICINE

## 2022-08-26 PROCEDURE — 96365 THER/PROPH/DIAG IV INF INIT: CPT

## 2022-08-26 PROCEDURE — 63600175 PHARM REV CODE 636 W HCPCS: Performed by: INTERNAL MEDICINE

## 2022-08-26 PROCEDURE — 25000003 PHARM REV CODE 250: Performed by: INTERNAL MEDICINE

## 2022-08-26 PROCEDURE — 96366 THER/PROPH/DIAG IV INF ADDON: CPT

## 2022-08-26 RX ORDER — SODIUM CHLORIDE 0.9 % (FLUSH) 0.9 %
10 SYRINGE (ML) INJECTION
Status: DISCONTINUED | OUTPATIENT
Start: 2022-08-26 | End: 2022-08-26 | Stop reason: HOSPADM

## 2022-08-26 RX ADMIN — Medication 10 ML: at 10:08

## 2022-08-26 RX ADMIN — IRON SUCROSE 300 MG: 20 INJECTION, SOLUTION INTRAVENOUS at 09:08

## 2022-08-26 RX ADMIN — SODIUM CHLORIDE: 9 INJECTION, SOLUTION INTRAVENOUS at 08:08

## 2022-08-31 ENCOUNTER — TELEPHONE (OUTPATIENT)
Dept: SURGERY | Facility: CLINIC | Age: 62
End: 2022-08-31

## 2022-08-31 ENCOUNTER — OFFICE VISIT (OUTPATIENT)
Dept: SURGERY | Facility: CLINIC | Age: 62
End: 2022-08-31
Payer: COMMERCIAL

## 2022-08-31 DIAGNOSIS — K44.9 HIATAL HERNIA: Primary | ICD-10-CM

## 2022-08-31 DIAGNOSIS — K21.9 HIATAL HERNIA WITH GERD: Primary | ICD-10-CM

## 2022-08-31 DIAGNOSIS — K44.9 HIATAL HERNIA WITH GERD: Primary | ICD-10-CM

## 2022-08-31 PROCEDURE — 99443 PR PHYSICIAN TELEPHONE EVALUATION 21-30 MIN: ICD-10-PCS | Mod: 95,,, | Performed by: SURGERY

## 2022-08-31 PROCEDURE — 99443 PR PHYSICIAN TELEPHONE EVALUATION 21-30 MIN: CPT | Mod: 95,,, | Performed by: SURGERY

## 2022-08-31 NOTE — PROGRESS NOTES
Established Patient - Audio Only Telehealth Visit     The patient location is: [place]  The chief complaint leading to consultation is: hiatal hernia  Visit type: Virtual visit with audio only (telephone)  Total time spent with patient: 23       The reason for the audio only service rather than synchronous audio and video virtual visit was related to technical difficulties or patient preference/necessity.     Each patient to whom I provide medical services by telemedicine is:  (1) informed of the relationship between the physician and patient and the respective role of any other health care provider with respect to management of the patient; and (2) notified that they may decline to receive medical services by telemedicine and may withdraw from such care at any time. Patient verbally consented to receive this service via voice-only telephone call.     60y/o with bmi 28.83, gerd and hiatal hernia.  Her main complaint is dysphagia to solids and she also has heartburn.  See symptom scores in resident note.  Eckardt score 5.  She doesn't like to take medication and stopped ppi.  She has gained 30 pounds since 2020.      She has no significant change in symptoms.  She still has dysphagia to bread and chicken and some of this is due to lack of saliva.     Cbc, cmp reviewed, basically ok  Cxr 2020 reviewed, likely hh  Video endoscopy 2022 reviewed duodenitis  Egd 2022 reviewed, normal duodenum, 7cm hh  Egd 2020 reviewed, 4cm hh, gastric ulcers  Motility 2022 poor  Ekg 2020 reviewed, normal       Growing hh with anemia (possibly due to hh), dysphagia (partly due to lack of saliva, also could be hiatal hernia or other etiologies) and gerd.  I don't think ph is necessary as we have plenty of indication for repair already.  I suggest timed barium swallow with tablet.  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  She may get some breast implant procedure 2 weeks after our robotic  hiatal hernia repair with possible mesh, possible esophageal lengthening and Toupet fundoplication..                        This service was not originating from a related E/M service provided within the previous 7 days nor will  to an E/M service or procedure within the next 24 hours or my soonest available appointment.  Prevailing standard of care was able to be met in this audio-only visit.

## 2022-09-02 ENCOUNTER — PATIENT MESSAGE (OUTPATIENT)
Dept: SURGERY | Facility: CLINIC | Age: 62
End: 2022-09-02
Payer: COMMERCIAL

## 2022-09-02 ENCOUNTER — INFUSION (OUTPATIENT)
Dept: INFUSION THERAPY | Facility: HOSPITAL | Age: 62
End: 2022-09-02
Attending: INTERNAL MEDICINE
Payer: COMMERCIAL

## 2022-09-02 VITALS
HEART RATE: 95 BPM | RESPIRATION RATE: 16 BRPM | DIASTOLIC BLOOD PRESSURE: 86 MMHG | HEIGHT: 61 IN | WEIGHT: 155 LBS | BODY MASS INDEX: 29.27 KG/M2 | OXYGEN SATURATION: 97 % | TEMPERATURE: 98 F | SYSTOLIC BLOOD PRESSURE: 131 MMHG

## 2022-09-02 DIAGNOSIS — D50.0 IRON DEFICIENCY ANEMIA DUE TO CHRONIC BLOOD LOSS: Primary | ICD-10-CM

## 2022-09-02 DIAGNOSIS — K44.9 HIATAL HERNIA WITH GERD: Primary | ICD-10-CM

## 2022-09-02 DIAGNOSIS — K21.9 HIATAL HERNIA WITH GERD: Primary | ICD-10-CM

## 2022-09-02 PROCEDURE — 96365 THER/PROPH/DIAG IV INF INIT: CPT

## 2022-09-02 PROCEDURE — 63600175 PHARM REV CODE 636 W HCPCS: Performed by: INTERNAL MEDICINE

## 2022-09-02 PROCEDURE — 96366 THER/PROPH/DIAG IV INF ADDON: CPT

## 2022-09-02 PROCEDURE — 25000003 PHARM REV CODE 250: Performed by: INTERNAL MEDICINE

## 2022-09-02 PROCEDURE — A4216 STERILE WATER/SALINE, 10 ML: HCPCS | Performed by: INTERNAL MEDICINE

## 2022-09-02 RX ORDER — SODIUM CHLORIDE 0.9 % (FLUSH) 0.9 %
10 SYRINGE (ML) INJECTION
Status: DISCONTINUED | OUTPATIENT
Start: 2022-09-02 | End: 2022-09-02 | Stop reason: HOSPADM

## 2022-09-02 RX ADMIN — Medication 10 ML: at 11:09

## 2022-09-02 RX ADMIN — SODIUM CHLORIDE: 9 INJECTION, SOLUTION INTRAVENOUS at 09:09

## 2022-09-02 RX ADMIN — IRON SUCROSE 300 MG: 20 INJECTION, SOLUTION INTRAVENOUS at 09:09

## 2022-09-02 NOTE — NURSING
Pt tolerated Venofer infusion 3 of 4 well.  No adverse reaction noted. No complaints at this time. IV flushed with NS and D/C per protocol.  Patient left clinic in no acute distress.

## 2022-09-09 ENCOUNTER — HOSPITAL ENCOUNTER (OUTPATIENT)
Dept: RADIOLOGY | Facility: HOSPITAL | Age: 62
Discharge: HOME | End: 2022-09-09
Attending: SURGERY
Payer: COMMERCIAL

## 2022-09-09 ENCOUNTER — INFUSION (OUTPATIENT)
Dept: INFUSION THERAPY | Facility: HOSPITAL | Age: 62
End: 2022-09-09
Attending: INTERNAL MEDICINE
Payer: COMMERCIAL

## 2022-09-09 VITALS
BODY MASS INDEX: 29.27 KG/M2 | SYSTOLIC BLOOD PRESSURE: 131 MMHG | RESPIRATION RATE: 16 BRPM | DIASTOLIC BLOOD PRESSURE: 75 MMHG | OXYGEN SATURATION: 95 % | TEMPERATURE: 98 F | WEIGHT: 155 LBS | HEART RATE: 101 BPM | HEIGHT: 61 IN

## 2022-09-09 DIAGNOSIS — D50.0 IRON DEFICIENCY ANEMIA DUE TO CHRONIC BLOOD LOSS: Primary | ICD-10-CM

## 2022-09-09 DIAGNOSIS — K21.9 HIATAL HERNIA WITH GERD: ICD-10-CM

## 2022-09-09 DIAGNOSIS — K44.9 HIATAL HERNIA WITH GERD: ICD-10-CM

## 2022-09-09 PROCEDURE — 74220 X-RAY XM ESOPHAGUS 1CNTRST: CPT | Mod: 26,,, | Performed by: INTERNAL MEDICINE

## 2022-09-09 PROCEDURE — A4216 STERILE WATER/SALINE, 10 ML: HCPCS | Performed by: INTERNAL MEDICINE

## 2022-09-09 PROCEDURE — 96365 THER/PROPH/DIAG IV INF INIT: CPT

## 2022-09-09 PROCEDURE — 25500020 PHARM REV CODE 255: Performed by: SURGERY

## 2022-09-09 PROCEDURE — A9698 NON-RAD CONTRAST MATERIALNOC: HCPCS | Performed by: SURGERY

## 2022-09-09 PROCEDURE — 63600175 PHARM REV CODE 636 W HCPCS: Performed by: INTERNAL MEDICINE

## 2022-09-09 PROCEDURE — 74220 X-RAY XM ESOPHAGUS 1CNTRST: CPT | Mod: TC

## 2022-09-09 PROCEDURE — 74220 FL ESOPHAGRAM COMPLETE: ICD-10-PCS | Mod: 26,,, | Performed by: INTERNAL MEDICINE

## 2022-09-09 PROCEDURE — 25000003 PHARM REV CODE 250: Performed by: INTERNAL MEDICINE

## 2022-09-09 RX ORDER — SODIUM CHLORIDE 0.9 % (FLUSH) 0.9 %
10 SYRINGE (ML) INJECTION
Status: DISCONTINUED | OUTPATIENT
Start: 2022-09-09 | End: 2022-09-09 | Stop reason: HOSPADM

## 2022-09-09 RX ADMIN — BARIUM SULFATE 200 ML: 0.81 POWDER, FOR SUSPENSION ORAL at 08:09

## 2022-09-09 RX ADMIN — IRON SUCROSE 300 MG: 20 INJECTION, SOLUTION INTRAVENOUS at 12:09

## 2022-09-09 RX ADMIN — Medication 10 ML: at 02:09

## 2022-09-09 RX ADMIN — SODIUM CHLORIDE: 9 INJECTION, SOLUTION INTRAVENOUS at 11:09

## 2022-09-09 RX ADMIN — BARIUM SULFATE 100 ML: 0.6 SUSPENSION ORAL at 08:09

## 2022-09-09 NOTE — NURSING
PT tolerated Venofer infusion well. No adverse reaction noted. Pt education reinforced on Venofer side effects, what to expect and when to contact the doctor. Pt verbalized understanding. PIV d/c per protocol, pt tolerated well.

## 2022-10-04 ENCOUNTER — PATIENT MESSAGE (OUTPATIENT)
Dept: SURGERY | Facility: HOSPITAL | Age: 62
End: 2022-10-04
Payer: COMMERCIAL

## 2022-10-06 ENCOUNTER — PATIENT MESSAGE (OUTPATIENT)
Dept: FAMILY MEDICINE | Facility: CLINIC | Age: 62
End: 2022-10-06
Payer: COMMERCIAL

## 2022-10-06 DIAGNOSIS — Z01.810 PREOP CARDIOVASCULAR EXAM: ICD-10-CM

## 2022-10-06 DIAGNOSIS — D50.0 IRON DEFICIENCY ANEMIA DUE TO CHRONIC BLOOD LOSS: ICD-10-CM

## 2022-10-06 DIAGNOSIS — K21.9 HIATAL HERNIA WITH GERD: Primary | ICD-10-CM

## 2022-10-06 DIAGNOSIS — K44.9 HIATAL HERNIA WITH GERD: Primary | ICD-10-CM

## 2022-10-08 NOTE — TELEPHONE ENCOUNTER
Please reach out to patient to schedule these labs and EKG for prior to her hiatal hernia surgery that is scheduled with Dr Back on 10/19/22, thanks

## 2022-10-09 ENCOUNTER — PATIENT MESSAGE (OUTPATIENT)
Dept: SURGERY | Facility: HOSPITAL | Age: 62
End: 2022-10-09
Payer: COMMERCIAL

## 2022-10-10 ENCOUNTER — TELEPHONE (OUTPATIENT)
Dept: SURGERY | Facility: CLINIC | Age: 62
End: 2022-10-10
Payer: COMMERCIAL

## 2022-10-10 NOTE — TELEPHONE ENCOUNTER
Labs and ekg are scheudle     Name: CATRINA LUJAN MRN: [id number]   Date: 10/17/2022 Status: Shaniqua   Appt Time: 10:00 AM Length: 10   Visit Type: NON FASTING LAB [2194] Copay: $0.00   Provider: APPOINTMENT BANDAR DAWSON Dept: [facility], Lab       Dept Address: [address] [place] [address] [place] [address]       Dept Phone Number: [phone]   Referring Provider: BALTAZAR EUBANKS CSN: [id number]   Appt Phone:     Notes: pre op   Made On: 10/10/2022 8:01 AM By: THAIS PRITCHARD [[id number]] (ES)       Date: 10/17/2022 Status: Shaniqua   Appt Time: 10:30 AM Length: 20   Visit Type: EKG [2099] Copay: $0.00   Provider: APPOINTMENT BANDAR DAWSON Dept: [facility], Lab       Dept Address: [address] [place] [address] [place] [address]       Dept Phone Number: [phone]   Referring Provider: BALTAZAR EUBANKS CSN: [id number]   Appt Phone:     Notes: pre op   Made On: 10/10/2022 8:02 AM By: THAIS PRITCHARD [[id number]] (ES)

## 2022-10-10 NOTE — TELEPHONE ENCOUNTER
Spoke to patient I have the labs and ekg scheduled. I have sent the FMLA forms to the disability desk. Patient is not satisfied with her care so far.

## 2022-10-17 ENCOUNTER — CLINICAL SUPPORT (OUTPATIENT)
Dept: LAB | Facility: HOSPITAL | Age: 62
End: 2022-10-17
Attending: FAMILY MEDICINE
Payer: COMMERCIAL

## 2022-10-17 DIAGNOSIS — K21.9 HIATAL HERNIA WITH GERD: ICD-10-CM

## 2022-10-17 DIAGNOSIS — K44.9 HIATAL HERNIA WITH GERD: ICD-10-CM

## 2022-10-17 DIAGNOSIS — Z01.810 PREOP CARDIOVASCULAR EXAM: ICD-10-CM

## 2022-10-17 DIAGNOSIS — D50.0 IRON DEFICIENCY ANEMIA DUE TO CHRONIC BLOOD LOSS: ICD-10-CM

## 2022-10-17 LAB
ALBUMIN SERPL BCP-MCNC: 3.9 G/DL (ref 3.5–5.2)
ALP SERPL-CCNC: 75 U/L (ref 55–135)
ALT SERPL W/O P-5'-P-CCNC: 12 U/L (ref 10–44)
ANION GAP SERPL CALC-SCNC: 6 MMOL/L (ref 8–16)
AST SERPL-CCNC: 18 U/L (ref 10–40)
BASOPHILS # BLD AUTO: 0.03 K/UL (ref 0–0.2)
BASOPHILS NFR BLD: 0.5 % (ref 0–1.9)
BILIRUB SERPL-MCNC: 0.9 MG/DL (ref 0.1–1)
BUN SERPL-MCNC: 15 MG/DL (ref 6–20)
CALCIUM SERPL-MCNC: 9.2 MG/DL (ref 8.7–10.5)
CHLORIDE SERPL-SCNC: 108 MMOL/L (ref 95–110)
CO2 SERPL-SCNC: 27 MMOL/L (ref 23–29)
CREAT SERPL-MCNC: 0.8 MG/DL (ref 0.5–1.4)
DIFFERENTIAL METHOD: ABNORMAL
EOSINOPHIL # BLD AUTO: 0.1 K/UL (ref 0–0.5)
EOSINOPHIL NFR BLD: 1.4 % (ref 0–8)
ERYTHROCYTE [DISTWIDTH] IN BLOOD BY AUTOMATED COUNT: 13.1 % (ref 11.5–14.5)
EST. GFR  (NO RACE VARIABLE): >60 ML/MIN/1.73 M^2
GLUCOSE SERPL-MCNC: 100 MG/DL (ref 70–110)
HCT VFR BLD AUTO: 41.9 % (ref 37–48.5)
HGB BLD-MCNC: 13.6 G/DL (ref 12–16)
IMM GRANULOCYTES # BLD AUTO: 0.02 K/UL (ref 0–0.04)
IMM GRANULOCYTES NFR BLD AUTO: 0.4 % (ref 0–0.5)
LYMPHOCYTES # BLD AUTO: 1.1 K/UL (ref 1–4.8)
LYMPHOCYTES NFR BLD: 20 % (ref 18–48)
MCH RBC QN AUTO: 32 PG (ref 27–31)
MCHC RBC AUTO-ENTMCNC: 32.5 G/DL (ref 32–36)
MCV RBC AUTO: 99 FL (ref 82–98)
MONOCYTES # BLD AUTO: 0.5 K/UL (ref 0.3–1)
MONOCYTES NFR BLD: 8.3 % (ref 4–15)
NEUTROPHILS # BLD AUTO: 3.9 K/UL (ref 1.8–7.7)
NEUTROPHILS NFR BLD: 69.4 % (ref 38–73)
NRBC BLD-RTO: 0 /100 WBC
PLATELET # BLD AUTO: 286 K/UL (ref 150–450)
PMV BLD AUTO: 9.4 FL (ref 9.2–12.9)
POTASSIUM SERPL-SCNC: 4.5 MMOL/L (ref 3.5–5.1)
PROT SERPL-MCNC: 7.3 G/DL (ref 6–8.4)
RBC # BLD AUTO: 4.25 M/UL (ref 4–5.4)
SODIUM SERPL-SCNC: 141 MMOL/L (ref 136–145)
WBC # BLD AUTO: 5.55 K/UL (ref 3.9–12.7)

## 2022-10-17 PROCEDURE — 85025 COMPLETE CBC W/AUTO DIFF WBC: CPT | Performed by: FAMILY MEDICINE

## 2022-10-17 PROCEDURE — 80053 COMPREHEN METABOLIC PANEL: CPT | Performed by: FAMILY MEDICINE

## 2022-10-17 PROCEDURE — 36415 COLL VENOUS BLD VENIPUNCTURE: CPT | Performed by: FAMILY MEDICINE

## 2022-10-18 ENCOUNTER — TELEPHONE (OUTPATIENT)
Dept: SURGERY | Facility: CLINIC | Age: 62
End: 2022-10-18
Payer: COMMERCIAL

## 2022-10-18 ENCOUNTER — TELEPHONE (OUTPATIENT)
Dept: FAMILY MEDICINE | Facility: CLINIC | Age: 62
End: 2022-10-18
Payer: COMMERCIAL

## 2022-10-18 ENCOUNTER — ANESTHESIA EVENT (OUTPATIENT)
Dept: SURGERY | Facility: HOSPITAL | Age: 62
End: 2022-10-18
Payer: COMMERCIAL

## 2022-10-18 NOTE — ANESTHESIA PREPROCEDURE EVALUATION
[facility]  Anesthesia Pre-Operative Evaluation         Patient Name: Terese Barney  YOB: 1960  MRN: [id number]    SUBJECTIVE:     Pre-operative evaluation for Procedure(s) (LRB):  XI ROBOTIC REPAIR, HERNIA, HIATAL w/ possible mesh; TOUPET fundoplication (N/A)  LENGTHENING PROCEDURE, ESOPHAGUS (N/A)     10/18/2022    Terese Barney is a 62 y.o. female w/ a significant PMHx of hiatal hernia with GERD and dysphagia. Pt does not report any symptoms of reflux at this time. NPO since 1800 10/18.     Patient now presents for the above procedure(s).    Hx of PONV    Prev airway: None documented.    Patient Active Problem List   Diagnosis    Anxiety and depression    Chronic back pain    Overweight (BMI 25.0-29.9)    Dry mouth    Insomnia    Motion sickness    Vitamin D deficiency    Hammer toe of left foot    Chronic pain syndrome    Hiatal hernia with GERD    Osteopenia    Acute medial meniscus tear of left knee    Osteoarthritis of left knee    Iron deficiency anemia due to chronic blood loss    History of total knee arthroplasty    Encounter for monitoring long-term proton pump inhibitor therapy       Review of patient's allergies indicates:   Allergen Reactions    Codeine Nausea And Vomiting       Current Inpatient Medications:      No current facility-administered medications on file prior to encounter.     Current Outpatient Medications on File Prior to Encounter   Medication Sig Dispense Refill    adapalene-benzoyl peroxide (EPIDUO) 0.1-2.5 % GlwP Apply thin layer to clean, dry skin of face nightly 45 g 11    albuterol (PROVENTIL/VENTOLIN HFA) 90 mcg/actuation inhaler Inhale 2 puffs into the lungs every 6 (six) hours as needed for Wheezing or Shortness of Breath. Rescue 1 Inhaler 0    cevimeline (EVOXAC) 30 mg capsule TAKE 1 CAPSULE (30 MG TOTAL) BY MOUTH 2 (TWO) TIMES DAILY. 180 capsule 4    cholecalciferol, vitamin D3, 125 mcg (5,000 unit) capsule Take 1  capsule (5,000 Units total) by mouth daily with breakfast. 100 capsule 4    DULoxetine (CYMBALTA) 60 MG capsule Take 1 capsule (60 mg total) by mouth 2 (two) times daily. 180 capsule 4    ferrous sulfate (FEOSOL) 325 mg (65 mg iron) Tab tablet Take 1 tablet (325 mg total) by mouth daily with breakfast. 90 tablet 4    LORazepam (ATIVAN) 1 MG tablet TAKE 1/2 TO 1 TABLET ONCE  DAILY AS NEEDED FOR SEVERE ANXIETY OR SLEEP 30 tablet 5    ondansetron (ZOFRAN) 4 MG tablet       pantoprazole (PROTONIX) 40 MG tablet Take 1 tablet (40 mg total) by mouth before breakfast. 90 tablet 4       Past Surgical History:   Procedure Laterality Date    AUGMENTATION OF BREAST Bilateral     BREAST SURGERY Bilateral      SECTION      two    COLONOSCOPY N/A 2020    Procedure: COLONOSCOPY/Suprep;  Surgeon: Cristhian Mancilla MD;  Location: [facility];  Service: Endoscopy;  Laterality: N/A;    ESOPHAGEAL MANOMETRY WITH MEASUREMENT OF IMPEDANCE N/A 2022    Procedure: MANOMETRY, ESOPHAGUS, WITH IMPEDANCE MEASUREMENT;  Surgeon: Jose Quijano MD;  Location: [address]);  Service: Endoscopy;  Laterality: N/A;  8/3 pt not vaccinated; Rapid; instructions to portal-st    ESOPHAGOGASTRODUODENOSCOPY N/A 2020    Procedure: ESOPHAGOGASTRODUODENOSCOPY (EGD);  Surgeon: Cristhian Mancilla MD;  Location: [facility];  Service: Endoscopy;  Laterality: N/A;    ESOPHAGOGASTRODUODENOSCOPY N/A 3/3/2022    Procedure: EGD (ESOPHAGOGASTRODUODENOSCOPY);  Surgeon: Ward Horvath MD;  Location: [facility];  Service: Endoscopy;  Laterality: N/A;    foot surgery      left plantar fascial release    FOOT SURGERY Left 2017    2nd TOE, Hammer toe repair    HYSTERECTOMY      INTRALUMINAL GASTROINTESTINAL TRACT IMAGING VIA CAPSULE N/A 3/3/2022    Procedure: IMAGING PROCEDURE, GI TRACT, INTRALUMINAL, VIA CAPSULE;  Surgeon: Ward Horvath MD;  Location: [facility];  Service: Endoscopy;  Laterality: N/A;     laparascopy      ovarian cysts    left knee surgery      TONSILLECTOMY      TOTAL ABDOMINAL HYSTERECTOMY W/ BILATERAL SALPINGOOPHORECTOMY  2004       OBJECTIVE:     Vital Signs Range (Last 24H):         Significant Labs:  Lab Results   Component Value Date    WBC 5.55 10/17/2022    HGB 13.6 10/17/2022    HCT 41.9 10/17/2022     10/17/2022     10/17/2022    K 4.5 10/17/2022     10/17/2022    CREATININE 0.8 10/17/2022    BUN 15 10/17/2022    CO2 27 10/17/2022    INR 0.9 01/31/2020    HGBA1C 5.1 05/13/2022       Diagnostic Studies: No relevant studies.    EKG:   Results for orders placed or performed in visit on 10/17/22   EKG 12-lead    Collection Time: 10/17/22 10:23 AM    Narrative    Test Reason : K44.9,K21.9,Z01.810,D50.0,    Vent. Rate : 086 BPM     Atrial Rate : 086 BPM     P-R Int : 154 ms          QRS Dur : 076 ms      QT Int : 350 ms       P-R-T Axes : 048 046 064 degrees     QTc Int : 418 ms    Normal sinus rhythm  Nonspecific ST abnormality  Abnormal ECG  When compared with ECG of 30-JAN-2020 17:19,  No significant change was found  Confirmed by Kalani Jackson MD ([id number]) on 10/17/2022 5:33:32 PM    Referred By: BALTAZAR EUBANKS           Confirmed By:Kalani Jackson MD         ASSESSMENT/PLAN:       Pre-op Assessment    I have reviewed the Patient Summary Reports.     I have reviewed the Nursing Notes. I have reviewed the NPO Status.   I have reviewed the Medications.     Review of Systems  Anesthesia Hx:  No problems with previous Anesthesia  Denies Family Hx of Anesthesia complications.  Personal Hx of Anesthesia complications, Post-Operative Nausea/Vomiting   Cardiovascular:  Cardiovascular Normal     Pulmonary:  Pulmonary Normal    Hepatic/GI:   GERD, poorly controlled Hiatal hernia   Endocrine:  Endocrine Normal    Psych:   Psychiatric History anxiety depression          Physical Exam  General: Well nourished, Cooperative, Oriented and Alert    Airway:  Mallampati: III   Mouth Opening:  Normal  Neck ROM: Normal ROM    Dental:  Intact    Chest/Lungs:  Normal Respiratory Rate    Heart:  Rate: Normal        Anesthesia Plan  Type of Anesthesia, risks & benefits discussed:    Anesthesia Type: Gen ETT  Intra-op Monitoring Plan: Standard ASA Monitors  Post Op Pain Control Plan: multimodal analgesia and IV/PO Opioids PRN  Induction:  IV  Airway Plan: Direct, Post-Induction  Informed Consent: Informed consent signed with the Patient and all parties understand the risks and agree with anesthesia plan.  All questions answered.   ASA Score: 2  Day of Surgery Review of History & Physical: H&P Update referred to the surgeon/provider.    Ready For Surgery From Anesthesia Perspective.     .

## 2022-10-18 NOTE — TELEPHONE ENCOUNTER
Labs and EKG from 10/18/22 reviewed. Chronic conditions stable.     Patient is clear for Hiatal Hernia Repair With Dr Back scheduled 10/19/22, dr Potts, PCP for Terese Barney

## 2022-10-18 NOTE — TELEPHONE ENCOUNTER
----- Message from Beth Mack RN sent at 10/18/2022  2:05 PM CDT -----  Regarding: Surgery Clearance  Good afternoon Dr. Potts,     Ms. Barney is scheduled to have her Hiatal Hernia repair surgery with Dr. Graham Back tomorrow.  You had mentioned that you would be willing to clear her for surgery with labs and an ekg, which she had.  Would you please review the requested test results to see if she can be cleared?          Thanks for your assistance, Beth PRINCE RN [facility]

## 2022-10-18 NOTE — PRE-PROCEDURE INSTRUCTIONS
PREOP INSTRUCTIONS:  No food,milk or milk products for 8 hours before surgery.  Clear liquids like water,gatorade,apple juice are allowed up until 2 hours before surgery.  Instructed to follow the surgeon's instructions if they differ from these.  Shower instructions as well as directions to the Surgery Center were given.  Encouraged to wear loose fitting,comfortable clothing.  Medication instructions for pm prior to and am of procedure reviewed.  Instructed to avoid taking vitamins,supplements,aspirin and ibuprofen the morning of surgery.    Patient denies any side effects or issues with anesthesia or sedation other than PONV

## 2022-10-19 ENCOUNTER — ANESTHESIA (OUTPATIENT)
Dept: SURGERY | Facility: HOSPITAL | Age: 62
End: 2022-10-19
Payer: COMMERCIAL

## 2022-10-19 ENCOUNTER — HOSPITAL ENCOUNTER (OUTPATIENT)
Facility: HOSPITAL | Age: 62
Discharge: HOME | End: 2022-10-20
Attending: SURGERY | Admitting: SURGERY
Payer: COMMERCIAL

## 2022-10-19 DIAGNOSIS — K44.9 HIATAL HERNIA: Primary | ICD-10-CM

## 2022-10-19 LAB
CTP QC/QA: YES
SARS-COV-2 AG RESP QL IA.RAPID: NEGATIVE

## 2022-10-19 PROCEDURE — 43283 PR LAP ESOPHAGEAL LENGTHENING: ICD-10-PCS | Mod: ,,, | Performed by: SURGERY

## 2022-10-19 PROCEDURE — 88302 TISSUE EXAM BY PATHOLOGIST: CPT | Mod: 26,,, | Performed by: PATHOLOGY

## 2022-10-19 PROCEDURE — G0378 HOSPITAL OBSERVATION PER HR: HCPCS

## 2022-10-19 PROCEDURE — 36000713 HC OR TIME LEV V EA ADD 15 MIN: Performed by: SURGERY

## 2022-10-19 PROCEDURE — 43281 LAP PARAESOPHAG HERN REPAIR: CPT | Mod: ,,, | Performed by: SURGERY

## 2022-10-19 PROCEDURE — 25000003 PHARM REV CODE 250

## 2022-10-19 PROCEDURE — 71000015 HC POSTOP RECOV 1ST HR: Performed by: SURGERY

## 2022-10-19 PROCEDURE — 71000033 HC RECOVERY, INTIAL HOUR: Performed by: SURGERY

## 2022-10-19 PROCEDURE — 94761 N-INVAS EAR/PLS OXIMETRY MLT: CPT

## 2022-10-19 PROCEDURE — 25000242 PHARM REV CODE 250 ALT 637 W/ HCPCS: Performed by: STUDENT IN AN ORGANIZED HEALTH CARE EDUCATION/TRAINING PROGRAM

## 2022-10-19 PROCEDURE — 27000221 HC OXYGEN, UP TO 24 HOURS

## 2022-10-19 PROCEDURE — 37000008 HC ANESTHESIA 1ST 15 MINUTES: Performed by: SURGERY

## 2022-10-19 PROCEDURE — 25000003 PHARM REV CODE 250: Performed by: SURGERY

## 2022-10-19 PROCEDURE — 25000003 PHARM REV CODE 250: Performed by: STUDENT IN AN ORGANIZED HEALTH CARE EDUCATION/TRAINING PROGRAM

## 2022-10-19 PROCEDURE — 37000009 HC ANESTHESIA EA ADD 15 MINS: Performed by: SURGERY

## 2022-10-19 PROCEDURE — 71000039 HC RECOVERY, EACH ADD'L HOUR: Performed by: SURGERY

## 2022-10-19 PROCEDURE — D9220A PRA ANESTHESIA: Mod: ,,, | Performed by: ANESTHESIOLOGY

## 2022-10-19 PROCEDURE — 88302 PR  SURG PATH,LEVEL II: ICD-10-PCS | Mod: 26,,, | Performed by: PATHOLOGY

## 2022-10-19 PROCEDURE — D9220A PRA ANESTHESIA: ICD-10-PCS | Mod: ,,, | Performed by: ANESTHESIOLOGY

## 2022-10-19 PROCEDURE — 63600175 PHARM REV CODE 636 W HCPCS: Performed by: STUDENT IN AN ORGANIZED HEALTH CARE EDUCATION/TRAINING PROGRAM

## 2022-10-19 PROCEDURE — 36000712 HC OR TIME LEV V 1ST 15 MIN: Performed by: SURGERY

## 2022-10-19 PROCEDURE — C1768 GRAFT, VASCULAR: HCPCS | Performed by: SURGERY

## 2022-10-19 PROCEDURE — 43283 LAP ESOPH LENGTHENING: CPT | Mod: ,,, | Performed by: SURGERY

## 2022-10-19 PROCEDURE — 88302 TISSUE EXAM BY PATHOLOGIST: CPT | Performed by: PATHOLOGY

## 2022-10-19 PROCEDURE — 43281 PR LAP, REPAIR PARAESOPHAGEAL HERNIA, INCL FUNDOPLASTY W/O MESH: ICD-10-PCS | Mod: ,,, | Performed by: SURGERY

## 2022-10-19 DEVICE — FELT TEFLON 1INX6IN: Type: IMPLANTABLE DEVICE | Site: ESOPHAGUS | Status: FUNCTIONAL

## 2022-10-19 RX ORDER — CEFAZOLIN SODIUM/WATER 2 G/20 ML
2 SYRINGE (ML) INTRAVENOUS
Status: ACTIVE | OUTPATIENT
Start: 2022-10-19

## 2022-10-19 RX ORDER — HYDROMORPHONE HYDROCHLORIDE 1 MG/ML
0.2 INJECTION, SOLUTION INTRAMUSCULAR; INTRAVENOUS; SUBCUTANEOUS EVERY 5 MIN PRN
Status: DISCONTINUED | OUTPATIENT
Start: 2022-10-19 | End: 2022-10-19 | Stop reason: HOSPADM

## 2022-10-19 RX ORDER — PROCHLORPERAZINE EDISYLATE 5 MG/ML
5 INJECTION INTRAMUSCULAR; INTRAVENOUS EVERY 30 MIN PRN
Status: DISCONTINUED | OUTPATIENT
Start: 2022-10-19 | End: 2022-10-19 | Stop reason: HOSPADM

## 2022-10-19 RX ORDER — SODIUM CHLORIDE 9 MG/ML
INJECTION, SOLUTION INTRAVENOUS CONTINUOUS
Status: DISCONTINUED | OUTPATIENT
Start: 2022-10-19 | End: 2022-10-20

## 2022-10-19 RX ORDER — ACETAMINOPHEN 10 MG/ML
INJECTION, SOLUTION INTRAVENOUS
Status: DISCONTINUED | OUTPATIENT
Start: 2022-10-19 | End: 2022-10-19

## 2022-10-19 RX ORDER — FENTANYL CITRATE 50 UG/ML
INJECTION, SOLUTION INTRAMUSCULAR; INTRAVENOUS
Status: DISCONTINUED | OUTPATIENT
Start: 2022-10-19 | End: 2022-10-19

## 2022-10-19 RX ORDER — FENTANYL CITRATE 50 UG/ML
25 INJECTION, SOLUTION INTRAMUSCULAR; INTRAVENOUS EVERY 5 MIN PRN
Status: DISCONTINUED | OUTPATIENT
Start: 2022-10-19 | End: 2022-10-19 | Stop reason: HOSPADM

## 2022-10-19 RX ORDER — PROPOFOL 10 MG/ML
VIAL (ML) INTRAVENOUS
Status: DISCONTINUED | OUTPATIENT
Start: 2022-10-19 | End: 2022-10-19

## 2022-10-19 RX ORDER — HALOPERIDOL 5 MG/ML
0.5 INJECTION INTRAMUSCULAR EVERY 10 MIN PRN
Status: DISCONTINUED | OUTPATIENT
Start: 2022-10-19 | End: 2022-10-19 | Stop reason: HOSPADM

## 2022-10-19 RX ORDER — KETAMINE HCL IN 0.9 % NACL 50 MG/5 ML
SYRINGE (ML) INTRAVENOUS
Status: DISCONTINUED | OUTPATIENT
Start: 2022-10-19 | End: 2022-10-19

## 2022-10-19 RX ORDER — ONDANSETRON 2 MG/ML
INJECTION INTRAMUSCULAR; INTRAVENOUS
Status: DISCONTINUED | OUTPATIENT
Start: 2022-10-19 | End: 2022-10-19

## 2022-10-19 RX ORDER — DEXAMETHASONE SODIUM PHOSPHATE 4 MG/ML
INJECTION, SOLUTION INTRA-ARTICULAR; INTRALESIONAL; INTRAMUSCULAR; INTRAVENOUS; SOFT TISSUE
Status: DISCONTINUED | OUTPATIENT
Start: 2022-10-19 | End: 2022-10-19

## 2022-10-19 RX ORDER — ROCURONIUM BROMIDE 10 MG/ML
INJECTION, SOLUTION INTRAVENOUS
Status: DISCONTINUED | OUTPATIENT
Start: 2022-10-19 | End: 2022-10-19

## 2022-10-19 RX ORDER — BUPIVACAINE HYDROCHLORIDE 5 MG/ML
INJECTION, SOLUTION EPIDURAL; INTRACAUDAL
Status: DISCONTINUED | OUTPATIENT
Start: 2022-10-19 | End: 2022-10-19 | Stop reason: HOSPADM

## 2022-10-19 RX ORDER — PANTOPRAZOLE SODIUM 40 MG/1
40 TABLET, DELAYED RELEASE ORAL DAILY
Status: DISCONTINUED | OUTPATIENT
Start: 2022-10-19 | End: 2022-10-20 | Stop reason: HOSPADM

## 2022-10-19 RX ORDER — SCOLOPAMINE TRANSDERMAL SYSTEM 1 MG/1
1 PATCH, EXTENDED RELEASE TRANSDERMAL
Status: DISCONTINUED | OUTPATIENT
Start: 2022-10-19 | End: 2022-10-20 | Stop reason: HOSPADM

## 2022-10-19 RX ORDER — LIDOCAINE HYDROCHLORIDE 20 MG/ML
INJECTION, SOLUTION EPIDURAL; INFILTRATION; INTRACAUDAL; PERINEURAL
Status: DISCONTINUED | OUTPATIENT
Start: 2022-10-19 | End: 2022-10-19

## 2022-10-19 RX ORDER — ONDANSETRON 2 MG/ML
4 INJECTION INTRAMUSCULAR; INTRAVENOUS EVERY 6 HOURS PRN
Status: DISCONTINUED | OUTPATIENT
Start: 2022-10-19 | End: 2022-10-20 | Stop reason: HOSPADM

## 2022-10-19 RX ORDER — PROCHLORPERAZINE EDISYLATE 5 MG/ML
5 INJECTION INTRAMUSCULAR; INTRAVENOUS EVERY 6 HOURS PRN
Status: DISCONTINUED | OUTPATIENT
Start: 2022-10-19 | End: 2022-10-20 | Stop reason: HOSPADM

## 2022-10-19 RX ORDER — ACETAMINOPHEN 10 MG/ML
1000 INJECTION, SOLUTION INTRAVENOUS ONCE
Status: CANCELLED | OUTPATIENT
Start: 2022-10-19 | End: 2022-10-19

## 2022-10-19 RX ORDER — MIDAZOLAM HYDROCHLORIDE 1 MG/ML
INJECTION, SOLUTION INTRAMUSCULAR; INTRAVENOUS
Status: DISCONTINUED | OUTPATIENT
Start: 2022-10-19 | End: 2022-10-19

## 2022-10-19 RX ORDER — OXYCODONE HCL 5 MG/5 ML
5 SOLUTION, ORAL ORAL EVERY 4 HOURS PRN
Status: DISCONTINUED | OUTPATIENT
Start: 2022-10-19 | End: 2022-10-20 | Stop reason: HOSPADM

## 2022-10-19 RX ORDER — SODIUM CHLORIDE 0.9 % (FLUSH) 0.9 %
10 SYRINGE (ML) INJECTION
Status: DISCONTINUED | OUTPATIENT
Start: 2022-10-19 | End: 2022-10-19 | Stop reason: HOSPADM

## 2022-10-19 RX ADMIN — OXYCODONE HYDROCHLORIDE 5 MG: 5 SOLUTION ORAL at 01:10

## 2022-10-19 RX ADMIN — ROCURONIUM BROMIDE 10 MG: 10 INJECTION INTRAVENOUS at 09:10

## 2022-10-19 RX ADMIN — Medication 10 MG: at 09:10

## 2022-10-19 RX ADMIN — SUGAMMADEX 200 MG: 100 INJECTION, SOLUTION INTRAVENOUS at 09:10

## 2022-10-19 RX ADMIN — PANTOPRAZOLE SODIUM 40 MG: 40 TABLET, DELAYED RELEASE ORAL at 10:10

## 2022-10-19 RX ADMIN — LIDOCAINE HYDROCHLORIDE 100 MG: 20 INJECTION, SOLUTION EPIDURAL; INFILTRATION; INTRACAUDAL; PERINEURAL at 08:10

## 2022-10-19 RX ADMIN — DEXAMETHASONE SODIUM PHOSPHATE 4 MG: 4 INJECTION, SOLUTION INTRAMUSCULAR; INTRAVENOUS at 08:10

## 2022-10-19 RX ADMIN — Medication 30 MG: at 08:10

## 2022-10-19 RX ADMIN — OXYCODONE HYDROCHLORIDE 5 MG: 5 SOLUTION ORAL at 10:10

## 2022-10-19 RX ADMIN — SODIUM CHLORIDE, SODIUM GLUCONATE, SODIUM ACETATE, POTASSIUM CHLORIDE, MAGNESIUM CHLORIDE, SODIUM PHOSPHATE, DIBASIC, AND POTASSIUM PHOSPHATE: .53; .5; .37; .037; .03; .012; .00082 INJECTION, SOLUTION INTRAVENOUS at 09:10

## 2022-10-19 RX ADMIN — SODIUM CHLORIDE: 0.9 INJECTION, SOLUTION INTRAVENOUS at 08:10

## 2022-10-19 RX ADMIN — SCOPALAMINE 1 PATCH: 1 PATCH, EXTENDED RELEASE TRANSDERMAL at 06:10

## 2022-10-19 RX ADMIN — Medication 2 G: at 08:10

## 2022-10-19 RX ADMIN — MIDAZOLAM HYDROCHLORIDE 2 MG: 1 INJECTION, SOLUTION INTRAMUSCULAR; INTRAVENOUS at 08:10

## 2022-10-19 RX ADMIN — PROPOFOL 150 MG: 10 INJECTION, EMULSION INTRAVENOUS at 08:10

## 2022-10-19 RX ADMIN — ONDANSETRON 4 MG: 2 INJECTION INTRAMUSCULAR; INTRAVENOUS at 09:10

## 2022-10-19 RX ADMIN — ACETAMINOPHEN 1000 MG: 10 INJECTION, SOLUTION INTRAVENOUS at 08:10

## 2022-10-19 RX ADMIN — ROCURONIUM BROMIDE 50 MG: 10 INJECTION INTRAVENOUS at 08:10

## 2022-10-19 RX ADMIN — SODIUM CHLORIDE: 0.9 INJECTION, SOLUTION INTRAVENOUS at 06:10

## 2022-10-19 RX ADMIN — FENTANYL CITRATE 50 MCG: 50 INJECTION, SOLUTION INTRAMUSCULAR; INTRAVENOUS at 08:10

## 2022-10-19 RX ADMIN — SODIUM CHLORIDE: 9 INJECTION, SOLUTION INTRAVENOUS at 10:10

## 2022-10-19 NOTE — H&P
60y/o with bmi 28.83, gerd and hiatal hernia.  Her main complaint is dysphagia to solids and she also has heartburn.  See symptom scores in resident note.  Eckardt score 5.  She doesn't like to take medication and stopped ppi.  She has gained 30 pounds since 2020.  No significant change since I last saw her.     She has no significant change in symptoms.  She still has dysphagia to bread and chicken and some of this is due to lack of saliva.     Cbc, cmp reviewed, basically ok  Cxr 2020 reviewed, likely hh  Video endoscopy 2022 reviewed duodenitis  Egd 2022 reviewed, normal duodenum, 7cm hh  Egd 2020 reviewed, 4cm hh, gastric ulcers  Motility 2022 poor  Bas with tablet 2022 reviewed, mild dismotility and gerd  Ekg 2020 reviewed, normal        Growing hh with anemia (possibly due to hh), dysphagia (partly due to lack of saliva, also could be hiatal hernia or other etiologies) and gerd.  I don't think ph is necessary as we have plenty of indication for repair already. 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  She may get some breast implant procedure 2 weeks after our robotic hiatal hernia repair with possible mesh, possible esophageal lengthening and Toupet fundoplication.

## 2022-10-19 NOTE — PROGRESS NOTES
Nurses Note -- 4 Eyes      10/19/2022   1:12 PM      Skin assessed during: Admit      [x] No Pressure Injuries Present    []Prevention Measures Documented      [] Yes- Altered Skin Integrity Present or Discovered   [] LDA Added if Not in Epic (Describe Wound)   [] New Altered Skin Integrity was Present on Admit and Documented in LDA   [] Wound Image Taken    Wound Care Consulted? No    Attending Nurse:  Lili Reid RN     Second RN/Staff Member:  keiko Gomes

## 2022-10-19 NOTE — ANESTHESIA PROCEDURE NOTES
Intubation    Date/Time: 10/19/2022 8:17 AM  Performed by: Cordelia Sher MD  Authorized by: Chang Allen MD     Intubation:     Induction:  Intravenous    Intubated:  Postinduction    Mask Ventilation:  Easy mask    Attempts:  1    Attempted By:  Student    Method of Intubation:  Video laryngoscopy    Blade:  Mario 3    Laryngeal View Grade: Grade I - full view of cords      Difficult Airway Encountered?: No      Complications:  None    Airway Device:  Oral endotracheal tube    Airway Device Size:  7.0    Style/Cuff Inflation:  Cuffed    Inflation Amount (mL):  5    Tube secured:  22    Secured at:  The lips    Placement Verified By:  Capnometry    Complicating Factors:  None    Findings Post-Intubation:  BS equal bilateral and atraumatic/condition of teeth unchanged

## 2022-10-19 NOTE — NURSING TRANSFER
Nursing Transfer Note      10/19/2022     Reason patient is being transferred: INPT    Transfer To: 553    Transfer via stretcher    Transfer with IVF    Transported by PCT    Medicines sent: IVF    Any special needs or follow-up needed: none    Chart send with patient: Yes    Notified: mother    Patient reassessed at: 10/19/22

## 2022-10-19 NOTE — ANESTHESIA POSTPROCEDURE EVALUATION
Anesthesia Post Evaluation    Patient: Terese Barney    Procedure(s) Performed: Procedure(s) (LRB):  XI ROBOTIC REPAIR, HERNIA, HIATAL w/ possible mesh; TOUPET fundoplication (N/A)    Final Anesthesia Type: general      Patient location during evaluation: PACU  Patient participation: Yes- Able to Participate  Level of consciousness: awake  Post-procedure vital signs: reviewed and stable  Pain management: adequate  Airway patency: patent    PONV status at discharge: No PONV  Anesthetic complications: no      Cardiovascular status: blood pressure returned to baseline  Respiratory status: unassisted  Hydration status: euvolemic  Follow-up not needed.          Vitals Value Taken Time   /69 10/19/22 1558   Temp 36.3 °C (97.4 °F) 10/19/22 1558   Pulse 80 10/19/22 1558   Resp 18 10/19/22 1558   SpO2 94 % 10/19/22 1558         Event Time   Out of Recovery 11:00:00         Pain/Veronique Score: Pain Rating Prior to Med Admin: 2 (10/19/2022  1:38 PM)  Pain Rating Post Med Admin: 0 (10/19/2022 10:36 AM)  Veronique Score: 9 (10/19/2022 10:30 AM)

## 2022-10-19 NOTE — TRANSFER OF CARE
"Anesthesia Transfer of Care Note    Patient: Terese Barney    Procedure(s) Performed: Procedure(s) (LRB):  XI ROBOTIC REPAIR, HERNIA, HIATAL w/ possible mesh; TOUPET fundoplication (N/A)  LENGTHENING PROCEDURE, ESOPHAGUS (N/A)    Patient location: PACU    Anesthesia Type: general    Transport from OR: Transported from OR on 6-10 L/min O2 by face mask with adequate spontaneous ventilation    Post pain: adequate analgesia    Post assessment: no apparent anesthetic complications    Post vital signs: stable    Post-procedure mental status: asleep.    Nausea/Vomiting: no nausea/vomiting    Complications: none    Transfer of care protocol was followed      Last vitals:   Visit Vitals  /74 (BP Location: Right arm, Patient Position: Lying)   Pulse 66   Temp 36.8 °C (98.3 °F) (Oral)   Resp 16   Ht 5' 1" (1.549 m)   Wt 68.5 kg (151 lb)   LMP  (LMP Unknown)   SpO2 98%   Breastfeeding No   BMI 28.53 kg/m²     "

## 2022-10-19 NOTE — BRIEF OP NOTE
Operative Note       Surgery Date: 10/19/2022     Surgeon(s) and Role:     * Graham Back MD - Primary     * Fuad Alvarez MD - Resident - Assisting    Pre-op Diagnosis:  Hiatal hernia [K44.9]    Post-op Diagnosis:  Hiatal hernia [K44.9]    Procedure(s) (LRB):  XI ROBOTIC REPAIR, HERNIA, HIATAL w/ possible mesh; TOUPET fundoplication (N/A)  LENGTHENING PROCEDURE, ESOPHAGUS (N/A)    Anesthesia: General    Procedure in Detail/Findings:  1/3 stomach in chest.  Repair with toupet.    Estimated Blood Loss: Minimal           Specimens (From admission, onward)       Start     Ordered    10/19/22 0944  Specimen to Pathology, Surgery General Surgery  Once        Comments: Pre-op Diagnosis: Hiatal hernia [K44.9]      Procedure(s):  XI ROBOTIC REPAIR, HERNIA, HIATAL w/ possible mesh; TOUPET fundoplication  LENGTHENING PROCEDURE, ESOPHAGUS     Number of specimens: 1    Name of specimens: 1.) hernia sac (permanent)   References:    Click here for ordering Quick Tip   Question:  Procedure Type:  Answer:  General Surgery    10/19/22 0943                  Implants:   Implant Name Type Inv. Item Serial No.  Lot No. LRB No. Used Action   F[employer]T [employer]ONID[employer] [id number] - [id number]  [employer] [id number]  [employer]  N/A 1 Implanted              Disposition: PACU - hemodynamically stable.           Condition: Good    Attestation:  I was present and scrubbed for the entire procedure.

## 2022-10-19 NOTE — CONSULTS
Food & Nutrition  Education    Diet Education: dietary recommendations; s/p hiatal hernia repair: 3 days fulls, soft diet until follow up  Time Spent: 5 minutes   Learners: Pt and family members     Comments:  RD consulted for dietary recommendations; s/p hiatal hernia repair: 3 days fulls, soft diet until follow up. Pt in a lot of stomach and chest pain at time of visit. Spoke w/ family members. Reported Pt to have eaten jello and drank water this morning before pain began. Foods recommended and foods to avoid on each specific diet emphasized. Handout left w/ Pts family members. No further needs identified.     All questions and concerns answered. Dietitian's contact information provided.       Follow-Up: Yes     Please Re-consult as needed    Thanks!     Samara San - Dietetic Intern

## 2022-10-19 NOTE — OP NOTE
DATE OF PROCEDURE: 10/19/2022    PRE OP DIAGNOSIS: Hiatal hernia [K44.9]    POST OP DIAGNOSIS: Hiatal hernia [K44.9]    PROCEDURE: Procedure(s) (LRB):  XI ROBOTIC REPAIR, HERNIA, HIATAL w/ possible mesh; TOUPET fundoplication (N/A)  LENGTHENING PROCEDURE, ESOPHAGUS (N/A)    Hernia type 3  Without mesh  Without 22 modifier    Surgeon(s) and Role:     * Graham Back MD - Primary     * Fuad Alvarez MD - Resident - Assisting    ANESTHESIA: General.     Procedure:    Patient was placed under general anesthesia and the abdomen was prepped and draped in usual manner.  Access to the peritoneum was gained 15 cm below xiphoid which happened to be at the umbilicus using a 5 mm Optiview trocar and then insufflation to 15 mmHg CO2 gas was obtained.  Robotic trocars were placed at 6 and 12 cm laterally to the primary trocar on the left side and 6 cm laterally to the right side and for the liver retractor 5 mm trocar was placed 15 cm from xiphoid and just below the right costal margin and these were all done under direct vision.  The patient was placed in steep reverse Trendelenburg and the liver retractor was placed exposing the esophageal hiatus.  Using the synchro seal lesser sac was incised exposing the left melissa and the peritoneum was divided at the left melissa.  We bluntly dissected the left melissa and the around posteriorly and anteriorly dividing the peritoneum with a synchro seal.  We continued similarly onto the left side and bluntly got around the esophagus.  We then took down the greater curve starting approximately a 3rd of the way down the stomach and going all the way to the base of the left melissa and taking all posterior attachments.  We then dissected deep into the mediastinum until 3-4 cm soft gas way within the abdominal cavity without tension.  This took 10 minutes more than a typical procedure due to adhesions.  There was a large hiatal hernia.  There was no significant posterior fat pad.  There  was a significant hernia sac.  Hernia sac was divided from the lower esophageal sphincter from the left side of the anterior vagus nerve to the posterior vagus nerve on the left side.  At the end of the case this was removed from the abdomen.  We cut 2 pledgets to 1 x 4 cm and placed them on the lateral sides of each crura and then sewed the hiatus closed with a 0 permanent V lock suture posterior to the esophagus and through the pledgets.  At the end of this we checked for appropriate size of the esophagus and hiatus with the robotic instruments.  We then took the fundus of the stomach and pulled around posterior to the esophagus checked for the angle of Hiss and performed a shoe shine maneuver.  We placed the 56 Faroese bougie through the mouth esophagus and into the stomach and it easily passed.  We again checked the hiatus for size and then performed a Toupet fundoplication by taking the appropriate part of the fundus and sewing it to just the right of the anterior vagus nerve with a running 2-0 permanent V lock suture for a length of 2 cm.  We selected the appropriate piece of fundus on the other side of the esophagus and pulled up along the esophagus up to the vagus nerve and checked for appropriate tension on the stomach and sewed it in place with again with a running 2 0 V lock suture for a length of 2 cm.  The vagus nerves were both left intact along the esophagus.  We removed the dilator and again inspected our wrap and hiatus and found them to be of appropriate size and configuration.  The liver was tractor was removed and the trocars removed under direct vision.  Prior to removing the last trocar pneumoperitoneum was allowed to escape.  The fascia the primary port site was closed with 0 Vicryl and the skin incisions were repaired with 4-0 Plain Cat Gut and reinforced with Dermabond.  The patient tolerated procedure well was brought her room stable condition.  Sponge and needle counts were correct at the  end of the case.  Blood loss is minimal, complications none and pathology hernia sac.

## 2022-10-19 NOTE — PLAN OF CARE
Explained to pt the risk of burns during surgery with jewelry being on. Verbalized understanding.

## 2022-10-19 NOTE — BRIEF OP NOTE
[facility] - [facility] ([facility])  Brief Operative Note    SUMMARY     Surgery Date: 10/19/2022     Surgeon(s) and Role:     * Graham Back MD - Primary     * Fuad Alvarez MD - Resident - Assisting        Pre-op Diagnosis:  Hiatal hernia [K44.9]    Post-op Diagnosis:  Post-Op Diagnosis Codes:     * Hiatal hernia [K44.9]    Procedure(s) (LRB):  XI ROBOTIC REPAIR, HERNIA, HIATAL w/ possible mesh; TOUPET fundoplication (N/A)  LENGTHENING PROCEDURE, ESOPHAGUS (N/A)    Anesthesia: General    Operative Findings: Robotic hiatal hernia repair with toupet fundoplication over bougie dilator.     Estimated Blood Loss: minimal    Estimated Blood Loss has been documented.         Specimens:   Specimen (24h ago, onward)       Start     Ordered    10/19/22 0944  Specimen to Pathology, Surgery General Surgery  Once        Comments: Pre-op Diagnosis: Hiatal hernia [K44.9]      Procedure(s):  XI ROBOTIC REPAIR, HERNIA, HIATAL w/ possible mesh; TOUPET fundoplication  LENGTHENING PROCEDURE, ESOPHAGUS     Number of specimens: 1    Name of specimens: 1.) hernia sac (permanent)   References:    Click here for ordering Quick Tip   Question:  Procedure Type:  Answer:  General Surgery    10/19/22 4751                    [id number]

## 2022-10-20 VITALS
DIASTOLIC BLOOD PRESSURE: 61 MMHG | HEIGHT: 61 IN | TEMPERATURE: 97 F | WEIGHT: 151 LBS | SYSTOLIC BLOOD PRESSURE: 136 MMHG | HEART RATE: 70 BPM | RESPIRATION RATE: 18 BRPM | OXYGEN SATURATION: 97 % | BODY MASS INDEX: 28.51 KG/M2

## 2022-10-20 LAB
ANION GAP SERPL CALC-SCNC: 7 MMOL/L (ref 8–16)
BASOPHILS # BLD AUTO: 0.01 K/UL (ref 0–0.2)
BASOPHILS NFR BLD: 0.1 % (ref 0–1.9)
BUN SERPL-MCNC: 8 MG/DL (ref 6–20)
CALCIUM SERPL-MCNC: 8.6 MG/DL (ref 8.7–10.5)
CHLORIDE SERPL-SCNC: 111 MMOL/L (ref 95–110)
CO2 SERPL-SCNC: 24 MMOL/L (ref 23–29)
CREAT SERPL-MCNC: 0.7 MG/DL (ref 0.5–1.4)
DIFFERENTIAL METHOD: ABNORMAL
EOSINOPHIL # BLD AUTO: 0 K/UL (ref 0–0.5)
EOSINOPHIL NFR BLD: 0 % (ref 0–8)
ERYTHROCYTE [DISTWIDTH] IN BLOOD BY AUTOMATED COUNT: 13 % (ref 11.5–14.5)
EST. GFR  (NO RACE VARIABLE): >60 ML/MIN/1.73 M^2
GLUCOSE SERPL-MCNC: 112 MG/DL (ref 70–110)
HCT VFR BLD AUTO: 37.3 % (ref 37–48.5)
HGB BLD-MCNC: 11.8 G/DL (ref 12–16)
IMM GRANULOCYTES # BLD AUTO: 0.03 K/UL (ref 0–0.04)
IMM GRANULOCYTES NFR BLD AUTO: 0.3 % (ref 0–0.5)
LYMPHOCYTES # BLD AUTO: 1.1 K/UL (ref 1–4.8)
LYMPHOCYTES NFR BLD: 12.2 % (ref 18–48)
MAGNESIUM SERPL-MCNC: 2 MG/DL (ref 1.6–2.6)
MCH RBC QN AUTO: 32.2 PG (ref 27–31)
MCHC RBC AUTO-ENTMCNC: 31.6 G/DL (ref 32–36)
MCV RBC AUTO: 102 FL (ref 82–98)
MONOCYTES # BLD AUTO: 0.7 K/UL (ref 0.3–1)
MONOCYTES NFR BLD: 7.9 % (ref 4–15)
NEUTROPHILS # BLD AUTO: 7 K/UL (ref 1.8–7.7)
NEUTROPHILS NFR BLD: 79.5 % (ref 38–73)
NRBC BLD-RTO: 0 /100 WBC
PHOSPHATE SERPL-MCNC: 2.9 MG/DL (ref 2.7–4.5)
PLATELET # BLD AUTO: 241 K/UL (ref 150–450)
PMV BLD AUTO: 9.7 FL (ref 9.2–12.9)
POTASSIUM SERPL-SCNC: 4.2 MMOL/L (ref 3.5–5.1)
RBC # BLD AUTO: 3.67 M/UL (ref 4–5.4)
SODIUM SERPL-SCNC: 142 MMOL/L (ref 136–145)
WBC # BLD AUTO: 8.77 K/UL (ref 3.9–12.7)

## 2022-10-20 PROCEDURE — 36415 COLL VENOUS BLD VENIPUNCTURE: CPT | Performed by: STUDENT IN AN ORGANIZED HEALTH CARE EDUCATION/TRAINING PROGRAM

## 2022-10-20 PROCEDURE — 25000003 PHARM REV CODE 250: Performed by: STUDENT IN AN ORGANIZED HEALTH CARE EDUCATION/TRAINING PROGRAM

## 2022-10-20 PROCEDURE — 80048 BASIC METABOLIC PNL TOTAL CA: CPT | Performed by: STUDENT IN AN ORGANIZED HEALTH CARE EDUCATION/TRAINING PROGRAM

## 2022-10-20 PROCEDURE — 25000242 PHARM REV CODE 250 ALT 637 W/ HCPCS: Performed by: STUDENT IN AN ORGANIZED HEALTH CARE EDUCATION/TRAINING PROGRAM

## 2022-10-20 PROCEDURE — 85025 COMPLETE CBC W/AUTO DIFF WBC: CPT | Performed by: STUDENT IN AN ORGANIZED HEALTH CARE EDUCATION/TRAINING PROGRAM

## 2022-10-20 PROCEDURE — 84100 ASSAY OF PHOSPHORUS: CPT | Performed by: STUDENT IN AN ORGANIZED HEALTH CARE EDUCATION/TRAINING PROGRAM

## 2022-10-20 PROCEDURE — 83735 ASSAY OF MAGNESIUM: CPT | Performed by: STUDENT IN AN ORGANIZED HEALTH CARE EDUCATION/TRAINING PROGRAM

## 2022-10-20 RX ORDER — HYDROCODONE BITARTRATE AND ACETAMINOPHEN 7.5; 325 MG/15ML; MG/15ML
15 SOLUTION ORAL 4 TIMES DAILY PRN
Qty: 118 ML | Refills: 0 | Status: SHIPPED | OUTPATIENT
Start: 2022-10-20 | End: 2022-12-08

## 2022-10-20 RX ADMIN — OXYCODONE HYDROCHLORIDE 5 MG: 5 SOLUTION ORAL at 08:10

## 2022-10-20 RX ADMIN — PANTOPRAZOLE SODIUM 40 MG: 40 TABLET, DELAYED RELEASE ORAL at 08:10

## 2022-10-20 NOTE — PLAN OF CARE
[facility] - Surgery  Initial Discharge Assessment       Primary Care Provider: Shanique Potts MD    Admission Diagnosis: Hiatal hernia [K44.9]    Admission Date: 10/19/2022  Expected Discharge Date: 10/20/2022    Discharge Barriers Identified: None    Payor: AETNA / Plan: AETNA CHOICE POS / Product Type: Commercial /     Extended Emergency Contact Information  Primary Emergency Contact: Drew Barney  Address: [address] [place] [address] [place]  Mobile Phone: [phone]  Relation: Son  Secondary Emergency Contact: Trice Beasley  Address: [address] [place] [address] [place]  Mobile Phone: [phone]  Relation: Sister    Discharge Plan A: Home with family  Discharge Plan B: Home Health, Home      [id number] - [place], [place] - [address] AT [address]OSS FROM [facility] [address]  [address]  [facility] [address]  Phone: [phone] Fax: [phone]    [facility] - [place], [place] - [address] AT Portal to Registered [facility] Sites  [address]  [facility] [address]  Phone: [phone] Fax: [phone]    [facility] - [place], [place] - [address] [facility]  [address] [facility] [address]  Phone: [phone] Fax: [phone]      Initial Assessment (most recent)       Adult Discharge Assessment - 10/20/22 1258          Discharge Assessment    Assessment Type Discharge Planning Assessment     Confirmed/corrected address, phone number and insurance Yes     Confirmed Demographics Correct on Facesheet     Source of Information patient     If unable to respond/provide information was family/caregiver contacted? Yes     Communicated SARAH with patient/caregiver Yes     Lives With other (see comments)   Pt lives out of state, plans to stay with her mother (in [place]) during her recovery.    Do you expect to return to your current living situation? Yes     Do you have help at  home or someone to help you manage your care at home? Yes     Prior to hospitilization cognitive status: Alert/Oriented     Current cognitive status: Alert/Oriented     Walking or Climbing Stairs Difficulty none     Dressing/Bathing Difficulty none     Home Accessibility wheelchair accessible     Home Layout Able to live on 1st floor     Equipment Currently Used at Home none     Readmission within 30 days? No     Patient currently being followed by outpatient case management? No     Do you currently have service(s) that help you manage your care at home? No     Do you have prescription coverage? Yes     Coverage Aetna     Do you have any problems affording any of your prescribed medications? No     Is the patient taking medications as prescribed? yes     Who is going to help you get home at discharge? Pt's Mother (Chelsey)     How do you get to doctors appointments? car, drives self;family or friend will provide     Are you on dialysis? No     Do you take coumadin? No     Discharge Plan A Home with family     Discharge Plan B Home Health;Home     DME Needed Upon Discharge  none     Discharge Plan discussed with: Patient     Discharge Barriers Identified None                       SW completed discharge planning assessment with the patient at bedside. SW verified demographic information listed on the pt.'s Face sheet. Patient lives in [place], plans to stay with her mother (Chelsey) home is in [place], [place] ([address] [place] [address]. Pt reports having transportation. Pt plans for her mother and other relatives to help manage her care upon discharge.      Elvi Barker, KURTIS  Case Management   [facility]   Ext. [phone]

## 2022-10-20 NOTE — ASSESSMENT & PLAN NOTE
Terese Barney is a 62 y.o. female s/p robotic hiatal hernia repair with Toupet fundoplication 10/19 now POD1. If tolerates diet without nausea, may be discharged home this afternoon    - Consult dietary for diet teaching   - CLD  - Multi-modal pain control  - PRN nausea meds  - Replace electrolytes PRN  - DVT prophylaxis   - OOBTC/Ambulate  - IS

## 2022-10-20 NOTE — DISCHARGE SUMMARY
[facility] - Surgery  General Surgery  Discharge Summary      Patient Name: Terese Barney  MRN: [id number]  Admission Date: 10/19/2022  Hospital Length of Stay: 0 days  Discharge Date and Time:  10/20/2022 1:30 PM  Attending Physician: Graham Back MD   Discharging Provider: Júnior Lee MD  Primary Care Provider: Shanique Potts MD    HPI:   No notes on file    Procedure(s) (LRB):  XI ROBOTIC REPAIR, HERNIA, HIATAL w/ possible mesh; TOUPET fundoplication (N/A)      Indwelling Lines/Drains at time of discharge:   Lines/Drains/Airways     None               Hospital Course: Terese Barney underwent a robotic hiatal hernia repair on 10/19. See operative note for procedure details. Tolerated procedure well. Post-operative course was uncomplicated. All post-operative milestones were met without delay. She was tolerating diet, ambulating, voiding spontaneously, and her pain was well controlled. Patient was instructed and educated on discharge instructions        Goals of Care Treatment Preferences:  Code Status: Full Code      Consults:   Consults (From admission, onward)        Status Ordering Provider     Inpatient consult to Registered Dietitian/Nutritionist  Once        Provider:  (Not yet assigned)    Completed JÚNIOR LEE          Significant Diagnostic Studies: Labs: All labs within the past 24 hours have been reviewed    Pending Diagnostic Studies:     Procedure Component Value Units Date/Time    Specimen to Pathology, Surgery General Surgery [[id number]] Collected: 10/19/22 0943    Order Status: Sent Lab Status: In process Updated: 10/19/22 2237        Final Active Diagnoses:    Diagnosis Date Noted POA    PRINCIPAL PROBLEM:  Hiatal hernia with GERD [K44.9, K21.9] 08/28/2017 Yes      Problems Resolved During this Admission:      Discharged Condition: good    Disposition: Home or Self Care    Follow Up:   Follow-up Information     Graham Back MD Follow up in 2 week(s).     Specialties: General Surgery, Bariatrics  Contact information:  [address]  [facility] [address]  [phone]                       Patient Instructions:      Diet full liquid     Lifting restrictions   Order Comments: No lifting greater than 10 pounds for 6 weeks from day of surgery.  No pushing/pulling such as vacuuming or raking.  No straining, avoid constipation and take stool softeners as described and laxatives as needed.  No driving while on narcotics and until you can react quickly without pain.     Other restrictions (specify):   Order Comments: Post-op Instructions  Please take pain medication as needed, if taking narcotics please avoid driving.    We recommend a high fiber diet and use of stool softeners while on narcotics as they might cause constipation.    You may leave the dressing in place for 48 hours.   Okay to shower in 24 hours   Avoid swimming pools, baths or hot tubs for at least 2 weeks.   Do not lift anything heavier than 10 lb for at least 2 weeks.   Please call if you have fevers, chills, shortness of breath, increased drainage from your wound or bleeding.  Please call to make an appointment in 2 weeks for wound recheck.     No driving until:   Order Comments: Off of narcotic pain medication, physically able to perform motions necessary for operating motor vehicle     Notify your health care provider if you experience any of the following:  temperature >100.4     Notify your health care provider if you experience any of the following:  persistent nausea and vomiting or diarrhea     Notify your health care provider if you experience any of the following:  severe uncontrolled pain     Notify your health care provider if you experience any of the following:  redness, tenderness, or signs of infection (pain, swelling, redness, odor or green/yellow discharge around incision site)     Notify your health care provider if you experience any of the following:  difficulty breathing or  increased cough     Notify your health care provider if you experience any of the following:  severe persistent headache     Activity as tolerated     Medications:  Reconciled Home Medications:      Medication List      START taking these medications    hydrocodone-apap 7.5-325 MG/15 ML oral solution  Commonly known as: HYCET  Take 15 mLs by mouth 4 (four) times daily as needed for Pain.        CONTINUE taking these medications    adapalene-benzoyl peroxide 0.1-2.5 % Glwp  Commonly known as: EPIDUO  Apply thin layer to clean, dry skin of face nightly     albuterol 90 mcg/actuation inhaler  Commonly known as: PROVENTIL/VENTOLIN HFA  Inhale 2 puffs into the lungs every 6 (six) hours as needed for Wheezing or Shortness of Breath. Rescue     cevimeline 30 mg capsule  Commonly known as: EVOXAC  TAKE 1 CAPSULE (30 MG TOTAL) BY MOUTH 2 (TWO) TIMES DAILY.     cholecalciferol (vitamin D3) 125 mcg (5,000 unit) capsule  Take 1 capsule (5,000 Units total) by mouth daily with breakfast.     DULoxetine 60 MG capsule  Commonly known as: CYMBALTA  Take 1 capsule (60 mg total) by mouth 2 (two) times daily.     ferrous sulfate 325 mg (65 mg iron) Tab tablet  Commonly known as: FEOSOL  Take 1 tablet (325 mg total) by mouth daily with breakfast.     LORazepam 1 MG tablet  Commonly known as: ATIVAN  TAKE 1/2 TO 1 TABLET ONCE  DAILY AS NEEDED FOR SEVERE ANXIETY OR SLEEP     pantoprazole 40 MG tablet  Commonly known as: PROTONIX  Take 1 tablet (40 mg total) by mouth before breakfast.          Time spent on the discharge of patient: 20 minutes    Fuad Alvarez MD  General Surgery  [facility] - Surgery

## 2022-10-20 NOTE — PROGRESS NOTES
Discharge instructions and AVS given to and reviewed with patient and family at bedside. Patient verbalized understanding. Lines removed. Medications delivered to bedside. Awaiting transport

## 2022-10-20 NOTE — SUBJECTIVE & OBJECTIVE
Interval History: Afebrile, HDS. Reporting some left sided chest pain and shoulder pain. CXR yesterday showed no acute abnormalities. Reporting mild cough and congestion. Denying nausea, emesis. Abdominal pain well controlled.    Medications:  Continuous Infusions:   sodium chloride 0.9% 70 mL/hr at 10/19/22 0655    sodium chloride 0.9% 125 mL/hr at 10/19/22 1022     Scheduled Meds:   pantoprazole  40 mg Oral Daily    scopolamine  1 patch Transdermal Once Pre-Op     PRN Meds:ceFAZolin (ANCEF) IVPB, ondansetron, oxyCODONE, prochlorperazine     Review of patient's allergies indicates:   Allergen Reactions    Codeine Nausea And Vomiting     Objective:     Vital Signs (Most Recent):  Temp: 98 °F (36.7 °C) (10/20/22 0553)  Pulse: 65 (10/20/22 0553)  Resp: 18 (10/20/22 0553)  BP: 135/69 (10/20/22 0553)  SpO2: 96 % (10/20/22 0553) Vital Signs (24h Range):  Temp:  [97.4 °F (36.3 °C)-98 °F (36.7 °C)] 98 °F (36.7 °C)  Pulse:  [65-87] 65  Resp:  [15-22] 18  SpO2:  [92 %-100 %] 96 %  BP: (111-140)/(54-80) 135/69     Weight: 68.5 kg (151 lb 0.2 oz)  Body mass index is 28.53 kg/m².    Intake/Output - Last 3 Shifts         10/18 0700  10/19 0659 10/19 0700  10/20 0659    P.O.  500    IV Piggyback  1000    Total Intake(mL/kg)  1500 (21.9)    Net  +1500          Urine Occurrence  4 x            Physical Exam  Constitutional:       General: She is not in acute distress.     Appearance: Normal appearance.   HENT:      Head: Normocephalic and atraumatic.   Eyes:      General: No scleral icterus.     Conjunctiva/sclera: Conjunctivae normal.      Pupils: Pupils are equal, round, and reactive to light.   Neck:      Trachea: No tracheal deviation.   Cardiovascular:      Rate and Rhythm: Normal rate and regular rhythm.   Pulmonary:      Effort: Pulmonary effort is normal. No respiratory distress.      Breath sounds: No stridor.   Abdominal:      General: Abdomen is flat. There is no distension.      Palpations: Abdomen is soft.       Tenderness: There is no abdominal tenderness. There is no guarding.      Comments: Incisions c/d/i   Musculoskeletal:         General: No deformity or signs of injury. Normal range of motion.      Cervical back: No edema.   Skin:     General: Skin is warm and dry.      Coloration: Skin is not jaundiced.   Neurological:      General: No focal deficit present.      Mental Status: She is alert and oriented to person, place, and time.   Psychiatric:         Mood and Affect: Mood normal.         Behavior: Behavior normal.       Significant Labs:  I have reviewed all pertinent lab results within the past 24 hours.  CBC:   Recent Labs   Lab 10/20/22  0441   WBC 8.77   RBC 3.67*   HGB 11.8*   HCT 37.3      *   MCH 32.2*   MCHC 31.6*     BMP:   Recent Labs   Lab 10/20/22  0441   *      K 4.2   *   CO2 24   BUN 8   CREATININE 0.7   CALCIUM 8.6*   MG 2.0       Significant Diagnostics:  I have reviewed all pertinent imaging results/findings within the past 24 hours.

## 2022-10-20 NOTE — HOSPITAL COURSE
Terese Barney underwent a robotic hiatal hernia repair on 10/19. See operative note for procedure details. Tolerated procedure well. Post-operative course was uncomplicated. All post-operative milestones were met without delay. She was tolerating diet, ambulating, voiding spontaneously, and her pain was well controlled. Patient was instructed and educated on discharge instructions

## 2022-10-20 NOTE — PROGRESS NOTES
[facility] - Surgery  General Surgery  Progress Note    Subjective:     History of Present Illness:  No notes on file    Post-Op Info:  Procedure(s) (LRB):  XI ROBOTIC REPAIR, HERNIA, HIATAL w/ possible mesh; TOUPET fundoplication (N/A)   1 Day Post-Op     Interval History: Afebrile, HDS. Reporting some left sided chest pain and shoulder pain. CXR yesterday showed no acute abnormalities. Reporting mild cough and congestion. Denying nausea, emesis. Abdominal pain well controlled.    Medications:  Continuous Infusions:   sodium chloride 0.9% 70 mL/hr at 10/19/22 0655    sodium chloride 0.9% 125 mL/hr at 10/19/22 1022     Scheduled Meds:   pantoprazole  40 mg Oral Daily    scopolamine  1 patch Transdermal Once Pre-Op     PRN Meds:ceFAZolin (ANCEF) IVPB, ondansetron, oxyCODONE, prochlorperazine     Review of patient's allergies indicates:   Allergen Reactions    Codeine Nausea And Vomiting     Objective:     Vital Signs (Most Recent):  Temp: 98 °F (36.7 °C) (10/20/22 0553)  Pulse: 65 (10/20/22 0553)  Resp: 18 (10/20/22 0553)  BP: 135/69 (10/20/22 0553)  SpO2: 96 % (10/20/22 0553) Vital Signs (24h Range):  Temp:  [97.4 °F (36.3 °C)-98 °F (36.7 °C)] 98 °F (36.7 °C)  Pulse:  [65-87] 65  Resp:  [15-22] 18  SpO2:  [92 %-100 %] 96 %  BP: (111-140)/(54-80) 135/69     Weight: 68.5 kg (151 lb 0.2 oz)  Body mass index is 28.53 kg/m².    Intake/Output - Last 3 Shifts         10/18 0700  10/19 0659 10/19 0700  10/20 0659    P.O.  500    IV Piggyback  1000    Total Intake(mL/kg)  1500 (21.9)    Net  +1500          Urine Occurrence  4 x            Physical Exam  Constitutional:       General: She is not in acute distress.     Appearance: Normal appearance.   HENT:      Head: Normocephalic and atraumatic.   Eyes:      General: No scleral icterus.     Conjunctiva/sclera: Conjunctivae normal.      Pupils: Pupils are equal, round, and reactive to light.   Neck:      Trachea: No tracheal deviation.   Cardiovascular:      Rate and Rhythm:  Normal rate and regular rhythm.   Pulmonary:      Effort: Pulmonary effort is normal. No respiratory distress.      Breath sounds: No stridor.   Abdominal:      General: Abdomen is flat. There is no distension.      Palpations: Abdomen is soft.      Tenderness: There is no abdominal tenderness. There is no guarding.      Comments: Incisions c/d/i   Musculoskeletal:         General: No deformity or signs of injury. Normal range of motion.      Cervical back: No edema.   Skin:     General: Skin is warm and dry.      Coloration: Skin is not jaundiced.   Neurological:      General: No focal deficit present.      Mental Status: She is alert and oriented to person, place, and time.   Psychiatric:         Mood and Affect: Mood normal.         Behavior: Behavior normal.       Significant Labs:  I have reviewed all pertinent lab results within the past 24 hours.  CBC:   Recent Labs   Lab 10/20/22  0441   WBC 8.77   RBC 3.67*   HGB 11.8*   HCT 37.3      *   MCH 32.2*   MCHC 31.6*     BMP:   Recent Labs   Lab 10/20/22  0441   *      K 4.2   *   CO2 24   BUN 8   CREATININE 0.7   CALCIUM 8.6*   MG 2.0       Significant Diagnostics:  I have reviewed all pertinent imaging results/findings within the past 24 hours.    Assessment/Plan:     Hiatal hernia with GERD  Terese Barney is a 62 y.o. female s/p robotic hiatal hernia repair with Toupet fundoplication 10/19 now POD1. If tolerates diet without nausea, may be discharged home this afternoon    - Consult dietary for diet teaching   - FLD  - Multi-modal pain control  - PRN nausea meds  - Replace electrolytes PRN  - DVT prophylaxis   - OOBTC/Ambulate  - IS          Kellie Webb MD  General Surgery  [facility] - Surgery

## 2022-10-20 NOTE — PLAN OF CARE
[facility] - Surgery  Discharge Final Note    Primary Care Provider: Shanique Potts MD    Expected Discharge Date: 10/20/2022    Final Discharge Note (most recent)       Final Note - 10/20/22 1515          Final Note    Assessment Type Final Discharge Note     Anticipated Discharge Disposition Home or Self Care     What phone number can be called within the next 1-3 days to see how you are doing after discharge? [phone]     Hospital Resources/Appts/Education Provided Provided patient/caregiver with written discharge plan information;Appointments scheduled by Navigator/Coordinator                   Future Appointments   Date Time Provider Department Center   11/3/2022  2:45 PM Graham Back MD [facility]   1/27/2023  9:00 AM APPOINTMENT LAB, [facility]   1/27/2023 10:00 AM Jose Luis Guerrero MD [facility] HEM ONC [facility]        Contact Info       Graham Back MD   Specialty: General Surgery, Bariatrics    [address]  [facility] [address]   Phone: [phone]       Next Steps: Follow up in 2 week(s)

## 2022-10-21 ENCOUNTER — PATIENT MESSAGE (OUTPATIENT)
Dept: SURGERY | Facility: CLINIC | Age: 62
End: 2022-10-21
Payer: COMMERCIAL

## 2022-10-25 LAB
FINAL PATHOLOGIC DIAGNOSIS: NORMAL
GROSS: NORMAL
Lab: NORMAL

## 2022-11-03 ENCOUNTER — OFFICE VISIT (OUTPATIENT)
Dept: SURGERY | Facility: CLINIC | Age: 62
End: 2022-11-03
Payer: COMMERCIAL

## 2022-11-03 VITALS
DIASTOLIC BLOOD PRESSURE: 64 MMHG | HEIGHT: 62 IN | WEIGHT: 148.56 LBS | SYSTOLIC BLOOD PRESSURE: 129 MMHG | HEART RATE: 114 BPM | BODY MASS INDEX: 27.34 KG/M2

## 2022-11-03 DIAGNOSIS — Z09 POSTOP CHECK: Primary | ICD-10-CM

## 2022-11-03 PROCEDURE — 99999 PR PBB SHADOW E&M-EST. PATIENT-LVL III: ICD-10-PCS | Mod: PBBFAC,,, | Performed by: SURGERY

## 2022-11-03 PROCEDURE — 99999 PR PBB SHADOW E&M-EST. PATIENT-LVL III: CPT | Mod: PBBFAC,,, | Performed by: SURGERY

## 2022-11-03 PROCEDURE — 99024 POSTOP FOLLOW-UP VISIT: CPT | Mod: S$GLB,,, | Performed by: SURGERY

## 2022-11-03 PROCEDURE — 99024 PR POST-OP FOLLOW-UP VISIT: ICD-10-PCS | Mod: S$GLB,,, | Performed by: SURGERY

## 2022-11-03 NOTE — LETTER
November 3, 2022        Shanique Potts MD  [address]  [address]  [facility] [address]             [facility]  [address]  [facility] [address]  Phone: [phone]   Patient: Terese Barney   MR Number: [id number]   YOB: 1960   Date of Visit: 11/3/2022       Dear Dr. Potts:    Thank you for referring Terese Barney to me for evaluation. Attached you will find relevant portions of my assessment and plan of care.    If you have questions, please do not hesitate to call me. I look forward to following Terese Barney along with you.    Sincerely,      Graham Back MD            CC  MD Moi Mckinneyosure

## 2022-11-03 NOTE — PROGRESS NOTES
"Terese Barney is a 62 y.o. female patient.   1. Postop check      Past Medical History:   Diagnosis Date    Anxiety and depression 11/2/2016    Chronic back pain     Hammer toe of left foot     Insomnia     Motion sickness     Overweight (BMI 25.0-29.9) 11/2/2016    PONV (postoperative nausea and vomiting)     Vitamin D deficiency 11/13/2016     Past Surgical History Pertinent Negatives:   Procedure Date Noted    BREAST BIOPSY 05/04/2020    BREAST CYST ASPIRATION 05/04/2020    BREAST CYST EXCISION 05/04/2020    BREAST LUMPECTOMY 05/04/2020    BREAST RECONSTRUCTION 05/04/2020    MASTECTOMY 05/04/2020    TOTAL REDUCTION MAMMOPLASTY 05/04/2020     Scheduled Meds:  Continuous Infusions:  PRN Meds:    Review of patient's allergies indicates:   Allergen Reactions    Codeine Nausea And Vomiting     There are no hospital problems to display for this patient.    Blood pressure 129/64, pulse (!) 114, height 5' 2" (1.575 m), weight 67.4 kg (148 lb 9.4 oz).    Subjective S/p robotic repair large hh with toupet 10/19/22.  She has a cough she left the hospital with.  She had a fever 3 days postop (100.3) but this has resolved.  She has a little heartburn after eating but this is greatly improved from preop and she isn't sure it is reflux.  She denies dysphagia on a soft diet (her main preop symptom was dysphagia and that is greatly improved).  She sometimes gets cramping and diarrhea after eating.    Objective:  Vital signs (most recent): Blood pressure 129/64, pulse (!) 114, height 5' 2" (1.575 m), weight 67.4 kg (148 lb 9.4 oz).  PE wounds clear  Path reviewed     Assessment & Plan Doing well s/p hh repair.  Advance diet as tolerated, light duty one more month (Nov 30) and is going back to work Dec 14 (has to lift 50 pound bags).  Rtc one month.  Follow up pcp for cough.       Graham Back MD  11/3/2022      "

## 2022-12-01 ENCOUNTER — PATIENT MESSAGE (OUTPATIENT)
Dept: FAMILY MEDICINE | Facility: CLINIC | Age: 62
End: 2022-12-01
Payer: COMMERCIAL

## 2022-12-04 ENCOUNTER — TELEPHONE (OUTPATIENT)
Dept: FAMILY MEDICINE | Facility: CLINIC | Age: 62
End: 2022-12-04
Payer: COMMERCIAL

## 2022-12-04 DIAGNOSIS — Z13.21 ENCOUNTER FOR SCREENING FOR NUTRITIONAL DISORDER: ICD-10-CM

## 2022-12-04 DIAGNOSIS — R42 DIZZINESS: ICD-10-CM

## 2022-12-04 DIAGNOSIS — E55.9 VITAMIN D DEFICIENCY: Primary | ICD-10-CM

## 2022-12-04 DIAGNOSIS — D50.0 IRON DEFICIENCY ANEMIA DUE TO CHRONIC BLOOD LOSS: ICD-10-CM

## 2022-12-04 DIAGNOSIS — Z51.81 ENCOUNTER FOR MONITORING LONG-TERM PROTON PUMP INHIBITOR THERAPY: ICD-10-CM

## 2022-12-04 DIAGNOSIS — Z79.899 ENCOUNTER FOR MONITORING LONG-TERM PROTON PUMP INHIBITOR THERAPY: ICD-10-CM

## 2022-12-04 NOTE — TELEPHONE ENCOUNTER
Please schedule patient for the attached non-fasting labs and also please have her check her heart rate and blood pressure when she is feeling dizzy/ off, thanks

## 2022-12-04 NOTE — TELEPHONE ENCOUNTER
----- Message from Martita Jose MA sent at 12/2/2022  6:17 PM CST -----  Patient has been feeling Dizzy and nauseous all the time states her head feels funny. Patient is requesting lab work . Feels anemic

## 2022-12-06 NOTE — TELEPHONE ENCOUNTER
I believe the labs ordered 12/4/22 have been scheduled for 12/9/22, which is in advance of her upcoming visit. Please confirm thanks

## 2022-12-08 ENCOUNTER — OFFICE VISIT (OUTPATIENT)
Dept: SURGERY | Facility: CLINIC | Age: 62
End: 2022-12-08
Payer: COMMERCIAL

## 2022-12-08 ENCOUNTER — PATIENT MESSAGE (OUTPATIENT)
Dept: SURGERY | Facility: CLINIC | Age: 62
End: 2022-12-08

## 2022-12-08 VITALS
SYSTOLIC BLOOD PRESSURE: 137 MMHG | HEART RATE: 99 BPM | WEIGHT: 148.94 LBS | HEIGHT: 62 IN | DIASTOLIC BLOOD PRESSURE: 79 MMHG | BODY MASS INDEX: 27.41 KG/M2

## 2022-12-08 DIAGNOSIS — Z09 POSTOP CHECK: Primary | ICD-10-CM

## 2022-12-08 DIAGNOSIS — K21.9 HIATAL HERNIA WITH GERD: ICD-10-CM

## 2022-12-08 DIAGNOSIS — K44.9 HIATAL HERNIA WITH GERD: ICD-10-CM

## 2022-12-08 PROCEDURE — 99999 PR PBB SHADOW E&M-EST. PATIENT-LVL III: CPT | Mod: PBBFAC,,, | Performed by: SURGERY

## 2022-12-08 PROCEDURE — 99999 PR PBB SHADOW E&M-EST. PATIENT-LVL III: ICD-10-PCS | Mod: PBBFAC,,, | Performed by: SURGERY

## 2022-12-08 PROCEDURE — 99024 PR POST-OP FOLLOW-UP VISIT: ICD-10-PCS | Mod: S$GLB,,, | Performed by: SURGERY

## 2022-12-08 PROCEDURE — 99024 POSTOP FOLLOW-UP VISIT: CPT | Mod: S$GLB,,, | Performed by: SURGERY

## 2022-12-08 RX ORDER — ONDANSETRON 4 MG/1
4 TABLET, FILM COATED ORAL 2 TIMES DAILY
Qty: 60 TABLET | Refills: 1 | Status: SHIPPED | OUTPATIENT
Start: 2022-12-08

## 2022-12-08 NOTE — PROGRESS NOTES
"Terese Barney is a 62 y.o. female patient.   1. Postop check      Past Medical History:   Diagnosis Date    Anxiety and depression 11/2/2016    Chronic back pain     Hammer toe of left foot     Insomnia     Motion sickness     Overweight (BMI 25.0-29.9) 11/2/2016    PONV (postoperative nausea and vomiting)     Vitamin D deficiency 11/13/2016     Past Surgical History Pertinent Negatives:   Procedure Date Noted    BREAST BIOPSY 05/04/2020    BREAST CYST ASPIRATION 05/04/2020    BREAST CYST EXCISION 05/04/2020    BREAST LUMPECTOMY 05/04/2020    BREAST RECONSTRUCTION 05/04/2020    MASTECTOMY 05/04/2020    TOTAL REDUCTION MAMMOPLASTY 05/04/2020     Scheduled Meds:  Continuous Infusions:  PRN Meds:    Review of patient's allergies indicates:   Allergen Reactions    Codeine Nausea And Vomiting     There are no hospital problems to display for this patient.    Blood pressure 137/79, pulse 99, height 5' 2" (1.575 m), weight 67.5 kg (148 lb 14.7 oz).    Subjective S/p robotic repair large hh with toupet 10/19/22.  She denies dysphagia, heartburn (had mild last night only), cough and fever.  She has some fatigue, nausea and dizziness.  She was told this might be due to getting off steroids.  She has pain at her incision sites in her lower abdomen.  She just found out her brother has pancreatic cancer and her mother is in the hospital (morning her husbands death 2 years ago and not eating).  She was put in the ICU in part due to her low sodium.    Objective:  Vital signs (most recent): Blood pressure 137/79, pulse 99, height 5' 2" (1.575 m), weight 67.5 kg (148 lb 14.7 oz).  Wounds clear, abdomen benign     Assessment & Plan Feeling poorly after surgery.  She is not able to work for 1 more month.  Obtain ugi, rx zofran and consider ges for her nausea.  Rtc one month.       Graham Back MD  12/8/2022      "

## 2022-12-09 ENCOUNTER — LAB VISIT (OUTPATIENT)
Dept: LAB | Facility: HOSPITAL | Age: 62
End: 2022-12-09
Attending: FAMILY MEDICINE
Payer: COMMERCIAL

## 2022-12-09 ENCOUNTER — PATIENT MESSAGE (OUTPATIENT)
Dept: SURGERY | Facility: CLINIC | Age: 62
End: 2022-12-09
Payer: COMMERCIAL

## 2022-12-09 DIAGNOSIS — Z51.81 ENCOUNTER FOR MONITORING LONG-TERM PROTON PUMP INHIBITOR THERAPY: ICD-10-CM

## 2022-12-09 DIAGNOSIS — D50.0 IRON DEFICIENCY ANEMIA DUE TO CHRONIC BLOOD LOSS: ICD-10-CM

## 2022-12-09 DIAGNOSIS — Z79.899 ENCOUNTER FOR MONITORING LONG-TERM PROTON PUMP INHIBITOR THERAPY: ICD-10-CM

## 2022-12-09 DIAGNOSIS — E55.9 VITAMIN D DEFICIENCY: ICD-10-CM

## 2022-12-09 DIAGNOSIS — R42 DIZZINESS: ICD-10-CM

## 2022-12-09 DIAGNOSIS — Z13.21 ENCOUNTER FOR SCREENING FOR NUTRITIONAL DISORDER: ICD-10-CM

## 2022-12-09 LAB
25(OH)D3+25(OH)D2 SERPL-MCNC: 22 NG/ML (ref 30–96)
ALBUMIN SERPL BCP-MCNC: 4.6 G/DL (ref 3.5–5.2)
ALP SERPL-CCNC: 92 U/L (ref 38–126)
ALT SERPL W/O P-5'-P-CCNC: 20 U/L (ref 10–44)
ANION GAP SERPL CALC-SCNC: 6 MMOL/L (ref 8–16)
AST SERPL-CCNC: 29 U/L (ref 15–46)
BASOPHILS # BLD AUTO: 0.04 K/UL (ref 0–0.2)
BASOPHILS NFR BLD: 0.7 % (ref 0–1.9)
BILIRUB SERPL-MCNC: 0.8 MG/DL (ref 0.1–1)
CALCIUM SERPL-MCNC: 9.1 MG/DL (ref 8.7–10.5)
CHLORIDE SERPL-SCNC: 109 MMOL/L (ref 95–110)
CO2 SERPL-SCNC: 28 MMOL/L (ref 23–29)
CREAT SERPL-MCNC: 0.64 MG/DL (ref 0.5–1.4)
DIFFERENTIAL METHOD: ABNORMAL
EOSINOPHIL # BLD AUTO: 0.1 K/UL (ref 0–0.5)
EOSINOPHIL NFR BLD: 2.2 % (ref 0–8)
ERYTHROCYTE [DISTWIDTH] IN BLOOD BY AUTOMATED COUNT: 12.9 % (ref 11.5–14.5)
EST. GFR  (NO RACE VARIABLE): >60 ML/MIN/1.73 M^2
FERRITIN SERPL-MCNC: 374 NG/ML (ref 20–300)
GLUCOSE SERPL-MCNC: 112 MG/DL (ref 70–110)
HCT VFR BLD AUTO: 41.1 % (ref 37–48.5)
HGB BLD-MCNC: 13.3 G/DL (ref 12–16)
IMM GRANULOCYTES # BLD AUTO: 0.02 K/UL (ref 0–0.04)
IMM GRANULOCYTES NFR BLD AUTO: 0.3 % (ref 0–0.5)
IRON SERPL-MCNC: 158 UG/DL (ref 30–160)
LYMPHOCYTES # BLD AUTO: 1.3 K/UL (ref 1–4.8)
LYMPHOCYTES NFR BLD: 22.2 % (ref 18–48)
MAGNESIUM SERPL-MCNC: 2 MG/DL (ref 1.6–2.6)
MCH RBC QN AUTO: 31.8 PG (ref 27–31)
MCHC RBC AUTO-ENTMCNC: 32.4 G/DL (ref 32–36)
MCV RBC AUTO: 98 FL (ref 82–98)
MONOCYTES # BLD AUTO: 0.5 K/UL (ref 0.3–1)
MONOCYTES NFR BLD: 9.3 % (ref 4–15)
NEUTROPHILS # BLD AUTO: 3.8 K/UL (ref 1.8–7.7)
NEUTROPHILS NFR BLD: 65.3 % (ref 38–73)
NRBC BLD-RTO: 0 /100 WBC
PLATELET # BLD AUTO: 376 K/UL (ref 150–450)
PMV BLD AUTO: 9.2 FL (ref 9.2–12.9)
POTASSIUM SERPL-SCNC: 4.1 MMOL/L (ref 3.5–5.1)
PREALB SERPL-MCNC: 28 MG/DL (ref 20–43)
PROT SERPL-MCNC: 7.3 G/DL (ref 6–8.4)
RBC # BLD AUTO: 4.18 M/UL (ref 4–5.4)
SATURATED IRON: 46 % (ref 20–50)
SODIUM SERPL-SCNC: 143 MMOL/L (ref 136–145)
TOTAL IRON BINDING CAPACITY: 346 UG/DL (ref 250–450)
TRANSFERRIN SERPL-MCNC: 234 MG/DL (ref 200–375)
TSH SERPL DL<=0.005 MIU/L-ACNC: 1.35 UIU/ML (ref 0.4–4)
UUN UR-MCNC: 15 MG/DL (ref 7–17)
VIT B12 SERPL-MCNC: 1049 PG/ML (ref 210–950)
WBC # BLD AUTO: 5.81 K/UL (ref 3.9–12.7)

## 2022-12-09 PROCEDURE — 84134 ASSAY OF PREALBUMIN: CPT | Performed by: FAMILY MEDICINE

## 2022-12-09 PROCEDURE — 80053 COMPREHEN METABOLIC PANEL: CPT | Mod: PO | Performed by: FAMILY MEDICINE

## 2022-12-09 PROCEDURE — 36415 COLL VENOUS BLD VENIPUNCTURE: CPT | Mod: PO | Performed by: FAMILY MEDICINE

## 2022-12-09 PROCEDURE — 82306 VITAMIN D 25 HYDROXY: CPT | Mod: PO | Performed by: FAMILY MEDICINE

## 2022-12-09 PROCEDURE — 82728 ASSAY OF FERRITIN: CPT | Performed by: FAMILY MEDICINE

## 2022-12-09 PROCEDURE — 84443 ASSAY THYROID STIM HORMONE: CPT | Mod: PO | Performed by: FAMILY MEDICINE

## 2022-12-09 PROCEDURE — 84466 ASSAY OF TRANSFERRIN: CPT | Mod: PO | Performed by: FAMILY MEDICINE

## 2022-12-09 PROCEDURE — 82607 VITAMIN B-12: CPT | Mod: PO | Performed by: FAMILY MEDICINE

## 2022-12-09 PROCEDURE — 83735 ASSAY OF MAGNESIUM: CPT | Mod: PO | Performed by: FAMILY MEDICINE

## 2022-12-09 PROCEDURE — 85025 COMPLETE CBC W/AUTO DIFF WBC: CPT | Mod: PO | Performed by: FAMILY MEDICINE

## 2022-12-12 ENCOUNTER — OFFICE VISIT (OUTPATIENT)
Dept: FAMILY MEDICINE | Facility: CLINIC | Age: 62
End: 2022-12-12
Payer: COMMERCIAL

## 2022-12-12 VITALS
OXYGEN SATURATION: 96 % | SYSTOLIC BLOOD PRESSURE: 122 MMHG | WEIGHT: 150.56 LBS | HEART RATE: 96 BPM | HEIGHT: 62 IN | TEMPERATURE: 99 F | BODY MASS INDEX: 27.7 KG/M2 | DIASTOLIC BLOOD PRESSURE: 78 MMHG

## 2022-12-12 DIAGNOSIS — F32.A ANXIETY AND DEPRESSION: ICD-10-CM

## 2022-12-12 DIAGNOSIS — E66.3 OVERWEIGHT (BMI 25.0-29.9): ICD-10-CM

## 2022-12-12 DIAGNOSIS — Z86.2 HISTORY OF IRON DEFICIENCY ANEMIA: ICD-10-CM

## 2022-12-12 DIAGNOSIS — E55.9 VITAMIN D DEFICIENCY: ICD-10-CM

## 2022-12-12 DIAGNOSIS — Z23 NEEDS FLU SHOT: ICD-10-CM

## 2022-12-12 DIAGNOSIS — F41.9 ANXIETY AND DEPRESSION: ICD-10-CM

## 2022-12-12 DIAGNOSIS — K21.9 HIATAL HERNIA WITH GERD: ICD-10-CM

## 2022-12-12 DIAGNOSIS — Z87.19 HISTORY OF REPAIR OF HIATAL HERNIA: ICD-10-CM

## 2022-12-12 DIAGNOSIS — K44.9 HIATAL HERNIA WITH GERD: ICD-10-CM

## 2022-12-12 DIAGNOSIS — Z98.890 HISTORY OF REPAIR OF HIATAL HERNIA: ICD-10-CM

## 2022-12-12 DIAGNOSIS — R53.83 FATIGUE, UNSPECIFIED TYPE: Primary | ICD-10-CM

## 2022-12-12 PROCEDURE — 90686 FLU VACCINE (QUAD) GREATER THAN OR EQUAL TO 3YO PRESERVATIVE FREE IM: ICD-10-PCS | Mod: S$GLB,,, | Performed by: FAMILY MEDICINE

## 2022-12-12 PROCEDURE — 99999 PR PBB SHADOW E&M-EST. PATIENT-LVL IV: CPT | Mod: PBBFAC,,, | Performed by: FAMILY MEDICINE

## 2022-12-12 PROCEDURE — 99214 OFFICE O/P EST MOD 30 MIN: CPT | Mod: 25,S$GLB,, | Performed by: FAMILY MEDICINE

## 2022-12-12 PROCEDURE — 99999 PR PBB SHADOW E&M-EST. PATIENT-LVL IV: ICD-10-PCS | Mod: PBBFAC,,, | Performed by: FAMILY MEDICINE

## 2022-12-12 PROCEDURE — 90686 IIV4 VACC NO PRSV 0.5 ML IM: CPT | Mod: S$GLB,,, | Performed by: FAMILY MEDICINE

## 2022-12-12 PROCEDURE — 99214 PR OFFICE/OUTPT VISIT, EST, LEVL IV, 30-39 MIN: ICD-10-PCS | Mod: 25,S$GLB,, | Performed by: FAMILY MEDICINE

## 2022-12-12 PROCEDURE — 90471 FLU VACCINE (QUAD) GREATER THAN OR EQUAL TO 3YO PRESERVATIVE FREE IM: ICD-10-PCS | Mod: S$GLB,,, | Performed by: FAMILY MEDICINE

## 2022-12-12 PROCEDURE — 90471 IMMUNIZATION ADMIN: CPT | Mod: S$GLB,,, | Performed by: FAMILY MEDICINE

## 2022-12-12 RX ORDER — BUPROPION HYDROCHLORIDE 150 MG/1
150 TABLET ORAL
Qty: 90 TABLET | Refills: 4 | Status: SHIPPED | OUTPATIENT
Start: 2022-12-12 | End: 2022-12-12 | Stop reason: SDUPTHER

## 2022-12-12 RX ORDER — BUPROPION HYDROCHLORIDE 150 MG/1
150 TABLET ORAL
Qty: 90 TABLET | Refills: 4 | Status: SHIPPED | OUTPATIENT
Start: 2022-12-12 | End: 2023-11-06

## 2022-12-12 RX ORDER — CHOLECALCIFEROL (VITAMIN D3) 125 MCG
5000 CAPSULE ORAL
Qty: 100 CAPSULE | Refills: 4 | Status: SHIPPED | OUTPATIENT
Start: 2022-12-12 | End: 2023-04-14

## 2022-12-12 RX ORDER — DULOXETIN HYDROCHLORIDE 30 MG/1
30 CAPSULE, DELAYED RELEASE ORAL DAILY
Qty: 90 CAPSULE | Refills: 3 | Status: SHIPPED | OUTPATIENT
Start: 2022-12-12 | End: 2023-12-04 | Stop reason: SDUPTHER

## 2022-12-12 RX ORDER — DULOXETIN HYDROCHLORIDE 30 MG/1
30 CAPSULE, DELAYED RELEASE ORAL DAILY
Qty: 90 CAPSULE | Refills: 3 | Status: SHIPPED | OUTPATIENT
Start: 2022-12-12 | End: 2022-12-12 | Stop reason: SDUPTHER

## 2022-12-12 NOTE — PROGRESS NOTES
Office Visit    Patient Name: Terese Barney    : 1960  MRN: [id number]    Subjective:  Terese is a 62 y.o. female who presents today for:    Dizziness (Started a couple months ago) and Nausea    Annual physical 22    S/P HIATAL HERNIA REPAIR 10/19/22 W/ DR LEWIS  Also had removal of bilateral breast implants by Dr Soni in early November.    LABS 2022 WITH UNREMARKABLE LIVER/KIDNEY FUNCTION/ELECTROLYTES, NORMAL CBC WITH IRON STUDIES.  NORMAL TSH.  LOW VITAMIN-D    62  year old patient of mine with history of osteoarthritis of the knees/low back pain, history of recurrent iron-deficiency anemia status post IV iron infusions, GERD status post hiatal hernia repair 2 months ago, anxiety/depression who presents today for lab review and to discuss recent dizziness.      Starting around the times of her recent surgeries she has been experiencing episodic dizziness and constant fatigue.   No illness symptoms.   Occasional lightheadedness, NO VERTIGO.   She describes her dizziness as a sensation of feeling drained and very week. Low energy and mildly like she may faint though she has not had a syncopal episode.     No CP/ SOB. Occasional postoperative diarrhea.     Drinking about 3 16 oz bottles of water. Water consumption has increased.    Eating fairly regularly. Eating sometimes increases sensation of lightheadedness and nausea.   A1c 22 5.1.    Poor sleep and under significant stress-- mom in the hospital and brother dx'd w/ pancreatic cancer.           PAST MEDICAL HISTORY, SURGICAL/SOCIAL/FAMILY HISTORY REVIEWED AS PER CHART, WITH PERTINENT FINDINGS INCLUDED IN HISTORY SECTION OF NOTE.     Current Medications    Medication List with Changes/Refills   New Medications    BUPROPION (WELLBUTRIN XL) 150 MG TB24 TABLET    Take 1 tablet (150 mg total) by mouth daily with breakfast.    DULOXETINE (CYMBALTA) 30 MG CAPSULE    Take 1 capsule (30 mg total) by mouth once daily.   Current Medications     ADAPALENE-BENZOYL PEROXIDE (EPIDUO) 0.1-2.5 % GLWP    Apply thin layer to clean, dry skin of face nightly    ALBUTEROL (PROVENTIL/VENTOLIN HFA) 90 MCG/ACTUATION INHALER    Inhale 2 puffs into the lungs every 6 (six) hours as needed for Wheezing or Shortness of Breath. Rescue    CEVIMELINE (EVOXAC) 30 MG CAPSULE    TAKE 1 CAPSULE (30 MG TOTAL) BY MOUTH 2 (TWO) TIMES DAILY.    FERROUS SULFATE (FEOSOL) 325 MG (65 MG IRON) TAB TABLET    Take 1 tablet (325 mg total) by mouth daily with breakfast.    LORAZEPAM (ATIVAN) 1 MG TABLET    TAKE 1/2 TO 1 TABLET ONCE  DAILY AS NEEDED FOR SEVERE ANXIETY OR SLEEP    ONDANSETRON (ZOFRAN) 4 MG TABLET    Take 1 tablet (4 mg total) by mouth 2 (two) times daily.   Changed and/or Refilled Medications    Modified Medication Previous Medication    CHOLECALCIFEROL, VITAMIN D3, 125 MCG (5,000 UNIT) CAPSULE cholecalciferol, vitamin D3, 125 mcg (5,000 unit) capsule       Take 1 capsule (5,000 Units total) by mouth daily with breakfast.    Take 1 capsule (5,000 Units total) by mouth daily with breakfast.   Discontinued Medications    DULOXETINE (CYMBALTA) 60 MG CAPSULE    Take 1 capsule (60 mg total) by mouth 2 (two) times daily.    PANTOPRAZOLE (PROTONIX) 40 MG TABLET    Take 1 tablet (40 mg total) by mouth before breakfast.       Allergies   Review of patient's allergies indicates:   Allergen Reactions    Codeine Nausea And Vomiting         Review of Systems (Pertinent positives)  Review of Systems   Constitutional:  Positive for fatigue. Negative for chills, fever and unexpected weight change.   Eyes:  Negative for visual disturbance.   Respiratory:  Negative for chest tightness and shortness of breath.    Cardiovascular:  Negative for chest pain, palpitations and leg swelling.   Gastrointestinal:  Positive for diarrhea (intermittent). Negative for nausea and vomiting.   Musculoskeletal:  Positive for arthralgias and back pain.   Neurological:  Positive for weakness and light-headedness.  "Negative for dizziness and headaches.   Psychiatric/Behavioral:  Positive for dysphoric mood and sleep disturbance. The patient is nervous/anxious.      /78 (BP Location: Right arm, Patient Position: Sitting, BP Method: Medium (Manual))   Pulse 96   Temp 98.9 °F (37.2 °C) (Oral)   Ht 5' 2" (1.575 m)   Wt 68.3 kg (150 lb 9.2 oz)   LMP  (LMP Unknown)   SpO2 96%   BMI 27.54 kg/m²     Physical Exam  Vitals reviewed.   Constitutional:       General: She is not in acute distress.     Appearance: Normal appearance. She is well-developed.   HENT:      Head: Normocephalic and atraumatic.      Right Ear: Tympanic membrane normal.      Left Ear: Tympanic membrane normal.      Nose: No congestion or rhinorrhea.      Mouth/Throat:      Pharynx: No oropharyngeal exudate or posterior oropharyngeal erythema.   Eyes:      Conjunctiva/sclera: Conjunctivae normal.   Cardiovascular:      Rate and Rhythm: Normal rate and regular rhythm.   Pulmonary:      Effort: Pulmonary effort is normal.      Breath sounds: Normal breath sounds.   Abdominal:      General: There is no distension.      Palpations: Abdomen is soft.   Musculoskeletal:      Right lower leg: No edema.      Left lower leg: No edema.   Skin:     General: Skin is warm and dry.   Neurological:      General: No focal deficit present.      Mental Status: She is alert and oriented to person, place, and time.   Psychiatric:         Mood and Affect: Mood is depressed. Affect is tearful.         Behavior: Behavior normal.       Assessment/Plan:  Terese Barney is a 62 y.o. female who presents today for :        ICD-10-CM ICD-9-CM    1. Fatigue, unspecified type  R53.83 780.79       2. Anxiety and depression  F41.9 300.00 buPROPion (WELLBUTRIN XL) 150 MG TB24 tablet    F32.A 311 DULoxetine (CYMBALTA) 30 MG capsule      DISCONTINUED: DULoxetine (CYMBALTA) 30 MG capsule      DISCONTINUED: buPROPion (WELLBUTRIN XL) 150 MG TB24 tablet      3. Hiatal hernia with GERD  K44.9 " "553.3     K21.9 530.81       4. History of repair of hiatal hernia  Z98.890 V15.29     Z87.19        5. Needs flu shot  Z23 V04.81 Influenza - Quadrivalent *Preferred* (6 months+) (PF)      6. History of iron deficiency anemia  Z86.2 V12.3       7. Vitamin D deficiency  E55.9 268.9 cholecalciferol, vitamin D3, 125 mcg (5,000 unit) capsule      8. Overweight (BMI 25.0-29.9)  E66.3 278.02           63 yo female with recent fatigue and intermittent lightheadedness about 6-8 weeks out from 2 recent surgeries-- hiatal hernia repair and also breast implant removal.     Blood work reassuring-will supplement her low vitamin-D. No concerning cardiopulmonary symptoms.  She is under significant stress, tearful on exam, and exhibits low motivation/symptoms of uncontrolled depression.      The physical demands of recent surgeries/postoperative recoveries, along with increased depression and also some anxiety from being off work and caring for her ill/aging mother may be contributing to her current fatigue/ feeling weak/lightheaded, which she describes as "dizzy.".     Her dizziness does not have any red flag qualities-no focal neurologic deficits, symptoms are overall mild/come and go.    Will try increasing protein to aid with postoperative recovery, increasing hydration, easing back into an exercise routine and will scale back on Cymbalta to 30 mg daily to then add Wellbutrin 150 XL daily to see if it provides more of a boost", which she currently feels she is lacking.      Will try the above and follow-up in 6 weeks, sooner if concerns.    Patient Instructions   Increase protein in diet to aid with post surgical recovery/healing      Follow up in about 6 weeks (around 1/23/2023) for to follow up on lab results, return as needed for new concerns.    "

## 2022-12-27 ENCOUNTER — OFFICE VISIT (OUTPATIENT)
Dept: SURGERY | Facility: CLINIC | Age: 62
End: 2022-12-27
Payer: COMMERCIAL

## 2022-12-27 ENCOUNTER — HOSPITAL ENCOUNTER (OUTPATIENT)
Dept: RADIOLOGY | Facility: HOSPITAL | Age: 62
Discharge: HOME | End: 2022-12-27
Attending: SURGERY
Payer: COMMERCIAL

## 2022-12-27 DIAGNOSIS — K44.9 HIATAL HERNIA WITH GERD: ICD-10-CM

## 2022-12-27 DIAGNOSIS — K21.9 HIATAL HERNIA WITH GERD: ICD-10-CM

## 2022-12-27 DIAGNOSIS — Z09 POSTOP CHECK: ICD-10-CM

## 2022-12-27 DIAGNOSIS — Z09 POSTOP CHECK: Primary | ICD-10-CM

## 2022-12-27 PROCEDURE — 25500020 PHARM REV CODE 255: Performed by: SURGERY

## 2022-12-27 PROCEDURE — 99024 PR POST-OP FOLLOW-UP VISIT: ICD-10-PCS | Mod: S$GLB,,, | Performed by: SURGERY

## 2022-12-27 PROCEDURE — 74240 X-RAY XM UPR GI TRC 1CNTRST: CPT | Mod: TC,FY

## 2022-12-27 PROCEDURE — 99999 PR PBB SHADOW E&M-EST. PATIENT-LVL III: ICD-10-PCS | Mod: PBBFAC,,, | Performed by: SURGERY

## 2022-12-27 PROCEDURE — A9698 NON-RAD CONTRAST MATERIALNOC: HCPCS | Performed by: SURGERY

## 2022-12-27 PROCEDURE — 99999 PR PBB SHADOW E&M-EST. PATIENT-LVL III: CPT | Mod: PBBFAC,,, | Performed by: SURGERY

## 2022-12-27 PROCEDURE — 74240 FL UPPER GI: ICD-10-PCS | Mod: 26,,, | Performed by: STUDENT IN AN ORGANIZED HEALTH CARE EDUCATION/TRAINING PROGRAM

## 2022-12-27 PROCEDURE — 99024 POSTOP FOLLOW-UP VISIT: CPT | Mod: S$GLB,,, | Performed by: SURGERY

## 2022-12-27 PROCEDURE — 74240 X-RAY XM UPR GI TRC 1CNTRST: CPT | Mod: 26,,, | Performed by: STUDENT IN AN ORGANIZED HEALTH CARE EDUCATION/TRAINING PROGRAM

## 2022-12-27 RX ADMIN — BARIUM SULFATE 160 ML: 0.6 SUSPENSION ORAL at 08:12

## 2022-12-27 NOTE — PROGRESS NOTES
Terese Barney is a 62 y.o. female patient.   1. Postop check      Past Medical History:   Diagnosis Date    Anxiety and depression 11/2/2016    Chronic back pain     Hammer toe of left foot     Insomnia     Motion sickness     Overweight (BMI 25.0-29.9) 11/2/2016    PONV (postoperative nausea and vomiting)     Vitamin D deficiency 11/13/2016     Past Surgical History Pertinent Negatives:   Procedure Date Noted    BREAST BIOPSY 05/04/2020    BREAST CYST ASPIRATION 05/04/2020    BREAST CYST EXCISION 05/04/2020    BREAST LUMPECTOMY 05/04/2020    BREAST RECONSTRUCTION 05/04/2020    MASTECTOMY 05/04/2020    TOTAL REDUCTION MAMMOPLASTY 05/04/2020     Scheduled Meds:  Continuous Infusions:  PRN Meds:    Review of patient's allergies indicates:   Allergen Reactions    Codeine Nausea And Vomiting     There are no hospital problems to display for this patient.    There were no vitals taken for this visit.    Subjective  S/p robotic repair large hh with toupet 10/19/22.  She denies dysphagia, had a mild attach of reflux yesterday only and sometimes she has some diarrhea after eating.  She thinks it is like dumping.  Her brother is undergoing chemo for pancreatic cancer and her mother recently got out of the hospital.    Objective:  Vital signs (most recent): There were no vitals taken for this visit.  Wounds clear     Assessment & Plan  Doing well.  Regular duty and rtc prn.  She says she goes back to work on Jan 16.       Graham Back MD  12/27/2022

## 2023-01-23 ENCOUNTER — TELEPHONE (OUTPATIENT)
Dept: HEMATOLOGY/ONCOLOGY | Facility: CLINIC | Age: 63
End: 2023-01-23
Payer: COMMERCIAL

## 2023-02-03 ENCOUNTER — HOSPITAL ENCOUNTER (OUTPATIENT)
Dept: RADIOLOGY | Facility: HOSPITAL | Age: 63
Discharge: HOME | End: 2023-02-03
Attending: FAMILY MEDICINE
Payer: COMMERCIAL

## 2023-02-03 DIAGNOSIS — R92.8 ABNORMAL MAMMOGRAM: ICD-10-CM

## 2023-02-03 PROCEDURE — 77065 DX MAMMO INCL CAD UNI: CPT | Mod: 26,LT,, | Performed by: RADIOLOGY

## 2023-02-03 PROCEDURE — 77065 DX MAMMO INCL CAD UNI: CPT | Mod: TC,LT

## 2023-02-03 PROCEDURE — 77065 MAMMO DIGITAL DIAGNOSTIC LEFT WITH TOMO: ICD-10-PCS | Mod: 26,LT,, | Performed by: RADIOLOGY

## 2023-02-03 PROCEDURE — 77061 MAMMO DIGITAL DIAGNOSTIC LEFT WITH TOMO: ICD-10-PCS | Mod: 26,LT,, | Performed by: RADIOLOGY

## 2023-02-03 PROCEDURE — 77061 BREAST TOMOSYNTHESIS UNI: CPT | Mod: 26,LT,, | Performed by: RADIOLOGY

## 2023-04-13 ENCOUNTER — LAB VISIT (OUTPATIENT)
Dept: LAB | Facility: HOSPITAL | Age: 63
End: 2023-04-13
Attending: INTERNAL MEDICINE
Payer: COMMERCIAL

## 2023-04-13 DIAGNOSIS — D50.0 IRON DEFICIENCY ANEMIA DUE TO CHRONIC BLOOD LOSS: ICD-10-CM

## 2023-04-13 LAB
BASOPHILS # BLD AUTO: 0.04 K/UL (ref 0–0.2)
BASOPHILS NFR BLD: 0.5 % (ref 0–1.9)
DIFFERENTIAL METHOD: ABNORMAL
EOSINOPHIL # BLD AUTO: 0.1 K/UL (ref 0–0.5)
EOSINOPHIL NFR BLD: 1.1 % (ref 0–8)
ERYTHROCYTE [DISTWIDTH] IN BLOOD BY AUTOMATED COUNT: 11.9 % (ref 11.5–14.5)
FERRITIN SERPL-MCNC: 232 NG/ML (ref 20–300)
HCT VFR BLD AUTO: 43.6 % (ref 37–48.5)
HGB BLD-MCNC: 14 G/DL (ref 12–16)
IMM GRANULOCYTES # BLD AUTO: 0.08 K/UL (ref 0–0.04)
IMM GRANULOCYTES NFR BLD AUTO: 1 % (ref 0–0.5)
IRON SERPL-MCNC: 97 UG/DL (ref 30–160)
LYMPHOCYTES # BLD AUTO: 1.8 K/UL (ref 1–4.8)
LYMPHOCYTES NFR BLD: 22.6 % (ref 18–48)
MCH RBC QN AUTO: 32.2 PG (ref 27–31)
MCHC RBC AUTO-ENTMCNC: 32.1 G/DL (ref 32–36)
MCV RBC AUTO: 100 FL (ref 82–98)
MONOCYTES # BLD AUTO: 0.9 K/UL (ref 0.3–1)
MONOCYTES NFR BLD: 10.6 % (ref 4–15)
NEUTROPHILS # BLD AUTO: 5.2 K/UL (ref 1.8–7.7)
NEUTROPHILS NFR BLD: 64.2 % (ref 38–73)
NRBC BLD-RTO: 0 /100 WBC
PLATELET # BLD AUTO: 284 K/UL (ref 150–450)
PMV BLD AUTO: 9.6 FL (ref 9.2–12.9)
RBC # BLD AUTO: 4.35 M/UL (ref 4–5.4)
SATURATED IRON: 29 % (ref 20–50)
TOTAL IRON BINDING CAPACITY: 336 UG/DL (ref 250–450)
TRANSFERRIN SERPL-MCNC: 227 MG/DL (ref 200–375)
WBC # BLD AUTO: 8.14 K/UL (ref 3.9–12.7)

## 2023-04-13 PROCEDURE — 36415 COLL VENOUS BLD VENIPUNCTURE: CPT | Performed by: INTERNAL MEDICINE

## 2023-04-13 PROCEDURE — 82728 ASSAY OF FERRITIN: CPT | Performed by: INTERNAL MEDICINE

## 2023-04-13 PROCEDURE — 84466 ASSAY OF TRANSFERRIN: CPT | Performed by: INTERNAL MEDICINE

## 2023-04-13 PROCEDURE — 85025 COMPLETE CBC W/AUTO DIFF WBC: CPT | Performed by: INTERNAL MEDICINE

## 2023-04-13 NOTE — PROGRESS NOTES
"PATIENT: Terese Barney  MRN: [id number]  DATE: 4/14/2023    Diagnosis:   1. Iron deficiency anemia due to chronic blood loss    2. Chronic pain syndrome      Chief Complaint: Breast Cancer and Anemia      Subjective:    History of Present Illness: Ms. Barney is a 62 y.o. female who presented in February 2020 for evaluation and management of iron deficiency anemia due to chronic blood loss. She was referred by Dr. Potts. I had seen her during a hospitalization in late January 2020.    Information from my consult note dated 1/31/20:  "Iron deficiency anemia due to chronic blood loss  - I have reviewed her labs chart.  - in January 2019, her hemoglobin was normal. Hemoglobin levels for past few days have revealed a severe microcytic anemia.  - ferritin level is decreased at 3 ng/mL, consistent with severe iron deficiency anemia.  - although stool was negative for occult blood, she reports black stools.  - agree with gastroenterology workup for source of bleeding.  - I will give a dose of iron sucrose while she is hospitalized. I will also start oral ferrous sulfate.  - I will schedule a follow-up appointment in clinic and arrange for outpatient ferric carboxymaltose."    - she underwent upper GI endoscopy on 1/31/20 and colonoscopy on 2/4/20.  - she received iron sucrose x 4 doses in February/March 2022.      Interval history:  - she presents for a follow-up appointment for her iron deficiency anemia due to chronic blood loss.  - she received iron sucrose x 4 doses in August/September 2022.  - today, she is doing well. She endorses mild fatigue. She denies chest pain, nausea, vomiting, diarrhea, constipation.    Past medical, surgical, family, and social histories have been reviewed and updated below.    Past Medical History:   Past Medical History:   Diagnosis Date    Anxiety and depression 11/2/2016    Chronic back pain     Hammer toe of left foot     Insomnia     Motion sickness     Overweight (BMI 25.0-29.9) " 2016    PONV (postoperative nausea and vomiting)     Vitamin D deficiency 2016       Past Surgical History:   Past Surgical History:   Procedure Laterality Date    AUGMENTATION OF BREAST Bilateral     BREAST SURGERY Bilateral      SECTION      two    COLONOSCOPY N/A 2020    Procedure: COLONOSCOPY/Suprep;  Surgeon: Cristhian Mancilla MD;  Location: [facility];  Service: Endoscopy;  Laterality: N/A;    ESOPHAGEAL MANOMETRY WITH MEASUREMENT OF IMPEDANCE N/A 2022    Procedure: MANOMETRY, ESOPHAGUS, WITH IMPEDANCE MEASUREMENT;  Surgeon: Jose Quijano MD;  Location: [facility] (4TH FLR);  Service: Endoscopy;  Laterality: N/A;  8/3 pt not vaccinated; Rapid; instructions to portal-st    ESOPHAGOGASTRODUODENOSCOPY N/A 2020    Procedure: ESOPHAGOGASTRODUODENOSCOPY (EGD);  Surgeon: Cristhian Mancilla MD;  Location: [facility];  Service: Endoscopy;  Laterality: N/A;    ESOPHAGOGASTRODUODENOSCOPY N/A 3/3/2022    Procedure: EGD (ESOPHAGOGASTRODUODENOSCOPY);  Surgeon: Ward Horvath MD;  Location: [facility];  Service: Endoscopy;  Laterality: N/A;    foot surgery      left plantar fascial release    FOOT SURGERY Left 2017    2nd TOE, Hammer toe repair    HYSTERECTOMY      INTRALUMINAL GASTROINTESTINAL TRACT IMAGING VIA CAPSULE N/A 3/3/2022    Procedure: IMAGING PROCEDURE, GI TRACT, INTRALUMINAL, VIA CAPSULE;  Surgeon: Ward Horvath MD;  Location: [facility];  Service: Endoscopy;  Laterality: N/A;    laparascopy      ovarian cysts    left knee surgery      ROBOT-ASSISTED REPAIR OF HIATAL HERNIA USING DA HUE XI N/A 10/19/2022    Procedure: XI ROBOTIC REPAIR, HERNIA, HIATAL w/ possible mesh; TOUPET fundoplication;  Surgeon: Graham Back MD;  [facility];  Service: General;  Laterality: N/A;  CONSENT IN AM    TONSILLECTOMY      TOTAL ABDOMINAL HYSTERECTOMY W/ BILATERAL SALPINGOOPHORECTOMY         Family History:   Family History   Problem Relation Age  of Onset    Hypertension Mother     Breast cancer Mother     Macular degeneration Father     Diabetes type II Father     Coronary artery disease Father     Hypertension Sister     Diabetes type II Sister     Diabetes type II Brother     Hypertension Brother     Glaucoma Paternal Grandmother        Social History:  reports that she has never smoked. She has never used smokeless tobacco. She reports current alcohol use. She reports that she does not use drugs.    Allergies:  Review of patient's allergies indicates:   Allergen Reactions    Codeine Nausea And Vomiting       Medications:  Current Outpatient Medications   Medication Sig Dispense Refill    adapalene-benzoyl peroxide (EPIDUO) 0.1-2.5 % GlwP Apply thin layer to clean, dry skin of face nightly 45 g 11    albuterol (PROVENTIL/VENTOLIN HFA) 90 mcg/actuation inhaler Inhale 2 puffs into the lungs every 6 (six) hours as needed for Wheezing or Shortness of Breath. Rescue 1 Inhaler 0    buPROPion (WELLBUTRIN XL) 150 MG TB24 tablet Take 1 tablet (150 mg total) by mouth daily with breakfast. 90 tablet 4    cevimeline (EVOXAC) 30 mg capsule TAKE 1 CAPSULE BY MOUTH 2 TIMES DAILY. 180 capsule 4    cholecalciferol, vitamin D3, 125 mcg (5,000 unit) capsule Take 1 capsule (5,000 Units total) by mouth daily with breakfast. 100 capsule 4    DULoxetine (CYMBALTA) 30 MG capsule Take 1 capsule (30 mg total) by mouth once daily. 90 capsule 3    ferrous sulfate (FEOSOL) 325 mg (65 mg iron) Tab tablet Take 1 tablet (325 mg total) by mouth daily with breakfast. 90 tablet 4    LORazepam (ATIVAN) 1 MG tablet TAKE 1/2 TO 1 TABLET ONCE  DAILY AS NEEDED FOR SEVERE ANXIETY OR SLEEP 30 tablet 5    ondansetron (ZOFRAN) 4 MG tablet Take 1 tablet (4 mg total) by mouth 2 (two) times daily. 60 tablet 1     No current facility-administered medications for this visit.     Facility-Administered Medications Ordered in Other Visits   Medication Dose Route Frequency Provider Last Rate Last Admin     ceFAZolin in sterile water 2 gram/20 mL IV syringe 2,000 mg  2 g Intravenous On Call Procedure Kellie Webb MD           Review of Systems   Constitutional:  Positive for fatigue.   HENT:  Negative for sore throat.    Eyes:  Negative for visual disturbance.   Respiratory:  Negative for cough and shortness of breath.    Cardiovascular:  Negative for chest pain.   Gastrointestinal:  Negative for abdominal pain, constipation, diarrhea, nausea and vomiting.   Genitourinary:  Negative for dysuria.   Musculoskeletal:  Negative for back pain.   Skin:  Negative for rash.   Neurological:  Negative for headaches.   Hematological:  Negative for adenopathy.   Psychiatric/Behavioral:  The patient is not nervous/anxious.      ECOG Performance Status:   ECOG SCORE 1       Objective:      Vitals:   Vitals:    04/14/23 0902   BP: 129/64   BP Location: Left arm   Patient Position: Sitting   BP Method: Large (Automatic)   Pulse: 107   Resp: 18   SpO2: 98%   Weight: 66.4 kg (146 lb 6.2 oz)     BMI: Body mass index is 26.77 kg/m².      Physical Exam  Vitals and nursing note reviewed.   Constitutional:       Appearance: She is well-developed.   HENT:      Head: Normocephalic and atraumatic.   Eyes:      Pupils: Pupils are equal, round, and reactive to light.   Cardiovascular:      Rate and Rhythm: Normal rate and regular rhythm.   Pulmonary:      Effort: Pulmonary effort is normal.      Breath sounds: Normal breath sounds.   Abdominal:      General: Bowel sounds are normal.      Palpations: Abdomen is soft.   Musculoskeletal:         General: Normal range of motion.      Cervical back: Normal range of motion and neck supple.   Skin:     General: Skin is warm and dry.   Neurological:      Mental Status: She is alert and oriented to person, place, and time.   Psychiatric:         Behavior: Behavior normal.         Thought Content: Thought content normal.         Judgment: Judgment normal.         Laboratory Data:  Labs have been  reviewed.    Lab Results   Component Value Date    WBC 8.14 04/13/2023    HGB 14.0 04/13/2023    HCT 43.6 04/13/2023     (H) 04/13/2023     04/13/2023           Imaging:     Colonoscopy (2/4/20):  - One 6 mm polyp in the rectum, removed with a hot snare. Resected and retrieved.  - Diverticulosis in the sigmoid colon.    Upper GI endoscopy (1/31/20):  - Esophageal plaques were found, consistent with candidiasis.  - 4 cm hiatal hernia.  - Non-bleeding gastric ulcers with no stigmata of bleeding. Biopsied.  - Normal examined duodenum.    Assessment:       1. Iron deficiency anemia due to chronic blood loss    2. Chronic pain syndrome         Plan:     1. Iron deficiency anemia due to chronic blood loss  - I have reviewed her labs chart.  - in January 2019, her hemoglobin was normal. Hemoglobin levels for past few days have revealed a severe microcytic anemia.  - ferritin level is decreased at 3 ng/mL, consistent with severe iron deficiency anemia.  - although stool was negative for occult blood, she reported black stools.  - I gave a dose of iron sucrose while she was hospitalized on 1/31/20.  - I recommended ferric carboxymaltose x 2 doses.  - she had subsequent improvement in her iron deficiency anemia after ferric carboxymaltose  - labs on 1/28/22 reveal recurrent iron deficiency anemia  - I recommended iron sucrose x 4 dose  - she received iron sucrose x 4 doses in February/March 2022.  - she received iron sucrose x 4 doses in August/September 2022.  - continue ferrous sulfate.  - Labs have been reviewed. Hemoglobin is normal at 14 g/dL. Ferritin is normal at 232 ng/mL.  - repeat labs in 6 months.  - return to clinic in 12 months with repeat iron studies.    2. Advance Care Planning     Power of   After our discussion (at previous visit), the patient decided to complete a HCPOA and appointed her  mother Chelsey Morris ([phone]) and sister Trice Beasley ([phone]) .        - repeat labs in 6  months.  - return to clinic in 12 months with repeat iron studies.    Jose Luis Guerrero M.D.  Hematology/Oncology  [facility] - [address], [address]  [place] [address]  Phone: [phone]  Fax: [phone]

## 2023-04-14 ENCOUNTER — OFFICE VISIT (OUTPATIENT)
Dept: HEMATOLOGY/ONCOLOGY | Facility: CLINIC | Age: 63
End: 2023-04-14
Payer: COMMERCIAL

## 2023-04-14 VITALS
DIASTOLIC BLOOD PRESSURE: 64 MMHG | RESPIRATION RATE: 18 BRPM | OXYGEN SATURATION: 98 % | SYSTOLIC BLOOD PRESSURE: 129 MMHG | WEIGHT: 146.38 LBS | BODY MASS INDEX: 26.77 KG/M2 | HEART RATE: 107 BPM

## 2023-04-14 DIAGNOSIS — D50.0 IRON DEFICIENCY ANEMIA DUE TO CHRONIC BLOOD LOSS: Primary | ICD-10-CM

## 2023-04-14 DIAGNOSIS — G89.4 CHRONIC PAIN SYNDROME: ICD-10-CM

## 2023-04-14 PROCEDURE — 99999 PR PBB SHADOW E&M-EST. PATIENT-LVL IV: CPT | Mod: PBBFAC,,, | Performed by: INTERNAL MEDICINE

## 2023-04-14 PROCEDURE — 99214 PR OFFICE/OUTPT VISIT, EST, LEVL IV, 30-39 MIN: ICD-10-PCS | Mod: S$GLB,,, | Performed by: INTERNAL MEDICINE

## 2023-04-14 PROCEDURE — 99999 PR PBB SHADOW E&M-EST. PATIENT-LVL IV: ICD-10-PCS | Mod: PBBFAC,,, | Performed by: INTERNAL MEDICINE

## 2023-04-14 PROCEDURE — 99214 OFFICE O/P EST MOD 30 MIN: CPT | Mod: S$GLB,,, | Performed by: INTERNAL MEDICINE

## 2023-08-23 ENCOUNTER — PATIENT MESSAGE (OUTPATIENT)
Dept: SURGERY | Facility: CLINIC | Age: 63
End: 2023-08-23
Payer: COMMERCIAL

## 2023-08-29 ENCOUNTER — HOSPITAL ENCOUNTER (OUTPATIENT)
Dept: RADIOLOGY | Facility: HOSPITAL | Age: 63
Discharge: HOME | End: 2023-08-29
Attending: SURGERY
Payer: COMMERCIAL

## 2023-08-29 ENCOUNTER — OFFICE VISIT (OUTPATIENT)
Dept: SURGERY | Facility: CLINIC | Age: 63
End: 2023-08-29
Payer: COMMERCIAL

## 2023-08-29 VITALS
HEIGHT: 62 IN | DIASTOLIC BLOOD PRESSURE: 64 MMHG | BODY MASS INDEX: 26.21 KG/M2 | HEART RATE: 87 BPM | SYSTOLIC BLOOD PRESSURE: 141 MMHG | WEIGHT: 142.44 LBS | RESPIRATION RATE: 17 BRPM

## 2023-08-29 DIAGNOSIS — R13.19 ESOPHAGEAL DYSPHAGIA: ICD-10-CM

## 2023-08-29 DIAGNOSIS — R13.19 ESOPHAGEAL DYSPHAGIA: Primary | ICD-10-CM

## 2023-08-29 PROCEDURE — A9698 NON-RAD CONTRAST MATERIALNOC: HCPCS | Performed by: SURGERY

## 2023-08-29 PROCEDURE — 99213 OFFICE O/P EST LOW 20 MIN: CPT | Mod: S$GLB,,, | Performed by: SURGERY

## 2023-08-29 PROCEDURE — 25500020 PHARM REV CODE 255: Performed by: SURGERY

## 2023-08-29 PROCEDURE — 99999 PR PBB SHADOW E&M-EST. PATIENT-LVL III: CPT | Mod: PBBFAC,,, | Performed by: SURGERY

## 2023-08-29 PROCEDURE — 99213 PR OFFICE/OUTPT VISIT, EST, LEVL III, 20-29 MIN: ICD-10-PCS | Mod: S$GLB,,, | Performed by: SURGERY

## 2023-08-29 PROCEDURE — 99999 PR PBB SHADOW E&M-EST. PATIENT-LVL III: ICD-10-PCS | Mod: PBBFAC,,, | Performed by: SURGERY

## 2023-08-29 PROCEDURE — 74220 FL ESOPHAGRAM COMPLETE: ICD-10-PCS | Mod: 26,,, | Performed by: INTERNAL MEDICINE

## 2023-08-29 PROCEDURE — 74220 X-RAY XM ESOPHAGUS 1CNTRST: CPT | Mod: TC

## 2023-08-29 PROCEDURE — 74220 X-RAY XM ESOPHAGUS 1CNTRST: CPT | Mod: 26,,, | Performed by: INTERNAL MEDICINE

## 2023-08-29 RX ADMIN — BARIUM SULFATE 150 ML: 0.6 SUSPENSION ORAL at 01:08

## 2023-08-29 NOTE — PROGRESS NOTES
I have seen the patient, reviewed the Resident's history and physical, assessment and plan. I have personally interviewed and examined the patient at bedside and: agree with the findings.     63y/o with anxiety and s/p robotic repair large hh with toupet 10/19/22.  She has developed, globus, mild dysphagia to solids, and mild gerd.  This started a few weeks ago and has largely improved.  She denies diarrhea and constipation.  She says she is eating less, is dieting and has lost 5-10 lb.  She says she is lifting heavy bags.    Her brother  of pancreatic cancer in July.  She works at [employer].     Cbc, cmp reviewed, ok  Ugi 2022 reviewed, films viewed, Postoperative changes of hiatal hernia repair with fundoplication noting small hiatal hernia (normal in size post repair as up to 2 cm is considered normal)    Minor symptoms after hh repair.  Obtain esophagram.

## 2023-08-29 NOTE — PROGRESS NOTES
General Surgery Clinic  Progress Note    SUBJECTIVE:     Chief Complaint: something stuck in her throat     History of Present Illness:  Terese Barney is a 62 y.o. female with past medical history of robotic hiatal hernia repair with Toupet fundoplication 10/19/22 is an established patient of this practice who presents today for follow up visit. She is presenting with recurrence of gerd symptoms that she had before the surgery for the past 2 weeks. She states that she has felt good since her surgery but for the past 2 weeks she has felt symptoms coming back particularly feeling something stuck in her throat. She endorses occasional dysphagia to solids that have occurred the past 2 weeks. She states that this is the first time she is having symptoms since the surgery, the symptoms are gradually getting better currently but she wanted to get it checked out. She is not taking any PPIs currently. She denies chest pain, abdominal pain, shortness of breath, fevers, chills, nausea, vomiting, diarrhea, constipation, early satiety, or postprandial fullness.     GERD Questionnaire     - PPI  - Typical heartburn  - Regurgitation  + Dysphagia solids  - Dysphagia liquids  - Hoarseness  - Sore throat  - Cough  - Asthma  - Chest pain  - Water brash  + Globus  - Nausea  - Vomiting     Eckardt Score (none =0, occ=1, daily=2, every meal=3, weight: 0=0, <5=1, 5-10=2, >10=3)  Dysphagia= 1  Regurgitation= 0  Chest pain= 0  Weight loss (kg)= 1  Total= 2    UGI 12/27/22: Postoperative changes of hiatal hernia repair with fundoplication noting small hiatal hernia    Review of patient's allergies indicates:   Allergen Reactions    Codeine Nausea And Vomiting       Current Outpatient Medications   Medication Sig Dispense Refill    adapalene-benzoyl peroxide (EPIDUO) 0.1-2.5 % GlwP Apply thin layer to clean, dry skin of face nightly 45 g 11    cevimeline (EVOXAC) 30 mg capsule TAKE 1 CAPSULE BY MOUTH 2 TIMES DAILY. 180 capsule 4     DULoxetine (CYMBALTA) 30 MG capsule Take 1 capsule (30 mg total) by mouth once daily. 90 capsule 3    ferrous sulfate (FEOSOL) 325 mg (65 mg iron) Tab tablet Take 1 tablet (325 mg total) by mouth daily with breakfast. 90 tablet 4    LORazepam (ATIVAN) 1 MG tablet TAKE 1/2 TO 1 TABLET ONCE  DAILY AS NEEDED FOR SEVERE ANXIETY OR SLEEP 30 tablet 5    ondansetron (ZOFRAN) 4 MG tablet Take 1 tablet (4 mg total) by mouth 2 (two) times daily. 60 tablet 1    albuterol (PROVENTIL/VENTOLIN HFA) 90 mcg/actuation inhaler Inhale 2 puffs into the lungs every 6 (six) hours as needed for Wheezing or Shortness of Breath. Rescue (Patient not taking: Reported on 2023) 1 Inhaler 0    buPROPion (WELLBUTRIN XL) 150 MG TB24 tablet Take 1 tablet (150 mg total) by mouth daily with breakfast. (Patient not taking: Reported on 2023) 90 tablet 4     No current facility-administered medications for this visit.     Facility-Administered Medications Ordered in Other Visits   Medication Dose Route Frequency Provider Last Rate Last Admin    ceFAZolin in sterile water 2 gram/20 mL IV syringe 2,000 mg  2 g Intravenous On Call Procedure Kellie Webb MD           Past Medical History:   Diagnosis Date    Anxiety and depression 2016    Chronic back pain     Hammer toe of left foot     Insomnia     Motion sickness     Overweight (BMI 25.0-29.9) 2016    PONV (postoperative nausea and vomiting)     Vitamin D deficiency 2016     Past Surgical History:   Procedure Laterality Date    AUGMENTATION OF BREAST Bilateral     BREAST SURGERY Bilateral      SECTION      two    COLONOSCOPY N/A 2020    Procedure: COLONOSCOPY/Suprep;  Surgeon: Cristhian Mancilla MD;  Location: [facility] END[facility];  Service: Endoscopy;  Laterality: N/A;    ESOPHAGEAL MANOMETRY WITH MEASUREMENT OF IMPEDANCE N/A 2022    Procedure: MANOMETRY, ESOPHAGUS, WITH IMPEDANCE MEASUREMENT;  Surgeon: Jose Quijano MD;  Location: [facility] ENDO ([address] [place] [address]);  Service:  Endoscopy;  Laterality: N/A;  8/3 pt not vaccinated; Rapid; instructions to portal-st    ESOPHAGOGASTRODUODENOSCOPY N/A 1/31/2020    Procedure: ESOPHAGOGASTRODUODENOSCOPY (EGD);  Surgeon: Cristhian Mancilla MD;  Location: [facility];  Service: Endoscopy;  Laterality: N/A;    ESOPHAGOGASTRODUODENOSCOPY N/A 3/3/2022    Procedure: EGD (ESOPHAGOGASTRODUODENOSCOPY);  Surgeon: Ward Horvath MD;  Location: [facility];  Service: Endoscopy;  Laterality: N/A;    foot surgery      left plantar fascial release    FOOT SURGERY Left 03/28/2017    2nd TOE, Hammer toe repair    HYSTERECTOMY      INTRALUMINAL GASTROINTESTINAL TRACT IMAGING VIA CAPSULE N/A 3/3/2022    Procedure: IMAGING PROCEDURE, GI TRACT, INTRALUMINAL, VIA CAPSULE;  Surgeon: Ward Horvath MD;  Location: [facility];  Service: Endoscopy;  Laterality: N/A;    laparascopy      ovarian cysts    left knee surgery      ROBOT-ASSISTED REPAIR OF HIATAL HERNIA USING DA HUE XI N/A 10/19/2022    Procedure: XI ROBOTIC REPAIR, HERNIA, HIATAL w/ possible mesh; TOUPET fundoplication;  Surgeon: Graham Back MD;  Location: [address];  Service: General;  Laterality: N/A;  CONSENT IN AM    TONSILLECTOMY      TOTAL ABDOMINAL HYSTERECTOMY W/ BILATERAL SALPINGOOPHORECTOMY  2004     Family History   Problem Relation Age of Onset    Hypertension Mother     Breast cancer Mother     Macular degeneration Father     Diabetes type II Father     Coronary artery disease Father     Hypertension Sister     Diabetes type II Sister     Diabetes type II Brother     Hypertension Brother     Glaucoma Paternal Grandmother      Social History     Tobacco Use    Smoking status: Never    Smokeless tobacco: Never   Substance Use Topics    Alcohol use: Yes     Comment: rare    Drug use: No        Review of Systems:  Negative except as stated in HPI     OBJECTIVE:     Vital Signs (Most Recent)  Pulse: 87 (08/29/23 0747)  Resp: 17 (08/29/23 0747)  BP: (!) 141/64 (08/29/23  "8247)  5' 2" (1.575 m)  64.6 kg (142 lb 6.7 oz)    Physical Exam  Constitutional:       Appearance: Normal appearance.   HENT:      Head: Normocephalic and atraumatic.      Mouth/Throat:      Mouth: Mucous membranes are moist.      Pharynx: Oropharynx is clear.   Cardiovascular:      Rate and Rhythm: Normal rate and regular rhythm.      Heart sounds: Normal heart sounds.   Pulmonary:      Effort: Pulmonary effort is normal. No respiratory distress.   Abdominal:      General: Abdomen is flat. There is no distension.      Palpations: Abdomen is soft.   Neurological:      General: No focal deficit present.      Mental Status: She is alert and oriented to person, place, and time. Mental status is at baseline.   Psychiatric:         Mood and Affect: Mood normal.         Behavior: Behavior normal.       Laboratory  Available labs reviewed.    Diagnostic Results:  Available imaging and diagnostic studies reviewed.    ASSESSMENT/PLAN:     62 y.o. female s/p robotic hiatal hernia repair with Toupet fundoplication 10/19/22. She has had minor recurrence of dysphagia and globus symptoms for past 2 weeks, getting better currently.     PLAN:  - Obtain esophagram to monitor postoperative hiatal hernia changes.   - Return to clinic PRN     Amador Multani MD   [facility]  "

## 2023-09-06 NOTE — ANESTHESIA RELEASE NOTE
"Anesthesia Release from PACU Note    Patient: Terese Barney    Procedure(s) Performed: Procedure(s) (LRB):  REPAIR-HAMMER TOE 2nd toe (Left)    Anesthesia type: general    Post pain: Adequate analgesia    Post assessment: no apparent anesthetic complications    Last Vitals:   Visit Vitals    BP 98/60 (BP Location: Left arm, Patient Position: Lying, BP Method: Automatic)    Pulse 77    Temp 36.5 °C (97.7 °F) (Tympanic)    Resp 18    Ht 5' 1" (1.549 m)    Wt 62.1 kg (137 lb)    SpO2 99%    Breastfeeding No    BMI 25.89 kg/m2       Post vital signs: stable    Level of consciousness: awake    Nausea/Vomiting: no nausea/no vomiting    Complications: none    Airway Patency: patent    Respiratory: unassisted    Cardiovascular: stable    Hydration: euvolemic  " Pt is an 84 y.o. M w/ PMHx HTN, BPH, recent findings of metastatic cancer to lung, liver, and pelvic bone w/ unclear primary source at this point, present with severe generalized weakness ISO electrolyte derangements including hyponatremia and hyperkalemia along w/ GREG, most likely due to pre-renal cause secondary to poor po intake.    Patient continues to have fatigue, poor appetite without n/v. Onc inpatient w/o any further workup. Outpatient appointment with oncology. Uptrending Tbili, although asymptomatic. [facility] will evaluate today.;   Says it would be impossible for him to function at home as he can barely walk on his own from his bed to bathroom.  Pt is an 84 y.o. M w/ PMHx HTN, BPH, recent findings of metastatic cancer to lung, liver, and pelvic bone w/ unclear primary source at this point, present with severe generalized weakness ISO electrolyte derangements including hyponatremia and hyperkalemia along w/ GREG, most likely due to pre-renal cause secondary to poor po intake.    Patient continues to have fatigue, poor appetite without n/v. Onc inpatient w/o any further workup. Outpatient appointment with oncology. Uptrending Tbili, although asymptomatic. [facility] plan for possible biliary stent.   Says it would be impossible for him to function at home as he can barely walk on his own from his bed to bathroom.

## 2023-09-10 DIAGNOSIS — F41.9 ANXIETY AND DEPRESSION: ICD-10-CM

## 2023-09-10 DIAGNOSIS — F32.A ANXIETY AND DEPRESSION: ICD-10-CM

## 2023-09-11 RX ORDER — LORAZEPAM 1 MG/1
TABLET ORAL
Qty: 30 TABLET | Refills: 5 | Status: SHIPPED | OUTPATIENT
Start: 2023-09-11 | End: 2023-10-17

## 2023-09-11 NOTE — TELEPHONE ENCOUNTER
Care Due:                  Date            Visit Type   Department     Provider  --------------------------------------------------------------------------------                                EP -                              PRIMARY      [facility] FAMILY  Last Visit: 12-      CARE ([facility])   MEDICINE       Shanique Potts  Next Visit: None Scheduled  None         None Found                                                            Last  Test          Frequency    Reason                     Performed    Due Date  --------------------------------------------------------------------------------    Office Visit  12 months..  DULoxetine, buPROPion....  12- 12-    Cr..........  12 months..  DULoxetine...............  12- 12-    [facility] Embedded Care Due Messages. Reference number: [id number].   9/10/2023 9:27:05 PM ROBIN

## 2023-10-10 ENCOUNTER — TELEPHONE (OUTPATIENT)
Dept: FAMILY MEDICINE | Facility: CLINIC | Age: 63
End: 2023-10-10
Payer: COMMERCIAL

## 2023-10-10 DIAGNOSIS — Z00.00 ROUTINE GENERAL MEDICAL EXAMINATION AT A HEALTH CARE FACILITY: Primary | ICD-10-CM

## 2023-10-10 NOTE — TELEPHONE ENCOUNTER
----- Message from Amanda Aquino sent at 10/9/2023  3:37 PM CDT -----  Type:  Needs Medical Advice    Who Called:  Pt    Would the patient rather a call back or a response via [facility]?  call  Best Call Back Number:  [phone]  Additional Information:  Pt would like a call back regarding when  needs to see her for her annual appt.

## 2023-10-10 NOTE — TELEPHONE ENCOUNTER
Called pt and was able to tell her her next annual is not until may. Pt stated that she is currently living in [place] and she has a follow up appointment with Dr. Guerrero and wanted her annual around the same time as her appointment with him. I told pt I did not know if her insurance would allow that or if she would be charged. We scheduled her annual appointment and rescheduled her labs to connect to her annual. Pt stated she would call dr. Curiel office to reschedule his appointment.

## 2023-10-13 ENCOUNTER — TELEPHONE (OUTPATIENT)
Dept: FAMILY MEDICINE | Facility: CLINIC | Age: 63
End: 2023-10-13
Payer: COMMERCIAL

## 2023-10-13 DIAGNOSIS — F41.9 ANXIETY AND DEPRESSION: Primary | ICD-10-CM

## 2023-10-13 DIAGNOSIS — G47.00 INSOMNIA, UNSPECIFIED TYPE: ICD-10-CM

## 2023-10-13 DIAGNOSIS — F32.A ANXIETY AND DEPRESSION: Primary | ICD-10-CM

## 2023-10-13 NOTE — TELEPHONE ENCOUNTER
----- Message from Crystal Salgado sent at 10/13/2023  3:07 PM CDT -----  Type:  Pharmacy Calling to Clarify an RX    Name of Caller:Lucrecia   Pharmacy Name:[facility]   Prescription Name:LORazepam (ATIVAN) 1 MG tablet  What do they need to clarify?:Back order   Best Call Back Number: opt#2 ref [id number]  Additional Information:

## 2023-10-14 NOTE — TELEPHONE ENCOUNTER
Please call pharmacy to see if an alternative dose of the Lorazepam like 0.5 mg or 2 mg is available or if they need me to change to an alternative medication like alprazolam(xanax) thanks

## 2023-10-17 ENCOUNTER — TELEPHONE (OUTPATIENT)
Dept: FAMILY MEDICINE | Facility: CLINIC | Age: 63
End: 2023-10-17
Payer: COMMERCIAL

## 2023-10-17 DIAGNOSIS — G47.00 INSOMNIA, UNSPECIFIED TYPE: ICD-10-CM

## 2023-10-17 DIAGNOSIS — F32.A ANXIETY AND DEPRESSION: ICD-10-CM

## 2023-10-17 DIAGNOSIS — F41.9 ANXIETY AND DEPRESSION: ICD-10-CM

## 2023-10-17 RX ORDER — LORAZEPAM 0.5 MG/1
TABLET ORAL
Qty: 60 TABLET | Refills: 5 | Status: SHIPPED | OUTPATIENT
Start: 2023-10-17 | End: 2023-10-18 | Stop reason: SDUPTHER

## 2023-10-17 NOTE — TELEPHONE ENCOUNTER
I called the pharmacy and spoke with Nurys, she stated that for the lorazepam she has the 0.5mg dose and for the alprazolam she has all doses in stock.

## 2023-10-17 NOTE — TELEPHONE ENCOUNTER
----- Message from Elvi Henderson sent at 10/17/2023  3:14 PM CDT -----  Type:  Patient  Call    Who Called:PT /with jackie on the line   Would the patient rather a call back or a response via [facility]? Call back   Best Call Back Number:[phone]  Additional Information: [facility] calling to say the future scripts for LORazepam (ATIVAN) 0.5 MG tablet  it to be sent to  [facility] - [facility] - [address] [facility] AT Portal to Registered [facility] Sites   Phone: [phone]  Fax:  [phone]

## 2023-10-17 NOTE — TELEPHONE ENCOUNTER
Called pt about her message. Pt was confused about the lorazepam being sent to [facility] and not [facility]. She also did not know why it was changed to 0.5 mg. I was able to explain to the pt what happened.    Pt would like the lorazepam to be sent to a new pharmacy. Wants it sent to [facility]. Pt also stated that she would like the 1mg dose as well. I explained to the pt that the 1mg is on back order and pt stated she understood but would like the 1mg sent anyway.

## 2023-10-18 ENCOUNTER — PATIENT MESSAGE (OUTPATIENT)
Dept: HEMATOLOGY/ONCOLOGY | Facility: CLINIC | Age: 63
End: 2023-10-18
Payer: COMMERCIAL

## 2023-10-18 RX ORDER — LORAZEPAM 0.5 MG/1
TABLET ORAL
Qty: 60 TABLET | Refills: 5 | Status: SHIPPED | OUTPATIENT
Start: 2023-10-18 | End: 2023-11-10 | Stop reason: SDUPTHER

## 2023-10-18 NOTE — TELEPHONE ENCOUNTER
We can switch her back to the 1 mg down the line, but she just can not have 2 active benzodiazepine prescriptions for lorazepam at once as it is a controlled substance

## 2023-10-18 NOTE — TELEPHONE ENCOUNTER
I sent lorazepam to [facility] but sent it as the 0.5 mg dose, I can not also send the 1 at the same time-she can not have to prescriptions for lorazepam active at the same time.

## 2023-10-18 NOTE — TELEPHONE ENCOUNTER
"Called pt and told her that Dr. Potts said:    "I sent lorazepam to [facility] but sent it as the 0.5 mg dose, I can not also send the 1 at the same time-she can not have to prescriptions for lorazepam active at the same time."    Pt asked if she would be on the 0.5 mg from now on or will she be switched back to the 1mg once it is no longer on back order.  "

## 2023-10-19 NOTE — TELEPHONE ENCOUNTER
"Called pt and told her that Dr. Potts said:    "We can switch her back to the 1 mg down the line, but she just can not have 2 active benzodiazepine prescriptions for lorazepam at once as it is a controlled substance "    Pt thanked me and said ok  "

## 2023-11-03 ENCOUNTER — LAB VISIT (OUTPATIENT)
Dept: LAB | Facility: HOSPITAL | Age: 63
End: 2023-11-03
Attending: INTERNAL MEDICINE
Payer: COMMERCIAL

## 2023-11-03 DIAGNOSIS — D50.0 IRON DEFICIENCY ANEMIA DUE TO CHRONIC BLOOD LOSS: ICD-10-CM

## 2023-11-03 LAB
BASOPHILS # BLD AUTO: 0.05 K/UL (ref 0–0.2)
BASOPHILS NFR BLD: 1.1 % (ref 0–1.9)
DIFFERENTIAL METHOD: ABNORMAL
EOSINOPHIL # BLD AUTO: 0.2 K/UL (ref 0–0.5)
EOSINOPHIL NFR BLD: 4.4 % (ref 0–8)
ERYTHROCYTE [DISTWIDTH] IN BLOOD BY AUTOMATED COUNT: 11.8 % (ref 11.5–14.5)
FERRITIN SERPL-MCNC: 174 NG/ML (ref 20–300)
HCT VFR BLD AUTO: 42.4 % (ref 37–48.5)
HGB BLD-MCNC: 13.7 G/DL (ref 12–16)
IMM GRANULOCYTES # BLD AUTO: 0.01 K/UL (ref 0–0.04)
IMM GRANULOCYTES NFR BLD AUTO: 0.2 % (ref 0–0.5)
IRON SERPL-MCNC: 163 UG/DL (ref 30–160)
LYMPHOCYTES # BLD AUTO: 1.4 K/UL (ref 1–4.8)
LYMPHOCYTES NFR BLD: 30.1 % (ref 18–48)
MCH RBC QN AUTO: 32.2 PG (ref 27–31)
MCHC RBC AUTO-ENTMCNC: 32.3 G/DL (ref 32–36)
MCV RBC AUTO: 100 FL (ref 82–98)
MONOCYTES # BLD AUTO: 0.5 K/UL (ref 0.3–1)
MONOCYTES NFR BLD: 11.1 % (ref 4–15)
NEUTROPHILS # BLD AUTO: 2.4 K/UL (ref 1.8–7.7)
NEUTROPHILS NFR BLD: 53.1 % (ref 38–73)
NRBC BLD-RTO: 0 /100 WBC
PLATELET # BLD AUTO: 296 K/UL (ref 150–450)
PMV BLD AUTO: 9.6 FL (ref 9.2–12.9)
RBC # BLD AUTO: 4.26 M/UL (ref 4–5.4)
SATURATED IRON: 48 % (ref 20–50)
TOTAL IRON BINDING CAPACITY: 339 UG/DL (ref 250–450)
TRANSFERRIN SERPL-MCNC: 229 MG/DL (ref 200–375)
WBC # BLD AUTO: 4.58 K/UL (ref 3.9–12.7)

## 2023-11-03 PROCEDURE — 85025 COMPLETE CBC W/AUTO DIFF WBC: CPT | Mod: PN | Performed by: INTERNAL MEDICINE

## 2023-11-03 PROCEDURE — 36415 COLL VENOUS BLD VENIPUNCTURE: CPT | Mod: PN | Performed by: INTERNAL MEDICINE

## 2023-11-03 PROCEDURE — 82728 ASSAY OF FERRITIN: CPT | Performed by: INTERNAL MEDICINE

## 2023-11-03 PROCEDURE — 84466 ASSAY OF TRANSFERRIN: CPT | Mod: PN | Performed by: INTERNAL MEDICINE

## 2023-11-04 NOTE — PROGRESS NOTES
"PATIENT: Terese Barney  MRN: [id number]  DATE: 11/6/2023    Diagnosis:   1. Iron deficiency anemia due to chronic blood loss    2. Chronic pain syndrome      Chief Complaint: Anemia    Telemedicine visit:  The patient location is: home  The chief complaint leading to consultation is: iron deficiency anemia    Visit type: audiovisual    Face to Face time with patient: 10 minutes  15 minutes of total time spent on the encounter, which includes face to face time and non-face to face time preparing to see the patient (eg, review of tests), Obtaining and/or reviewing separately obtained history, Documenting clinical information in the electronic or other health record, Independently interpreting results (not separately reported) and communicating results to the patient/family/caregiver, or Care coordination (not separately reported).         Each patient to whom he or she provides medical services by telemedicine is:  (1) informed of the relationship between the physician and patient and the respective role of any other health care provider with respect to management of the patient; and (2) notified that he or she may decline to receive medical services by telemedicine and may withdraw from such care at any time.    Notes: see below    Subjective:    History of Present Illness: Ms. Barney is a 63 y.o. female who presented in February 2020 for evaluation and management of iron deficiency anemia due to chronic blood loss. She was referred by Dr. Potts. I had seen her during a hospitalization in late January 2020.    Information from my consult note dated 1/31/20:  "Iron deficiency anemia due to chronic blood loss  - I have reviewed her labs chart.  - in January 2019, her hemoglobin was normal. Hemoglobin levels for past few days have revealed a severe microcytic anemia.  - ferritin level is decreased at 3 ng/mL, consistent with severe iron deficiency anemia.  - although stool was negative for occult blood, she reports " "black stools.  - agree with gastroenterology workup for source of bleeding.  - I will give a dose of iron sucrose while she is hospitalized. I will also start oral ferrous sulfate.  - I will schedule a follow-up appointment in clinic and arrange for outpatient ferric carboxymaltose."    - she underwent upper GI endoscopy on 20 and colonoscopy on 20.  - she received iron sucrose x 4 doses in 2022.  - she received iron sucrose x 4 doses in 2022.      Interval history:  - she presents for a follow-up appointment for her iron deficiency anemia due to chronic blood loss.  - today, she endorses fatigue. She recently resumed her oral iron. She denies chest pain, nausea, vomiting, diarrhea, constipation.    Past medical, surgical, family, and social histories have been reviewed and updated below.    Past Medical History:   Past Medical History:   Diagnosis Date    Anxiety and depression 2016    Chronic back pain     Hammer toe of left foot     Insomnia     Motion sickness     Overweight (BMI 25.0-29.9) 2016    PONV (postoperative nausea and vomiting)     Vitamin D deficiency 2016       Past Surgical History:   Past Surgical History:   Procedure Laterality Date    AUGMENTATION OF BREAST Bilateral     BREAST SURGERY Bilateral      SECTION      two    COLONOSCOPY N/A 2020    Procedure: COLONOSCOPY/Suprep;  Surgeon: Cristhian Mancilla MD;  Location: [facility];  Service: Endoscopy;  Laterality: N/A;    ESOPHAGEAL MANOMETRY WITH MEASUREMENT OF IMPEDANCE N/A 2022    Procedure: MANOMETRY, ESOPHAGUS, WITH IMPEDANCE MEASUREMENT;  Surgeon: Jose Quijano MD;  Location: [facility] ([address] [place] [address];  Service: Endoscopy;  Laterality: N/A;  8/3 pt not vaccinated; Rapid; instructions to portal-st    ESOPHAGOGASTRODUODENOSCOPY N/A 2020    Procedure: ESOPHAGOGASTRODUODENOSCOPY (EGD);  Surgeon: Cristhian Mancilla MD;  Location: [facility];  Service: Endoscopy;  Laterality: " N/A;    ESOPHAGOGASTRODUODENOSCOPY N/A 3/3/2022    Procedure: EGD (ESOPHAGOGASTRODUODENOSCOPY);  Surgeon: Ward Horvath MD;  Location: [facility] ENDO;  Service: Endoscopy;  Laterality: N/A;    foot surgery      left plantar fascial release    FOOT SURGERY Left 03/28/2017    2nd TOE, Hammer toe repair    HYSTERECTOMY      INTRALUMINAL GASTROINTESTINAL TRACT IMAGING VIA CAPSULE N/A 3/3/2022    Procedure: IMAGING PROCEDURE, GI TRACT, INTRALUMINAL, VIA CAPSULE;  Surgeon: Ward Horvath MD;  Location: [facility] ENDO;  Service: Endoscopy;  Laterality: N/A;    laparascopy      ovarian cysts    left knee surgery      ROBOT-ASSISTED REPAIR OF HIATAL HERNIA USING DA HUE XI N/A 10/19/2022    Procedure: XI ROBOTIC REPAIR, HERNIA, HIATAL w/ possible mesh; TOUPET fundoplication;  Surgeon: Graham Back MD;  Location: [address];  Service: General;  Laterality: N/A;  CONSENT IN AM    TONSILLECTOMY      TOTAL ABDOMINAL HYSTERECTOMY W/ BILATERAL SALPINGOOPHORECTOMY  2004       Family History:   Family History   Problem Relation Age of Onset    Hypertension Mother     Breast cancer Mother     Macular degeneration Father     Diabetes type II Father     Coronary artery disease Father     Hypertension Sister     Diabetes type II Sister     Diabetes type II Brother     Hypertension Brother     Glaucoma Paternal Grandmother        Social History:  reports that she has never smoked. She has never used smokeless tobacco. She reports current alcohol use. She reports that she does not use drugs.    Allergies:  Review of patient's allergies indicates:   Allergen Reactions    Codeine Nausea And Vomiting       Medications:  Current Outpatient Medications   Medication Sig Dispense Refill    adapalene-benzoyl peroxide (EPIDUO) 0.1-2.5 % GlwP Apply thin layer to clean, dry skin of face nightly 45 g 11    albuterol (PROVENTIL/VENTOLIN HFA) 90 mcg/actuation inhaler Inhale 2 puffs into the lungs every 6 (six) hours as needed  for Wheezing or Shortness of Breath. Rescue (Patient not taking: Reported on 8/29/2023) 1 Inhaler 0    buPROPion (WELLBUTRIN XL) 150 MG TB24 tablet Take 1 tablet (150 mg total) by mouth daily with breakfast. (Patient not taking: Reported on 8/29/2023) 90 tablet 4    cevimeline (EVOXAC) 30 mg capsule TAKE 1 CAPSULE BY MOUTH 2 TIMES DAILY. 180 capsule 4    DULoxetine (CYMBALTA) 30 MG capsule Take 1 capsule (30 mg total) by mouth once daily. 90 capsule 3    ferrous sulfate (FEOSOL) 325 mg (65 mg iron) Tab tablet Take 1 tablet (325 mg total) by mouth daily with breakfast. 90 tablet 4    LORazepam (ATIVAN) 0.5 MG tablet Take 1-2 tablets daily as needed for increased anxiety or insomnia 60 tablet 5    ondansetron (ZOFRAN) 4 MG tablet Take 1 tablet (4 mg total) by mouth 2 (two) times daily. 60 tablet 1     No current facility-administered medications for this visit.     Facility-Administered Medications Ordered in Other Visits   Medication Dose Route Frequency Provider Last Rate Last Admin    ceFAZolin in sterile water 2 gram/20 mL IV syringe 2,000 mg  2 g Intravenous On Call Procedure Kellie Webb MD           Review of Systems   Constitutional:  Positive for fatigue. Negative for appetite change and unexpected weight change.   HENT:  Negative for mouth sores and sore throat.    Eyes:  Negative for visual disturbance.   Respiratory:  Negative for cough and shortness of breath.    Cardiovascular:  Negative for chest pain.   Gastrointestinal:  Negative for abdominal pain, constipation, diarrhea, nausea and vomiting.   Genitourinary:  Negative for dysuria and frequency.   Musculoskeletal:  Negative for back pain.   Skin:  Negative for rash.   Neurological:  Negative for headaches.   Hematological:  Negative for adenopathy.   Psychiatric/Behavioral:  The patient is not nervous/anxious.        ECOG Performance Status:   ECOG SCORE 1       Objective:      Vitals:   There were no vitals filed for this visit.    BMI: There is  no height or weight on file to calculate BMI.  Deferred due to telemedicine visit.      Physical Exam  Deferred due to telemedicine visit.        Laboratory Data:  Labs have been reviewed.    Lab Results   Component Value Date    WBC 4.58 11/03/2023    HGB 13.7 11/03/2023    HCT 42.4 11/03/2023     (H) 11/03/2023     11/03/2023           Imaging:     Colonoscopy (2/4/20):  - One 6 mm polyp in the rectum, removed with a hot snare. Resected and retrieved.  - Diverticulosis in the sigmoid colon.    Upper GI endoscopy (1/31/20):  - Esophageal plaques were found, consistent with candidiasis.  - 4 cm hiatal hernia.  - Non-bleeding gastric ulcers with no stigmata of bleeding. Biopsied.  - Normal examined duodenum.    Assessment:       1. Iron deficiency anemia due to chronic blood loss    2. Chronic pain syndrome         Plan:     1. Iron deficiency anemia due to chronic blood loss  - I have reviewed her labs chart.  - in January 2019, her hemoglobin was normal. Hemoglobin levels for past few days have revealed a severe microcytic anemia.  - ferritin level is decreased at 3 ng/mL, consistent with severe iron deficiency anemia.  - although stool was negative for occult blood, she reported black stools.  - I gave a dose of iron sucrose while she was hospitalized on 1/31/20.  - I recommended ferric carboxymaltose x 2 doses.  - she had subsequent improvement in her iron deficiency anemia after ferric carboxymaltose  - labs on 1/28/22 reveal recurrent iron deficiency anemia  - I recommended iron sucrose x 4 dose  - she received iron sucrose x 4 doses in February/March 2022.  - she received iron sucrose x 4 doses in August/September 2022.  - continue ferrous sulfate.  - Labs have been reviewed. Hemoglobin is normal at 13.7 g/dL. Ferritin decreased from 232 ng/mL to 174 ng/mL.  - She recently resumed her oral iron. Continue for now.  - repeat labs in 6 months.  - return to clinic in 12 months with repeat iron  studies.    2. Chronic pain syndrome  - no acute issues  - continue to monitor    3. Advance Care Planning     Power of   After our discussion (at previous visit), the patient decided to complete a HCPOA and appointed her  mother Chelsey Morris ([phone]) and sister Trice Beasley ([phone]) .        - repeat labs in 6 months.  - return to clinic in 12 months with repeat iron studies.    Jsoe Luis Guerrero M.D.  Hematology/Oncology  [facility] - [address], [address]  [place] [address]  Phone: [phone]  Fax: [phone]

## 2023-11-06 ENCOUNTER — OFFICE VISIT (OUTPATIENT)
Dept: HEMATOLOGY/ONCOLOGY | Facility: CLINIC | Age: 63
End: 2023-11-06
Payer: COMMERCIAL

## 2023-11-06 DIAGNOSIS — G89.4 CHRONIC PAIN SYNDROME: ICD-10-CM

## 2023-11-06 DIAGNOSIS — D50.0 IRON DEFICIENCY ANEMIA DUE TO CHRONIC BLOOD LOSS: Primary | ICD-10-CM

## 2023-11-06 PROCEDURE — 99213 PR OFFICE/OUTPT VISIT, EST, LEVL III, 20-29 MIN: ICD-10-PCS | Mod: 95,,, | Performed by: INTERNAL MEDICINE

## 2023-11-06 PROCEDURE — 99213 OFFICE O/P EST LOW 20 MIN: CPT | Mod: 95,,, | Performed by: INTERNAL MEDICINE

## 2023-11-06 NOTE — Clinical Note
1. Schedule labs cbc, ferritin, iron/tibc on 5/11/24 (she has labs scheduled on that date). 2. Schedule labs cbc, ferritin, iron/tibc in 1 year. 3. Schedule virtual visit after labs in 1 year.  Thanks!

## 2023-11-09 ENCOUNTER — PATIENT MESSAGE (OUTPATIENT)
Dept: FAMILY MEDICINE | Facility: CLINIC | Age: 63
End: 2023-11-09
Payer: COMMERCIAL

## 2023-11-09 DIAGNOSIS — F32.A ANXIETY AND DEPRESSION: ICD-10-CM

## 2023-11-09 DIAGNOSIS — G47.00 INSOMNIA, UNSPECIFIED TYPE: ICD-10-CM

## 2023-11-09 DIAGNOSIS — F41.9 ANXIETY AND DEPRESSION: ICD-10-CM

## 2023-11-09 NOTE — TELEPHONE ENCOUNTER
----- Message from Floresita Beal sent at 11/9/2023  2:32 PM CST -----  Type:  Needs Medical Advice/medication     Who Called: pt    Would the patient rather a call back or a response via [facility]? call  Best Call Back Number: [phone]  Additional Information: pt requesting a call back to discuss medication

## 2023-11-10 ENCOUNTER — TELEPHONE (OUTPATIENT)
Dept: FAMILY MEDICINE | Facility: CLINIC | Age: 63
End: 2023-11-10
Payer: COMMERCIAL

## 2023-11-10 RX ORDER — LORAZEPAM 0.5 MG/1
TABLET ORAL
Qty: 60 TABLET | Refills: 5 | Status: SHIPPED | OUTPATIENT
Start: 2023-11-10 | End: 2023-12-04 | Stop reason: SDUPTHER

## 2023-11-10 NOTE — TELEPHONE ENCOUNTER
Lorazapam cannot be reordered because it was also sent to [facility] and she has not gotten anything from [facility] and pt is leaving out of town Monday and does not have enough to last.

## 2023-11-10 NOTE — TELEPHONE ENCOUNTER
Care Due:                  Date            Visit Type   Department     Provider  --------------------------------------------------------------------------------                                EP -                              [facility]  Last Visit: 12-      CARE ([facility])   RUBEN Potts                               -                              [facility]  Next Visit: 05-      CARE ([facility])   RUBEN Potts                                                            Last  Test          Frequency    Reason                     Performed    Due Date  --------------------------------------------------------------------------------    Office Visit  12 months..  cevimeline...............  12- 12-    Cr..........  12 months..  DULoxetine...............  12- 12-    [facility] Embedded Care Due Messages. Reference number: [id number].   11/10/2023 2:43:08 PM CST

## 2023-12-04 DIAGNOSIS — F32.A ANXIETY AND DEPRESSION: ICD-10-CM

## 2023-12-04 DIAGNOSIS — F41.9 ANXIETY AND DEPRESSION: ICD-10-CM

## 2023-12-04 DIAGNOSIS — G47.00 INSOMNIA, UNSPECIFIED TYPE: ICD-10-CM

## 2023-12-05 RX ORDER — LORAZEPAM 0.5 MG/1
TABLET ORAL
Qty: 60 TABLET | Refills: 5 | Status: SHIPPED | OUTPATIENT
Start: 2023-12-05

## 2023-12-05 RX ORDER — DULOXETIN HYDROCHLORIDE 30 MG/1
30 CAPSULE, DELAYED RELEASE ORAL DAILY
Qty: 90 CAPSULE | Refills: 3 | Status: SHIPPED | OUTPATIENT
Start: 2023-12-05 | End: 2024-12-04

## 2023-12-05 RX ORDER — CEVIMELINE HYDROCHLORIDE 30 MG/1
30 CAPSULE ORAL 2 TIMES DAILY
Qty: 180 CAPSULE | Refills: 4 | Status: SHIPPED | OUTPATIENT
Start: 2023-12-05

## 2023-12-05 NOTE — TELEPHONE ENCOUNTER
No care due was identified.  [facility] Embedded Care Due Messages. Reference number: [id number].   12/04/2023 11:40:47 PM CST

## 2023-12-05 NOTE — TELEPHONE ENCOUNTER
No care due was identified.  [facility] Embedded Care Due Messages. Reference number: [id number].   12/04/2023 11:43:13 PM CST

## 2024-02-12 ENCOUNTER — PATIENT MESSAGE (OUTPATIENT)
Dept: ADMINISTRATIVE | Facility: HOSPITAL | Age: 64
End: 2024-02-12
Payer: COMMERCIAL

## 2024-02-12 ENCOUNTER — PATIENT MESSAGE (OUTPATIENT)
Dept: FAMILY MEDICINE | Facility: CLINIC | Age: 64
End: 2024-02-12
Payer: COMMERCIAL

## 2024-02-12 DIAGNOSIS — Z12.31 SCREENING MAMMOGRAM FOR BREAST CANCER: Primary | ICD-10-CM

## 2024-02-13 NOTE — TELEPHONE ENCOUNTER
I placed orders for a mammogram and then she has labs ordered 10/10/2023-- please schedule those for prior to a physical with me thanks

## 2024-02-19 ENCOUNTER — PATIENT OUTREACH (OUTPATIENT)
Dept: ADMINISTRATIVE | Facility: HOSPITAL | Age: 64
End: 2024-02-19
Payer: COMMERCIAL

## 2024-02-19 NOTE — PROGRESS NOTES
Care Everywhere updates requested and reviewed.  Immunizations reconciled. Media reports reviewed.  Duplicate HM overrides and  orders removed.  Overdue HM topic chart audit and/or requested.       Health Maintenance Due   Topic Date Due    COVID-19 Vaccine (1) Never done    Shingles Vaccine (1 of 2) Never done    RSV Vaccine (Age 60+ and Pregnant patients) (1 - 1-dose 60+ series) Never done    Influenza Vaccine (1) 2023    Mammogram  2024

## 2024-03-13 ENCOUNTER — PATIENT MESSAGE (OUTPATIENT)
Dept: FAMILY MEDICINE | Facility: CLINIC | Age: 64
End: 2024-03-13
Payer: COMMERCIAL

## 2024-03-22 ENCOUNTER — HOSPITAL ENCOUNTER (OUTPATIENT)
Dept: RADIOLOGY | Facility: HOSPITAL | Age: 64
Discharge: HOME | End: 2024-03-22
Attending: FAMILY MEDICINE
Payer: COMMERCIAL

## 2024-03-22 DIAGNOSIS — Z12.31 SCREENING MAMMOGRAM FOR BREAST CANCER: ICD-10-CM

## 2024-03-22 PROCEDURE — 77063 BREAST TOMOSYNTHESIS BI: CPT | Mod: TC

## 2024-03-22 PROCEDURE — 77063 BREAST TOMOSYNTHESIS BI: CPT | Mod: 26,,, | Performed by: RADIOLOGY

## 2024-03-22 PROCEDURE — 77067 SCR MAMMO BI INCL CAD: CPT | Mod: 26,,, | Performed by: RADIOLOGY

## 2024-03-25 ENCOUNTER — PATIENT MESSAGE (OUTPATIENT)
Dept: FAMILY MEDICINE | Facility: CLINIC | Age: 64
End: 2024-03-25
Payer: COMMERCIAL

## 2024-03-25 ENCOUNTER — LAB VISIT (OUTPATIENT)
Dept: LAB | Facility: HOSPITAL | Age: 64
End: 2024-03-25
Attending: INTERNAL MEDICINE
Payer: COMMERCIAL

## 2024-03-25 DIAGNOSIS — D50.0 IRON DEFICIENCY ANEMIA DUE TO CHRONIC BLOOD LOSS: ICD-10-CM

## 2024-03-25 DIAGNOSIS — Z00.00 ROUTINE GENERAL MEDICAL EXAMINATION AT A HEALTH CARE FACILITY: ICD-10-CM

## 2024-03-25 LAB
25(OH)D3+25(OH)D2 SERPL-MCNC: 21 NG/ML (ref 30–96)
ALBUMIN SERPL BCP-MCNC: 4.3 G/DL (ref 3.5–5.2)
ALP SERPL-CCNC: 72 U/L (ref 38–126)
ALT SERPL W/O P-5'-P-CCNC: 12 U/L (ref 10–44)
ANION GAP SERPL CALC-SCNC: 11 MMOL/L (ref 8–16)
AST SERPL-CCNC: 24 U/L (ref 15–46)
BASOPHILS # BLD AUTO: 0.03 K/UL (ref 0–0.2)
BASOPHILS NFR BLD: 0.5 % (ref 0–1.9)
BILIRUB SERPL-MCNC: 1 MG/DL (ref 0.1–1)
CALCIUM SERPL-MCNC: 9.4 MG/DL (ref 8.7–10.5)
CHLORIDE SERPL-SCNC: 107 MMOL/L (ref 95–110)
CHOLEST SERPL-MCNC: 193 MG/DL (ref 120–199)
CHOLEST/HDLC SERPL: 3.2 {RATIO} (ref 2–5)
CO2 SERPL-SCNC: 24 MMOL/L (ref 23–29)
CREAT SERPL-MCNC: 0.76 MG/DL (ref 0.5–1.4)
DIFFERENTIAL METHOD BLD: ABNORMAL
EOSINOPHIL # BLD AUTO: 0.2 K/UL (ref 0–0.5)
EOSINOPHIL NFR BLD: 2.9 % (ref 0–8)
ERYTHROCYTE [DISTWIDTH] IN BLOOD BY AUTOMATED COUNT: 11.9 % (ref 11.5–14.5)
EST. GFR  (NO RACE VARIABLE): >60 ML/MIN/1.73 M^2
ESTIMATED AVG GLUCOSE: 105 MG/DL (ref 68–131)
FERRITIN SERPL-MCNC: 214 NG/ML (ref 20–300)
GLUCOSE SERPL-MCNC: 102 MG/DL (ref 70–110)
HBA1C MFR BLD: 5.3 % (ref 4–5.6)
HCT VFR BLD AUTO: 41.9 % (ref 37–48.5)
HDLC SERPL-MCNC: 61 MG/DL (ref 40–75)
HDLC SERPL: 31.6 % (ref 20–50)
HGB BLD-MCNC: 14.2 G/DL (ref 12–16)
IMM GRANULOCYTES # BLD AUTO: 0.01 K/UL (ref 0–0.04)
IMM GRANULOCYTES NFR BLD AUTO: 0.2 % (ref 0–0.5)
IRON SERPL-MCNC: 150 UG/DL (ref 30–160)
LDLC SERPL CALC-MCNC: 109.6 MG/DL (ref 63–159)
LYMPHOCYTES # BLD AUTO: 1.2 K/UL (ref 1–4.8)
LYMPHOCYTES NFR BLD: 22.1 % (ref 18–48)
MCH RBC QN AUTO: 32.8 PG (ref 27–31)
MCHC RBC AUTO-ENTMCNC: 33.9 G/DL (ref 32–36)
MCV RBC AUTO: 97 FL (ref 82–98)
MONOCYTES # BLD AUTO: 0.5 K/UL (ref 0.3–1)
MONOCYTES NFR BLD: 9.2 % (ref 4–15)
NEUTROPHILS # BLD AUTO: 3.6 K/UL (ref 1.8–7.7)
NEUTROPHILS NFR BLD: 65.1 % (ref 38–73)
NONHDLC SERPL-MCNC: 132 MG/DL
NRBC BLD-RTO: 0 /100 WBC
PLATELET # BLD AUTO: 286 K/UL (ref 150–450)
PMV BLD AUTO: 9.5 FL (ref 9.2–12.9)
POTASSIUM SERPL-SCNC: 4.3 MMOL/L (ref 3.5–5.1)
PROT SERPL-MCNC: 7.4 G/DL (ref 6–8.4)
RBC # BLD AUTO: 4.33 M/UL (ref 4–5.4)
SATURATED IRON: 45 % (ref 20–50)
SODIUM SERPL-SCNC: 142 MMOL/L (ref 136–145)
TOTAL IRON BINDING CAPACITY: 333 UG/DL (ref 250–450)
TRANSFERRIN SERPL-MCNC: 225 MG/DL (ref 200–375)
TRIGL SERPL-MCNC: 112 MG/DL (ref 30–150)
TSH SERPL DL<=0.005 MIU/L-ACNC: 2.09 UIU/ML (ref 0.4–4)
UUN UR-MCNC: 20 MG/DL (ref 7–17)
WBC # BLD AUTO: 5.52 K/UL (ref 3.9–12.7)

## 2024-03-25 PROCEDURE — 36415 COLL VENOUS BLD VENIPUNCTURE: CPT | Mod: PN | Performed by: FAMILY MEDICINE

## 2024-03-25 PROCEDURE — 83036 HEMOGLOBIN GLYCOSYLATED A1C: CPT | Performed by: FAMILY MEDICINE

## 2024-03-25 PROCEDURE — 84443 ASSAY THYROID STIM HORMONE: CPT | Mod: PN | Performed by: FAMILY MEDICINE

## 2024-03-25 PROCEDURE — 80053 COMPREHEN METABOLIC PANEL: CPT | Mod: PN | Performed by: FAMILY MEDICINE

## 2024-03-25 PROCEDURE — 83540 ASSAY OF IRON: CPT | Mod: PN | Performed by: INTERNAL MEDICINE

## 2024-03-25 PROCEDURE — 82306 VITAMIN D 25 HYDROXY: CPT | Mod: PN | Performed by: FAMILY MEDICINE

## 2024-03-25 PROCEDURE — 82728 ASSAY OF FERRITIN: CPT | Performed by: INTERNAL MEDICINE

## 2024-03-25 PROCEDURE — 85025 COMPLETE CBC W/AUTO DIFF WBC: CPT | Mod: PN | Performed by: INTERNAL MEDICINE

## 2024-03-25 PROCEDURE — 80061 LIPID PANEL: CPT | Performed by: FAMILY MEDICINE

## 2024-04-25 ENCOUNTER — TELEPHONE (OUTPATIENT)
Dept: FAMILY MEDICINE | Facility: CLINIC | Age: 64
End: 2024-04-25
Payer: COMMERCIAL

## 2024-04-25 NOTE — TELEPHONE ENCOUNTER
----- Message from Vesta Reid sent at 4/25/2024  2:27 PM CDT -----  Pt Requesting to Reschedule an Appointment     Pt is requesting to Reschedule an appointment that our scheduling dept cannot Reschedule.    Who called: pt  Initial appt date: 5/13/24  When pt wants appt: 4/25/24, 4/26/24, or 4/29/24  Reason: pt said she [place] out of state and is coming in town now  Best call back #: [phone]  Additional notes:  I searched and there were no availably appt dates that pt preferred

## 2024-04-25 NOTE — TELEPHONE ENCOUNTER
Spoke to pt and informed her that Dr. Potts did not have any Annual appts for today, tomorrow or Monday. Pt stated that she will keep her appt.

## 2024-05-12 NOTE — PROGRESS NOTES
Office Visit    Patient Name: Terese Barney    : 1960  MRN: [id number]    Subjective:  Terese is a 63 y.o. female who presents today for:    Annual Exam    Prior Physical w/ Me 22  Most Recent OV w/ Me 22 Fatigue    Ms. Barney is a 63  year old patient of mine with osteoarthritis of her left knee s/p meniscus surgery per ortho Dr Barriga and now following with Ortho Dr Lilly who performed a partial left knee replacement, H/O recurrent Fe deficiency anemia evaluated by Heme/Onc and GI, anxiety/depression, chronic low back pain who presents for annual physical with lab review.   She was hospitalized at the end of 2020 for Fe deficiency anemia. Had SOB, palpitations, intermittent dark stools, and excessive fatigue for 3-4 weeks. Hb was as low as 5.4.  Endoscopy revealed a hiatal hernia and ulcer with no active bleeding. Colonoscopy (-) FOR BLEEDING +TUBULAR ADENOMA. Given 2 units while in the hospital.   SHE RECEIVED IV IRON INFUSIONS WITH SUBSEQUENT RESOLUTION OF HER IRON DEFICIENCY ANEMIA.  SHE TRIED TO REMAIN OFF OF NSAIDS GIVEN HISTORY OF STOMACH ULCER, but currently on some PRN Meloxicam for shoulder pain.     Labs 22 showed recurrent evidence of iron deficiency anemia and she followed up with heme/onc Dr Guerrero who ordered 2 more IV iron infusions-- completed  and 22.   Followed up with GI dr Horvath-- had EGD 3/3/22 showing normal esophagus, 7 cm hiatal hernia, Normal examined duodenum, and successful deployment of video capsule.   Capsule endoscopy showed Localized erythematous mucosa without active bleeding and with stigmata of bleeding was found in the duodenum.  She is s/p robotic hiatal hernia repair with Toupet fundoplication 10/19/22 per Dr Back with unremarkable Esophagram 23.       Most recently seen by Heme/Onc Dr Guerrero 23. She is s/p 2 rounds of IV iron and is currently on an oral iron supplement inconsistently.   LABS 3/25/24 w/ NORMAL  FERRITIN  & HCT 41.9, NORMAL CMP/TSH 2.0 & A1C 5.3 W/ . VITAMIN D PERSISTENTLY LOW AT 21.     Mood stable on Cymbalta and with ativan prn for sleep.   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   In a court case regarding the fall that flared her knee pain in the first place.   Most recent appointment with Ortho Dr Lilly 24 -- planning to do a Left total knee replacement due to ongoing pain and hardware concerns.   She has had some family stress-- her dad  in 2020. Mom is my patient Chelsey Morris who is of advanced age & has chronic conditions, her brother passed away of pancreatic cancer.  Relationship issues too-- has severe  vaginal dryness and dyspareunia interfering with her desired intimacy.    She is postmenopausal since BRUNO/BSO in  and no longer needs paps. Previously was on HRT but has not been on HRT in several years. Had previous side effects from HRT.      General lifestyle habits are as follows:    Diet: fair-- struggling to cook consistently especially since it is just for herself, trying to drink more water  Exercise: poor-  trying to use treadmill more consistently and ride a bike, also now has a rowing machine  Sleep: good -sleeps well nightly with use of Ativan.  This is working better than Ambien.     Weight:  STABLE SINCE  AT BMI OF 27     Immunizations: TDaP , yearly influenza 22, Seasonal COVID-19 vaccine advised, SHINGRIX vaccine advised, Pneumovax 23 20     Screening Tests: colonoscopy 20--> Tubular adenoma and REPEAT 5 YEARS (2025) & EGD 3/3/22, yearly mammogram 3/22/24- Repeat 1 year, DEXA 22-- OSTEOPENIA & REPEAT 3 YEARS (2025), s/p BRUNO/BSO & no longer needs paps, hep C (-) 2019, HIV (-) 20     Eye/Dental:  Eye exam up to date-[facility] exam, dental  UTD    PAST MEDICAL HISTORY, SURGICAL/SOCIAL/FAMILY HISTORY REVIEWED AS PER CHART, WITH PERTINENT FINDINGS INCLUDED IN HISTORY SECTION OF NOTE.     Current Medications    Medication List with Changes/Refills   New Medications    CHOLECALCIFEROL, VITAMIN D3, 125 MCG (5,000 UNIT) CAPSULE    Take 1 capsule (5,000 Units total) by mouth daily with breakfast.    LORAZEPAM (ATIVAN) 1 MG TABLET    Take 1 tablet (1 mg total) by mouth once daily.   Current Medications    ADAPALENE-BENZOYL PEROXIDE (EPIDUO) 0.1-2.5 % GLWP    Apply thin layer to clean, dry skin of face nightly    CEVIMELINE (EVOXAC) 30 MG CAPSULE    Take 1 capsule (30 mg total) by mouth 2 (two) times daily.    DULOXETINE (CYMBALTA) 30 MG CAPSULE    Take 1 capsule (30 mg total) by mouth once daily.    ONDANSETRON (ZOFRAN) 4 MG TABLET    Take 1 tablet (4 mg total) by mouth 2 (two) times daily.   Discontinued Medications    ALBUTEROL (PROVENTIL/VENTOLIN HFA) 90 MCG/ACTUATION INHALER    Inhale 2 puffs into the lungs every 6 (six) hours as needed for Wheezing or Shortness of Breath. Rescue    LORAZEPAM (ATIVAN) 0.5 MG TABLET    Take 1-2 tablets daily as needed for increased anxiety or insomnia       Allergies   Review of patient's allergies indicates:   Allergen Reactions    Codeine Nausea And Vomiting         Review of Systems (Pertinent positives)  Review of Systems   Constitutional:  Negative for unexpected weight change.   HENT:  Negative for trouble swallowing.    Eyes:  Negative for visual disturbance.   Respiratory:  Negative for shortness of breath.    Cardiovascular:  Negative for chest pain.   Gastrointestinal:  Negative for blood in stool, constipation, diarrhea, nausea and vomiting.   Endocrine: Negative for cold intolerance.   Genitourinary:  Negative for difficulty urinating.   Musculoskeletal:  Positive for arthralgias (left knee).   Neurological:  Negative for dizziness and light-headedness.   Psychiatric/Behavioral:  Positive for sleep disturbance  "(stable on ativan prn). Negative for dysphoric mood. The patient is not nervous/anxious.        /76 (BP Location: Left arm, Patient Position: Sitting, BP Method: Large (Manual))   Pulse 92   Temp 99.4 °F (37.4 °C)   Ht 5' 2" (1.575 m)   Wt 67.7 kg (149 lb 4 oz)   LMP  (LMP Unknown)   SpO2 97%   BMI 27.30 kg/m²     Physical Exam  Vitals reviewed.   Constitutional:       General: She is not in acute distress.     Appearance: Normal appearance. She is well-developed.   HENT:      Head: Normocephalic and atraumatic.      Right Ear: Tympanic membrane and ear canal normal. Tympanic membrane is not erythematous or bulging.      Left Ear: Tympanic membrane and ear canal normal. Tympanic membrane is not erythematous or bulging.      Nose: Nose normal.      Mouth/Throat:      Mouth: Mucous membranes are moist.      Pharynx: No oropharyngeal exudate or posterior oropharyngeal erythema.   Eyes:      Extraocular Movements: Extraocular movements intact.      Conjunctiva/sclera: Conjunctivae normal.   Neck:      Thyroid: No thyroid mass or thyromegaly.      Vascular: No carotid bruit.   Cardiovascular:      Rate and Rhythm: Normal rate and regular rhythm.      Pulses:           Dorsalis pedis pulses are 2+ on the right side and 2+ on the left side.      Heart sounds: Normal heart sounds. No murmur heard.  Pulmonary:      Effort: Pulmonary effort is normal. No respiratory distress.      Breath sounds: Normal breath sounds.   Abdominal:      General: Bowel sounds are normal. There is no distension.      Palpations: Abdomen is soft. There is no mass.      Tenderness: There is no abdominal tenderness.   Musculoskeletal:         General: Normal range of motion.      Right lower leg: No edema.      Left lower leg: No edema.   Lymphadenopathy:      Cervical: No cervical adenopathy.   Skin:     General: Skin is warm and dry.      Findings: No rash.   Neurological:      General: No focal deficit present.      Mental Status: " She is alert and oriented to person, place, and time.   Psychiatric:         Mood and Affect: Mood normal.         Behavior: Behavior normal.           Assessment/Plan:  Terese Barney is a 63 y.o. female who presents today for :        ICD-10-CM ICD-9-CM    1. Routine general medical examination at a health care facility  Z00.00 V70.0       2. Overweight (BMI 25.0-29.9)  E66.3 278.02       3. History of repair of hiatal hernia  Z98.890 V15.29     Z87.19        4. History of total left knee replacement  Z96.652 V43.65       5. Anxiety and depression  F41.9 300.00     F32.A 311       6. Dry mouth  R68.2 527.7       7. Insomnia, unspecified type  G47.00 780.52 LORazepam (ATIVAN) 1 MG tablet      8. Vitamin D deficiency  E55.9 268.9 cholecalciferol, vitamin D3, 125 mcg (5,000 unit) capsule      9. Osteopenia, unspecified location  M85.80 733.90       10. History of iron deficiency anemia  Z86.2 V12.3       11. Medication management  Z79.899 V58.69       12. Chronic pain of both shoulders  M25.511 719.41     G89.29 338.29     M25.512      meloxicam prn      13. Need for shingles vaccine  Z23 V04.89 (In Office Administered) Zoster Recombinant Vaccine        ADVISED ON DIET/EXERCISE/SLEEP, ROUTINE EYE/DENTAL EXAMS, AND THE IMPORTANCE OF KEEPING UP WITH APPROPRIATE SCREENING TESTS BASED ON AGE AND RISK FACTORS.  MAMMOGRAM DUE 3/2025 & DEXA DUE 6/2025, SHINGLES VACCINE AND UPDATED COVID-19 VACCINES ADVISED.  COLONOSCOPY DUE 2/2025(tubular adenoma).  SHINGRIX 1/2 GIVEN today 5/13/24     OVERWEIGHT BMI:  Wishes she could lose more weight but trying to start exercising more regularly with a treadmill, rowing machine and bike-needs to be mindful of the effects of exercises on her left knee.     ANXIETY AND DEPRESSION:   stable on Cymbalta 30 mg daily with Ativan nightly for help with sleep.     CHRONIC LEFT KNEE PAIN:   status post partial knee replacement per Ortho Dr. Lilly, status post multiple rounds of physical therapy,  considering adjustment knee surgery/ full knee replacement due to on going alignment issues and chronic pain concerns.  Taking Tylenol p.r.n., limiting NSAIDs given history of gastric ulcer     GERD WITH HISTORY OF GI ULCER ON EGD/HIATAL HERNIA, CAPSULE ENDOSCOPY SHOWING ERYTHEMATOUS DUODENUM WITH STIGMATA OF BLEEDING: she is s/p robotic hiatal hernia repair with Toupet fundoplication 10/19/22 per Dr Back with unremarkable Esophagram 8/29/23, GERD symptoms currently stable OFF PPI. Avoiding irritants like NSAIDS. C-Scope due 2/2025 2/2 h/o tubular adenoma     HISTORY OF IRON DEFICIENCY ANEMIA:   GI workup has revealed prior non bleeding stomach ulcer, erythematous duodenum.  Has had recurrent iron deficiency anemia.  Following with heme/onc Dr Guerrero and has had several rounds of IV iron previously. Most recent labs show stability on intermittent PO FeSo4 supplement with ongoing monitoring advised. Has follow up and labs scheduled 11/2024.       OSTEOPENIA:  CONTINUE CALCIUM, VITAMIN-D-ADVISE 5000 IU DAILY-- has not been consistent with this and VIT D PERSISTENTLY LOW ON LABS.  Repeat DEXA 6/2025       There are no Patient Instructions on file for this visit.      Follow up in about 1 year (around 5/13/2025) for to follow up on lab results, return as needed for new concerns.

## 2024-05-13 ENCOUNTER — OFFICE VISIT (OUTPATIENT)
Dept: FAMILY MEDICINE | Facility: CLINIC | Age: 64
End: 2024-05-13
Payer: COMMERCIAL

## 2024-05-13 VITALS
HEIGHT: 62 IN | HEART RATE: 92 BPM | BODY MASS INDEX: 27.47 KG/M2 | TEMPERATURE: 99 F | WEIGHT: 149.25 LBS | DIASTOLIC BLOOD PRESSURE: 76 MMHG | OXYGEN SATURATION: 97 % | SYSTOLIC BLOOD PRESSURE: 120 MMHG

## 2024-05-13 DIAGNOSIS — Z96.652 HISTORY OF TOTAL LEFT KNEE REPLACEMENT: ICD-10-CM

## 2024-05-13 DIAGNOSIS — Z23 NEED FOR SHINGLES VACCINE: ICD-10-CM

## 2024-05-13 DIAGNOSIS — E55.9 VITAMIN D DEFICIENCY: ICD-10-CM

## 2024-05-13 DIAGNOSIS — F41.9 ANXIETY AND DEPRESSION: ICD-10-CM

## 2024-05-13 DIAGNOSIS — Z00.00 ROUTINE GENERAL MEDICAL EXAMINATION AT A HEALTH CARE FACILITY: Primary | ICD-10-CM

## 2024-05-13 DIAGNOSIS — Z87.19 HISTORY OF REPAIR OF HIATAL HERNIA: ICD-10-CM

## 2024-05-13 DIAGNOSIS — M25.512 CHRONIC PAIN OF BOTH SHOULDERS: ICD-10-CM

## 2024-05-13 DIAGNOSIS — Z79.899 MEDICATION MANAGEMENT: ICD-10-CM

## 2024-05-13 DIAGNOSIS — G47.00 INSOMNIA, UNSPECIFIED TYPE: ICD-10-CM

## 2024-05-13 DIAGNOSIS — Z98.890 HISTORY OF REPAIR OF HIATAL HERNIA: ICD-10-CM

## 2024-05-13 DIAGNOSIS — E66.3 OVERWEIGHT (BMI 25.0-29.9): ICD-10-CM

## 2024-05-13 DIAGNOSIS — F32.A ANXIETY AND DEPRESSION: ICD-10-CM

## 2024-05-13 DIAGNOSIS — R68.2 DRY MOUTH: ICD-10-CM

## 2024-05-13 DIAGNOSIS — Z86.2 HISTORY OF IRON DEFICIENCY ANEMIA: ICD-10-CM

## 2024-05-13 DIAGNOSIS — M25.511 CHRONIC PAIN OF BOTH SHOULDERS: ICD-10-CM

## 2024-05-13 DIAGNOSIS — G89.29 CHRONIC PAIN OF BOTH SHOULDERS: ICD-10-CM

## 2024-05-13 DIAGNOSIS — M85.80 OSTEOPENIA, UNSPECIFIED LOCATION: ICD-10-CM

## 2024-05-13 PROCEDURE — 90471 IMMUNIZATION ADMIN: CPT | Mod: S$GLB,,, | Performed by: FAMILY MEDICINE

## 2024-05-13 PROCEDURE — 90750 HZV VACC RECOMBINANT IM: CPT | Mod: S$GLB,,, | Performed by: FAMILY MEDICINE

## 2024-05-13 PROCEDURE — 99396 PREV VISIT EST AGE 40-64: CPT | Mod: 25,S$GLB,, | Performed by: FAMILY MEDICINE

## 2024-05-13 PROCEDURE — 99999 PR PBB SHADOW E&M-EST. PATIENT-LVL IV: CPT | Mod: PBBFAC,,, | Performed by: FAMILY MEDICINE

## 2024-05-13 RX ORDER — LORAZEPAM 1 MG/1
1 TABLET ORAL DAILY
Qty: 30 TABLET | Refills: 5 | Status: SHIPPED | OUTPATIENT
Start: 2024-05-13 | End: 2024-11-09

## 2024-05-13 RX ORDER — CHOLECALCIFEROL (VITAMIN D3) 125 MCG
5000 CAPSULE ORAL
Qty: 100 CAPSULE | Refills: 4 | Status: SHIPPED | OUTPATIENT
Start: 2024-05-13

## 2024-05-13 NOTE — PATIENT INSTRUCTIONS
ADVISE 2ND SHINGRIX SHINGLES SHOT IN 2-6 MONTHS OR CAN OBTAIN IN 1 YEAR WHEN YOU RETURN FOR YOUR ANNUAL PHYSICAL.  THIS SHOT IS AVAILABLE AT LOCAL PHARMACIES.

## 2024-07-05 ENCOUNTER — TELEPHONE (OUTPATIENT)
Dept: FAMILY MEDICINE | Facility: CLINIC | Age: 64
End: 2024-07-05
Payer: COMMERCIAL

## 2024-07-05 ENCOUNTER — TELEPHONE (OUTPATIENT)
Dept: HEMATOLOGY/ONCOLOGY | Facility: CLINIC | Age: 64
End: 2024-07-05
Payer: COMMERCIAL

## 2024-07-05 NOTE — TELEPHONE ENCOUNTER
----- Message from Jose Luis Guerrero MD sent at 7/3/2024  5:25 PM CDT -----    ----- Message -----  From: Mignon Buenrostro  Sent: 7/3/2024   5:00 PM CDT  To: [facility] Staff    Type:  Needs Medical Advice    Who Called: pt  Would the patient rather a call back or a response via [facility]? call  Best Call Back Number: [phone]   Additional Information:   Pt requesting a call regarding billing charges

## 2024-07-05 NOTE — TELEPHONE ENCOUNTER
----- Message from Mignon Buenrostro sent at 7/3/2024  5:00 PM CDT -----  Type:  Needs Medical Advice    Who Called: pt  Would the patient rather a call back or a response via [facility]?   Best Call Back Number: [phone]   Additional Information:   Pt requesting a call regarding billing charges

## 2024-09-13 NOTE — INTERVAL H&P NOTE
The patient has been examined and the H&P has been reviewed:    I concur with the findings and no changes have occurred since H&P was written.    Surgery risks, benefits and alternative options discussed and understood by patient/family.          There are no hospital problems to display for this patient.    
Yes

## 2024-10-27 ENCOUNTER — PATIENT MESSAGE (OUTPATIENT)
Dept: FAMILY MEDICINE | Facility: CLINIC | Age: 64
End: 2024-10-27
Payer: COMMERCIAL

## 2024-11-08 ENCOUNTER — OFFICE VISIT (OUTPATIENT)
Dept: FAMILY MEDICINE | Facility: CLINIC | Age: 64
End: 2024-11-08
Payer: COMMERCIAL

## 2024-11-08 ENCOUNTER — LAB VISIT (OUTPATIENT)
Dept: LAB | Facility: HOSPITAL | Age: 64
End: 2024-11-08
Attending: FAMILY MEDICINE
Payer: COMMERCIAL

## 2024-11-08 VITALS
OXYGEN SATURATION: 98 % | HEIGHT: 62 IN | TEMPERATURE: 98 F | BODY MASS INDEX: 28.28 KG/M2 | WEIGHT: 153.69 LBS | DIASTOLIC BLOOD PRESSURE: 68 MMHG | SYSTOLIC BLOOD PRESSURE: 126 MMHG | HEART RATE: 112 BPM

## 2024-11-08 DIAGNOSIS — E55.9 VITAMIN D DEFICIENCY: ICD-10-CM

## 2024-11-08 DIAGNOSIS — R53.83 FATIGUE, UNSPECIFIED TYPE: ICD-10-CM

## 2024-11-08 DIAGNOSIS — Z23 INFLUENZA VACCINE NEEDED: ICD-10-CM

## 2024-11-08 DIAGNOSIS — G89.29 CHRONIC PAIN OF BOTH SHOULDERS: ICD-10-CM

## 2024-11-08 DIAGNOSIS — L29.9 ITCHING: Primary | ICD-10-CM

## 2024-11-08 DIAGNOSIS — M25.511 CHRONIC PAIN OF BOTH SHOULDERS: ICD-10-CM

## 2024-11-08 DIAGNOSIS — M25.512 CHRONIC PAIN OF BOTH SHOULDERS: ICD-10-CM

## 2024-11-08 DIAGNOSIS — E66.3 OVERWEIGHT (BMI 25.0-29.9): ICD-10-CM

## 2024-11-08 DIAGNOSIS — M75.51 SUBACROMIAL BURSITIS OF RIGHT SHOULDER JOINT: ICD-10-CM

## 2024-11-08 DIAGNOSIS — Z23 NEED FOR SHINGLES VACCINE: ICD-10-CM

## 2024-11-08 DIAGNOSIS — Z86.2 HISTORY OF IRON DEFICIENCY ANEMIA: ICD-10-CM

## 2024-11-08 LAB
ALBUMIN SERPL BCP-MCNC: 3.8 G/DL (ref 3.5–5.2)
ALP SERPL-CCNC: 93 U/L (ref 40–150)
ALT SERPL W/O P-5'-P-CCNC: 11 U/L (ref 10–44)
ANION GAP SERPL CALC-SCNC: 7 MMOL/L (ref 8–16)
AST SERPL-CCNC: 16 U/L (ref 10–40)
BASOPHILS # BLD AUTO: 0.04 K/UL (ref 0–0.2)
BASOPHILS NFR BLD: 0.6 % (ref 0–1.9)
BILIRUB SERPL-MCNC: 0.4 MG/DL (ref 0.1–1)
BUN SERPL-MCNC: 16 MG/DL (ref 8–23)
CALCIUM SERPL-MCNC: 9.5 MG/DL (ref 8.7–10.5)
CHLORIDE SERPL-SCNC: 107 MMOL/L (ref 95–110)
CO2 SERPL-SCNC: 27 MMOL/L (ref 23–29)
CREAT SERPL-MCNC: 0.8 MG/DL (ref 0.5–1.4)
DIFFERENTIAL METHOD BLD: ABNORMAL
EOSINOPHIL # BLD AUTO: 0.1 K/UL (ref 0–0.5)
EOSINOPHIL NFR BLD: 1.6 % (ref 0–8)
ERYTHROCYTE [DISTWIDTH] IN BLOOD BY AUTOMATED COUNT: 12.3 % (ref 11.5–14.5)
EST. GFR  (NO RACE VARIABLE): >60 ML/MIN/1.73 M^2
FERRITIN SERPL-MCNC: 153 NG/ML (ref 20–300)
GLUCOSE SERPL-MCNC: 99 MG/DL (ref 70–110)
HCT VFR BLD AUTO: 44.2 % (ref 37–48.5)
HGB BLD-MCNC: 14.4 G/DL (ref 12–16)
IMM GRANULOCYTES # BLD AUTO: 0.02 K/UL (ref 0–0.04)
IMM GRANULOCYTES NFR BLD AUTO: 0.3 % (ref 0–0.5)
IRON SERPL-MCNC: 51 UG/DL (ref 30–160)
LYMPHOCYTES # BLD AUTO: 1.6 K/UL (ref 1–4.8)
LYMPHOCYTES NFR BLD: 23.1 % (ref 18–48)
MCH RBC QN AUTO: 31.8 PG (ref 27–31)
MCHC RBC AUTO-ENTMCNC: 32.6 G/DL (ref 32–36)
MCV RBC AUTO: 98 FL (ref 82–98)
MONOCYTES # BLD AUTO: 0.8 K/UL (ref 0.3–1)
MONOCYTES NFR BLD: 11.5 % (ref 4–15)
NEUTROPHILS # BLD AUTO: 4.4 K/UL (ref 1.8–7.7)
NEUTROPHILS NFR BLD: 62.9 % (ref 38–73)
NRBC BLD-RTO: 0 /100 WBC
PLATELET # BLD AUTO: 297 K/UL (ref 150–450)
PMV BLD AUTO: 9.5 FL (ref 9.2–12.9)
POTASSIUM SERPL-SCNC: 4.3 MMOL/L (ref 3.5–5.1)
PROT SERPL-MCNC: 7.4 G/DL (ref 6–8.4)
RBC # BLD AUTO: 4.53 M/UL (ref 4–5.4)
SATURATED IRON: 15 % (ref 20–50)
SODIUM SERPL-SCNC: 141 MMOL/L (ref 136–145)
TOTAL IRON BINDING CAPACITY: 351 UG/DL (ref 250–450)
TRANSFERRIN SERPL-MCNC: 237 MG/DL (ref 200–375)
TSH SERPL DL<=0.005 MIU/L-ACNC: 1.18 UIU/ML (ref 0.4–4)
WBC # BLD AUTO: 7.05 K/UL (ref 3.9–12.7)

## 2024-11-08 PROCEDURE — 82728 ASSAY OF FERRITIN: CPT | Performed by: FAMILY MEDICINE

## 2024-11-08 PROCEDURE — 85025 COMPLETE CBC W/AUTO DIFF WBC: CPT | Performed by: FAMILY MEDICINE

## 2024-11-08 PROCEDURE — 99999 PR PBB SHADOW E&M-EST. PATIENT-LVL IV: CPT | Mod: PBBFAC,,, | Performed by: FAMILY MEDICINE

## 2024-11-08 PROCEDURE — 36415 COLL VENOUS BLD VENIPUNCTURE: CPT | Performed by: FAMILY MEDICINE

## 2024-11-08 PROCEDURE — 80053 COMPREHEN METABOLIC PANEL: CPT | Performed by: FAMILY MEDICINE

## 2024-11-08 PROCEDURE — 84443 ASSAY THYROID STIM HORMONE: CPT | Performed by: FAMILY MEDICINE

## 2024-11-08 PROCEDURE — 84466 ASSAY OF TRANSFERRIN: CPT | Performed by: FAMILY MEDICINE

## 2024-11-08 RX ORDER — TIRZEPATIDE 5 MG/.5ML
5 INJECTION, SOLUTION SUBCUTANEOUS
Qty: 2 ML | Refills: 5 | Status: SHIPPED | OUTPATIENT
Start: 2024-11-08

## 2024-11-08 RX ORDER — TRIAMCINOLONE ACETONIDE 40 MG/ML
80 INJECTION, SUSPENSION INTRA-ARTICULAR; INTRAMUSCULAR ONCE
Status: COMPLETED | OUTPATIENT
Start: 2024-11-08 | End: 2024-11-08

## 2024-11-08 RX ORDER — VIT C/E/ZN/COPPR/LUTEIN/ZEAXAN 250MG-90MG
5000 CAPSULE ORAL
Qty: 100 CAPSULE | Refills: 4 | Status: SHIPPED | OUTPATIENT
Start: 2024-11-08

## 2024-11-08 RX ADMIN — TRIAMCINOLONE ACETONIDE 80 MG: 40 INJECTION, SUSPENSION INTRA-ARTICULAR; INTRAMUSCULAR at 12:11

## 2024-11-08 NOTE — PROGRESS NOTES
Office Visit    Patient Name: Terese Barney    : 1960  MRN: [id number]    Subjective:  Terese is a 64 y.o. female who presents today for:    OTHER (Pts right arm is itching but she received the shingles in left arm. Pt doesn't know what is causing it )    SEEN BY ME FOR ANNUAL PHYSICAL 2024-LABS AND CHRONIC CONDITIONS FOUND TO BE STABLE AT THAT TIME AND ADVISED ON 1 YEAR FOLLOW-UP     64-year-old female with history of GI bleed/iron-deficiency anemia, vitamin-D deficiency, osteoarthritis of the knees, status post surgical replacement, chronic pain associated with some depression stable on Cymbalta who presents today for urgent care visit to discuss severe itching of the right arm.      She was given the 1st dose of the Shingrix vaccine at the time of her annual physical 6 months ago-this was given in her left arm.  She initially thought that the severe itching was related to a side effect of the Shingrix vaccine.  It came on about 1 month after her appointment with me. 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    She reports actually being diagnosed with shoulder bursitis through [facility] but did not receive a trial of subacromial injection.  She is no longer receiving care for this condition through [facility].      In addition to this concern she also complains of generalized fatigue.    She does not have symptoms of anemia necessarily like she previously has had with fatigue though she would like to have her levels rechecked as she was supposed to see heme Onc Dr. Guerrero.    In part due to this fatigue she does not have energy to exercise and has struggled with weight gain.  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      She is very interested in trial of a GLP 1 given her weight loss challenges and lack of success with diet and exercise along with associated chronic osteoarthritis of the knees associated with chronic pain.        PAST MEDICAL HISTORY, SURGICAL/SOCIAL/FAMILY HISTORY REVIEWED AS PER CHART, WITH PERTINENT FINDINGS INCLUDED IN HISTORY SECTION OF NOTE.     Current Medications    Medication List with Changes/Refills   New Medications    TIRZEPATIDE, WEIGHT LOSS, (ZEPBOUND) 5 MG/0.5 ML PNIJ    Inject 5 mg into the skin every 7 days.   Current Medications    ADAPALENE-BENZOYL PEROXIDE (EPIDUO) 0.1-2.5 % GLWP    Apply thin layer to clean, dry skin of face nightly    CEVIMELINE (EVOXAC) 30 MG CAPSULE    Take 1 capsule (30 mg total) by mouth 2 (two) times daily.    DULOXETINE (CYMBALTA) 30 MG CAPSULE    Take 1 capsule (30 mg total) by mouth once daily.    LORAZEPAM (ATIVAN) 1 MG TABLET    Take 1 tablet (1 mg total) by mouth once daily.    ONDANSETRON (ZOFRAN) 4 MG TABLET    Take 1 tablet (4 mg total) by mouth 2 (two) times daily.   Changed and/or Refilled Medications    Modified Medication Previous Medication    CHOLECALCIFEROL, VITAMIN D3, 125 MCG (5,000 UNIT) CAPSULE cholecalciferol, vitamin D3, 125 mcg (5,000 unit) capsule       Take 1 capsule (5,000 Units total) by mouth daily with breakfast.    Take 1 capsule (5,000 Units total) by mouth daily with breakfast.       Allergies   Review of patient's allergies indicates:   Allergen Reactions    Codeine Nausea And Vomiting         Review of Systems (Pertinent positives)  Review of Systems   Constitutional:  Positive for fatigue and unexpected weight change.   HENT:  Positive for congestion and rhinorrhea.    Respiratory:  Negative for shortness of breath.    Cardiovascular:  Negative for chest pain.   Musculoskeletal:  Positive for arthralgias.   Skin:  Negative for color change and rash.   Allergic/Immunologic: Positive for environmental allergies.   Neurological:  " Negative for light-headedness.       /68 (BP Location: Right arm, Patient Position: Sitting)   Pulse (!) 112   Temp 98.4 °F (36.9 °C)   Ht 5' 2" (1.575 m)   Wt 69.7 kg (153 lb 10.6 oz)   LMP  (LMP Unknown)   SpO2 98%   BMI 28.10 kg/m²     Physical Exam  Vitals reviewed.   Constitutional:       General: She is not in acute distress.     Appearance: Normal appearance. She is well-developed.   HENT:      Head: Normocephalic and atraumatic.   Eyes:      Conjunctiva/sclera: Conjunctivae normal.   Cardiovascular:      Rate and Rhythm: Normal rate and regular rhythm.   Pulmonary:      Effort: Pulmonary effort is normal.      Breath sounds: Normal breath sounds.   Musculoskeletal:      Right shoulder: Decreased range of motion. Normal strength.      Right lower leg: No edema.      Left lower leg: No edema.      Comments: Positive Paul impingement test with pain over the sub acromial bursa.  Positive empty can test.      No significant strength deficits of the rotator cuff.   Skin:     General: Skin is warm and dry.   Neurological:      General: No focal deficit present.      Mental Status: She is alert and oriented to person, place, and time.   Psychiatric:         Mood and Affect: Mood normal.         Behavior: Behavior normal.       After verbal informed consent was obtained during which risks, including risk of possible joint infection as a very unlikely but serious potential side effect were explained, patient agreed to proceed with the injection.  The R shoulder was examined and appropriate landmarks identified. 3 passes of an alcohol prep pad were used to thoroughly clean the area identified for the injection.  A mixture of 2 cc of Kenalog 40 mg per cc along with 3 cc of xylocaine 2% without epinephrine was then injected directly into the prepped area using a 1.5 in needle.  No complications or excessive bleeding was observed.  The extremity was found to be neurovascularly intact following the " procedure, and  Band-Aid was placed on the injection site.  Patient advised that the affects of cortisone would likely reach max benefit within 48-72 hours.  Advised to call the office with any concerns following the procedure.      Assessment/Plan:  Terese Barney is a 64 y.o. female who presents today for :        ICD-10-CM ICD-9-CM    1. Itching  L29.9 698.9       2. Subacromial bursitis of right shoulder joint  M75.51 726.19 triamcinolone acetonide injection 80 mg      3. Chronic pain of both shoulders  M25.511 719.41 triamcinolone acetonide injection 80 mg    G89.29 338.29     M25.512        4. Vitamin D deficiency  E55.9 268.9 cholecalciferol, vitamin D3, 125 mcg (5,000 unit) capsule      5. History of iron deficiency anemia  Z86.2 V12.3       6. Fatigue, unspecified type  R53.83 780.79 Comprehensive Metabolic Panel      CBC Auto Differential      TSH      Ferritin      Iron and TIBC      7. Need for shingles vaccine  Z23 V04.89       8. Influenza vaccine needed  Z23 V04.81 influenza (Flulaval, Fluzone, Fluarix) 45 mcg/0.5 mL IM vaccine (> or = 6 mo) 0.5 mL      9. Overweight (BMI 25.0-29.9)  E66.3 278.02 tirzepatide, weight loss, (ZEPBOUND) 5 mg/0.5 mL PnIj        RIGHT SHOULDER ITCHING, CHRONIC PAIN OF BILATERAL SHOULDERS, RIGHT SHOULDER BURSITIS:  Suspect that the itching is in fact burning/inflammation from right-sided subacromial bursitis.  Exam findings and history are consistent with this.  Trial of subacromial injection of the right shoulder and will see if her itching resolves.  Advised on the importance of keeping up with her PT rotator cuff strength training regimen.      VITAMIN-D DEFICIENCY:  Reminded on the importance of consistently taking 5000 IU D3 daily, will recheck with annual labs.      FATIGUE WITH HISTORY OF IRON-DEFICIENCY ANEMIA:  Missed her appointment with heme/Onc Dr. Guerrero.  Has done much better recently following parenteral iron and initially following blood transfusion-history  of GI blood loss previously evaluated, advise further based on updated labs.      OVERWEIGHT BMI WITH COMORBIDITY:  Frustrated with the inability to lose weight via diet and exercise.  Discussed pros and cons of GLP 1.  Given her increasing weight that is trending towards obese BMI and associated with chronic musculoskeletal/arthritis type pain have advised trial of compounded Mounjaro through [facility]-5 mg weekly dose sent and she will let me know how she is doing on this regimen.    A total of 40 minutes was spent on this encounter that included explaining differentials, symptom counseling, review of recent results, and next steps of diagnosis and management plan, along with direct documentation of the encounter.      There are no Patient Instructions on file for this visit.      Follow up for to follow up on lab results, return as needed for new concerns.

## 2024-11-24 DIAGNOSIS — G47.00 INSOMNIA, UNSPECIFIED TYPE: ICD-10-CM

## 2024-11-25 RX ORDER — CEVIMELINE HYDROCHLORIDE 30 MG/1
30 CAPSULE ORAL 2 TIMES DAILY
Qty: 180 CAPSULE | Refills: 3 | Status: SHIPPED | OUTPATIENT
Start: 2024-11-25

## 2024-11-25 RX ORDER — LORAZEPAM 1 MG/1
1 TABLET ORAL
Qty: 30 TABLET | Refills: 5 | Status: SHIPPED | OUTPATIENT
Start: 2024-11-25

## 2024-11-25 NOTE — TELEPHONE ENCOUNTER
Care Due:                  Date            Visit Type   Department     Provider  --------------------------------------------------------------------------------                                EP -                              PRIMARY      [facility]  Last Visit: 11-      CARE ([facility])   MEDICINE       Shanique Potts  Next Visit: None Scheduled  None         None Found                                                            Last  Test          Frequency    Reason                     Performed    Due Date  --------------------------------------------------------------------------------    HBA1C.......  6 months...  tirzepatide,.............  03- 09-    [facility] Embedded Care Due Messages. Reference number: [id number].   11/24/2024 10:02:11 PM CST

## 2025-02-17 ENCOUNTER — PATIENT MESSAGE (OUTPATIENT)
Dept: INTERNAL MEDICINE | Facility: CLINIC | Age: 65
End: 2025-02-17
Payer: COMMERCIAL

## 2025-02-17 DIAGNOSIS — E66.3 OVERWEIGHT (BMI 25.0-29.9): Primary | ICD-10-CM

## 2025-02-17 RX ORDER — TIRZEPATIDE 7.5 MG/.5ML
7.5 INJECTION, SOLUTION SUBCUTANEOUS
Qty: 6 ML | Refills: 1 | Status: SHIPPED | OUTPATIENT
Start: 2025-02-17

## 2025-02-17 RX ORDER — TIRZEPATIDE 7.5 MG/.5ML
7.5 INJECTION, SOLUTION SUBCUTANEOUS
Qty: 6 ML | Refills: 1 | Status: SHIPPED | OUTPATIENT
Start: 2025-02-17 | End: 2025-02-17 | Stop reason: SDUPTHER

## 2025-04-02 ENCOUNTER — PATIENT MESSAGE (OUTPATIENT)
Dept: INTERNAL MEDICINE | Facility: CLINIC | Age: 65
End: 2025-04-02
Payer: COMMERCIAL

## 2025-04-04 ENCOUNTER — HOSPITAL ENCOUNTER (OUTPATIENT)
Dept: RADIOLOGY | Facility: HOSPITAL | Age: 65
Discharge: HOME | End: 2025-04-04
Attending: FAMILY MEDICINE
Payer: COMMERCIAL

## 2025-04-04 DIAGNOSIS — Z12.31 ENCOUNTER FOR SCREENING MAMMOGRAM FOR BREAST CANCER: ICD-10-CM

## 2025-04-04 PROCEDURE — 77063 BREAST TOMOSYNTHESIS BI: CPT | Mod: 26,,, | Performed by: RADIOLOGY

## 2025-04-04 PROCEDURE — 77067 SCR MAMMO BI INCL CAD: CPT | Mod: 26,,, | Performed by: RADIOLOGY

## 2025-04-04 PROCEDURE — 77067 SCR MAMMO BI INCL CAD: CPT | Mod: TC

## 2025-05-08 ENCOUNTER — PATIENT MESSAGE (OUTPATIENT)
Dept: INTERNAL MEDICINE | Facility: CLINIC | Age: 65
End: 2025-05-08
Payer: COMMERCIAL

## 2025-05-12 ENCOUNTER — TELEPHONE (OUTPATIENT)
Dept: INTERNAL MEDICINE | Facility: CLINIC | Age: 65
End: 2025-05-12
Payer: COMMERCIAL

## 2025-05-13 ENCOUNTER — OFFICE VISIT (OUTPATIENT)
Dept: INTERNAL MEDICINE | Facility: CLINIC | Age: 65
End: 2025-05-13
Payer: COMMERCIAL

## 2025-05-13 ENCOUNTER — LAB VISIT (OUTPATIENT)
Dept: LAB | Facility: HOSPITAL | Age: 65
End: 2025-05-13
Attending: FAMILY MEDICINE
Payer: COMMERCIAL

## 2025-05-13 VITALS
DIASTOLIC BLOOD PRESSURE: 62 MMHG | SYSTOLIC BLOOD PRESSURE: 110 MMHG | BODY MASS INDEX: 24.18 KG/M2 | HEIGHT: 62 IN | HEART RATE: 104 BPM | TEMPERATURE: 98 F | WEIGHT: 131.38 LBS | OXYGEN SATURATION: 97 %

## 2025-05-13 DIAGNOSIS — R53.83 FATIGUE, UNSPECIFIED TYPE: ICD-10-CM

## 2025-05-13 DIAGNOSIS — M25.512 CHRONIC PAIN OF BOTH SHOULDERS: ICD-10-CM

## 2025-05-13 DIAGNOSIS — Z86.2 HISTORY OF IRON DEFICIENCY ANEMIA: ICD-10-CM

## 2025-05-13 DIAGNOSIS — J01.90 ACUTE BACTERIAL SINUSITIS: ICD-10-CM

## 2025-05-13 DIAGNOSIS — G89.29 CHRONIC PAIN OF BOTH SHOULDERS: ICD-10-CM

## 2025-05-13 DIAGNOSIS — B96.89 ACUTE BACTERIAL SINUSITIS: ICD-10-CM

## 2025-05-13 DIAGNOSIS — M25.511 CHRONIC PAIN OF BOTH SHOULDERS: ICD-10-CM

## 2025-05-13 DIAGNOSIS — M75.51 SUBACROMIAL BURSITIS OF BOTH SHOULDERS: ICD-10-CM

## 2025-05-13 DIAGNOSIS — M75.52 SUBACROMIAL BURSITIS OF BOTH SHOULDERS: ICD-10-CM

## 2025-05-13 DIAGNOSIS — M25.819 IMPINGEMENT OF SHOULDER: Primary | ICD-10-CM

## 2025-05-13 LAB
25(OH)D3+25(OH)D2 SERPL-MCNC: 31 NG/ML (ref 30–96)
ABSOLUTE EOSINOPHIL (OHS): 0.12 K/UL
ABSOLUTE MONOCYTE (OHS): 0.66 K/UL (ref 0.3–1)
ABSOLUTE NEUTROPHIL COUNT (OHS): 4.12 K/UL (ref 1.8–7.7)
ALBUMIN SERPL BCP-MCNC: 3.8 G/DL (ref 3.5–5.2)
ALP SERPL-CCNC: 64 UNIT/L (ref 40–150)
ALT SERPL W/O P-5'-P-CCNC: 6 UNIT/L (ref 10–44)
ANION GAP (OHS): 10 MMOL/L (ref 8–16)
AST SERPL-CCNC: 17 UNIT/L (ref 11–45)
BASOPHILS # BLD AUTO: 0.04 K/UL
BASOPHILS NFR BLD AUTO: 0.7 %
BILIRUB SERPL-MCNC: 0.9 MG/DL (ref 0.1–1)
BUN SERPL-MCNC: 14 MG/DL (ref 8–23)
CALCIUM SERPL-MCNC: 9.3 MG/DL (ref 8.7–10.5)
CHLORIDE SERPL-SCNC: 105 MMOL/L (ref 95–110)
CO2 SERPL-SCNC: 25 MMOL/L (ref 23–29)
CREAT SERPL-MCNC: 0.8 MG/DL (ref 0.5–1.4)
ERYTHROCYTE [DISTWIDTH] IN BLOOD BY AUTOMATED COUNT: 12 % (ref 11.5–14.5)
FERRITIN SERPL-MCNC: 256 NG/ML (ref 20–300)
GFR SERPLBLD CREATININE-BSD FMLA CKD-EPI: >60 ML/MIN/1.73/M2
GLUCOSE SERPL-MCNC: 77 MG/DL (ref 70–110)
HCT VFR BLD AUTO: 40.2 % (ref 37–48.5)
HGB BLD-MCNC: 12.8 GM/DL (ref 12–16)
IMM GRANULOCYTES # BLD AUTO: 0.03 K/UL (ref 0–0.04)
IMM GRANULOCYTES NFR BLD AUTO: 0.5 % (ref 0–0.5)
IRON SATN MFR SERPL: 19 % (ref 20–50)
IRON SERPL-MCNC: 59 UG/DL (ref 30–160)
LYMPHOCYTES # BLD AUTO: 0.86 K/UL (ref 1–4.8)
MAGNESIUM SERPL-MCNC: 2 MG/DL (ref 1.6–2.6)
MCH RBC QN AUTO: 31.1 PG (ref 27–31)
MCHC RBC AUTO-ENTMCNC: 31.8 G/DL (ref 32–36)
MCV RBC AUTO: 98 FL (ref 82–98)
NUCLEATED RBC (/100WBC) (OHS): 0 /100 WBC
PLATELET # BLD AUTO: 281 K/UL (ref 150–450)
PMV BLD AUTO: 10 FL (ref 9.2–12.9)
POTASSIUM SERPL-SCNC: 3.7 MMOL/L (ref 3.5–5.1)
PROT SERPL-MCNC: 6.8 GM/DL (ref 6–8.4)
RBC # BLD AUTO: 4.12 M/UL (ref 4–5.4)
RELATIVE EOSINOPHIL (OHS): 2.1 %
RELATIVE LYMPHOCYTE (OHS): 14.8 % (ref 18–48)
RELATIVE MONOCYTE (OHS): 11.3 % (ref 4–15)
RELATIVE NEUTROPHIL (OHS): 70.6 % (ref 38–73)
SODIUM SERPL-SCNC: 140 MMOL/L (ref 136–145)
TIBC SERPL-MCNC: 314 UG/DL (ref 250–450)
TRANSFERRIN SERPL-MCNC: 212 MG/DL (ref 200–375)
TSH SERPL-ACNC: 0.68 UIU/ML (ref 0.4–4)
VIT B12 SERPL-MCNC: >2000 PG/ML (ref 210–950)
WBC # BLD AUTO: 5.83 K/UL (ref 3.9–12.7)

## 2025-05-13 PROCEDURE — 84443 ASSAY THYROID STIM HORMONE: CPT

## 2025-05-13 PROCEDURE — 80053 COMPREHEN METABOLIC PANEL: CPT

## 2025-05-13 PROCEDURE — 82306 VITAMIN D 25 HYDROXY: CPT

## 2025-05-13 PROCEDURE — 85025 COMPLETE CBC W/AUTO DIFF WBC: CPT

## 2025-05-13 PROCEDURE — 99999 PR PBB SHADOW E&M-EST. PATIENT-LVL IV: CPT | Mod: PBBFAC,,, | Performed by: FAMILY MEDICINE

## 2025-05-13 PROCEDURE — 82607 VITAMIN B-12: CPT

## 2025-05-13 PROCEDURE — 82728 ASSAY OF FERRITIN: CPT

## 2025-05-13 PROCEDURE — 99215 OFFICE O/P EST HI 40 MIN: CPT | Mod: 25,S$GLB,, | Performed by: FAMILY MEDICINE

## 2025-05-13 PROCEDURE — 36415 COLL VENOUS BLD VENIPUNCTURE: CPT

## 2025-05-13 PROCEDURE — 20610 DRAIN/INJ JOINT/BURSA W/O US: CPT | Mod: RT,S$GLB,, | Performed by: FAMILY MEDICINE

## 2025-05-13 PROCEDURE — 83735 ASSAY OF MAGNESIUM: CPT

## 2025-05-13 PROCEDURE — 84466 ASSAY OF TRANSFERRIN: CPT

## 2025-05-13 RX ORDER — TRIAMCINOLONE ACETONIDE 40 MG/ML
60 INJECTION, SUSPENSION INTRA-ARTICULAR; INTRAMUSCULAR
Status: COMPLETED | OUTPATIENT
Start: 2025-05-13 | End: 2025-05-13

## 2025-05-13 RX ORDER — DOXYCYCLINE 100 MG/1
100 CAPSULE ORAL 2 TIMES DAILY
Qty: 14 CAPSULE | Refills: 0 | Status: SHIPPED | OUTPATIENT
Start: 2025-05-13 | End: 2025-05-20

## 2025-05-13 RX ADMIN — TRIAMCINOLONE ACETONIDE 60 MG: 40 INJECTION, SUSPENSION INTRA-ARTICULAR; INTRAMUSCULAR at 11:05

## 2025-05-13 NOTE — PROGRESS NOTES
Office Visit    Patient Name: Terese Barney    : 1960  MRN: [id number]    Subjective:  Terese is a 64 y.o. female who presents today for: bilateral shoulder pain and upper respiratory infection    Last seen by Dr. Potts on 24 for itching of the right shoulder, which was suspected to be inflammation due to subacromial bursitis, supported by a positive Paul-Ramon and Empty Can test.  She would like repeat injections today due to noting significant benefit from prior injection received six-months ago-benefit has lasted up until the present time    Bilateral Shoulder pain: She has struggled with bilateral shoulder pain in the past, and reports being diagnosed with shoulder bursitis through [facility], but did not receive a trial of subacromial injection. 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  She has attended PT in the past, but continues to have pain. She is no longer receiving care for this condition through [facility]. She has received a subacromial injection in the past from Dr. Potts on 24, which went well with no complications. Today, she is interested in getting repeat subacromial injections--this time for both shoulders and going back to PT. For long term goals, she is planning to retire from her job at 67.    Upper respiratory infection: She recently attended a wedding, where she got sick four days ago. Her symptoms started with a sore throat, which progressed into a productive cough with yellow green sputum and sinal pressure. She does endorse having ear pain yesterday, which has since resolved. She denies any fever or shortness of breath associated with this infection. She will need a doctors note for Tuesday, Wednesday, and Thursday with a return to work for Friday.    Chronic Fatigue: She also reports having troubles with energy levels and feeling tired, especially after coming home from work and during  the weekend. She has had a previous TSH, B12, iron studies for chronic fatigue, which have been normal on 3/25/24; however, her vitamin D level was found to be low (21). She mentions she has been taking Vitamin D supplements since then, as well as Vitamin B12. She does report some trouble sleeping with approximately 6 hours of sleep--she has to wake up at 3:45 a.m. for her work with [employer]. She does have a past history of depression, which has been stable with duloxetine 30 mg. She denies any dysphoric mood or anxiety.        PAST MEDICAL HISTORY, SURGICAL/SOCIAL/FAMILY HISTORY REVIEWED AS PER CHART, WITH PERTINENT FINDINGS INCLUDED IN HISTORY SECTION OF NOTE.     Current Medications    Medication List with Changes/Refills   New Medications    DOXYCYCLINE (VIBRAMYCIN) 100 MG CAP    Take 1 capsule (100 mg total) by mouth 2 (two) times daily. for 7 days   Current Medications    ADAPALENE-BENZOYL PEROXIDE (EPIDUO) 0.1-2.5 % GLWP    Apply thin layer to clean, dry skin of face nightly    CEVIMELINE (EVOXAC) 30 MG CAPSULE    TAKE 1 CAPSULE TWICE DAILY    CHOLECALCIFEROL, VITAMIN D3, 125 MCG (5,000 UNIT) CAPSULE    Take 1 capsule (5,000 Units total) by mouth daily with breakfast.    DULOXETINE (CYMBALTA) 30 MG CAPSULE    TAKE 1 CAPSULE ONCE DAILY    LORAZEPAM (ATIVAN) 1 MG TABLET    TAKE 1 TABLET ONCE DAILY    ONDANSETRON (ZOFRAN) 4 MG TABLET    Take 1 tablet (4 mg total) by mouth 2 (two) times daily.    TIRZEPATIDE, WEIGHT LOSS, (ZEPBOUND) 7.5 MG/0.5 ML PNIJ    Inject 7.5 mg into the skin every 7 days.       Allergies   Review of patient's allergies indicates:   Allergen Reactions    Codeine Nausea And Vomiting         Review of Systems (Pertinent positives)  Review of Systems   Constitutional:  Positive for fatigue. Negative for chills, fever and unexpected weight change (intentional weight loss on GLP-1).   HENT:  Positive for ear pain (yesterday, resolved today), postnasal drip, sinus pressure and sore  "throat (resolving). Negative for sinus pain.    Respiratory:  Positive for cough (congested). Negative for apnea, choking, chest tightness and shortness of breath.    Gastrointestinal:  Negative for abdominal pain, constipation, diarrhea, nausea and vomiting.   Psychiatric/Behavioral:  Negative for dysphoric mood. The patient is not nervous/anxious.        /62 (BP Location: Left arm, Patient Position: Sitting)   Pulse 104   Temp 98.1 °F (36.7 °C)   Ht 5' 2" (1.575 m)   Wt 59.6 kg (131 lb 6.3 oz)   LMP  (LMP Unknown)   SpO2 97%   BMI 24.03 kg/m²     Physical Exam  Constitutional:       Appearance: Normal appearance.   HENT:      Right Ear: Tympanic membrane and ear canal normal.      Left Ear: Tympanic membrane and ear canal normal.      Nose: No congestion or rhinorrhea.      Mouth/Throat:      Pharynx: Posterior oropharyngeal erythema (mild) present.      Comments: Postnasal drip  Eyes:      Extraocular Movements: Extraocular movements intact.      Conjunctiva/sclera: Conjunctivae normal.      Pupils: Pupils are equal, round, and reactive to light.   Pulmonary:      Effort: Pulmonary effort is normal.      Breath sounds: Normal breath sounds.   Abdominal:      General: Abdomen is flat.      Palpations: Abdomen is soft.   Musculoskeletal:      Comments: Empty Can: positive  External Rotation: negative   Demar lift off (behind back): negative  Neer: positive   Hawkin Ramon: positive     Sulcus or Apprehension Test: negative   Bicep tendinopathy: negative     Strength 5/5   Neurological:      Mental Status: She is alert.       After verbal informed consent was obtained during which risks, including risk of possible joint infection as a very unlikely but serious potential side effect were explained, patient agreed to proceed with the injection.  The shoulders were examined and appropriate landmarks identified. 3 passes of an alcohol prep pad were used to thoroughly clean the area identified for the " injection.  A mixture of 1.5 cc of Kenalog 40 mg per cc along with 3 cc of xylocaine 2% without epinephrine was then injected directly into the prepped area using a 1.5 in needle.  No complications or excessive bleeding was observed.  The extremity was found to be neurovascularly intact following the procedure, and  Band-Aid was placed on the injection site.  Patient advised that the affects of cortisone would likely reach max benefit within 48-72 hours.  This procedure was repeated for both shoulders.  Advised to call the office with any concerns following the procedure.      Assessment/Plan:  Terese Barney is a 64 y.o. female who presents today for :    - physical therapy  - doxycycline 100mg for upper respiratory infection         ICD-10-CM ICD-9-CM    1. Impingement of shoulder  M25.819 719.81 triamcinolone acetonide injection 60 mg      triamcinolone acetonide injection 60 mg      Ambulatory Referral/Consult to Physical Therapy      2. Chronic pain of both shoulders  M25.511 719.41 triamcinolone acetonide injection 60 mg    G89.29 338.29 triamcinolone acetonide injection 60 mg    M25.512  Ambulatory Referral/Consult to Physical Therapy      3. Subacromial bursitis of both shoulders  M75.51 726.19 triamcinolone acetonide injection 60 mg    M75.52  triamcinolone acetonide injection 60 mg      Ambulatory Referral/Consult to Physical Therapy      4. Acute bacterial sinusitis  J01.90 461.9 doxycycline (VIBRAMYCIN) 100 MG Cap    B96.89        5. Fatigue, unspecified type  R53.83 780.79 Comprehensive Metabolic Panel      CBC Auto Differential      TSH      Vitamin D      Vitamin B12      Magnesium      Ferritin      Iron and TIBC      6. History of iron deficiency anemia  Z86.2 V12.3 Comprehensive Metabolic Panel      CBC Auto Differential      TSH      Vitamin D      Vitamin B12      Magnesium      Ferritin      Iron and TIBC          Shoulder pain, Subacromial Bursitis, and Impingement  She has a history of  bilateral subacromial bursitis, previously got a subacromial triamcinolone injection by Dr. Potts on 11/8/24. Last injection gave her relief for about 4-6 months. Has good strength but positive neri-lorna, Neer, and empty can test.     - Repeat 60mg subacromial triamcinolone injection bilaterally  - PT referral for working on rotator cuff muscles   - Talked about long term goal for prevention of further exacerbation. Difficult for her due to job of lifting heavy bags with [employer]; She plans to retire at 67.    Acute Bacterial Sinusitis  Sore throat, cough, sinus pressure, and ear pain over past 4 days. Ear pain has now resolved. Cough has been productive with yellow-green sputum Is clear on examination. Throat showed mild erythema with postnasal drip. Is leaving back to [facility].    - Will give Doxycycline 100 mg for use if symptoms do not resolve in a couple of days     Chronic fatigue and history of iron deficiency anemia  Does not have enough energy at the end of the day or on weekends despite normal mood. Does have some lack of sleep. Previous labs have shown normal TSH, B12, and iron studies on 3/25/24. Did have Vitamin D of 21. Has been taking Vitamin D supplements along with Vitamin B12. Does also have a previous history of iron deficiency anemia requiring IV infusions In the past.    - Talked about importance of sleep and mental health maintenance (she works a challenging schedule with having to be awake very early in the morning-before 4:00 a.m. which is not ideal for her biological clock)  - Will reorder the following labs to reassess for organic cause of fatigue: CBC, CMP, TSH, Vitamin B12, Vitamin D, Magnesium, and iron studies  - Talked about nerve pain from subacromial bursitis that may be a cause for lack of sleep and increased tiredness; will follow up if continued pain and fatigue, may consider a nerve agent such as gabapentin    Ramiro Khoury MS-4    I hereby acknowledge that I am  relying upon documentation authored by a medical student working under my supervision and further I hereby attest that I have verified the student documentation or findings by personally re-performing the physical exam and medical decision making activities of the Evaluation and Management service to be billed.  Shanique Potts      There are no Patient Instructions on file for this visit.      Follow up for to follow up on lab results, return as needed for new concerns.

## 2025-05-15 DIAGNOSIS — G47.00 INSOMNIA, UNSPECIFIED TYPE: ICD-10-CM

## 2025-05-16 ENCOUNTER — RESULTS FOLLOW-UP (OUTPATIENT)
Dept: INTERNAL MEDICINE | Facility: CLINIC | Age: 65
End: 2025-05-16

## 2025-05-16 RX ORDER — LORAZEPAM 1 MG/1
1 TABLET ORAL DAILY PRN
Qty: 30 TABLET | Refills: 5 | Status: SHIPPED | OUTPATIENT
Start: 2025-05-16

## 2025-05-16 NOTE — TELEPHONE ENCOUNTER
Care Due:                  Date            Visit Type   Department     Provider  --------------------------------------------------------------------------------                                EP -                              PRIMARY      [facility]  Last Visit: 05-      CARE ([facility])   MEDICINE       Shanique Potts  Next Visit: None Scheduled  None         None Found                                                            Last  Test          Frequency    Reason                     Performed    Due Date  --------------------------------------------------------------------------------    HBA1C.......  6 months...  tirzepatide,.............  03- 09-    [facility] Embedded Care Due Messages. Reference number: [id number].   5/15/2025 10:46:51 PM CDT

## 2025-06-09 ENCOUNTER — PATIENT MESSAGE (OUTPATIENT)
Dept: INTERNAL MEDICINE | Facility: CLINIC | Age: 65
End: 2025-06-09
Payer: COMMERCIAL

## 2025-06-12 NOTE — TELEPHONE ENCOUNTER
We need to fax over Tirzepatide RX first thing on Friday morning 6/13-- messaging patient over portal to determine dose, thanks.

## (undated) DEVICE — NDL HYPO A BEVEL 22X1 1/2

## (undated) DEVICE — OBTURATOR BLADELESS 8MM XI

## (undated) DEVICE — BLADE SURG CARBON STEEL SZ11

## (undated) DEVICE — TROCAR ENDOPATH XCEL 12MM 10CM

## (undated) DEVICE — TROCAR ENDOPATH XCEL 12X100MM

## (undated) DEVICE — SHOE CAST POST-OP MEN SBUNION

## (undated) DEVICE — Device

## (undated) DEVICE — GOWN POLY REINF BRTH SLV XL

## (undated) DEVICE — SOL 9P NACL IRR PIC IL

## (undated) DEVICE — BLADE SCALP OPHTL RND TIP

## (undated) DEVICE — SUT VLOC 2-0 6IN 1/2 CIRCLE TP

## (undated) DEVICE — TRAY MINOR GEN SURG OMC

## (undated) DEVICE — DRAPE SCOPE PILLOW WARMER

## (undated) DEVICE — SEAL UNIVERSAL 5MM-8MM XI

## (undated) DEVICE — STOCKINET 4INX48

## (undated) DEVICE — BLADE SAGITTA 5/BX

## (undated) DEVICE — KIT ANTIFOG W/SPONG & FLUID

## (undated) DEVICE — GOWN SURGICAL X-LARGE

## (undated) DEVICE — SEE MEDLINE ITEM 152515

## (undated) DEVICE — SUT ETHILON 3-0 PS2 18 BLK

## (undated) DEVICE — DRESSING XEROFORM FOIL PK 1X8

## (undated) DEVICE — SUT 2/0 30IN SILK BLK BRAI

## (undated) DEVICE — TRAY MINOR ORTHO

## (undated) DEVICE — ADHESIVE DERMABOND ADVANCED

## (undated) DEVICE — LOOP VESSEL BLUE MAXI

## (undated) DEVICE — DRESSING XEROFORM 1X8IN

## (undated) DEVICE — SEE MEDLINE ITEM 157150

## (undated) DEVICE — COVER LIGHT HANDLE

## (undated) DEVICE — ELECTRODE REM PLYHSV RETURN 9

## (undated) DEVICE — SUT GUT PL. 4-0 27 FS-2

## (undated) DEVICE — GAUZE SPONGE 4X4 12PLY

## (undated) DEVICE — SUT 0 VICRYL / UR6 (J603)

## (undated) DEVICE — BANDAGE DERMACEA STRETCH 4X1IN

## (undated) DEVICE — APPLICATOR CHLORAPREP ORN 26ML

## (undated) DEVICE — STOCKINET TUBULAR 1 PLY 6X60IN

## (undated) DEVICE — PAD CAST SPECIALIST STRL 4

## (undated) DEVICE — NDL HYPO REG 25G X 1 1/2

## (undated) DEVICE — BLADE MICRO SAGGITAL SAW 5/BX

## (undated) DEVICE — DRAPE COLUMN DAVINCI XI

## (undated) DEVICE — SEE MEDLINE ITEM 146298

## (undated) DEVICE — TUBE SET SINGLE LUMEN FILTERED

## (undated) DEVICE — BANDAGE ESMARK 6X12

## (undated) DEVICE — DRAPE ARM DAVINCI XI